# Patient Record
Sex: MALE | Race: WHITE | Employment: OTHER | URBAN - METROPOLITAN AREA
[De-identification: names, ages, dates, MRNs, and addresses within clinical notes are randomized per-mention and may not be internally consistent; named-entity substitution may affect disease eponyms.]

---

## 2017-01-16 ENCOUNTER — OFFICE VISIT (OUTPATIENT)
Dept: CARDIOLOGY CLINIC | Age: 77
End: 2017-01-16

## 2017-01-16 DIAGNOSIS — I48.91 ATRIAL FIBRILLATION, UNSPECIFIED TYPE (HCC): Primary | Chronic | ICD-10-CM

## 2017-01-27 DIAGNOSIS — I48.0 PAROXYSMAL ATRIAL FIBRILLATION (HCC): ICD-10-CM

## 2017-01-27 RX ORDER — APIXABAN 5 MG/1
TABLET, FILM COATED ORAL
Qty: 60 TAB | Refills: 6 | Status: SHIPPED | OUTPATIENT
Start: 2017-01-27 | End: 2017-09-23 | Stop reason: SDUPTHER

## 2017-02-16 ENCOUNTER — CLINICAL SUPPORT (OUTPATIENT)
Dept: CARDIOLOGY CLINIC | Age: 77
End: 2017-02-16

## 2017-02-16 DIAGNOSIS — Z45.09 ENCOUNTER FOR LOOP RECORDER CHECK: ICD-10-CM

## 2017-02-16 DIAGNOSIS — Z98.890 S/P ABLATION OF ATRIAL FIBRILLATION: Primary | ICD-10-CM

## 2017-02-16 DIAGNOSIS — I48.0 PAROXYSMAL ATRIAL FIBRILLATION (HCC): Chronic | ICD-10-CM

## 2017-02-16 DIAGNOSIS — Z86.79 S/P ABLATION OF ATRIAL FIBRILLATION: Primary | ICD-10-CM

## 2017-02-20 ENCOUNTER — OFFICE VISIT (OUTPATIENT)
Dept: CARDIOLOGY CLINIC | Age: 77
End: 2017-02-20

## 2017-02-20 DIAGNOSIS — I48.0 PAROXYSMAL ATRIAL FIBRILLATION (HCC): Primary | Chronic | ICD-10-CM

## 2017-03-15 RX ORDER — ROSUVASTATIN CALCIUM 40 MG/1
TABLET, COATED ORAL
Qty: 30 TAB | Refills: 3 | Status: SHIPPED | OUTPATIENT
Start: 2017-03-15 | End: 2017-07-17 | Stop reason: SDUPTHER

## 2017-03-23 ENCOUNTER — OFFICE VISIT (OUTPATIENT)
Dept: CARDIOLOGY CLINIC | Age: 77
End: 2017-03-23

## 2017-03-23 DIAGNOSIS — I48.0 PAROXYSMAL ATRIAL FIBRILLATION (HCC): Primary | Chronic | ICD-10-CM

## 2017-04-05 RX ORDER — METOPROLOL TARTRATE 25 MG/1
TABLET, FILM COATED ORAL
Qty: 30 TAB | Refills: 10 | Status: SHIPPED | OUTPATIENT
Start: 2017-04-05 | End: 2017-06-02 | Stop reason: SDUPTHER

## 2017-04-24 ENCOUNTER — OFFICE VISIT (OUTPATIENT)
Dept: CARDIOLOGY CLINIC | Age: 77
End: 2017-04-24

## 2017-04-24 DIAGNOSIS — I48.0 PAROXYSMAL ATRIAL FIBRILLATION (HCC): Primary | Chronic | ICD-10-CM

## 2017-05-09 ENCOUNTER — CLINICAL SUPPORT (OUTPATIENT)
Dept: CARDIOLOGY CLINIC | Age: 77
End: 2017-05-09

## 2017-05-09 ENCOUNTER — OFFICE VISIT (OUTPATIENT)
Dept: CARDIOLOGY CLINIC | Age: 77
End: 2017-05-09

## 2017-05-09 VITALS
SYSTOLIC BLOOD PRESSURE: 138 MMHG | OXYGEN SATURATION: 97 % | RESPIRATION RATE: 20 BRPM | HEART RATE: 55 BPM | WEIGHT: 283.6 LBS | BODY MASS INDEX: 38.41 KG/M2 | DIASTOLIC BLOOD PRESSURE: 68 MMHG | HEIGHT: 72 IN

## 2017-05-09 DIAGNOSIS — Z01.810 PRE-OPERATIVE CARDIOVASCULAR EXAMINATION: ICD-10-CM

## 2017-05-09 DIAGNOSIS — I48.91 ATRIAL FIBRILLATION, UNSPECIFIED TYPE (HCC): Primary | Chronic | ICD-10-CM

## 2017-05-09 DIAGNOSIS — E78.2 MIXED HYPERLIPIDEMIA: Chronic | ICD-10-CM

## 2017-05-09 RX ORDER — BLOOD SUGAR DIAGNOSTIC
STRIP MISCELLANEOUS
Qty: 100 STRIP | Refills: 2 | Status: SHIPPED | OUTPATIENT
Start: 2017-05-09 | End: 2018-07-05 | Stop reason: SDUPTHER

## 2017-05-09 RX ORDER — METFORMIN HYDROCHLORIDE 500 MG/1
TABLET, FILM COATED, EXTENDED RELEASE ORAL
Refills: 10 | COMMUNITY
Start: 2017-04-09 | End: 2017-05-09 | Stop reason: SDUPTHER

## 2017-05-09 NOTE — MR AVS SNAPSHOT
Visit Information Date & Time Provider Department Dept. Phone Encounter #  
 5/9/2017  1:45 PM Joe Lockwood, [address] [facility] [phone] [id number] Your Appointments 7/20/2017 11:30 AM  
ROUTINE CARE with MD Dwayne [facility]hena [facility]) Appt Note: 9 month follow-  up  
 [address] [facility], [facility] [address]  
[facility] [address] [place] [id number]  
  
    
  
 5/25/2017  8:00 AM  
REMOTE OFFICE VISIT with [facility]) Appt Note: NOT A OFFICE VISIT  REMOTE MDT ILR  
 0043 [address] [facility]  
[phone] [address] [facility] Upcoming Health Maintenance Date Due DTaP/Tdap/Td series (1 - Tdap) 1/25/1963 ZOSTER VACCINE AGE 60> 1/25/2002 Pneumococcal 65+ Low/Medium Risk (1 of 2 - PCV13) 1/25/2007 MEDICARE YEARLY EXAM 1/25/2007 GLAUCOMA SCREENING Q2Y 3/13/2015 EYE EXAM RETINAL OR DILATED Q1 9/24/2015 HEMOGLOBIN A1C Q6M 4/21/2017 MICROALBUMIN Q1 6/22/2017 LIPID PANEL Q1 6/22/2017 INFLUENZA AGE 9 TO ADULT 8/1/2017 FOOT EXAM Q1 10/20/2017 Allergies as of 5/9/2017  Review Complete On: 5/9/2017 By: Xochitl Silverio Severity Noted Reaction Type Reactions Ciprocinonide Medium 04/25/2012   Side Effect Unable to Obtain Causes anxiety Current Immunizations  Never Reviewed No immunizations on file. Not reviewed this visit You Were Diagnosed With   
  
 Codes Comments Atrial fibrillation, unspecified type ([facility]    -  Primary ICD-10-CM: I48.91 
ICD-9-CM: 427.31 Mixed hyperlipidemia     ICD-10-CM: E78.2 ICD-9-CM: 272.2 Vitals  BP Pulse Resp Height(growth percentile) Weight(growth percentile) SpO2  
 138/68 (BP 1 Location: Left arm, BP Patient Position: Sitting) (!) 55 20 6' (1.829 m) 283 lb 9.6 oz (128.6 kg) 97% BMI Smoking Status 38.46 kg/m2 Former Smoker Vitals History BMI and BSA Data Body Mass Index Body Surface Area  
 38.46 kg/m 2 2.56 m 2 Preferred Pharmacy Pharmacy Name Phone [id number] [address] [place] [phone] Your Updated Medication List  
  
   
This list is accurate as of: 5/9/17  2:24 PM.  Always use your most recent med list.  
  
  
  
  
 ACCU-CHEK YVES PLUS TEST STRP strip Generic drug:  glucose blood VI test strips USE TO TEST BLOOD SUGAR DAILY (DX E11.9)  
  
 cyanocobalamin 1,000 mcg tablet Take 1,000 mcg by mouth daily. ECOTRIN LOW STRENGTH 81 mg tablet Generic drug:  aspirin delayed-release Take  by mouth daily. ELIQUIS 5 mg tablet Generic drug:  apixaban TAKE 1 TABLET BY MOUTH TWO (2) TIMES A DAY. latanoprost 0.005 % ophthalmic solution Commonly known as:  Javi Samaniego Administer 1 Drop to both eyes nightly. metFORMIN  mg tablet Commonly known as:  GLUCOPHAGE XR  
TAKE 2 TABLETS BY MOUTH 2 TIMES DAILY WITH MEALS  
  
 * metoprolol tartrate 25 mg tablet Commonly known as:  LOPRESSOR  
TAKE 1/2 TABLET BY MOUTH TWICE A DAY  
  
 * metoprolol tartrate 25 mg tablet Commonly known as:  LOPRESSOR  
TAKE 1/2 TABLET BY MOUTH 2 TIMES DAILY  
  
 rosuvastatin 40 mg tablet Commonly known as:  CRESTOR  
TAKE 1 TABLET BY MOUTH EVERY DAY  
  
 * Notice: This list has 2 medication(s) that are the same as other medications prescribed for you. Read the directions carefully, and ask your doctor or other care provider to review them with you. We Performed the Following AMB POC EKG ROUTINE W/ 12 LEADS, INTER & REP [36606 CPT(R)] Please provide this summary of care documentation to your next provider. Your primary care clinician is listed as NONE.  If you have any questions after today's visit, please call [phone].

## 2017-05-09 NOTE — PROGRESS NOTES
Subjective:      Morgan Jimenez is a 78 y.o. male is here for follow up s/p AF ablation 2016. He reports dizziness upon standing occasionally. The patient denies chest pain/ shortness of breath, orthopnea, PND, LE edema, palpitations, syncope, presyncope or fatigue.        Patient Active Problem List    Diagnosis Date Noted    S/P ablation of atrial fibrillation 2016    [facility] 2016    Irregular heartbeat 2016    Postsurgical aortocoronary bypass status 2012    Mixed hyperlipidemia 2012    Coronary atherosclerosis of native coronary artery 2012    Atrial fibrillation ([facility] 2012      None  Past Medical History:   Diagnosis Date    Coronary atherosclerosis of native coronary artery     Diabetes mellitus, type II [facility]     Mixed hyperlipidemia     Other dyspnea and respiratory abnormality     S/P ablation of atrial fibrillation 2016      Past Surgical History:   Procedure Laterality Date    HX CORONARY ARTERY BYPASS GRAFT       Allergies   Allergen Reactions    Ciprocinonide Unable to Obtain     Causes anxiety      Family History   Problem Relation Age of Onset    Diabetes Mother     Emphysema Father       age 46 - smoker    negative for cardiac disease  Social History     Social History    Marital status:      Spouse name: N/A    Number of children: N/A    Years of education: N/A     Social History Main Topics    Smoking status: Former Smoker     Packs/day: 4.00     Years: 20.00     Types: Cigarettes     Quit date: 1980    Smokeless tobacco: None      Comment: QUIT AT AGE 42    Alcohol use No    Drug use: None    Sexual activity: Not Asked     Other Topics Concern    None     Social History Narrative     Current Outpatient Prescriptions   Medication Sig    metoprolol tartrate (LOPRESSOR) 25 mg tablet TAKE 1/2 TABLET BY MOUTH 2 TIMES DAILY    rosuvastatin (CRESTOR) 40 mg tablet TAKE 1 TABLET BY MOUTH EVERY DAY    ELIQUIS 5 mg tablet TAKE 1 TABLET BY MOUTH TWO (2) TIMES A DAY.  metFORMIN ER (GLUCOPHAGE XR) 500 mg tablet TAKE 2 TABLETS BY MOUTH 2 TIMES DAILY WITH MEALS    cyanocobalamin 1,000 mcg tablet Take 1,000 mcg by mouth daily.  ACCU-CHEK YVES PLUS TEST STRP strip USE TO TEST BLOOD SUGAR DAILY (DX E11.9)    metoprolol (LOPRESSOR) 25 mg tablet TAKE 1/2 TABLET BY MOUTH TWICE A DAY    aspirin delayed-release (ECOTRIN LOW STRENGTH) 81 mg tablet Take  by mouth daily.  latanoprost (XALATAN) 0.005 % ophthalmic solution Administer 1 Drop to both eyes nightly. No current facility-administered medications for this visit. Vitals:    05/09/17 1346   BP: 138/68   Pulse: (!) 55   Resp: 20   SpO2: 97%   Weight: 283 lb 9.6 oz (128.6 kg)   Height: 6' (1.829 m)       I have reviewed the nurses notes, vitals, problem list, allergy list, medical history, family, social history and medications. Review of Symptoms:    General: Pt denies excessive weight gain or loss. Pt is able to conduct ADL's  HEENT: +dizziness,Denies blurred vision, headaches, epistaxis and difficulty swallowing. Respiratory: Denies shortness of breath, MCCOLLUM, wheezing or stridor. Cardiovascular: Denies precordial pain, palpitations, edema or PND  Gastrointestinal: Denies poor appetite, indigestion, abdominal pain or blood in stool  Urinary: Denies dysuria, pyuria  Musculoskeletal: Denies pain or swelling from muscles or joints  Neurologic: Denies tremor, paresthesias, or sensory motor disturbance  Skin: Denies rash, itching or texture change. Psych: Denies depression      Physical Exam:      General: Well developed, in no acute distress. HEENT: Eyes - PERRL, no jvd  Heart:  Normal S1/S2 negative S3 or S4.  Regular, no murmur, gallop or rub.   Respiratory: Clear bilaterally x 4, no wheezing or rales  Abdomen:   Soft, non-tender, bowel sounds are active.   Extremities:  No edema, normal cap refill, no cyanosis. Musculoskeletal: No clubbing  Neuro: A&Ox3, speech clear, gait stable. Skin: Skin color is normal. No rashes or lesions. Non diaphoretic  Vascular: 2+ pulses symmetric in all extremities    Cardiographics    Ekg: sinus bradycardia  ILR negative    No results found for this or any previous visit. Lab Results   Component Value Date/Time    WBC 9.2 08/04/2016 05:11 AM    HGB 10.3 08/04/2016 05:11 AM    HCT 31.0 08/04/2016 05:11 AM    PLATELET 756 34/20/0269 05:11 AM    MCV 92.5 08/04/2016 05:11 AM      Lab Results   Component Value Date/Time    Sodium 140 08/04/2016 05:11 AM    Potassium 4.3 08/04/2016 05:11 AM    Chloride 109 08/04/2016 05:11 AM    CO2 21 08/04/2016 05:11 AM    Anion gap 10 08/04/2016 05:11 AM    Glucose 164 08/04/2016 05:11 AM    BUN 25 08/04/2016 05:11 AM    Creatinine 1.67 08/04/2016 05:11 AM    BUN/Creatinine ratio 15 08/04/2016 05:11 AM    GFR est AA 49 08/04/2016 05:11 AM    GFR est non-AA 40 08/04/2016 05:11 AM    Calcium 8.4 08/04/2016 05:11 AM    Bilirubin, total 0.5 07/26/2016 07:31 AM    AST (SGOT) 11 07/26/2016 07:31 AM    Alk. phosphatase 57 07/26/2016 07:31 AM    Protein, total 6.5 07/26/2016 07:31 AM    Albumin 4.2 07/26/2016 07:31 AM    A-G Ratio 1.8 07/26/2016 07:31 AM    ALT (SGPT) 15 07/26/2016 07:31 AM         Assessment:     Assessment:        ICD-10-CM ICD-9-CM    1. Atrial fibrillation, unspecified type ([facility] I48.91 427.31 AMB POC EKG ROUTINE W/ 12 LEADS, INTER & REP     Orders Placed This Encounter    AMB POC EKG ROUTINE W/ 12 LEADS, INTER & REP     Order Specific Question:   Reason for Exam:     Answer:   Routine    DISCONTD: metFORMIN (GLUMETZA ER) 500 mg TG24 24 hour tablet     Sig: TAKE 2 TABLETS BY MOUTH TWICE A DAY WITH MEALS     Refill:  10        Plan:   Mr. Aye Parker is here for f/u of AF s/p AF ablation 8/2016. He reports some dizziness with positional change- will increase hydration status. EKG is sinus bradycardia and ILR negative for events. Will continue medical therapy and follow up with Dr. Marga Brewster in one year. Echocardiogram last year demonstrated an EF of 50-55% and he denies sob, chest pain or other cardiac complaints. He is low risk of cardiac complications for upcoming cataract surgery. Continue medical management for hyperlipidemia. Dia Bansal NP     Thank you for allowing me to participate in Alesiha Antonia 's care.     Yassine Horton MD, Socrates Paredes

## 2017-05-09 NOTE — PROGRESS NOTES
Chief Complaint   Patient presents with    Irregular Heart Beat     6 mo f/u    Cholesterol Problem     \"

## 2017-05-25 ENCOUNTER — OFFICE VISIT (OUTPATIENT)
Dept: CARDIOLOGY CLINIC | Age: 77
End: 2017-05-25

## 2017-05-25 DIAGNOSIS — I48.91 ATRIAL FIBRILLATION, UNSPECIFIED TYPE (HCC): Primary | Chronic | ICD-10-CM

## 2017-06-02 ENCOUNTER — OFFICE VISIT (OUTPATIENT)
Dept: FAMILY MEDICINE CLINIC | Age: 77
End: 2017-06-02

## 2017-06-02 VITALS
DIASTOLIC BLOOD PRESSURE: 54 MMHG | WEIGHT: 288 LBS | SYSTOLIC BLOOD PRESSURE: 130 MMHG | RESPIRATION RATE: 18 BRPM | BODY MASS INDEX: 39.01 KG/M2 | HEART RATE: 55 BPM | HEIGHT: 72 IN | TEMPERATURE: 96.5 F | OXYGEN SATURATION: 97 %

## 2017-06-02 DIAGNOSIS — E11.9 CONTROLLED TYPE 2 DIABETES MELLITUS WITHOUT COMPLICATION, WITHOUT LONG-TERM CURRENT USE OF INSULIN (HCC): ICD-10-CM

## 2017-06-02 DIAGNOSIS — E78.5 HYPERLIPIDEMIA, UNSPECIFIED HYPERLIPIDEMIA TYPE: ICD-10-CM

## 2017-06-02 DIAGNOSIS — I48.91 ATRIAL FIBRILLATION, UNSPECIFIED TYPE (HCC): ICD-10-CM

## 2017-06-02 DIAGNOSIS — I25.810 CORONARY ARTERY DISEASE INVOLVING CORONARY BYPASS GRAFT OF NATIVE HEART WITHOUT ANGINA PECTORIS: ICD-10-CM

## 2017-06-02 DIAGNOSIS — H25.9 AGE-RELATED CATARACT OF BOTH EYES, UNSPECIFIED AGE-RELATED CATARACT TYPE: Primary | ICD-10-CM

## 2017-06-02 RX ORDER — OFLOXACIN 3 MG/ML
SOLUTION/ DROPS OPHTHALMIC
COMMUNITY
Start: 2017-05-26 | End: 2017-07-27

## 2017-06-02 NOTE — PATIENT INSTRUCTIONS
Continue same medication  Keep planned follow-up with cardiology and endocrinology and ophthalmology  Lab work today  As long as lab work within acceptable limits may proceed with planned cataract surgery    Recommend routine checkup and Medicare wellness visit at your convenience

## 2017-06-02 NOTE — PROGRESS NOTES
Iván Post is a 78 y.o. male who presents to the office today with the following:  Chief Complaint   Patient presents with    Pre-op Exam     cataract surgery,  and        Allergies   Allergen Reactions    Ciprocinonide Unable to Obtain     Causes anxiety       Current Outpatient Prescriptions   Medication Sig    ACCU-CHEK YVES PLUS TEST STRP strip USE TO TEST BLOOD SUGAR DAILY (DX E11.9)    rosuvastatin (CRESTOR) 40 mg tablet TAKE 1 TABLET BY MOUTH EVERY DAY    ELIQUIS 5 mg tablet TAKE 1 TABLET BY MOUTH TWO (2) TIMES A DAY.  metFORMIN ER (GLUCOPHAGE XR) 500 mg tablet TAKE 2 TABLETS BY MOUTH 2 TIMES DAILY WITH MEALS    cyanocobalamin 1,000 mcg tablet Take 1,000 mcg by mouth daily.  metoprolol (LOPRESSOR) 25 mg tablet TAKE 1/2 TABLET BY MOUTH TWICE A DAY    aspirin delayed-release (ECOTRIN LOW STRENGTH) 81 mg tablet Take  by mouth daily.  latanoprost (XALATAN) 0.005 % ophthalmic solution Administer 1 Drop to both eyes nightly.  ofloxacin (FLOXIN) 0.3 % ophthalmic solution      No current facility-administered medications for this visit. Past Medical History:   Diagnosis Date    Coronary atherosclerosis of native coronary artery     Diabetes mellitus, type II ([facility]     Mixed hyperlipidemia     Other dyspnea and respiratory abnormality     S/P ablation of atrial fibrillation 2016       Past Surgical History:   Procedure Laterality Date    HX CORONARY ARTERY BYPASS GRAFT         History   Smoking Status    Former Smoker    Packs/day: 4.00    Years: 20.00    Types: Cigarettes    Quit date: 1980   Smokeless Tobacco    Not on file     Comment: QUIT AT AGE 42       Family History   Problem Relation Age of Onset    Diabetes Mother    [address] Emphysema Father       age 46 - smoker         History of Present Illness:  Patient here for preop visit cataract surgery. Patient follows with ophthalmology. Found to have cataracts.   They are planning to do bilateral cataract surgery on 2 separate dates in June. Ophthalmology requested preop clearance. Physical form from ophthalmology presented by patient. Ophthalmology did not recommend/require discontinuation of anticoagulants prior to cataract surgery    Patient currently gets his care to the specialist in [place]. Is thinking of establishing in this area but probably not at this office because of location    Past medical problems. Patient has a history of CAD. Post bypass. No history of MI. Compliant with his medications. Follows with cardiology. He denies any symptoms. Specifically no chest pain shortness of breath palpitations. Patient does have sick sinus syndrome and atrial fibrillation. Status post ablation. On Eliquis and aspirin. He has an implantable cardiac monitor. Recently saw his cardiologist.  No cardiac complaints. His cardiologist noted in his most recent visit that this patient was low risk for cataract surgery. Patient does have diabetes. Compliant with metformin. He does follow with endocrinology. He denies any diabetic complaints. He does have obesity    Past medical history is otherwise negative    Past surgical history remarkable for ablation and coronary bypass. No surgical complications    EKG done by cardiology in May  Lab work ordered today      Review of Systems:    Review of systems negative except as noted above      Physical Exam:  Visit Vitals    /54 (BP 1 Location: Left arm, BP Patient Position: Sitting)    Pulse (!) 55    Temp 96.5 °F (35.8 °C) (Temporal)    Resp 18    Ht 6' (1.829 m)    Wt 288 lb (130.6 kg)    SpO2 97%    BMI 39.06 kg/m2     Vitals:    06/02/17 0945   BP: 130/54   BP 1 Location: Left arm   BP Patient Position: Sitting   Pulse: (!) 55   Resp: 18   Temp: 96.5 °F (35.8 °C)   TempSrc: Temporal   SpO2: 97%   Weight: 288 lb (130.6 kg)   Height: 6' (1.829 m)     Patient no acute distress vital signs stable as above.   Blood pressure controlled  Head is normocephalic  External ears normal.  Ear canals normal TMs were clear  Eyes PERRLA. EOMs full. Sclera clear. Full exam from ophthalmology  Nose within normal limits. Nares  normal.  No significant congestion  OP mucosa normal, no obvious lesions. Pharynx normal.  No erythema or exudate. Structures midline. No loose teeth or dentures  Neck no nodes no masses no bruits  Chest was clear no wheezes rhonchi or rales. Good air exchange  Cor regular rate and rhythm no S3-S4 no murmurs no ectopy  Abdomen obese no HSM no masses soft and was nontender  Extremities trace pretibial edema. Nontender. Good range of motion  Gait and gross neurologic exam were normal    Assessment/Plan:  1. Age-related cataract of both eyes, unspecified age-related cataract type  Patient here for preop evaluation cataract surgery. Exam was done. Labs ordered. Overall he appears medically stable. As long as his labs are acceptable I see no contraindications to him proceeding with the planned surgical procedure. I will complete his paperwork on receipt of his labs. Otherwise patient will continue with his routine medications. He will follow-up with the above providers. I recommended routine medical checkup at his convenience    2. Coronary artery disease involving coronary bypass graft of native heart without angina pectoris    - CBC WITH AUTOMATED DIFF    3. Atrial fibrillation, unspecified type (HCC)  Sinus rhythm today    4. Controlled type 2 diabetes mellitus without complication, without long-term current use of insulin ([facility]  Followed by endocrinology  - METABOLIC PANEL, BASIC    5. Hyperlipidemia, unspecified hyperlipidemia type      6.  BMI 39.0-39.9,adult      Patient Instructions   Continue same medication  Keep planned follow-up with cardiology and endocrinology and ophthalmology  Lab work today  As long as lab work within acceptable limits may proceed with planned cataract surgery    Recommend routine checkup and Medicare wellness visit at your convenience              Continue current therapy plan except for indicated above. Verbal and written instructions (see AVS) provided.  Patient expresses understanding of diagnosis and treatment plan. Follow-up Disposition:  Return if symptoms worsen or fail to improve. Luna Dalton.  Chad Garcia MD

## 2017-06-02 NOTE — MR AVS SNAPSHOT
Visit Information Date & Time Provider Department Dept. Phone Encounter #  
 6/2/2017 10:00 AM Allan Paredes MD [address] [id number] Follow-up Instructions Return if symptoms worsen or fail to improve. Your Appointments 7/20/2017 11:30 AM  
ROUTINE CARE with Nery Blair MD  
[facility]) Appt Note: 9 month follow-  up  
 [address] [facility] [address]  
[address] [address] [place] [id number] Upcoming Health Maintenance Date Due DTaP/Tdap/Td series (1 - Tdap) 1/25/1961 ZOSTER VACCINE AGE 60> 1/25/2002 Pneumococcal 65+ Low/Medium Risk (1 of 2 - PCV13) 1/25/2007 MEDICARE YEARLY EXAM 1/25/2007 GLAUCOMA SCREENING Q2Y 3/13/2015 EYE EXAM RETINAL OR DILATED Q1 9/24/2015 HEMOGLOBIN A1C Q6M 4/21/2017 MICROALBUMIN Q1 6/22/2017 LIPID PANEL Q1 6/22/2017 INFLUENZA AGE 9 TO ADULT 8/1/2017 FOOT EXAM Q1 10/20/2017 Allergies as of 6/2/2017  Review Complete On: 6/2/2017 By: Loretto Rinne, LPN Severity Noted Reaction Type Reactions Ciprocinonide Medium 04/25/2012   Side Effect Unable to Obtain Causes anxiety Current Immunizations  Never Reviewed No immunizations on file. Not reviewed this visit You Were Diagnosed With   
  
 Codes Comments Age-related cataract of both eyes, unspecified age-related cataract type    -  Primary ICD-10-CM: H25.9 ICD-9-CM: 366.10 Coronary artery disease involving coronary bypass graft of native heart without angina pectoris     ICD-10-CM: I25.810 ICD-9-CM: 414.05 Atrial fibrillation, unspecified type ([facility]     ICD-10-CM: I48.91 
ICD-9-CM: 427.31 Controlled type 2 diabetes mellitus without complication, without long-term current use of insulin ([facility]     ICD-10-CM: E11.9 ICD-9-CM: 250.00 Hyperlipidemia, unspecified hyperlipidemia type     ICD-10-CM: E78.5 ICD-9-CM: 272.4 BMI 39.0-39.9,adult     ICD-10-CM: Q17.00 ICD-9-CM: V85.39 Vitals BP Pulse Temp Resp Height(growth percentile) 130/54 (BP 1 Location: Left arm, BP Patient Position: Sitting) (!) 55 96.5 °F (35.8 °C) (Temporal) 18 6' (1.829 m) Weight(growth percentile) SpO2 BMI Smoking Status 288 lb (130.6 kg) 97% 39.06 kg/m2 Former Smoker BMI and BSA Data Body Mass Index Body Surface Area 39.06 kg/m 2 2.58 m 2 Preferred Pharmacy Pharmacy Name Phone [id number] [phone] Your Updated Medication List  
  
   
This list is accurate as of: 6/2/17 10:29 AM.  Always use your most recent med list.  
  
  
  
  
 ACCU-CHEK YVES PLUS TEST STRP strip Generic drug:  glucose blood VI test strips USE TO TEST BLOOD SUGAR DAILY (DX E11.9)  
  
 cyanocobalamin 1,000 mcg tablet Take 1,000 mcg by mouth daily. ECOTRIN LOW STRENGTH 81 mg tablet Generic drug:  aspirin delayed-release Take  by mouth daily. ELIQUIS 5 mg tablet Generic drug:  apixaban TAKE 1 TABLET BY MOUTH TWO (2) TIMES A DAY. latanoprost 0.005 % ophthalmic solution Commonly known as:  Yanet Sicilian Administer 1 Drop to both eyes nightly. metFORMIN  mg tablet Commonly known as:  GLUCOPHAGE XR  
TAKE 2 TABLETS BY MOUTH 2 TIMES DAILY WITH MEALS  
  
 metoprolol tartrate 25 mg tablet Commonly known as:  LOPRESSOR  
TAKE 1/2 TABLET BY MOUTH TWICE A DAY  
  
 ofloxacin 0.3 % ophthalmic solution Commonly known as:  FLOXIN  
  
 rosuvastatin 40 mg tablet Commonly known as:  CRESTOR  
TAKE 1 TABLET BY MOUTH EVERY DAY We Performed the Following CBC WITH AUTOMATED DIFF [78909 CPT(R)] METABOLIC PANEL, BASIC [82424 CPT(R)] Follow-up Instructions Return if symptoms worsen or fail to improve. Patient Instructions Continue same medication Keep planned follow-up with cardiology and endocrinology and ophthalmology Lab work today As long as lab work within acceptable limits may proceed with planned cataract surgery Recommend routine checkup and Medicare wellness visit at your convenience Please provide this summary of care documentation to your next provider. Your primary care clinician is listed as NONE. If you have any questions after today's visit, please call [phone].

## 2017-06-05 ENCOUNTER — TELEPHONE (OUTPATIENT)
Dept: FAMILY MEDICINE CLINIC | Age: 77
End: 2017-06-05

## 2017-06-05 PROBLEM — D64.9 ANEMIA: Status: ACTIVE | Noted: 2017-06-05

## 2017-06-05 NOTE — PROGRESS NOTES
Labs reviewed  Chemistry panel stable with glucose of 187 and mild renal insufficiency  Patient does have a mild normocytic anemia. Possibly due to his renal insufficiency.   Improved a bit since 2016  He may proceed with planned cataract surgery  Recommend he establish for primary care and follow-up to discuss his anemia and possible additional lab testing  In the interim I recommend he start over-the-counter vitamin with iron

## 2017-06-05 NOTE — PROGRESS NOTES
Patient notified about labs will make follow up appointment to establish care with Dr Chad Garcia to discuss anemia patient has a endocrinologist and will address sugars with them

## 2017-06-19 RX ORDER — METFORMIN HYDROCHLORIDE 500 MG/1
TABLET, EXTENDED RELEASE ORAL
Qty: 120 TAB | Refills: 10 | Status: SHIPPED | OUTPATIENT
Start: 2017-06-19 | End: 2018-06-26 | Stop reason: SDUPTHER

## 2017-06-26 ENCOUNTER — OFFICE VISIT (OUTPATIENT)
Dept: CARDIOLOGY CLINIC | Age: 77
End: 2017-06-26

## 2017-06-26 DIAGNOSIS — I48.91 ATRIAL FIBRILLATION, UNSPECIFIED TYPE (HCC): Primary | Chronic | ICD-10-CM

## 2017-07-13 LAB
BASOPHILS # BLD AUTO: 0 X10E3/UL (ref 0–0.2)
BASOPHILS NFR BLD AUTO: 0 %
BUN SERPL-MCNC: 28 MG/DL (ref 8–27)
BUN/CREAT SERPL: 18 (ref 10–24)
CALCIUM SERPL-MCNC: 9.3 MG/DL (ref 8.6–10.2)
CHLORIDE SERPL-SCNC: 101 MMOL/L (ref 96–106)
CO2 SERPL-SCNC: 21 MMOL/L (ref 18–29)
CREAT SERPL-MCNC: 1.57 MG/DL (ref 0.76–1.27)
EOSINOPHIL # BLD AUTO: 0.1 X10E3/UL (ref 0–0.4)
EOSINOPHIL NFR BLD AUTO: 1 %
ERYTHROCYTE [DISTWIDTH] IN BLOOD BY AUTOMATED COUNT: 14.6 % (ref 12.3–15.4)
GLUCOSE SERPL-MCNC: 187 MG/DL (ref 65–99)
HCT VFR BLD AUTO: 32.9 % (ref 37.5–51)
HGB BLD-MCNC: 10.6 G/DL (ref 12.6–17.7)
IMM GRANULOCYTES # BLD: 0 X10E3/UL (ref 0–0.1)
IMM GRANULOCYTES NFR BLD: 0 %
INTERPRETATION: NORMAL
LYMPHOCYTES # BLD AUTO: 1.1 X10E3/UL (ref 0.7–3.1)
LYMPHOCYTES NFR BLD AUTO: 17 %
MCH RBC QN AUTO: 29.9 PG (ref 26.6–33)
MCHC RBC AUTO-ENTMCNC: 32.2 G/DL (ref 31.5–35.7)
MCV RBC AUTO: 93 FL (ref 79–97)
MONOCYTES # BLD AUTO: 0.6 X10E3/UL (ref 0.1–0.9)
MONOCYTES NFR BLD AUTO: 9 %
NEUTROPHILS # BLD AUTO: 4.7 X10E3/UL (ref 1.4–7)
NEUTROPHILS NFR BLD AUTO: 73 %
PLATELET # BLD AUTO: 171 X10E3/UL (ref 150–379)
POTASSIUM SERPL-SCNC: 4.9 MMOL/L (ref 3.5–5.2)
RBC # BLD AUTO: 3.55 X10E6/UL (ref 4.14–5.8)
SODIUM SERPL-SCNC: 141 MMOL/L (ref 134–144)
WBC # BLD AUTO: 6.5 X10E3/UL (ref 3.4–10.8)

## 2017-07-17 RX ORDER — ROSUVASTATIN CALCIUM 40 MG/1
TABLET, COATED ORAL
Qty: 30 TAB | Refills: 3 | Status: SHIPPED | OUTPATIENT
Start: 2017-07-17 | End: 2017-12-04 | Stop reason: SDUPTHER

## 2017-07-27 ENCOUNTER — OFFICE VISIT (OUTPATIENT)
Dept: CARDIOLOGY CLINIC | Age: 77
End: 2017-07-27

## 2017-07-27 ENCOUNTER — OFFICE VISIT (OUTPATIENT)
Dept: ENDOCRINOLOGY | Age: 77
End: 2017-07-27

## 2017-07-27 VITALS
HEART RATE: 60 BPM | DIASTOLIC BLOOD PRESSURE: 48 MMHG | BODY MASS INDEX: 37.79 KG/M2 | HEIGHT: 72 IN | WEIGHT: 279 LBS | SYSTOLIC BLOOD PRESSURE: 109 MMHG

## 2017-07-27 DIAGNOSIS — N18.30 CKD (CHRONIC KIDNEY DISEASE) STAGE 3, GFR 30-59 ML/MIN (HCC): ICD-10-CM

## 2017-07-27 DIAGNOSIS — E11.9 CONTROLLED TYPE 2 DIABETES MELLITUS WITHOUT COMPLICATION, WITHOUT LONG-TERM CURRENT USE OF INSULIN (HCC): Primary | ICD-10-CM

## 2017-07-27 DIAGNOSIS — D64.9 ANEMIA, UNSPECIFIED TYPE: ICD-10-CM

## 2017-07-27 DIAGNOSIS — Z86.79 S/P ABLATION OF ATRIAL FIBRILLATION: ICD-10-CM

## 2017-07-27 DIAGNOSIS — I48.91 ATRIAL FIBRILLATION, UNSPECIFIED TYPE (HCC): Primary | Chronic | ICD-10-CM

## 2017-07-27 DIAGNOSIS — E78.2 MIXED HYPERLIPIDEMIA: Chronic | ICD-10-CM

## 2017-07-27 DIAGNOSIS — Z98.890 S/P ABLATION OF ATRIAL FIBRILLATION: ICD-10-CM

## 2017-07-27 NOTE — MR AVS SNAPSHOT
Visit Information Date & Time Provider Department Dept. Phone Encounter #  
 7/27/2017  2:30 PM Brittaney Scott, [address] [facility] [phone] Follow-up Instructions Return in about 1 year (around 7/27/2018). Upcoming Health Maintenance Date Due DTaP/Tdap/Td series (1 - Tdap) 1/25/1963 ZOSTER VACCINE AGE 60> 11/25/2001 Pneumococcal 65+ Low/Medium Risk (1 of 2 - PCV13) 1/25/2007 MEDICARE YEARLY EXAM 1/25/2007 GLAUCOMA SCREENING Q2Y 3/13/2015 EYE EXAM RETINAL OR DILATED Q1 9/24/2015 HEMOGLOBIN A1C Q6M 4/21/2017 MICROALBUMIN Q1 6/22/2017 LIPID PANEL Q1 6/22/2017 INFLUENZA AGE 9 TO ADULT 8/1/2017 FOOT EXAM Q1 10/20/2017 Allergies as of 7/27/2017  Review Complete On: 7/27/2017 By: Brittaney Scott MD  
  
 Severity Noted Reaction Type Reactions Ciprocinonide Medium 04/25/2012   Side Effect Unable to Obtain Causes anxiety Current Immunizations  Never Reviewed No immunizations on file. Not reviewed this visit You Were Diagnosed With   
  
 Codes Comments Controlled type 2 diabetes mellitus without complication, without long-term current use of insulin ([facility]    -  Primary ICD-10-CM: E11.9 ICD-9-CM: 250.00 Mixed hyperlipidemia     ICD-10-CM: E78.2 ICD-9-CM: 272.2 Hyperlipidemia, unspecified hyperlipidemia type     ICD-10-CM: E78.5 ICD-9-CM: 272.4 S/P ablation of atrial fibrillation     ICD-10-CM: Z98.890, Z86.79 
ICD-9-CM: V45.89 BMI 37.0-37.9, adult     ICD-10-CM: Z68.37 ICD-9-CM: V85.37 Anemia, unspecified type     ICD-10-CM: D64.9 ICD-9-CM: 285.9 CKD (chronic kidney disease) stage 3, GFR 30-59 ml/min     ICD-10-CM: N18.3 ICD-9-CM: 116. 3 Vitals BP Pulse Height(growth percentile) Weight(growth percentile) BMI Smoking Status 109/48 60 6' (1.829 m) 279 lb (126.6 kg) 37.84 kg/m2 Former Smoker Vitals History BMI and BSA Data Body Mass Index Body Surface Area  
 37.84 kg/m 2 2.54 m 2 Preferred Pharmacy Pharmacy Name Phone [id number]"" [address] [place] [address] [place] [phone] Your Updated Medication List  
  
   
This list is accurate as of: 7/27/17  3:34 PM.  Always use your most recent med list.  
  
  
  
  
 ACCU-CHEK YVES PLUS TEST STRP strip Generic drug:  glucose blood VI test strips USE TO TEST BLOOD SUGAR DAILY (DX E11.9)  
  
 cyanocobalamin 1,000 mcg tablet Take 1,000 mcg by mouth daily. ECOTRIN LOW STRENGTH 81 mg tablet Generic drug:  aspirin delayed-release Take  by mouth daily. ELIQUIS 5 mg tablet Generic drug:  apixaban TAKE 1 TABLET BY MOUTH TWO (2) TIMES A DAY. metFORMIN  mg tablet Commonly known as:  GLUCOPHAGE XR  
TAKE 2 TABLETS BY MOUTH 2 TIMES DAILY WITH MEALS  
  
 metoprolol tartrate 25 mg tablet Commonly known as:  LOPRESSOR  
TAKE 1/2 TABLET BY MOUTH TWICE A DAY  
  
 rosuvastatin 40 mg tablet Commonly known as:  CRESTOR  
TAKE 1 TABLET BY MOUTH EVERY DAY We Performed the Following FERRITIN [22604 CPT(R)] HEMOGLOBIN A1C WITH EAG [73436 CPT(R)] LIPID PANEL [58122 CPT(R)] METABOLIC PANEL, COMPREHENSIVE [52571 CPT(R)] PHOSPHORUS [77127 CPT(R)] RETICULOCYTE COUNT [id number] CPT(R)] Follow-up Instructions Return in about 1 year (around 7/27/2018). Patient Instructions Diabetes type II Watch portions Aim for 30 + minutes of dedicated exercise most days (walking, kayaking, etc) Check sugars before meals and at bedtime 2 days on the 1st and 15th of the month Goals for blood sugars: 
Fasting  (less than 150) Other times -  (less than 180). Cholesterol: 
Continue Crestor 40 mg 
 
Kidneys: 
Blood pressure and diabetes control. Anemia-  May be due to kidney disease. Will check iron levels Please provide this summary of care documentation to your next provider. Your primary care clinician is listed as NONE. If you have any questions after today's visit, please call [phone].

## 2017-07-27 NOTE — PATIENT INSTRUCTIONS
Diabetes type II  Watch portions  Aim for 30 + minutes of dedicated exercise most days (walking, kayaking, etc)    Check sugars before meals and at bedtime 2 days on the 1st and 15th of the month    Goals for blood sugars:  Fasting  (less than 150)  Other times -  (less than 180). Cholesterol:  Continue Crestor 40 mg    Kidneys:  Blood pressure and diabetes control. Anemia-  May be due to kidney disease.  Will check iron levels

## 2017-07-27 NOTE — PROGRESS NOTES
History of Present Illness: Alexys Lawrence is a 78 y.o. male presents for follow-up of diabetes. He has CAD and is s/p CABG. Following with Dr Anahi Starkey office for this. Since last visit had atrial fibrillation and then atrial ablation. Taking Eliquis. Diabetes related complications:  + microalbuminuria   Sees eye doctor every 6 months for glaucoma - no retinopathy problems    Current diabetes regimen:  Metformin ER 2 g daily     Glucoses:   monitors every other day at various times. Most are in 130 range     Weight is overall stable over the last year. Diet:  Watching portions more. Lipids - generic rosuvastatin    Blood pressure - remains controlled. Taking only a small dose of metoprolol twice daily    Exercise: no dedicated exercise. Can resume walking, which he did in the past.  Also has a kayak. He is also anemic, which appears stable. This may be due to his chronic kidney disease. Past Medical History:   Diagnosis Date    Anemia 6/5/2017    Coronary atherosclerosis of native coronary artery     Diabetes mellitus, type II (HCC)     Mixed hyperlipidemia     Other dyspnea and respiratory abnormality     S/P ablation of atrial fibrillation 8/4/2016     Current Outpatient Prescriptions   Medication Sig    rosuvastatin (CRESTOR) 40 mg tablet TAKE 1 TABLET BY MOUTH EVERY DAY    metFORMIN ER (GLUCOPHAGE XR) 500 mg tablet TAKE 2 TABLETS BY MOUTH 2 TIMES DAILY WITH MEALS    ACCU-CHEK YVES PLUS TEST STRP strip USE TO TEST BLOOD SUGAR DAILY (DX E11.9)    ELIQUIS 5 mg tablet TAKE 1 TABLET BY MOUTH TWO (2) TIMES A DAY.  cyanocobalamin 1,000 mcg tablet Take 1,000 mcg by mouth daily.  metoprolol (LOPRESSOR) 25 mg tablet TAKE 1/2 TABLET BY MOUTH TWICE A DAY    aspirin delayed-release (ECOTRIN LOW STRENGTH) 81 mg tablet Take  by mouth daily.       ofloxacin (FLOXIN) 0.3 % ophthalmic solution     latanoprost (XALATAN) 0.005 % ophthalmic solution Administer 1 Drop to both eyes nightly. No current facility-administered medications for this visit. Allergies   Allergen Reactions    Ciprocinonide Unable to Obtain     Causes anxiety       Review of Systems:  - Eyes: no blurry vision or double vision  - Cardiovascular: no chest pain  - Respiratory: no shortness of breath  - Musculoskeletal: no myalgias  - Neurological: no numbness/tingling in extremities    Physical Examination:  Visit Vitals    /48    Pulse 60    Ht 6' (1.829 m)    Wt 279 lb (126.6 kg)    BMI 37.84 kg/m2   -   - General: pleasant, no distress, normal gait   HEENT: hearing intact, EOMI, clear sclera without icterus  - Cardiovascular: regular, normal rate   - Respiratory: normal effort  - Integumentary: no edema    Diabetic foot exam:     Right: Filament test normal sensation with micro filament   Pulse DP: 2+ (normal)   Deformities: None    - Psychiatric: normal mood and affect    Data Reviewed:   Component      Latest Ref Rng & Units 6/2/2017 6/2/2017 10/21/2016 6/22/2016          10:28 AM 10:28 AM  8:44 AM  9:12 AM   WBC      3.4 - 10.8 x10E3/uL  6.5     RBC      4.14 - 5.80 x10E6/uL  3.55 (L)     HGB      12.6 - 17.7 g/dL  10.6 (L)     HCT      37.5 - 51.0 %  32.9 (L)     MCV      79 - 97 fL  93     MCH      26.6 - 33.0 pg  29.9     MCHC      31.5 - 35.7 g/dL  32.2     RDW      12.3 - 15.4 %  14.6     PLATELET      916 - 929 x10E3/uL  171     NEUTROPHILS      %  73     Lymphocytes      %  17     MONOCYTES      %  9     EOSINOPHILS      %  1     BASOPHILS      %  0     ABS. NEUTROPHILS      1.4 - 7.0 x10E3/uL  4.7     Abs Lymphocytes      0.7 - 3.1 x10E3/uL  1.1     ABS. MONOCYTES      0.1 - 0.9 x10E3/uL  0.6     ABS. EOSINOPHILS      0.0 - 0.4 x10E3/uL  0.1     ABS. BASOPHILS      0.0 - 0.2 x10E3/uL  0.0     IMMATURE GRANULOCYTES      %  0     ABS. IMM.  GRANS.      0.0 - 0.1 x10E3/uL  0.0     Glucose      65 - 99 mg/dL 187 (H)      BUN      8 - 27 mg/dL 28 (H)      Creatinine      0.76 - 1.27 mg/dL 1.57 (H)      GFR est non-AA      >59 mL/min/1.73 42 (L)      GFR est AA      >59 mL/min/1.73 49 (L)      BUN/Creatinine ratio      10 - 24 18      Sodium      134 - 144 mmol/L 141      Potassium      3.5 - 5.2 mmol/L 4.9      Chloride      96 - 106 mmol/L 101      CO2      18 - 29 mmol/L 21      Calcium      8.6 - 10.2 mg/dL 9.3      Cholesterol, total      100 - 199 mg/dL    104   Triglyceride      0 - 149 mg/dL    122   HDL Cholesterol      >39 mg/dL    36 (L)   VLDL, calculated      5 - 40 mg/dL    24   LDL, calculated      0 - 99 mg/dL    44   Creatinine, urine      Not Estab. mg/dL       Microalbumin, urine      Not Estab. ug/mL       Microalbumin/Creat. Ratio      0.0 - 30.0 mg/g creat       Hemoglobin A1c, (calculated)      4.8 - 5.6 %   6.8 (H)    Estimated average glucose      mg/dL   148      Component      Latest Ref Rng & Units 6/22/2016           9:12 AM   WBC      3.4 - 10.8 x10E3/uL    RBC      4.14 - 5.80 x10E6/uL    HGB      12.6 - 17.7 g/dL    HCT      37.5 - 51.0 %    MCV      79 - 97 fL    MCH      26.6 - 33.0 pg    MCHC      31.5 - 35.7 g/dL    RDW      12.3 - 15.4 %    PLATELET      947 - 988 x10E3/uL    NEUTROPHILS      %    Lymphocytes      %    MONOCYTES      %    EOSINOPHILS      %    BASOPHILS      %    ABS. NEUTROPHILS      1.4 - 7.0 x10E3/uL    Abs Lymphocytes      0.7 - 3.1 x10E3/uL    ABS. MONOCYTES      0.1 - 0.9 x10E3/uL    ABS. EOSINOPHILS      0.0 - 0.4 x10E3/uL    ABS. BASOPHILS      0.0 - 0.2 x10E3/uL    IMMATURE GRANULOCYTES      %    ABS. IMM.  GRANS.      0.0 - 0.1 x10E3/uL    Glucose      65 - 99 mg/dL    BUN      8 - 27 mg/dL    Creatinine      0.76 - 1.27 mg/dL    GFR est non-AA      >59 mL/min/1.73    GFR est AA      >59 mL/min/1.73    BUN/Creatinine ratio      10 - 24    Sodium      134 - 144 mmol/L    Potassium      3.5 - 5.2 mmol/L    Chloride      96 - 106 mmol/L    CO2      18 - 29 mmol/L    Calcium      8.6 - 10.2 mg/dL    Cholesterol, total      100 - 199 mg/dL Triglyceride      0 - 149 mg/dL    HDL Cholesterol      >39 mg/dL    VLDL, calculated      5 - 40 mg/dL    LDL, calculated      0 - 99 mg/dL    Creatinine, urine      Not Estab. mg/dL 121.8   Microalbumin, urine      Not Estab. ug/mL 62.8   Microalbumin/Creat. Ratio      0.0 - 30.0 mg/g creat 51.6 (H)   Hemoglobin A1c, (calculated)      4.8 - 5.6 %    Estimated average glucose      mg/dL        Assessment/Plan:   1. Controlled type 2 diabetes mellitus without complication, without long-term current use of insulin (HCC)   Check hemoglobin A1c. Continue metformin  Encouraged dietary improvements to help with weight loss, and increasing exercise. If hemoglobin A1c is above goal, discussed adding Jardiance, and SGL T2 inhibitor, in order to lower glucoses further and also for the cardiovascular risk reduction indication   2. Mixed hyperlipidemia   Reassess. Continue rosuvastatin       5 S/P ablation of atrial fibrillation    6. BMI 37.0-37.9, adult   -Encouraged weight loss and dietary efforts   7. Anemia, unspecified type   Suspect this is due to kidney dysfunction. Will check ferritin level. Overall, this appears stable over the last year   8 CKD (chronic kidney disease) stage 3, GFR 30-59 ml/min   Reassess. This appears stable. With anemia, may be worth considering nephrology evaluation     Patient Instructions   Diabetes type II  Watch portions  Aim for 30 + minutes of dedicated exercise most days (walking, kayaking, etc)    Check sugars before meals and at bedtime 2 days on the 1st and 15th of the month    Goals for blood sugars:  Fasting  (less than 150)  Other times -  (less than 180). Cholesterol:  Continue Crestor 40 mg    Kidneys:  Blood pressure and diabetes control. Anemia-  May be due to kidney disease. Will check iron levels      Follow-up Disposition:  Return in about 1 year (around 7/27/2018).     Copy sent to:

## 2017-08-01 LAB
ALBUMIN SERPL-MCNC: 4.4 G/DL (ref 3.5–4.8)
ALBUMIN/CREAT UR: 123.9 MG/G CREAT (ref 0–30)
ALBUMIN/GLOB SERPL: 2.2 {RATIO} (ref 1.2–2.2)
ALP SERPL-CCNC: 56 IU/L (ref 39–117)
ALT SERPL-CCNC: 13 IU/L (ref 0–44)
AST SERPL-CCNC: 14 IU/L (ref 0–40)
BILIRUB SERPL-MCNC: 0.3 MG/DL (ref 0–1.2)
BUN SERPL-MCNC: 27 MG/DL (ref 8–27)
BUN/CREAT SERPL: 16 (ref 10–24)
CALCIUM SERPL-MCNC: 8.9 MG/DL (ref 8.6–10.2)
CHLORIDE SERPL-SCNC: 104 MMOL/L (ref 96–106)
CHOLEST SERPL-MCNC: 87 MG/DL (ref 100–199)
CO2 SERPL-SCNC: 21 MMOL/L (ref 18–29)
CREAT SERPL-MCNC: 1.73 MG/DL (ref 0.76–1.27)
CREAT UR-MCNC: 110.4 MG/DL
EST. AVERAGE GLUCOSE BLD GHB EST-MCNC: 157 MG/DL
FERRITIN SERPL-MCNC: 12 NG/ML (ref 30–400)
GLOBULIN SER CALC-MCNC: 2 G/DL (ref 1.5–4.5)
GLUCOSE SERPL-MCNC: 136 MG/DL (ref 65–99)
HBA1C MFR BLD: 7.1 % (ref 4.8–5.6)
HDLC SERPL-MCNC: 31 MG/DL
LDLC SERPL CALC-MCNC: 22 MG/DL (ref 0–99)
MICROALBUMIN UR-MCNC: 136.8 UG/ML
PHOSPHATE SERPL-MCNC: 3 MG/DL (ref 2.5–4.5)
POTASSIUM SERPL-SCNC: 4.6 MMOL/L (ref 3.5–5.2)
PROT SERPL-MCNC: 6.4 G/DL (ref 6–8.5)
RETICS/RBC NFR AUTO: 1.3 % (ref 0.6–2.6)
SODIUM SERPL-SCNC: 141 MMOL/L (ref 134–144)
TRIGL SERPL-MCNC: 172 MG/DL (ref 0–149)
VLDLC SERPL CALC-MCNC: 34 MG/DL (ref 5–40)

## 2017-08-28 ENCOUNTER — OFFICE VISIT (OUTPATIENT)
Dept: CARDIOLOGY CLINIC | Age: 77
End: 2017-08-28

## 2017-08-28 DIAGNOSIS — I48.91 ATRIAL FIBRILLATION, UNSPECIFIED TYPE (HCC): Primary | Chronic | ICD-10-CM

## 2017-10-02 ENCOUNTER — OFFICE VISIT (OUTPATIENT)
Dept: CARDIOLOGY CLINIC | Age: 77
End: 2017-10-02

## 2017-10-02 DIAGNOSIS — I48.0 PAROXYSMAL ATRIAL FIBRILLATION (HCC): ICD-10-CM

## 2017-10-02 DIAGNOSIS — I48.91 ATRIAL FIBRILLATION, UNSPECIFIED TYPE (HCC): Primary | Chronic | ICD-10-CM

## 2017-10-03 RX ORDER — APIXABAN 5 MG/1
TABLET, FILM COATED ORAL
Qty: 60 TAB | Refills: 6 | Status: SHIPPED | OUTPATIENT
Start: 2017-10-03 | End: 2018-04-10 | Stop reason: SDUPTHER

## 2017-11-02 ENCOUNTER — OFFICE VISIT (OUTPATIENT)
Dept: CARDIOLOGY CLINIC | Age: 77
End: 2017-11-02

## 2017-11-02 DIAGNOSIS — I48.91 ATRIAL FIBRILLATION, UNSPECIFIED TYPE (HCC): Primary | Chronic | ICD-10-CM

## 2017-12-04 ENCOUNTER — OFFICE VISIT (OUTPATIENT)
Dept: CARDIOLOGY CLINIC | Age: 77
End: 2017-12-04

## 2017-12-04 DIAGNOSIS — I48.91 ATRIAL FIBRILLATION, UNSPECIFIED TYPE (HCC): Primary | Chronic | ICD-10-CM

## 2017-12-04 RX ORDER — ROSUVASTATIN CALCIUM 40 MG/1
TABLET, COATED ORAL
Qty: 30 TAB | Refills: 3 | Status: SHIPPED | OUTPATIENT
Start: 2017-12-04 | End: 2018-04-19 | Stop reason: SDUPTHER

## 2018-01-04 ENCOUNTER — OFFICE VISIT (OUTPATIENT)
Dept: CARDIOLOGY CLINIC | Age: 78
End: 2018-01-04

## 2018-01-04 DIAGNOSIS — I48.91 ATRIAL FIBRILLATION, UNSPECIFIED TYPE (HCC): Primary | Chronic | ICD-10-CM

## 2018-02-05 ENCOUNTER — OFFICE VISIT (OUTPATIENT)
Dept: CARDIOLOGY CLINIC | Age: 78
End: 2018-02-05

## 2018-02-05 DIAGNOSIS — I48.91 ATRIAL FIBRILLATION, UNSPECIFIED TYPE (HCC): Primary | Chronic | ICD-10-CM

## 2018-03-08 ENCOUNTER — CLINICAL SUPPORT (OUTPATIENT)
Dept: CARDIOLOGY CLINIC | Age: 78
End: 2018-03-08

## 2018-03-08 DIAGNOSIS — I48.0 PAROXYSMAL ATRIAL FIBRILLATION (HCC): Primary | Chronic | ICD-10-CM

## 2018-04-09 ENCOUNTER — CLINICAL SUPPORT (OUTPATIENT)
Dept: CARDIOLOGY CLINIC | Age: 78
End: 2018-04-09

## 2018-04-09 DIAGNOSIS — I48.91 ATRIAL FIBRILLATION, UNSPECIFIED TYPE (HCC): Primary | Chronic | ICD-10-CM

## 2018-04-10 ENCOUNTER — CLINICAL SUPPORT (OUTPATIENT)
Dept: CARDIOLOGY CLINIC | Age: 78
End: 2018-04-10

## 2018-04-10 ENCOUNTER — OFFICE VISIT (OUTPATIENT)
Dept: CARDIOLOGY CLINIC | Age: 78
End: 2018-04-10

## 2018-04-10 VITALS
HEART RATE: 60 BPM | HEIGHT: 72 IN | SYSTOLIC BLOOD PRESSURE: 128 MMHG | RESPIRATION RATE: 16 BRPM | OXYGEN SATURATION: 96 % | BODY MASS INDEX: 39.06 KG/M2 | WEIGHT: 288.4 LBS | DIASTOLIC BLOOD PRESSURE: 58 MMHG

## 2018-04-10 DIAGNOSIS — Z45.09 ENCOUNTER FOR LOOP RECORDER CHECK: ICD-10-CM

## 2018-04-10 DIAGNOSIS — I25.10 ATHEROSCLEROSIS OF NATIVE CORONARY ARTERY OF NATIVE HEART WITHOUT ANGINA PECTORIS: ICD-10-CM

## 2018-04-10 DIAGNOSIS — I25.111 CORONARY ARTERY DISEASE INVOLVING NATIVE CORONARY ARTERY OF NATIVE HEART WITH ANGINA PECTORIS WITH DOCUMENTED SPASM (HCC): Primary | ICD-10-CM

## 2018-04-10 DIAGNOSIS — Z86.79 S/P ABLATION OF ATRIAL FIBRILLATION: Primary | ICD-10-CM

## 2018-04-10 DIAGNOSIS — Z98.890 S/P ABLATION OF ATRIAL FIBRILLATION: Primary | ICD-10-CM

## 2018-04-10 DIAGNOSIS — Z86.79 S/P ABLATION OF ATRIAL FIBRILLATION: ICD-10-CM

## 2018-04-10 DIAGNOSIS — I48.91 ATRIAL FIBRILLATION, UNSPECIFIED TYPE (HCC): Chronic | ICD-10-CM

## 2018-04-10 DIAGNOSIS — I49.9 IRREGULAR HEARTBEAT: ICD-10-CM

## 2018-04-10 DIAGNOSIS — R55 SYNCOPE AND COLLAPSE: ICD-10-CM

## 2018-04-10 DIAGNOSIS — I48.0 PAROXYSMAL ATRIAL FIBRILLATION (HCC): Chronic | ICD-10-CM

## 2018-04-10 DIAGNOSIS — E78.5 HYPERLIPIDEMIA, UNSPECIFIED HYPERLIPIDEMIA TYPE: ICD-10-CM

## 2018-04-10 DIAGNOSIS — Z98.890 S/P ABLATION OF ATRIAL FIBRILLATION: ICD-10-CM

## 2018-04-10 DIAGNOSIS — I49.5 SSS (SICK SINUS SYNDROME) (HCC): ICD-10-CM

## 2018-04-10 DIAGNOSIS — E78.2 MIXED HYPERLIPIDEMIA: Chronic | ICD-10-CM

## 2018-04-10 PROBLEM — E66.01 SEVERE OBESITY (BMI 35.0-39.9) WITH COMORBIDITY (HCC): Status: ACTIVE | Noted: 2018-04-10

## 2018-04-10 PROBLEM — E11.21 TYPE 2 DIABETES WITH NEPHROPATHY (HCC): Status: ACTIVE | Noted: 2018-04-10

## 2018-04-10 RX ORDER — GLUCOSAMINE SULFATE 1500 MG
1000 POWDER IN PACKET (EA) ORAL DAILY
COMMUNITY

## 2018-04-10 RX ORDER — FERROUS SULFATE 324(65)MG
324 TABLET, DELAYED RELEASE (ENTERIC COATED) ORAL
COMMUNITY
End: 2019-10-25

## 2018-04-10 NOTE — PROGRESS NOTES
Chief Complaint   Patient presents with    Irregular Heart Beat     annual follow up, C/O off/on dizziness     1. Have you been to the ER, urgent care clinic since your last visit? Hospitalized since your last visit? No    2. Have you seen or consulted any other health care providers outside of the [address] [place] [address] since your last visit? Include any pap smears or colon screening.  No

## 2018-04-10 NOTE — PROGRESS NOTES
Subjective:      Alexys Lawrence is a 70 y.o. male is here for follow up s/p AF ablation 8/2016. PMHX of CABG x 2 in 2002, AF ablation, HTN, hyperlipidemia, DM and The patient denies shortness of breath, orthopnea, PND, LE edema, palpitations, or fatigue. He admits to 3 syncopal episodes in the last 4 mo. The last episode [place]. He is aware what is happening, his legs give out and he is down 30-40 seconds. In addition, he acquired a dog 3- 4 mo ago and is aware of a burning/winded feeling in his sternum when he walks the dog. He admits this is more exertion than he did prior to getting the dog. Patient Active Problem List    Diagnosis Date Noted    Type 2 diabetes with nephropathy ([address] 04/10/2018    Severe obesity (BMI 35.0-39. 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 angina pectoris 06/02/2017    Age-related cataract of both eyes 06/02/2017    S/P ablation of atrial fibrillation 08/04/2016    SSS (sick sinus syndrome) ([address] 07/19/2016    Irregular heartbeat 05/31/2016    Postsurgical aortocoronary bypass status 04/25/2012    Mixed hyperlipidemia 04/25/2012    Coronary atherosclerosis of native coronary artery 04/25/2012    Atrial fibrillation ([address] 04/25/2012      None  Past Medical History:   Diagnosis Date    Anemia 6/5/2017    Coronary atherosclerosis of native coronary artery     Diabetes mellitus, type II ([address]     Mixed hyperlipidemia     Other dyspnea and respiratory abnormality     S/P ablation of atrial fibrillation 8/4/2016      Past Surgical History:   Procedure Laterality Date    HX CORONARY ARTERY BYPASS GRAFT  2001     Allergies   Allergen Reactions  Ciprocinonide Unable to Obtain     Causes anxiety      Family History   Problem Relation Age of Onset    Diabetes Mother     Emphysema Father       age 46 - smoker    negative for cardiac disease  Social History     Social History    Marital status:      Spouse name: N/A    Number of children: N/A    Years of education: N/A     Social History Main Topics    Smoking status: Former Smoker     Packs/day: 4.00     Years: 20.00     Types: Cigarettes     Quit date: 1980    Smokeless tobacco: Former User      Comment: [employer] AT AGE 42    Alcohol use No    Drug use: None    Sexual activity: Not Asked     Other Topics Concern    None     Social History Narrative     Current Outpatient Prescriptions   Medication Sig    cholecalciferol (VITAMIN D3) 1,000 unit cap Take  by mouth daily.  ferrous sulfate 324 mg (65 mg iron) tablet Take  by mouth Daily (before breakfast).  rosuvastatin (CRESTOR) 40 mg tablet TAKE 1 TABLET BY MOUTH EVERY DAY    ELIQUIS 5 mg tablet TAKE 1 TABLET BY MOUTH TWO (2) TIMES A DAY.  metFORMIN ER (GLUCOPHAGE XR) 500 mg tablet TAKE 2 TABLETS BY MOUTH 2 TIMES DAILY WITH MEALS    ACCU-CHEK YVES PLUS TEST STRP strip USE TO TEST BLOOD SUGAR DAILY (DX E11.9)    cyanocobalamin 1,000 mcg tablet Take 1,000 mcg by mouth daily.  metoprolol (LOPRESSOR) 25 mg tablet TAKE 1/2 TABLET BY MOUTH TWICE A DAY    aspirin delayed-release (ECOTRIN LOW STRENGTH) 81 mg tablet Take  by mouth daily. No current facility-administered medications for this visit. Vitals:    04/10/18 1500 04/10/18 1514 04/10/18 1515   BP: 122/60 124/60 128/58   Pulse: 60     Resp: 16     SpO2: 96%     Weight: 288 lb 6.4 oz (130.8 kg)     Height: 6' (1.829 m)         I have reviewed the nurses notes, vitals, problem list, allergy list, medical history, family, social history and medications. Review of Symptoms:    General: Pt denies excessive weight gain or loss.  Pt is able to conduct ADL's  HEENT: Denies blurred vision, headaches, epistaxis and difficulty swallowing. Respiratory: Denies shortness of breath, MCCOLLUM, wheezing or stridor. Cardiovascular: Denies precordial pain, palpitations, edema or PND  Gastrointestinal: Denies poor appetite, indigestion, abdominal pain or blood in stool  Urinary: Denies dysuria, pyuria  Musculoskeletal: Denies pain or swelling from muscles or joints  Neurologic: Denies tremor, paresthesias, or sensory motor disturbance  Skin: Denies rash, itching or texture change. Psych: Denies depression      Physical Exam:      General: Well developed, in no acute distress. HEENT: Eyes - PERRL, no jvd  Heart:  Normal S1/S2 negative S3 or S4. Regular, no murmur, gallop or rub.   Respiratory: Clear bilaterally x 4, no wheezing or rales  Extremities:  No edema, normal cap refill, no cyanosis. Musculoskeletal: No clubbing  Neuro: A&Ox3, speech clear, gait stable. Skin: Skin color is normal. No rashes or lesions. Non diaphoretic  Vascular: 2+ pulses symmetric in all extremities    Cardiographics    Ekg: Sinus  Bradycardia   -Nonspecific QRS widening. Ventricular rate 53. No results found for this or any previous visit.       Lab Results   Component Value Date/Time    WBC 6.5 06/02/2017 10:28 AM    HGB 10.6 (L) 06/02/2017 10:28 AM    HCT 32.9 (L) 06/02/2017 10:28 AM    PLATELET 652 51/86/6290 10:28 AM    MCV 93 06/02/2017 10:28 AM      Lab Results   Component Value Date/Time    Sodium 141 07/31/2017 08:01 AM    Potassium 4.6 07/31/2017 08:01 AM    Chloride 104 07/31/2017 08:01 AM    CO2 21 07/31/2017 08:01 AM    Anion gap 10 08/04/2016 05:11 AM    Glucose 136 (H) 07/31/2017 08:01 AM    BUN 27 07/31/2017 08:01 AM    Creatinine 1.73 (H) 07/31/2017 08:01 AM    BUN/Creatinine ratio 16 07/31/2017 08:01 AM    GFR est AA 44 (L) 07/31/2017 08:01 AM    GFR est non-AA 38 (L) 07/31/2017 08:01 AM    Calcium 8.9 07/31/2017 08:01 AM    Bilirubin, total 0.3 07/31/2017 08:01 AM    AST (SGOT) 14 07/31/2017 08:01 AM    Alk. phosphatase 56 07/31/2017 08:01 AM    Protein, total 6.4 07/31/2017 08:01 AM    Albumin 4.4 07/31/2017 08:01 AM    A-G Ratio 2.2 07/31/2017 08:01 AM    ALT (SGPT) 13 07/31/2017 08:01 AM      No results found for: TSH, TSH2, TSH3, TSHP, TSHEXT, TSHEXT     Assessment:          ICD-10-CM ICD-9-CM    1. Coronary artery disease involving native coronary artery of native heart with angina pectoris with documented spasm ([facility] I25.111 414.01 2D ECHO COMPLETE ADULT (TTE) W OR WO CONTR     413.9 STRESS TEST LEXISCAN/CARDIOLITE   2. Atrial fibrillation, unspecified type (HCC) I48.91 427.31 AMB POC EKG ROUTINE W/ 12 LEADS, INTER & REP      2D ECHO COMPLETE ADULT (TTE) W OR WO CONTR      STRESS TEST LEXISCAN/CARDIOLITE   3. Mixed hyperlipidemia E78.2 272.2 2D ECHO COMPLETE ADULT (TTE) W OR WO CONTR      STRESS TEST LEXISCAN/CARDIOLITE   4. Atherosclerosis of native coronary artery of native heart without angina pectoris I25.10 414.01 2D ECHO COMPLETE ADULT (TTE) W OR WO CONTR      STRESS TEST LEXISCAN/CARDIOLITE   5. Hyperlipidemia, unspecified hyperlipidemia type E78.5 272.4 2D ECHO COMPLETE ADULT (TTE) W OR WO CONTR      STRESS TEST LEXISCAN/CARDIOLITE   6. Irregular heartbeat I49.9 427.9 2D ECHO COMPLETE ADULT (TTE) W OR WO CONTR      STRESS TEST LEXISCAN/CARDIOLITE   7.  S/P ablation of atrial fibrillation Z98.890 V45.89 2D ECHO COMPLETE ADULT (TTE) W OR WO CONTR    Z86.79  STRESS TEST LEXISCAN/CARDIOLITE     Orders Placed This Encounter    AMB POC EKG ROUTINE W/ 12 LEADS, INTER & REP     Order Specific Question:   Reason for Exam:     Answer:   routine    LEXISCAN/CARDIOLITE, Clinic Performed     Standing Status:   Future     Standing Expiration Date:   10/10/2018     Order Specific Question:   Reason for Exam:     Answer:   angina, syncope, CAD, S/P CABG in 2002    2D ECHO COMPLETE ADULT (TTE) W OR WO CONTR     Standing Status:   Future     Standing Expiration Date:   10/10/2018 Order Specific Question:   Reason for Exam:     Answer:   angina, s/p CABG     Order Specific Question:   Contrast Enhancement (Bubble Study, Definity, Optison) may be used if criteria listed in established evidence-based protocol has been identified. Answer: Yes    cholecalciferol (VITAMIN D3) 1,000 unit cap     Sig: Take  by mouth daily.  ferrous sulfate 324 mg (65 mg iron) tablet     Sig: Take  by mouth Daily (before breakfast). Plan:     Mr. Gaby Giordano is here for f/u of A, s/p AF ablation 8/2016. He reports 3 episodes of syncope. EKG is sinus bradycardia and ILR negative for events except bradycardia in the 40s. In addition, he reports exertional angina. Will get nuclear stress test and echo. If that is negative, he will benefit from a pacemaker for his sick sinus syndrome. He will f/u in 2 weeks for results. Cont med rx for cad, htn and dm. Continue medical management of ASHD, HTN, DM and hyperlipidemia. Thank you for allowing me to participate in Mario Alcaraz 's care.       CHAD Buchanan MD, [facility] [place], [facility]

## 2018-04-10 NOTE — MR AVS SNAPSHOT
[address] [facility] 
[phone] Patient: Kristal Petty MRN: [id number] TSC:3/68/9179 Visit Information Date & Time Provider Department Dept. Phone Encounter #  
 4/10/2018  3:15 PM Cyrus Michaud, [address] [place] [address] [facility] [phone] [id number] Your Appointments 4/24/2018  9:00 AM  
ECHO CARDIOGRAMS 2D with ECHO, [facility]) Appt Note: . [address]  
[phone] [address]  
  
    
 4/24/2018 10:00 AM  
NUCLEAR MEDICINE with NUCLEAR, [facility]) Appt Note: . [address]  
[phone] [address]  
  
    
 7/26/2018  9:50 AM  
ROUTINE CARE with MD Dwayne Hahn [facility]) Appt Note: 1yr f/u thyroid cp0.00  
 [address] [place] [address]  
[address] [facility] [address] [address] Upcoming Health Maintenance Date Due DTaP/Tdap/Td series (1 - Tdap) 1/25/1963 ZOSTER VACCINE AGE 60> 11/25/2001 Pneumococcal 65+ Low/Medium Risk (1 of 2 - PCV13) 1/25/2007 GLAUCOMA SCREENING Q2Y 3/13/2015 EYE EXAM RETINAL OR DILATED Q1 9/24/2015 Influenza Age 5 to Adult 8/1/2017 FOOT EXAM Q1 10/20/2017 HEMOGLOBIN A1C Q6M 1/31/2018 MEDICARE YEARLY EXAM 3/14/2018 MICROALBUMIN Q1 7/31/2018 LIPID PANEL Q1 7/31/2018 Allergies as of 4/10/2018  Review Complete On: 4/10/2018 By: Carmine Carson LPN Severity Noted Reaction Type Reactions Ciprocinonide Medium 04/25/2012   Side Effect Unable to Obtain Causes anxiety Current Immunizations  Never Reviewed No immunizations on file. Not reviewed this visit You Were Diagnosed With   
  
 Codes Comments Coronary artery disease involving native coronary artery of native heart with angina pectoris with documented spasm ([facility]    -  Primary ICD-10-CM: I25.111 ICD-9-CM: 414.01, 413.9 Atrial fibrillation, unspecified type ([facility].)     ICD-10-CM: I48.91 
ICD-9-CM: 427.31 Mixed hyperlipidemia     ICD-10-CM: E78.2 ICD-9-CM: 272.2 Atherosclerosis of native coronary artery of native heart without angina pectoris     ICD-10-CM: I25.10 ICD-9-CM: 414.01 Hyperlipidemia, unspecified hyperlipidemia type     ICD-10-CM: E78.5 ICD-9-CM: 272.4 Irregular heartbeat     ICD-10-CM: I49.9 ICD-9-CM: 427.9 S/P ablation of atrial fibrillation     ICD-10-CM: Z98.890, Z86.79 
ICD-9-CM: V45.89 Vitals BP Pulse Resp Height(growth percentile) Weight(growth percentile) SpO2  
 128/58 (BP 1 Location: Left arm, BP Patient Position: Standing) 60 16 6' (1.829 m) 288 lb 6.4 oz (130.8 kg) 96% BMI Smoking Status 39.11 kg/m2 Former Smoker Vitals History BMI and BSA Data Body Mass Index Body Surface Area  
 39.11 kg/m 2 2.58 m 2 Preferred Pharmacy Pharmacy Name Phone [id number] Flores [address] [phone] Your Updated Medication List  
  
   
This list is accurate as of 4/10/18  3:52 PM.  Always use your most recent med list.  
  
  
  
  
 ACCU-CHEK YVES PLUS TEST STRP strip Generic drug:  glucose blood VI test strips USE TO TEST BLOOD SUGAR DAILY (DX E11.9)  
  
 cyanocobalamin 1,000 mcg tablet Take 1,000 mcg by mouth daily. ECOTRIN LOW STRENGTH 81 mg tablet Generic drug:  aspirin delayed-release Take  by mouth daily. ELIQUIS 5 mg tablet Generic drug:  apixaban TAKE 1 TABLET BY MOUTH TWO (2) TIMES A DAY. ferrous sulfate 324 mg (65 mg iron) tablet Take  by mouth Daily (before breakfast). metFORMIN  mg tablet Commonly known as:  GLUCOPHAGE XR  
TAKE 2 TABLETS BY MOUTH 2 TIMES DAILY WITH MEALS  
  
 metoprolol tartrate 25 mg tablet Commonly known as:  LOPRESSOR  
TAKE 1/2 TABLET BY MOUTH TWICE A DAY  
  
 rosuvastatin 40 mg tablet Commonly known as:  CRESTOR  
TAKE 1 TABLET BY MOUTH EVERY DAY  
  
 VITAMIN D3 1,000 unit Cap Generic drug:  cholecalciferol Take  by mouth daily. We Performed the Following AMB POC EKG ROUTINE W/ 12 LEADS, INTER & REP [82839 CPT(R)] To-Do List   
 Around 04/10/2018 ECHO:  2D ECHO COMPLETE ADULT (TTE) W OR WO CONTR Around 04/10/2018 ECG:  STRESS TEST LEXISCAN/CARDIOLITE Introducing [facility] & HEALTH SERVICES! Dear Kelton Hendricks: 
Thank you for requesting a [employer] account. Our records indicate that you have previously registered for a [employer] account but its currently inactive. Please call our [employer] support line at [phone]. Additional Information If you have questions, please visit the Frequently Asked Questions section of the [employer] website at [email or web address]. Remember, [employer] is NOT to be used for urgent needs. For medical emergencies, dial 911. Now available from your iPhone and Android! Please provide this summary of care documentation to your next provider. Your primary care clinician is listed as NONE. If you have any questions after today's visit, please call [phone].

## 2018-04-19 RX ORDER — ROSUVASTATIN CALCIUM 40 MG/1
TABLET, COATED ORAL
Qty: 30 TAB | Refills: 3 | Status: SHIPPED | OUTPATIENT
Start: 2018-04-19 | End: 2018-09-05 | Stop reason: SDUPTHER

## 2018-04-19 RX ORDER — METOPROLOL TARTRATE 25 MG/1
TABLET, FILM COATED ORAL
Qty: 30 TAB | Refills: 10 | Status: SHIPPED | OUTPATIENT
Start: 2018-04-19 | End: 2018-07-26 | Stop reason: SDUPTHER

## 2018-04-24 ENCOUNTER — CLINICAL SUPPORT (OUTPATIENT)
Dept: CARDIOLOGY CLINIC | Age: 78
End: 2018-04-24

## 2018-04-24 DIAGNOSIS — I25.111 CORONARY ARTERY DISEASE INVOLVING NATIVE CORONARY ARTERY OF NATIVE HEART WITH ANGINA PECTORIS WITH DOCUMENTED SPASM (HCC): ICD-10-CM

## 2018-04-24 DIAGNOSIS — Z98.890 S/P ABLATION OF ATRIAL FIBRILLATION: ICD-10-CM

## 2018-04-24 DIAGNOSIS — I25.111 CORONARY ARTERY DISEASE INVOLVING NATIVE CORONARY ARTERY OF NATIVE HEART WITH ANGINA PECTORIS WITH DOCUMENTED SPASM (HCC): Primary | ICD-10-CM

## 2018-04-24 DIAGNOSIS — I25.10 ATHEROSCLEROSIS OF NATIVE CORONARY ARTERY OF NATIVE HEART WITHOUT ANGINA PECTORIS: ICD-10-CM

## 2018-04-24 DIAGNOSIS — I49.9 IRREGULAR HEARTBEAT: ICD-10-CM

## 2018-04-24 DIAGNOSIS — I48.91 ATRIAL FIBRILLATION, UNSPECIFIED TYPE (HCC): Chronic | ICD-10-CM

## 2018-04-24 DIAGNOSIS — Z86.79 S/P ABLATION OF ATRIAL FIBRILLATION: ICD-10-CM

## 2018-04-24 DIAGNOSIS — E78.5 HYPERLIPIDEMIA, UNSPECIFIED HYPERLIPIDEMIA TYPE: ICD-10-CM

## 2018-04-24 DIAGNOSIS — E78.2 MIXED HYPERLIPIDEMIA: Chronic | ICD-10-CM

## 2018-04-27 ENCOUNTER — TELEPHONE (OUTPATIENT)
Dept: CARDIOLOGY CLINIC | Age: 78
End: 2018-04-27

## 2018-04-30 ENCOUNTER — CLINICAL SUPPORT (OUTPATIENT)
Dept: CARDIOLOGY CLINIC | Age: 78
End: 2018-04-30

## 2018-04-30 DIAGNOSIS — I25.10 ATHEROSCLEROSIS OF NATIVE CORONARY ARTERY OF NATIVE HEART WITHOUT ANGINA PECTORIS: ICD-10-CM

## 2018-04-30 DIAGNOSIS — E78.2 MIXED HYPERLIPIDEMIA: Chronic | ICD-10-CM

## 2018-04-30 DIAGNOSIS — Z98.890 S/P ABLATION OF ATRIAL FIBRILLATION: ICD-10-CM

## 2018-04-30 DIAGNOSIS — I25.111 CORONARY ARTERY DISEASE INVOLVING NATIVE CORONARY ARTERY OF NATIVE HEART WITH ANGINA PECTORIS WITH DOCUMENTED SPASM (HCC): ICD-10-CM

## 2018-04-30 DIAGNOSIS — Z86.79 S/P ABLATION OF ATRIAL FIBRILLATION: ICD-10-CM

## 2018-04-30 DIAGNOSIS — E78.5 HYPERLIPIDEMIA, UNSPECIFIED HYPERLIPIDEMIA TYPE: ICD-10-CM

## 2018-04-30 DIAGNOSIS — I48.91 ATRIAL FIBRILLATION, UNSPECIFIED TYPE (HCC): Chronic | ICD-10-CM

## 2018-04-30 DIAGNOSIS — I49.9 IRREGULAR HEARTBEAT: ICD-10-CM

## 2018-05-01 ENCOUNTER — TELEPHONE (OUTPATIENT)
Dept: CARDIOLOGY CLINIC | Age: 78
End: 2018-05-01

## 2018-05-01 NOTE — PROGRESS NOTES
Spoke with patient  Verified patient with 2 patient identifiers  Informed per Bianca NP stress test shows no evidence of blockages. Patient verbalized understanding.

## 2018-05-01 NOTE — TELEPHONE ENCOUNTER
----- Message from Sonya Gasca MD sent at 5/1/2018  9:52 AM EDT -----  pls let him know that his stress test was normal    ----- Message -----     From: Hernan Bro MD     Sent: 5/1/2018   9:38 AM       To: Sonya Gasca MD

## 2018-05-11 ENCOUNTER — OFFICE VISIT (OUTPATIENT)
Dept: CARDIOLOGY CLINIC | Age: 78
End: 2018-05-11

## 2018-05-11 DIAGNOSIS — I49.9 IRREGULAR HEARTBEAT: ICD-10-CM

## 2018-05-11 DIAGNOSIS — Z86.79 S/P ABLATION OF ATRIAL FIBRILLATION: Primary | ICD-10-CM

## 2018-05-11 DIAGNOSIS — Z98.890 S/P ABLATION OF ATRIAL FIBRILLATION: Primary | ICD-10-CM

## 2018-05-11 DIAGNOSIS — I49.5 SSS (SICK SINUS SYNDROME) (HCC): Chronic | ICD-10-CM

## 2018-05-24 DIAGNOSIS — I48.0 PAROXYSMAL ATRIAL FIBRILLATION (HCC): ICD-10-CM

## 2018-05-24 RX ORDER — APIXABAN 5 MG/1
TABLET, FILM COATED ORAL
Qty: 60 TAB | Refills: 6 | Status: SHIPPED | OUTPATIENT
Start: 2018-05-24 | End: 2019-01-17 | Stop reason: SDUPTHER

## 2018-06-11 ENCOUNTER — CLINICAL SUPPORT (OUTPATIENT)
Dept: CARDIOLOGY CLINIC | Age: 78
End: 2018-06-11

## 2018-06-11 DIAGNOSIS — I49.9 IRREGULAR HEARTBEAT: ICD-10-CM

## 2018-06-11 DIAGNOSIS — Z45.09 ENCOUNTER FOR LOOP RECORDER CHECK: ICD-10-CM

## 2018-06-11 DIAGNOSIS — I49.5 SSS (SICK SINUS SYNDROME) (HCC): Chronic | ICD-10-CM

## 2018-06-11 DIAGNOSIS — Z86.79 S/P ABLATION OF ATRIAL FIBRILLATION: Primary | ICD-10-CM

## 2018-06-11 DIAGNOSIS — Z98.890 S/P ABLATION OF ATRIAL FIBRILLATION: Primary | ICD-10-CM

## 2018-06-26 RX ORDER — METFORMIN HYDROCHLORIDE 500 MG/1
TABLET, EXTENDED RELEASE ORAL
Qty: 120 TAB | Refills: 10 | Status: ON HOLD | OUTPATIENT
Start: 2018-06-26 | End: 2019-05-08 | Stop reason: SDUPTHER

## 2018-07-05 RX ORDER — BLOOD SUGAR DIAGNOSTIC
STRIP MISCELLANEOUS
Qty: 100 STRIP | Refills: 2 | Status: SHIPPED | OUTPATIENT
Start: 2018-07-05 | End: 2019-07-22 | Stop reason: SDUPTHER

## 2018-07-13 ENCOUNTER — CLINICAL SUPPORT (OUTPATIENT)
Dept: CARDIOLOGY CLINIC | Age: 78
End: 2018-07-13

## 2018-07-13 DIAGNOSIS — I49.5 SSS (SICK SINUS SYNDROME) (HCC): Chronic | ICD-10-CM

## 2018-07-13 DIAGNOSIS — I48.91 ATRIAL FIBRILLATION, UNSPECIFIED TYPE (HCC): Primary | Chronic | ICD-10-CM

## 2018-07-13 DIAGNOSIS — Z45.09 ENCOUNTER FOR LOOP RECORDER CHECK: ICD-10-CM

## 2018-07-26 ENCOUNTER — OFFICE VISIT (OUTPATIENT)
Dept: ENDOCRINOLOGY | Age: 78
End: 2018-07-26

## 2018-07-26 VITALS
DIASTOLIC BLOOD PRESSURE: 48 MMHG | WEIGHT: 274.6 LBS | RESPIRATION RATE: 16 BRPM | HEIGHT: 72 IN | BODY MASS INDEX: 37.19 KG/M2 | SYSTOLIC BLOOD PRESSURE: 125 MMHG | OXYGEN SATURATION: 94 % | HEART RATE: 56 BPM

## 2018-07-26 DIAGNOSIS — E78.5 DYSLIPIDEMIA: ICD-10-CM

## 2018-07-26 DIAGNOSIS — D50.9 IRON DEFICIENCY ANEMIA, UNSPECIFIED IRON DEFICIENCY ANEMIA TYPE: ICD-10-CM

## 2018-07-26 DIAGNOSIS — I95.1 ORTHOSTATIC HYPOTENSION: ICD-10-CM

## 2018-07-26 DIAGNOSIS — E11.21 TYPE II DIABETES MELLITUS WITH NEPHROPATHY (HCC): Primary | ICD-10-CM

## 2018-07-26 DIAGNOSIS — I25.10 ATHEROSCLEROSIS OF NATIVE CORONARY ARTERY OF NATIVE HEART WITHOUT ANGINA PECTORIS: ICD-10-CM

## 2018-07-26 NOTE — PROGRESS NOTES
History of Present Illness: Tolu Townsend is a 70 y.o. male presents for follow-up of diabetes. He has CAD and is s/p CABG. Following with Dr Sande Sandifer office for this. He has a history of atrial fibrillation and is status post atrial ablation. Taking Eliquis. He has some problems with bradycardia and is seeing Dr Nanda Thomas.  He remains on metoprolol 12.5 mg twice daily. Diabetes related complications:  + microalbuminuria   Sees eye doctor every 6 months for glaucoma - no retinopathy problems    Current diabetes regimen:  Metformin ER 2 g daily     Glucoses: Brings log. These were reviewed. Values in May were higher - values were 131-260. Higher values in evening. Avg was 189  June and July values are much better -  with all but 1 value < 150 - Avg was 129 in July and 118-190 with 154 avg in June. Overall, glucoses seem to be progressively improving. Weight is down from last summer- about 10-14 lbs. Diet:  Snacking less and improved diet over last 3 months. Lipids - generic rosuvastatin    Blood pressure - Taking only a small dose of metoprolol twice daily. BP is lower. Does have sx of orthostatic hypotension    Exercise: walking dog regularly. Has a 3-6 yo dog - Beagle. Walking more of late    He is also anemia. He reports being advised to take iron by his primary care doctor.   He is currently taking I am  He reports having never had a colonoscopy  He does not desire to have a colonoscopy either  He denies bright red blood in his stool or in the rectum, but does report occasional black stools      Past Medical History:   Diagnosis Date    Anemia 6/5/2017    Coronary atherosclerosis of native coronary artery     Diabetes mellitus, type II ([facility]     Mixed hyperlipidemia     Other dyspnea and respiratory abnormality     S/P ablation of atrial fibrillation 8/4/2016     Current Outpatient Prescriptions   Medication Sig    ACCU-CHEK YVES PLUS TEST STRP strip USE TO TEST BLOOD SUGAR DAILY DX:E11.9    metFORMIN ER (GLUCOPHAGE XR) 500 mg tablet TAKE 2 TABLETS BY MOUTH TWICE A DAY WITH A MEAL    ELIQUIS 5 mg tablet TAKE 1 TABLET BY MOUTH TWICE A DAY    rosuvastatin (CRESTOR) 40 mg tablet TAKE 1 TABLET BY MOUTH EVERY DAY    cholecalciferol (VITAMIN D3) 1,000 unit cap Take  by mouth daily.  ferrous sulfate 324 mg (65 mg iron) tablet Take  by mouth Daily (before breakfast).  cyanocobalamin 1,000 mcg tablet Take 1,000 mcg by mouth daily.  metoprolol (LOPRESSOR) 25 mg tablet TAKE 1/2 TABLET BY MOUTH TWICE A DAY    aspirin delayed-release (ECOTRIN LOW STRENGTH) 81 mg tablet Take  by mouth daily. No current facility-administered medications for this visit.       Allergies   Allergen Reactions    Ciprocinonide Unable to Obtain     Causes anxiety       Review of Systems:  - Eyes: no blurry vision or double vision  - Cardiovascular: no chest pain  - Respiratory: no shortness of breath  - Musculoskeletal: no myalgias  - Neurological: no numbness/tingling in extremities    Physical Examination:  Visit Vitals    /48 (BP 1 Location: Left arm, BP Patient Position: Sitting)    Pulse (!) 56    Resp 16    Ht 6' (1.829 m)    Wt 274 lb 9.6 oz (124.6 kg)    SpO2 94%    BMI 37.24 kg/m2   -   - General: pleasant, no distress, normal gait   HEENT: hearing intact, EOMI, clear sclera without icterus  - Cardiovascular: regular, normal rate   - Respiratory: normal effort  - Integumentary: no edema   Diabetic foot exam:       Right Foot:   Visual Exam: normal    Pulse DP: 2+ (normal)   Filament test: normal sensation        - Psychiatric: normal mood and affect    Data Reviewed:   Component      Latest Ref Rng & Units 7/31/2017 7/31/2017 7/31/2017 7/31/2017           8:01 AM  8:01 AM  8:01 AM  8:01 AM   Glucose      65 - 99 mg/dL       BUN      8 - 27 mg/dL       Creatinine      0.76 - 1.27 mg/dL       GFR est non-AA      >59 mL/min/1.73       GFR est AA      >59 mL/min/1.73 BUN/Creatinine ratio      10 - 24       Sodium      134 - 144 mmol/L       Potassium      3.5 - 5.2 mmol/L       Chloride      96 - 106 mmol/L       CO2      18 - 29 mmol/L       Calcium      8.6 - 10.2 mg/dL       Protein, total      6.0 - 8.5 g/dL       Albumin      3.5 - 4.8 g/dL       GLOBULIN, TOTAL      1.5 - 4.5 g/dL       A-G Ratio      1.2 - 2.2       Bilirubin, total      0.0 - 1.2 mg/dL       Alk. phosphatase      39 - 117 IU/L       AST      0 - 40 IU/L       ALT (SGPT)      0 - 44 IU/L       Cholesterol, total      100 - 199 mg/dL    87 (L)   Triglyceride      0 - 149 mg/dL    172 (H)   HDL Cholesterol      >39 mg/dL    31 (L)   VLDL, calculated      5 - 40 mg/dL    34   LDL, calculated      0 - 99 mg/dL    22   Creatinine, urine      Not Estab. mg/dL       Microalbumin, urine      Not Estab. ug/mL       Microalbumin/Creat. Ratio      0.0 - 30.0 mg/g creat       Hemoglobin A1c, (calculated)      4.8 - 5.6 %   7.1 (H)    Estimated average glucose      mg/dL   157    Phosphorus      2.5 - 4.5 mg/dL  3.0     Ferritin      30 - 400 ng/mL 12 (L)        Component      Latest Ref Rng & Units 7/31/2017 7/31/2017           8:01 AM  8:01 AM   Glucose      65 - 99 mg/dL 136 (H)    BUN      8 - 27 mg/dL 27    Creatinine      0.76 - 1.27 mg/dL 1.73 (H)    GFR est non-AA      >59 mL/min/1.73 38 (L)    GFR est AA      >59 mL/min/1.73 44 (L)    BUN/Creatinine ratio      10 - 24 16    Sodium      134 - 144 mmol/L 141    Potassium      3.5 - 5.2 mmol/L 4.6    Chloride      96 - 106 mmol/L 104    CO2      18 - 29 mmol/L 21    Calcium      8.6 - 10.2 mg/dL 8.9    Protein, total      6.0 - 8.5 g/dL 6.4    Albumin      3.5 - 4.8 g/dL 4.4    GLOBULIN, TOTAL      1.5 - 4.5 g/dL 2.0    A-G Ratio      1.2 - 2.2 2.2    Bilirubin, total      0.0 - 1.2 mg/dL 0.3    Alk.  phosphatase      39 - 117 IU/L 56    AST      0 - 40 IU/L 14    ALT (SGPT)      0 - 44 IU/L 13    Cholesterol, total      100 - 199 mg/dL     Triglyceride      0 - 149 mg/dL     HDL Cholesterol      >39 mg/dL     VLDL, calculated      5 - 40 mg/dL     LDL, calculated      0 - 99 mg/dL     Creatinine, urine      Not Estab. mg/dL  110.4   Microalbumin, urine      Not Estab. ug/mL  136.8   Microalbumin/Creat. Ratio      0.0 - 30.0 mg/g creat  123.9 (H)   Hemoglobin A1c, (calculated)      4.8 - 5.6 %     Estimated average glucose      mg/dL     Phosphorus      2.5 - 4.5 mg/dL     Ferritin      30 - 400 ng/mL         Assessment/Plan:   1. Type II diabetes mellitus with nephropathy ([facility]   Diabetes was not well controlled in May, but in June and July he has had progressive improvement  Hemoglobin A1c will likely be affected by higher values and may. If he continues current efforts with diet and exercise hemoglobin A1c should be near normal in several months     2. Atherosclerosis of native coronary artery of native heart without angina pectoris   Blood pressure is controlled. Continue atorvastatin   3. Dyslipidemia   Atorvastatin   4. BMI 37.0-37.9, adult    5. Orthostatic hypotension   We will see if Dr. Milka Franklin is in agreement with stopping metoprolol. His heart rate is slow and he reports symptoms of orthostatic hypotension   6. Iron deficiency anemia, unspecified iron deficiency anemia type   CBC, check ferritin  Iron deficiency anemia is confirmed, will recommend referral to gastroenterologist for further evaluation  Reviewed the Eliquis would increase the magnitude of any small intestinal bleeds     Patient Instructions   Diabetes type II  - continue efforts with dietary improvements and exercise    Continue metformin     Goals for blood sugars:  Fasting  (less than 150)  Other times -  (less than 180). Cholesterol:  Continue Crestor 40 mg    Dizziness with standing  Will see if Dr Jerrica Lennon is ok with stopping the metoprolol and whether he wants to see you for a pacemaker    Low iron and low blood counts  Reassess.   May recommend seeing stomach and intestine doctor (gastroenterologist) to evaluate you and see in you need a colonoscopy to determine if there is any bleeding in the intestines. Follow-up Disposition:  Return in about 1 year (around 7/26/2019).     Copy sent to:

## 2018-07-26 NOTE — PROGRESS NOTES
1. Have you been to the ER, urgent care clinic since your last visit? Hospitalized since your last visit? No    2. Have you seen or consulted any other health care providers outside of the [address] [place] [address] since your last visit? Include any pap smears or colon screening.  No     Chief Complaint   Patient presents with    Diabetes     Patient had cataract surgery and exam, patient has not had recent foot exam, patient has BG log       Visit Vitals    /48 (BP 1 Location: Left arm, BP Patient Position: Sitting)    Pulse (!) 56    Resp 16    Ht 6' (1.829 m)    Wt 274 lb 9.6 oz (124.6 kg)    SpO2 94%    BMI 37.24 kg/m2     Learning Assessment 7/26/2018   PRIMARY LEARNER Patient   HIGHEST LEVEL OF EDUCATION - PRIMARY LEARNER  -   BARRIERS PRIMARY LEARNER -   CO-LEARNER CAREGIVER -   PRIMARY LANGUAGE ENGLISH    NEED -   LEARNER PREFERENCE PRIMARY DEMONSTRATION     -   LEARNING SPECIAL TOPICS -   ANSWERED BY patient   RELATIONSHIP SELF

## 2018-07-26 NOTE — PATIENT INSTRUCTIONS
Diabetes type II  - continue efforts with dietary improvements and exercise    Continue metformin     Goals for blood sugars:  Fasting  (less than 150)  Other times -  (less than 180). Cholesterol:  Continue Crestor 40 mg    Dizziness with standing  Will see if Dr Earl Akbar is ok with stopping the metoprolol and whether he wants to see you for a pacemaker    Low iron and low blood counts  Reassess. May recommend seeing stomach and intestine doctor (gastroenterologist) to evaluate you and see in you need a colonoscopy to determine if there is any bleeding in the intestines.

## 2018-07-26 NOTE — MR AVS SNAPSHOT
[facility] 
[phone] Patient: Mayco Roberson MRN: [id number] YTO:[birth year] Visit Information Date & Time Provider Department Dept. Phone Encounter #  
 7/26/2018  9:50 AM Azalea Harden, [address] [facility] [phone] [id number] Follow-up Instructions Return in about 1 year (around 7/26/2019). Your Appointments 8/13/2018  8:00 AM  
PROCEDURE with [facility] [facility]) Appt Note: not an office visit, mdt ilr [address]  
[phone] [address] Upcoming Health Maintenance Date Due DTaP/Tdap/Td series (1 - Tdap) 1/25/1963 ZOSTER VACCINE AGE 60> 11/25/2001 Pneumococcal 65+ Low/Medium Risk (1 of 2 - PCV13) 1/25/2007 GLAUCOMA SCREENING Q2Y 3/13/2015 EYE EXAM RETINAL OR DILATED Q1 9/24/2015 FOOT EXAM Q1 10/20/2017 HEMOGLOBIN A1C Q6M 1/31/2018 MEDICARE YEARLY EXAM 3/14/2018 MICROALBUMIN Q1 7/31/2018 LIPID PANEL Q1 7/31/2018 Influenza Age 5 to Adult 8/1/2018 Allergies as of 7/26/2018  Review Complete On: 7/26/2018 By: Azalea Harden MD  
  
 Severity Noted Reaction Type Reactions Ciprocinonide Medium 04/25/2012   Side Effect Unable to Obtain Causes anxiety Current Immunizations  Never Reviewed No immunizations on file. Not reviewed this visit You Were Diagnosed With   
  
 Codes Comments Type II diabetes mellitus with nephropathy ([facility]    -  Primary ICD-10-CM: E11.21 
ICD-9-CM: 250.40, 583.81 Atherosclerosis of native coronary artery of native heart without angina pectoris     ICD-10-CM: I25.10 ICD-9-CM: 414.01 Dyslipidemia     ICD-10-CM: E78.5 ICD-9-CM: 272.4 BMI 37.0-37.9, adult     ICD-10-CM: Z68.37 ICD-9-CM: V85.37   
 Orthostatic hypotension     ICD-10-CM: I95.1 ICD-9-CM: 458.0 Iron deficiency anemia, unspecified iron deficiency anemia type     ICD-10-CM: D50.9 ICD-9-CM: 280.9 Vitals BP Pulse Resp Height(growth percentile) Weight(growth percentile) SpO2  
 125/48 (BP 1 Location: Left arm, BP Patient Position: Sitting) (!) 56 16 6' (1.829 m) 274 lb 9.6 oz (124.6 kg) 94% BMI Smoking Status 37.24 kg/m2 Former Smoker Vitals History BMI and BSA Data Body Mass Index Body Surface Area  
 37.24 kg/m 2 2.52 m 2 Preferred Pharmacy [facility] Name Phone [id number] [address] [place] [phone] Your Updated Medication List  
  
   
This list is accurate as of 7/26/18 10:37 AM.  Always use your most recent med list.  
  
  
  
  
 ACCU-CHEK YVES PLUS TEST STRP strip Generic drug:  glucose blood VI test strips USE TO TEST BLOOD SUGAR DAILY DX:E11.9  
  
 cyanocobalamin 1,000 mcg tablet Take 1,000 mcg by mouth daily. ECOTRIN LOW STRENGTH 81 mg tablet Generic drug:  aspirin delayed-release Take  by mouth daily. ELIQUIS 5 mg tablet Generic drug:  apixaban TAKE 1 TABLET BY MOUTH TWICE A DAY  
  
 ferrous sulfate 324 mg (65 mg iron) tablet Take  by mouth Daily (before breakfast). metFORMIN  mg tablet Commonly known as:  GLUCOPHAGE XR  
TAKE 2 TABLETS BY MOUTH TWICE A DAY WITH A MEAL  
  
 metoprolol tartrate 25 mg tablet Commonly known as:  LOPRESSOR  
TAKE 1/2 TABLET BY MOUTH TWICE A DAY  
  
 rosuvastatin 40 mg tablet Commonly known as:  CRESTOR  
TAKE 1 TABLET BY MOUTH EVERY DAY  
  
 VITAMIN D3 1,000 unit Cap Generic drug:  cholecalciferol Take  by mouth daily. We Performed the Following CBC WITH AUTOMATED DIFF [50241 CPT(R)] FERRITIN [70352 CPT(R)] HEMOGLOBIN A1C WITH EAG [81953 CPT(R)] LIPID PANEL [07242 CPT(R)] METABOLIC PANEL, COMPREHENSIVE [06464 CPT(R)] MICROALBUMIN, UR, RAND W/ MICROALB/CREAT RATIO [id number] CPT(R)] Follow-up Instructions Return in about 1 year (around 7/26/2019). Patient Instructions Diabetes type II 
- continue efforts with dietary improvements and exercise Continue metformin Goals for blood sugars: 
Fasting  (less than 150) Other times -  (less than 180). Cholesterol: 
Continue Crestor 40 mg 
 
Dizziness with standing Will see if Dr Irina Deng is ok with stopping the metoprolol and whether he wants to see you for a pacemaker Low iron and low blood counts Reassess. May recommend seeing stomach and intestine doctor (gastroenterologist) to evaluate you and see in you need a colonoscopy to determine if there is any bleeding in the intestines. Introducing [facility] & HEALTH SERVICES! Madeleine Sheriff introduces [email or web address] patient portal. Now you can access parts of your medical record, email your doctor's office, and request medication refills online. 1. In your internet browser, go to [email or web address] 2. Click on the First Time User? Click Here link in the Sign In box. You will see the New Member Sign Up page. 3. Enter your [email or web address] Access Code exactly as it appears below. You will not need to use this code after youve completed the sign-up process. If you do not sign up before the expiration date, you must request a new code. · [email or web address] Access Code: [email or web address] Expires: 10/14/2018 11:42 AM 
 
4. Enter the last four digits of your Social Security Number (xxxx) and Date of Birth (mm/dd/yyyy) as indicated and click Submit. You will be taken to the next sign-up page. 5. Create a [email or web address] ID. This will be your [email or web address] login ID and cannot be changed, so think of one that is secure and easy to remember. 6. Create a [employer] password. You can change your password at any time. 7. Enter your Password Reset Question and Answer. This can be used at a later time if you forget your password. 8. Enter your e-mail address. You will receive e-mail notification when new information is available in [address] 9. Click Sign Up. You can now view and download portions of your medical record. 10. Click the Download Summary menu link to download a portable copy of your medical information. If you have questions, please visit the Frequently Asked Questions section of the [employer] website. Remember, [employer] is NOT to be used for urgent needs. For medical emergencies, dial 911. Now available from your iPhone and Android! Please provide this summary of care documentation to your next provider. Your primary care clinician is listed as NONE. If you have any questions after today's visit, please call [phone].

## 2018-08-01 LAB
ALBUMIN SERPL-MCNC: 4.4 G/DL (ref 3.5–4.8)
ALBUMIN/CREAT UR: 120 MG/G CREAT (ref 0–30)
ALBUMIN/GLOB SERPL: 1.9 {RATIO} (ref 1.2–2.2)
ALP SERPL-CCNC: 55 IU/L (ref 39–117)
ALT SERPL-CCNC: 14 IU/L (ref 0–44)
AST SERPL-CCNC: 15 IU/L (ref 0–40)
BASOPHILS # BLD AUTO: 0 X10E3/UL (ref 0–0.2)
BASOPHILS NFR BLD AUTO: 0 %
BILIRUB SERPL-MCNC: 0.5 MG/DL (ref 0–1.2)
BUN SERPL-MCNC: 36 MG/DL (ref 8–27)
BUN/CREAT SERPL: 18 (ref 10–24)
CALCIUM SERPL-MCNC: 9.6 MG/DL (ref 8.6–10.2)
CHLORIDE SERPL-SCNC: 103 MMOL/L (ref 96–106)
CHOLEST SERPL-MCNC: 83 MG/DL (ref 100–199)
CO2 SERPL-SCNC: 23 MMOL/L (ref 20–29)
CREAT SERPL-MCNC: 1.98 MG/DL (ref 0.76–1.27)
CREAT UR-MCNC: 114.6 MG/DL
EOSINOPHIL # BLD AUTO: 0.1 X10E3/UL (ref 0–0.4)
EOSINOPHIL NFR BLD AUTO: 2 %
ERYTHROCYTE [DISTWIDTH] IN BLOOD BY AUTOMATED COUNT: 15 % (ref 12.3–15.4)
EST. AVERAGE GLUCOSE BLD GHB EST-MCNC: 157 MG/DL
FERRITIN SERPL-MCNC: 20 NG/ML (ref 30–400)
GLOBULIN SER CALC-MCNC: 2.3 G/DL (ref 1.5–4.5)
GLUCOSE SERPL-MCNC: 148 MG/DL (ref 65–99)
HBA1C MFR BLD: 7.1 % (ref 4.8–5.6)
HCT VFR BLD AUTO: 33.4 % (ref 37.5–51)
HDLC SERPL-MCNC: 31 MG/DL
HGB BLD-MCNC: 10.5 G/DL (ref 13–17.7)
IMM GRANULOCYTES # BLD: 0 X10E3/UL (ref 0–0.1)
IMM GRANULOCYTES NFR BLD: 0 %
LDLC SERPL CALC-MCNC: 20 MG/DL (ref 0–99)
LYMPHOCYTES # BLD AUTO: 1.5 X10E3/UL (ref 0.7–3.1)
LYMPHOCYTES NFR BLD AUTO: 24 %
MCH RBC QN AUTO: 29.9 PG (ref 26.6–33)
MCHC RBC AUTO-ENTMCNC: 31.4 G/DL (ref 31.5–35.7)
MCV RBC AUTO: 95 FL (ref 79–97)
MICROALBUMIN UR-MCNC: 137.5 UG/ML
MONOCYTES # BLD AUTO: 0.6 X10E3/UL (ref 0.1–0.9)
MONOCYTES NFR BLD AUTO: 8 %
NEUTROPHILS # BLD AUTO: 4.3 X10E3/UL (ref 1.4–7)
NEUTROPHILS NFR BLD AUTO: 66 %
PLATELET # BLD AUTO: 175 X10E3/UL (ref 150–379)
POTASSIUM SERPL-SCNC: 4.9 MMOL/L (ref 3.5–5.2)
PROT SERPL-MCNC: 6.7 G/DL (ref 6–8.5)
RBC # BLD AUTO: 3.51 X10E6/UL (ref 4.14–5.8)
SODIUM SERPL-SCNC: 141 MMOL/L (ref 134–144)
TRIGL SERPL-MCNC: 160 MG/DL (ref 0–149)
VLDLC SERPL CALC-MCNC: 32 MG/DL (ref 5–40)
WBC # BLD AUTO: 6.5 X10E3/UL (ref 3.4–10.8)

## 2018-08-13 ENCOUNTER — CLINICAL SUPPORT (OUTPATIENT)
Dept: CARDIOLOGY CLINIC | Age: 78
End: 2018-08-13

## 2018-08-13 DIAGNOSIS — Z45.09 ENCOUNTER FOR LOOP RECORDER CHECK: ICD-10-CM

## 2018-08-13 DIAGNOSIS — Z98.890 S/P ABLATION OF ATRIAL FIBRILLATION: ICD-10-CM

## 2018-08-13 DIAGNOSIS — Z86.79 S/P ABLATION OF ATRIAL FIBRILLATION: ICD-10-CM

## 2018-08-13 DIAGNOSIS — I48.91 ATRIAL FIBRILLATION, UNSPECIFIED TYPE (HCC): Primary | Chronic | ICD-10-CM

## 2018-09-05 RX ORDER — ROSUVASTATIN CALCIUM 40 MG/1
TABLET, COATED ORAL
Qty: 30 TAB | Refills: 3 | Status: SHIPPED | OUTPATIENT
Start: 2018-09-05 | End: 2019-01-17 | Stop reason: SDUPTHER

## 2018-09-13 ENCOUNTER — CLINICAL SUPPORT (OUTPATIENT)
Dept: CARDIOLOGY CLINIC | Age: 78
End: 2018-09-13

## 2018-09-13 DIAGNOSIS — Z45.09 ENCOUNTER FOR LOOP RECORDER CHECK: ICD-10-CM

## 2018-09-13 DIAGNOSIS — I48.91 ATRIAL FIBRILLATION, UNSPECIFIED TYPE (HCC): Primary | Chronic | ICD-10-CM

## 2018-12-14 ENCOUNTER — CLINICAL SUPPORT (OUTPATIENT)
Dept: CARDIOLOGY CLINIC | Age: 78
End: 2018-12-14

## 2018-12-14 DIAGNOSIS — I48.91 ATRIAL FIBRILLATION, UNSPECIFIED TYPE (HCC): Primary | Chronic | ICD-10-CM

## 2018-12-14 DIAGNOSIS — Z45.09 ENCOUNTER FOR LOOP RECORDER CHECK: ICD-10-CM

## 2019-01-17 DIAGNOSIS — I48.0 PAROXYSMAL ATRIAL FIBRILLATION (HCC): ICD-10-CM

## 2019-01-17 RX ORDER — ROSUVASTATIN CALCIUM 40 MG/1
TABLET, COATED ORAL
Qty: 30 TAB | Refills: 3 | Status: SHIPPED | OUTPATIENT
Start: 2019-01-17 | End: 2019-07-22 | Stop reason: SDUPTHER

## 2019-01-17 RX ORDER — APIXABAN 5 MG/1
TABLET, FILM COATED ORAL
Qty: 60 TAB | Refills: 6 | Status: SHIPPED | OUTPATIENT
Start: 2019-01-17 | End: 2019-08-20 | Stop reason: SINTOL

## 2019-03-18 ENCOUNTER — CLINICAL SUPPORT (OUTPATIENT)
Dept: CARDIOLOGY CLINIC | Age: 79
End: 2019-03-18

## 2019-03-18 DIAGNOSIS — I48.91 ATRIAL FIBRILLATION, UNSPECIFIED TYPE (HCC): Primary | Chronic | ICD-10-CM

## 2019-03-18 DIAGNOSIS — Z45.09 ENCOUNTER FOR LOOP RECORDER CHECK: ICD-10-CM

## 2019-03-21 ENCOUNTER — CLINICAL SUPPORT (OUTPATIENT)
Dept: CARDIOLOGY CLINIC | Age: 79
End: 2019-03-21

## 2019-03-21 ENCOUNTER — OFFICE VISIT (OUTPATIENT)
Dept: CARDIOLOGY CLINIC | Age: 79
End: 2019-03-21

## 2019-03-21 VITALS
SYSTOLIC BLOOD PRESSURE: 132 MMHG | HEIGHT: 72 IN | BODY MASS INDEX: 38.01 KG/M2 | RESPIRATION RATE: 18 BRPM | WEIGHT: 280.6 LBS | DIASTOLIC BLOOD PRESSURE: 70 MMHG | HEART RATE: 56 BPM | OXYGEN SATURATION: 97 %

## 2019-03-21 DIAGNOSIS — I49.9 IRREGULAR HEARTBEAT: ICD-10-CM

## 2019-03-21 DIAGNOSIS — I25.111 CORONARY ARTERY DISEASE INVOLVING NATIVE CORONARY ARTERY OF NATIVE HEART WITH ANGINA PECTORIS WITH DOCUMENTED SPASM (HCC): ICD-10-CM

## 2019-03-21 DIAGNOSIS — Z86.79 S/P ABLATION OF ATRIAL FIBRILLATION: ICD-10-CM

## 2019-03-21 DIAGNOSIS — R06.09 DOE (DYSPNEA ON EXERTION): ICD-10-CM

## 2019-03-21 DIAGNOSIS — Z98.890 S/P ABLATION OF ATRIAL FIBRILLATION: ICD-10-CM

## 2019-03-21 DIAGNOSIS — I49.5 SSS (SICK SINUS SYNDROME) (HCC): ICD-10-CM

## 2019-03-21 DIAGNOSIS — R53.83 FATIGUE, UNSPECIFIED TYPE: ICD-10-CM

## 2019-03-21 DIAGNOSIS — E78.2 MIXED HYPERLIPIDEMIA: ICD-10-CM

## 2019-03-21 DIAGNOSIS — I48.0 PAROXYSMAL ATRIAL FIBRILLATION (HCC): ICD-10-CM

## 2019-03-21 DIAGNOSIS — I49.9 IRREGULAR HEARTBEAT: Primary | ICD-10-CM

## 2019-03-21 NOTE — H&P (VIEW-ONLY)
Subjective:  
  
Mary Lares is a 70 y.o. male is here for EP follow up. The patient reports fatigue and dyspnea on exertion. Takes him 3-4 rests to complete mowing his lawn. In addition, he feels frequent fluttering across his chest daily. Sees endocrine annually. Patient Active Problem List  
 Diagnosis Date Noted  Type 2 diabetes with nephropathy ([address] 04/10/2018  Severe obesity (BMI 35.0-39. 9) with comorbidity ([address] 04/10/2018  Anemia 2017  BMI 39.0-39.9,adult 2017  Hyperlipidemia 2017  Controlled type 2 diabetes mellitus without complication, without long-term current use of insulin ([address] 2017  Coronary artery disease involving coronary bypass graft of native heart without angina pectoris 2017  Age-related cataract of both eyes 2017  S/P ablation of atrial fibrillation 2016  SSS (sick sinus syndrome) ([address] 2016  Irregular heartbeat 2016  Postsurgical aortocoronary bypass status 2012  Mixed hyperlipidemia 2012  Coronary atherosclerosis of native coronary artery 2012  Atrial fibrillation ([address] 2012 None Past Medical History:  
Diagnosis Date  Anemia 2017  Coronary atherosclerosis of native coronary artery  Diabetes mellitus, type II ([address]  Mixed hyperlipidemia  Other dyspnea and respiratory abnormality  S/P ablation of atrial fibrillation 2016 Past Surgical History:  
Procedure Laterality Date  HX CATARACT REMOVAL Bilateral 2018  HX CORONARY ARTERY BYPASS GRAFT   Allergies Allergen Reactions  Ciprocinonide Unable to Consolidated [place] Causes anxiety Family History Problem Relation Age of Onset  Diabetes Mother  Emphysema Father   
      age 46 - smoker  
 negative for cardiac disease Social History Socioeconomic History  Marital status:  Spouse name: Not on file  Number of children: Not on file  Years of education: Not on file  Highest education level: Not on file Tobacco Use  Smoking status: Former Smoker Packs/day: 4.00 Years: 20.00 Pack years: 80.00 Types: Cigarettes Last attempt to quit: 1980 Years since quittin.6  Smokeless tobacco: Former User  Tobacco comment: QUIT AT AGE 42 Substance and Sexual Activity  Alcohol use: No  
  Alcohol/week: 0.0 oz  
 
Current Outpatient Medications Medication Sig  
 rosuvastatin (CRESTOR) 40 mg tablet TAKE 1 TABLET BY MOUTH EVERY DAY  ELIQUIS 5 mg tablet TAKE 1 TABLET BY MOUTH TWICE A DAY  ACCU-CHEK YVES PLUS TEST STRP strip USE TO TEST BLOOD SUGAR DAILY DX:E11.9  
 metFORMIN ER (GLUCOPHAGE XR) 500 mg tablet TAKE 2 TABLETS BY MOUTH TWICE A DAY WITH A MEAL  cholecalciferol (VITAMIN D3) 1,000 unit cap Take  by mouth daily.  ferrous sulfate 324 mg (65 mg iron) tablet Take  by mouth Daily (before breakfast).  cyanocobalamin 1,000 mcg tablet Take 1,000 mcg by mouth daily.  metoprolol (LOPRESSOR) 25 mg tablet TAKE 1/2 TABLET BY MOUTH TWICE A DAY  aspirin delayed-release (ECOTRIN LOW STRENGTH) 81 mg tablet Take  by mouth daily. No current facility-administered medications for this visit. Vitals:  
 19 1341 BP: 132/70 Pulse: (!) 56 Resp: 18 SpO2: 97% Weight: 280 lb 9.6 oz (127.3 kg) Height: 6' (1.829 m) I have reviewed the nurses notes, vitals, problem list, allergy list, medical history, family, social history and medications. Review of Symptoms: 
 
General: Pt denies excessive weight gain or loss. Pt is able to conduct ADL's. Pale. HEENT: Denies blurred vision, headaches, epistaxis and difficulty swallowing. Respiratory: Reports shortness of breath, MCCOLLUM Cardiovascular: Denies precordial pain, Reports palpitations Gastrointestinal: Denies poor appetite, indigestion, abdominal pain or blood in stool Urinary: Denies dysuria, pyuria Musculoskeletal: Denies pain or swelling from muscles or joints Neurologic: Denies tremor, paresthesias, or sensory motor disturbance Skin: Denies rash, itching or texture change. Psych: Denies depression Physical Exam:   
 
General: Well developed, in no acute distress. HEENT: Eyes - PERRL, no jvd Heart:  Normal S1/S2 negative S3 or S4. Regular, no murmur, gallop or rub.  
Respiratory: Clear bilaterally x 4, no wheezing or rales Extremities:  No edema, normal cap refill, no cyanosis. Musculoskeletal: No clubbing Neuro: A&Ox3, speech clear, gait stable. Skin: Skin color is normal. No rashes or lesions. Non diaphoretic Vascular: 2+ pulses symmetric in all extremities Cardiographics Ekg: sinus bradycardia No results found for this or any previous visit. Lab Results Component Value Date/Time WBC 6.5 07/31/2018 08:52 AM  
 HGB 10.5 (L) 07/31/2018 08:52 AM  
 HCT 33.4 (L) 07/31/2018 08:52 AM  
 PLATELET 134 75/45/3518 08:52 AM  
 MCV 95 07/31/2018 08:52 AM  
  
Lab Results Component Value Date/Time Sodium 141 07/31/2018 08:52 AM  
 Potassium 4.9 07/31/2018 08:52 AM  
 Chloride 103 07/31/2018 08:52 AM  
 CO2 23 07/31/2018 08:52 AM  
 Anion gap 10 08/04/2016 05:11 AM  
 Glucose 148 (H) 07/31/2018 08:52 AM  
 BUN 36 (H) 07/31/2018 08:52 AM  
 Creatinine 1.98 (H) 07/31/2018 08:52 AM  
 BUN/Creatinine ratio 18 07/31/2018 08:52 AM  
 GFR est AA 37 (L) 07/31/2018 08:52 AM  
 GFR est non-AA 32 (L) 07/31/2018 08:52 AM  
 Calcium 9.6 07/31/2018 08:52 AM  
 Bilirubin, total 0.5 07/31/2018 08:52 AM  
 AST (SGOT) 15 07/31/2018 08:52 AM  
 Alk. phosphatase 55 07/31/2018 08:52 AM  
 Protein, total 6.7 07/31/2018 08:52 AM  
 Albumin 4.4 07/31/2018 08:52 AM  
 A-G Ratio 1.9 07/31/2018 08:52 AM  
 ALT (SGPT) 14 07/31/2018 08:52 AM  
  
No results found for: TSH, TSH2, TSH3, TSHP, TSHEXT, TSHEXT Assessment: ICD-10-CM ICD-9-CM 1. Irregular heartbeat I49.9 427.9 AMB POC EKG ROUTINE W/ 12 LEADS, INTER & REP 2. SSS (sick sinus syndrome) ([facility]) I49.5 427.81   
3. Mixed hyperlipidemia E78.2 272.2 4. Coronary artery disease involving native coronary artery of native heart with angina pectoris with documented spasm ([facility]) I25.111 414.01   
  413.9 5. Paroxysmal atrial fibrillation ([facility]) I48.0 427.31   
6. S/P ablation of atrial fibrillation Z98.890 V45.89   
 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 He reports recurrent palpitations. ILR with ? Undersensing but unable to detect arrhythmia. Will get holter. With fatigue, dyspnea in obese, DM, pt with CAD hx on [facility], I will get lexiscan and labs. Follow up pending test results. Continue medical management for DM, CAD and BMI 38. Thank you for allowing me to participate in Master Mae 's care.  
 
 
Karissa Luong NP

## 2019-03-21 NOTE — PROGRESS NOTES
1. Have you been to the ER, urgent care clinic since your last visit? Hospitalized since your last visit? No    2. Have you seen or consulted any other health care providers outside of the [facility] since your last visit? Include any pap smears or colon screening. No    Chief Complaint   Patient presents with    Irregular Heart Beat     C/O of palps.

## 2019-03-21 NOTE — PROGRESS NOTES
Subjective:      Mendel Hemp is a 70 y.o. male is here for EP follow up. The patient reports fatigue and dyspnea on exertion. Takes him 3-4 rests to complete mowing his lawn. In addition, he feels frequent fluttering across his chest daily. Sees endocrine annually. Patient Active Problem List    Diagnosis Date Noted    Type 2 diabetes with nephropathy ([address] 04/10/2018    Severe obesity (BMI 35.0-39. 9) with comorbidity ([address] 04/10/2018    Anemia 2017    BMI 39.0-39.9,adult 2017    Hyperlipidemia 2017    Controlled type 2 diabetes mellitus without complication, without long-term current use of insulin ([address] 2017    Coronary artery disease involving coronary bypass graft of native heart without angina pectoris 2017    Age-related cataract of both eyes 2017    S/P ablation of atrial fibrillation 2016    [facility] 2016    Irregular heartbeat 2016    Postsurgical aortocoronary bypass status 2012    Mixed hyperlipidemia 2012    Coronary atherosclerosis of native coronary artery 2012    Atrial fibrillation ([address] 2012      None  Past Medical History:   Diagnosis Date    Anemia 2017    Coronary atherosclerosis of native coronary artery     Diabetes mellitus, type II ([address]     Mixed hyperlipidemia     Other dyspnea and respiratory abnormality     S/P ablation of atrial fibrillation 2016      Past Surgical History:   Procedure Laterality Date    HX CATARACT REMOVAL Bilateral 2018    HX CORONARY ARTERY BYPASS GRAFT       Allergies   Allergen Reactions    Ciprocinonide Unable to Obtain     Causes anxiety      Family History   Problem Relation Age of Onset    Diabetes Mother     Emphysema Father          age 46 - smoker    negative for cardiac disease  Social History     Socioeconomic History    Marital status:      Spouse name: Not on file    Number of children: Not on file    Years of education: Not on file    Highest education level: Not on file   Tobacco Use    Smoking status: Former Smoker     Packs/day: 4.00     Years: 20.00     Pack years: 80.00     Types: Cigarettes     Last attempt to quit: 1980     Years since quittin.6    Smokeless tobacco: Former User    Tobacco comment: QUIT AT AGE 39   Substance and Sexual Activity    Alcohol use: No     Alcohol/week: 0.0 oz     Current Outpatient Medications   Medication Sig    rosuvastatin (CRESTOR) 40 mg tablet TAKE 1 TABLET BY MOUTH EVERY DAY    ELIQUIS 5 mg tablet TAKE 1 TABLET BY MOUTH TWICE A DAY    ACCU-CHEK YVES PLUS TEST STRP strip USE TO TEST BLOOD SUGAR DAILY DX:E11.9    metFORMIN ER (GLUCOPHAGE XR) 500 mg tablet TAKE 2 TABLETS BY MOUTH TWICE A DAY WITH A MEAL    cholecalciferol (VITAMIN D3) 1,000 unit cap Take  by mouth daily.  ferrous sulfate 324 mg (65 mg iron) tablet Take  by mouth Daily (before breakfast).  cyanocobalamin 1,000 mcg tablet Take 1,000 mcg by mouth daily.  metoprolol (LOPRESSOR) 25 mg tablet TAKE 1/2 TABLET BY MOUTH TWICE A DAY    aspirin delayed-release (ECOTRIN LOW STRENGTH) 81 mg tablet Take  by mouth daily. No current facility-administered medications for this visit. Vitals:    19 1341   BP: 132/70   Pulse: (!) 56   Resp: 18   SpO2: 97%   Weight: 280 lb 9.6 oz (127.3 kg)   Height: 6' (1.829 m)       I have reviewed the nurses notes, vitals, problem list, allergy list, medical history, family, social history and medications. Review of Symptoms:    General: Pt denies excessive weight gain or loss. Pt is able to conduct ADL's. Pale. HEENT: Denies blurred vision, headaches, epistaxis and difficulty swallowing.   Respiratory: Reports shortness of breath, MCCOLLUM  Cardiovascular: Denies precordial pain, Reports palpitations  Gastrointestinal: Denies poor appetite, indigestion, abdominal pain or blood in stool  Urinary: Denies dysuria, pyuria  Musculoskeletal: Denies pain or swelling from muscles or joints  Neurologic: Denies tremor, paresthesias, or sensory motor disturbance  Skin: Denies rash, itching or texture change. Psych: Denies depression      Physical Exam:      General: Well developed, in no acute distress. HEENT: Eyes - PERRL, no jvd  Heart:  Normal S1/S2 negative S3 or S4. Regular, no murmur, gallop or rub.   Respiratory: Clear bilaterally x 4, no wheezing or rales  Extremities:  No edema, normal cap refill, no cyanosis. Musculoskeletal: No clubbing  Neuro: A&Ox3, speech clear, gait stable. Skin: Skin color is normal. No rashes or lesions. Non diaphoretic  Vascular: 2+ pulses symmetric in all extremities    Cardiographics    Ekg: sinus bradycardia    No results found for this or any previous visit. Lab Results   Component Value Date/Time    WBC 6.5 07/31/2018 08:52 AM    HGB 10.5 (L) 07/31/2018 08:52 AM    HCT 33.4 (L) 07/31/2018 08:52 AM    PLATELET 034 40/68/3844 08:52 AM    MCV 95 07/31/2018 08:52 AM      Lab Results   Component Value Date/Time    Sodium 141 07/31/2018 08:52 AM    Potassium 4.9 07/31/2018 08:52 AM    Chloride 103 07/31/2018 08:52 AM    CO2 23 07/31/2018 08:52 AM    Anion gap 10 08/04/2016 05:11 AM    Glucose 148 (H) 07/31/2018 08:52 AM    BUN 36 (H) 07/31/2018 08:52 AM    Creatinine 1.98 (H) 07/31/2018 08:52 AM    BUN/Creatinine ratio 18 07/31/2018 08:52 AM    GFR est AA 37 (L) 07/31/2018 08:52 AM    GFR est non-AA 32 (L) 07/31/2018 08:52 AM    Calcium 9.6 07/31/2018 08:52 AM    Bilirubin, total 0.5 07/31/2018 08:52 AM    AST (SGOT) 15 07/31/2018 08:52 AM    Alk. phosphatase 55 07/31/2018 08:52 AM    Protein, total 6.7 07/31/2018 08:52 AM    Albumin 4.4 07/31/2018 08:52 AM    A-G Ratio 1.9 07/31/2018 08:52 AM    ALT (SGPT) 14 07/31/2018 08:52 AM      No results found for: TSH, TSH2, TSH3, TSHP, TSHEXT, TSHEXT     Assessment:           ICD-10-CM ICD-9-CM    1.  Irregular heartbeat I49.9 427.9 AMB POC EKG ROUTINE W/ 12 LEADS, INTER & REP   2. SSS (sick sinus syndrome) ([facility]) I49.5 427.81    3. Mixed hyperlipidemia E78.2 272.2    4. Coronary artery disease involving native coronary artery of native heart with angina pectoris with documented spasm ([facility] I25.111 414.01      413.9    5. Paroxysmal atrial fibrillation ([facility]) I48.0 427.31    6. 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 He reports recurrent palpitations. ILR with ? Undersensing but unable to detect arrhythmia. Will get holter. With fatigue, dyspnea in obese, DM, pt with CAD hx on [address], I will get lexiscan and labs. Follow up pending test results. Continue medical management for DM, CAD and BMI 38. Thank you for allowing me to participate in Jyothi Jimenez 's care.       Luis Rasmussen NP

## 2019-03-26 ENCOUNTER — TELEPHONE (OUTPATIENT)
Dept: CARDIOLOGY CLINIC | Age: 79
End: 2019-03-26

## 2019-03-26 NOTE — TELEPHONE ENCOUNTER
Verified patient with two identifiers. Pt has been informed of holter monitor results. Results and options will be discussed  further with Alcides Max NP. Pt verbalized understanding.

## 2019-03-27 ENCOUNTER — HOSPITAL ENCOUNTER (OUTPATIENT)
Dept: LAB | Age: 79
Discharge: HOME | End: 2019-03-27
Payer: MEDICARE

## 2019-03-27 DIAGNOSIS — I25.111 CORONARY ARTERY DISEASE INVOLVING NATIVE CORONARY ARTERY OF NATIVE HEART WITH ANGINA PECTORIS WITH DOCUMENTED SPASM (HCC): ICD-10-CM

## 2019-03-27 DIAGNOSIS — R06.09 DOE (DYSPNEA ON EXERTION): ICD-10-CM

## 2019-03-27 DIAGNOSIS — Z45.09 ENCOUNTER FOR LOOP RECORDER CHECK: ICD-10-CM

## 2019-03-27 DIAGNOSIS — R53.83 FATIGUE, UNSPECIFIED TYPE: ICD-10-CM

## 2019-03-27 DIAGNOSIS — I49.5 SSS (SICK SINUS SYNDROME) (HCC): Primary | ICD-10-CM

## 2019-03-27 PROCEDURE — 85027 COMPLETE CBC AUTOMATED: CPT

## 2019-03-28 LAB
ERYTHROCYTE [DISTWIDTH] IN BLOOD BY AUTOMATED COUNT: 14.5 % (ref 12.3–15.4)
HCT VFR BLD AUTO: 31.7 % (ref 37.5–51)
HGB BLD-MCNC: 10.1 G/DL (ref 13–17.7)
MCH RBC QN AUTO: 29.8 PG (ref 26.6–33)
MCHC RBC AUTO-ENTMCNC: 31.9 G/DL (ref 31.5–35.7)
MCV RBC AUTO: 94 FL (ref 79–97)
PLATELET # BLD AUTO: 178 X10E3/UL (ref 150–379)
RBC # BLD AUTO: 3.39 X10E6/UL (ref 4.14–5.8)
WBC # BLD AUTO: 6 X10E3/UL (ref 3.4–10.8)

## 2019-03-29 ENCOUNTER — APPOINTMENT (OUTPATIENT)
Dept: GENERAL RADIOLOGY | Age: 79
End: 2019-03-29
Attending: INTERNAL MEDICINE
Payer: MEDICARE

## 2019-03-29 ENCOUNTER — HOSPITAL ENCOUNTER (OUTPATIENT)
Age: 79
Discharge: HOME | End: 2019-03-29
Attending: INTERNAL MEDICINE | Admitting: INTERNAL MEDICINE
Payer: MEDICARE

## 2019-03-29 VITALS
BODY MASS INDEX: 37.79 KG/M2 | DIASTOLIC BLOOD PRESSURE: 65 MMHG | HEIGHT: 72 IN | SYSTOLIC BLOOD PRESSURE: 144 MMHG | TEMPERATURE: 97.7 F | WEIGHT: 279 LBS | RESPIRATION RATE: 11 BRPM | OXYGEN SATURATION: 96 % | HEART RATE: 60 BPM

## 2019-03-29 DIAGNOSIS — R00.1 BRADYCARDIA: ICD-10-CM

## 2019-03-29 PROBLEM — I49.3 VENTRICULAR ESCAPE RHYTHM: Status: ACTIVE | Noted: 2019-03-29

## 2019-03-29 LAB
GLUCOSE BLD STRIP.AUTO-MCNC: 175 MG/DL (ref 65–100)
SERVICE CMNT-IMP: ABNORMAL

## 2019-03-29 PROCEDURE — 77030018729 HC ELECTRD DEFIB PAD CARD -B: Performed by: INTERNAL MEDICINE

## 2019-03-29 PROCEDURE — C1898 LEAD, PMKR, OTHER THAN TRANS: HCPCS | Performed by: INTERNAL MEDICINE

## 2019-03-29 PROCEDURE — 33286 RMVL SUBQ CAR RHYTHM MNTR: CPT | Performed by: INTERNAL MEDICINE

## 2019-03-29 PROCEDURE — 71045 X-RAY EXAM CHEST 1 VIEW: CPT

## 2019-03-29 PROCEDURE — A4565 SLINGS: HCPCS

## 2019-03-29 PROCEDURE — 74011000250 HC RX REV CODE- 250: Performed by: INTERNAL MEDICINE

## 2019-03-29 PROCEDURE — 77030037713 HC CLOSR DEV INCIS ZIP STRY -B: Performed by: INTERNAL MEDICINE

## 2019-03-29 PROCEDURE — 82962 GLUCOSE BLOOD TEST: CPT

## 2019-03-29 PROCEDURE — 77030031139 HC SUT VCRL2 J&J -A: Performed by: INTERNAL MEDICINE

## 2019-03-29 PROCEDURE — 99152 MOD SED SAME PHYS/QHP 5/>YRS: CPT | Performed by: INTERNAL MEDICINE

## 2019-03-29 PROCEDURE — 74011250636 HC RX REV CODE- 250/636

## 2019-03-29 PROCEDURE — 77030002996 HC SUT SLK J&J -A: Performed by: INTERNAL MEDICINE

## 2019-03-29 PROCEDURE — C1894 INTRO/SHEATH, NON-LASER: HCPCS | Performed by: INTERNAL MEDICINE

## 2019-03-29 PROCEDURE — 74011250636 HC RX REV CODE- 250/636: Performed by: INTERNAL MEDICINE

## 2019-03-29 PROCEDURE — C1785 PMKR, DUAL, RATE-RESP: HCPCS | Performed by: INTERNAL MEDICINE

## 2019-03-29 PROCEDURE — 99153 MOD SED SAME PHYS/QHP EA: CPT | Performed by: INTERNAL MEDICINE

## 2019-03-29 PROCEDURE — C1893 INTRO/SHEATH, FIXED,NON-PEEL: HCPCS | Performed by: INTERNAL MEDICINE

## 2019-03-29 PROCEDURE — 77030018673: Performed by: INTERNAL MEDICINE

## 2019-03-29 PROCEDURE — 33208 INSRT HEART PM ATRIAL & VENT: CPT | Performed by: INTERNAL MEDICINE

## 2019-03-29 DEVICE — PCMKR AZURE XT DR MRI --: Type: IMPLANTABLE DEVICE | Status: FUNCTIONAL

## 2019-03-29 DEVICE — LEAD PCMKR CAPSUR FIX NOVUS 52 --: Type: IMPLANTABLE DEVICE | Status: FUNCTIONAL

## 2019-03-29 DEVICE — LEAD PCMKR CAPSUR FIX NOVUS 58 --: Type: IMPLANTABLE DEVICE | Status: FUNCTIONAL

## 2019-03-29 RX ORDER — LORAZEPAM 2 MG/ML
INJECTION INTRAMUSCULAR AS NEEDED
Status: DISCONTINUED | OUTPATIENT
Start: 2019-03-29 | End: 2019-03-29

## 2019-03-29 RX ORDER — NALOXONE HYDROCHLORIDE 0.4 MG/ML
0.4 INJECTION, SOLUTION INTRAMUSCULAR; INTRAVENOUS; SUBCUTANEOUS AS NEEDED
Status: DISCONTINUED | OUTPATIENT
Start: 2019-03-29 | End: 2019-03-29 | Stop reason: HOSPADM

## 2019-03-29 RX ORDER — BACITRACIN 50000 [IU]/1
INJECTION, POWDER, FOR SOLUTION INTRAMUSCULAR AS NEEDED
Status: DISCONTINUED | OUTPATIENT
Start: 2019-03-29 | End: 2019-03-29

## 2019-03-29 RX ORDER — HYDROCODONE BITARTRATE AND ACETAMINOPHEN 5; 325 MG/1; MG/1
1 TABLET ORAL
Status: DISCONTINUED | OUTPATIENT
Start: 2019-03-29 | End: 2019-03-29 | Stop reason: HOSPADM

## 2019-03-29 RX ORDER — SODIUM CHLORIDE 0.9 % (FLUSH) 0.9 %
5-40 SYRINGE (ML) INJECTION AS NEEDED
Status: DISCONTINUED | OUTPATIENT
Start: 2019-03-29 | End: 2019-03-29 | Stop reason: HOSPADM

## 2019-03-29 RX ORDER — MIDAZOLAM HYDROCHLORIDE 1 MG/ML
INJECTION, SOLUTION INTRAMUSCULAR; INTRAVENOUS AS NEEDED
Status: DISCONTINUED | OUTPATIENT
Start: 2019-03-29 | End: 2019-03-29

## 2019-03-29 RX ORDER — SODIUM CHLORIDE 0.9 % (FLUSH) 0.9 %
5-40 SYRINGE (ML) INJECTION EVERY 8 HOURS
Status: DISCONTINUED | OUTPATIENT
Start: 2019-03-29 | End: 2019-03-29 | Stop reason: HOSPADM

## 2019-03-29 RX ORDER — CEFAZOLIN SODIUM/WATER 2 G/20 ML
2 SYRINGE (ML) INTRAVENOUS ONCE
Status: DISCONTINUED | OUTPATIENT
Start: 2019-03-29 | End: 2019-03-29

## 2019-03-29 RX ORDER — HEPARIN SODIUM 200 [USP'U]/100ML
INJECTION, SOLUTION INTRAVENOUS
Status: DISCONTINUED | OUTPATIENT
Start: 2019-03-29 | End: 2019-03-29

## 2019-03-29 RX ORDER — LIDOCAINE HYDROCHLORIDE 20 MG/ML
INJECTION, SOLUTION INFILTRATION; PERINEURAL AS NEEDED
Status: DISCONTINUED | OUTPATIENT
Start: 2019-03-29 | End: 2019-03-29

## 2019-03-29 RX ORDER — FENTANYL CITRATE 50 UG/ML
INJECTION, SOLUTION INTRAMUSCULAR; INTRAVENOUS AS NEEDED
Status: DISCONTINUED | OUTPATIENT
Start: 2019-03-29 | End: 2019-03-29

## 2019-03-29 RX ORDER — ACETAMINOPHEN 325 MG/1
650 TABLET ORAL
Status: DISCONTINUED | OUTPATIENT
Start: 2019-03-29 | End: 2019-03-29 | Stop reason: HOSPADM

## 2019-03-29 RX ADMIN — CEFAZOLIN 3 G: 1 INJECTION, POWDER, FOR SOLUTION INTRAMUSCULAR; INTRAVENOUS; PARENTERAL at 09:10

## 2019-03-29 NOTE — Clinical Note
TRANSFER - OUT REPORT:  
 
Verbal report given to: Woodrow Abraham RN/TIN Espinoza. Report consisted of patient's Situation, Background, Assessment and  
Recommendations(SBAR). Opportunity for questions and clarification was provided. Patient transported to: Recovery.

## 2019-03-29 NOTE — PROGRESS NOTES
TRANSFER - IN REPORT: 
 
Verbal report received from [facility] [address] on Yaakov Lara  being received from cath lab for routine post - op. Report consisted of patients Situation, Background, Assessment and Recommendations(SBAR). Information from the following report(s) SBAR, Procedure Summary, Med Rec Status and Cardiac Rhythm paced was reviewed with the receiving clinician. Opportunity for questions and clarification was provided. Assessment completed upon patients arrival to [address] [place] [address] and care assumed. Cardiac Cath Lab Recovery Arrival Note: 
 
Yaakov Lara arrived to [facility] recovery area. Patient procedure= removal of loop recorder and insertion of permanent pacemaker. Patient on cardiac monitor, non-invasive blood pressure, SPO2 monitor. On room air. Patient status doing well without problems. Patient is A&Ox 3. Patient reports no problems. PROCEDURE SITE CHECK: 
 
Procedure site:without any bleeding and denies pain/discomfort reported at procedure site. No change in patient status. Continue to monitor patient and status.

## 2019-03-29 NOTE — DISCHARGE INSTRUCTIONS
[address]  [phone]        NEW PACEMAKER IMPLANT DISCHARGE INSTRUCTIONS    Patient ID:  Tanya Grover  [id number]  7 y.o.  1940    Admit Date: 3/29/2019    Discharge Date: 3/29/2019     Admitting Physician: Ar Cochran MD     Discharge Physician: Ar Cochran MD    Admission Diagnoses:   Bradycardia [R00.1]  Ventricular escape rhythm [I49.3]    Discharge Diagnoses: Active Problems:    Ventricular escape rhythm (3/29/2019)        Discharge Condition: Good    Cardiology Procedures this Admission:  Pacemaker insertion. Disposition: home    Reference discharge instructions provided by nursing for diet and activity. Follow-up with device clinic in two weeks. Call [phone] to make an appointment. Signed:  Ar Cochran MD  3/29/2019  10:01 AM      DISCHARGE INSTRUCTIONS FOR PATIENTS WITH PACEMAKERS    1. Remember to call for an appointment for 2 weeks [phone] to check healing and implant programming. 2. Medic Alert Bracelets are available from your pharmacist to wear at all times if you choose to wear one. 3. Carry your ID card for pacemaker with you at all times. This card will be given to you in the hospital or mailed to you. 4. The pacemaker will bulge slightly under your skin. The bulge will decrease in size over the next few weeks. Please notify the doctor's office if you notice any of the following around your site:   A.  A bruise that does not go away. B.  Soreness or yellow, green, or brown drainage from the site. C. Any swelling from the site. D. If you have a fever of 100 degrees or higher that lasts for a few days. INCISION CARE       1.  Leave the dressing over your site until your follow up visit. 2.  You may not shower until after follow up visit. 3.  For comfort, wear loose fitting clothing. 4.  Ice pack to affected shoulder for first 24 hours, wear your sling for 2 days.   5.  Report any signs of infection, fever, pain, swelling, redness, oozing, or heat at site especially if these symptoms increase after the first 3 to 4 days. ACTIVITY PRECAUTIONS     1. Avoid rough contact with the implant site. 2. No driving for 14 days. 3. Avoid lifting your arm over your head, carrying anything on the affected side, or lifting over 10 pounds for 30 days. For the first 2 days only bend your arm at the elbow. 4. Any extreme activity such as golf, weight lifting or exercise biking should be restricted for 60 days. 5. Do not carry objects by holding them against your implant site. 6.  No shooting rifles or any type of gun with the affected shoulder permanently. SPECIAL PRECAUTIONS     1. You should avoid all strong magnetic fields, such as arc welding, large transformers, large motors. 2.  You may or may not (depending on your device) have an MRI which uses a strong magnet to take pictures. 3.  Treatments or surgery that requires diathermy or electrocautery should be discussed with your doctor before scheduled. 4. Avoid radio frequency transmitters, including radar. 5. Advise dentist or other medical personnel you see that you have a pacemaker. 6.  Cell phones and microwave oven use is okay. 7.  If you plan to move or take a trip to a new area, the doctor's office will give you a name of a doctor to contact for any problems. ANTIBIOTIC THERAPY    During the first 8 weeks after your pacemaker insertion, you may need antibiotics before any dental work or certain tests or operations. Let the dentist or doctor who is caring for you know that you have had an implanted device.

## 2019-03-29 NOTE — INTERVAL H&P NOTE
H&P Update: 
Surinder Him was seen and examined. History and physical has been reviewed. Significant clinical changes have occurred as noted:  holter demonstrated ventricular escape rhythm in the 30s. In light of fatigue and sob, will proceed with removal of ILR and implant of pacemaker. I discussed the risks/benefits/alternatives of the procedure with the patient. Risks include (but are not limited to) bleeding, heart block, infection, cva/mi/tamponade/death. The patient understands and agrees to proceed. Thank you for this interesting consultation.  
 
 
Signed By: Malick Rodas MD   
 March 29, 2019 8:09 AM

## 2019-03-29 NOTE — PROGRESS NOTES
Reviewed discharge paperwork with patient and wife. Both verbalize understanding. Patient discharge d to home

## 2019-03-29 NOTE — ROUTINE PROCESS
Cardiac Cath Lab Recovery Arrival Note: 
 
 
Mir Kirkland arrived to Cardiac Cath Lab, Recovery Area. Staff introduced to patient. Patient identifiers verified with NAME and DATE OF BIRTH. Procedure verified with patient. Consent forms reviewed and signed by patient or authorized representative and verified. Allergies verified. Patient and family oriented to department. Patient and family informed of procedure and plan of care. Questions answered with review. Patient prepped for procedure, per orders from physician, prior to arrival. 
 
Patient on cardiac monitor, non-invasive blood pressure, SPO2 monitor. On room air. Patient is A&Ox 3. Patient reports no complaints. Patient in stretcher, in low position, with side rails up, call bell within reach, patient instructed to call if assistance as needed. Patient prep in: [address], [place] [address]. Family in: room with patient.   
Prep by: Pk Ambrose rn

## 2019-04-15 ENCOUNTER — CLINICAL SUPPORT (OUTPATIENT)
Dept: CARDIOLOGY CLINIC | Age: 79
End: 2019-04-15

## 2019-04-15 DIAGNOSIS — Z95.0 PACEMAKER: ICD-10-CM

## 2019-04-15 DIAGNOSIS — Z86.79 S/P ABLATION OF ATRIAL FIBRILLATION: ICD-10-CM

## 2019-04-15 DIAGNOSIS — I49.5 SSS (SICK SINUS SYNDROME) (HCC): ICD-10-CM

## 2019-04-15 DIAGNOSIS — I49.5 SSS (SICK SINUS SYNDROME) (HCC): Chronic | ICD-10-CM

## 2019-04-15 DIAGNOSIS — Z98.890 S/P ABLATION OF ATRIAL FIBRILLATION: ICD-10-CM

## 2019-04-15 DIAGNOSIS — I49.9 IRREGULAR HEARTBEAT: ICD-10-CM

## 2019-04-15 DIAGNOSIS — I48.91 ATRIAL FIBRILLATION, UNSPECIFIED TYPE (HCC): Primary | ICD-10-CM

## 2019-04-15 DIAGNOSIS — Z95.0 CARDIAC PACEMAKER IN SITU: Primary | ICD-10-CM

## 2019-04-15 RX ORDER — METOPROLOL TARTRATE 25 MG/1
25 TABLET, FILM COATED ORAL 2 TIMES DAILY
Qty: 180 TAB | Refills: 3 | Status: SHIPPED | OUTPATIENT
Start: 2019-04-15 | End: 2020-06-15

## 2019-04-15 NOTE — PROGRESS NOTES
Shelley Gibbs  1940  None          Subjective:    Patient is here for 2 week site check and device interrogation after pacemaker implantation. The patient denies chest pain/shortness of breath or fevers. Reports elevated bps. Did not have stress test completed. Patient Active Problem List    Diagnosis Date Noted    Ventricular escape rhythm 03/29/2019    Type 2 diabetes with nephropathy ([address] 04/10/2018    Severe obesity (BMI 35.0-39. 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 angina pectoris 06/02/2017    Age-related cataract of both eyes 06/02/2017    S/P ablation of atrial fibrillation 08/04/2016    SSS (sick sinus syndrome) ([address] 07/19/2016    Irregular heartbeat 05/31/2016    Postsurgical aortocoronary bypass status 04/25/2012    Mixed hyperlipidemia 04/25/2012    Coronary atherosclerosis of native coronary artery 04/25/2012    Atrial fibrillation ([address] 04/25/2012      Past Medical History:   Diagnosis Date    Anemia 6/5/2017    Coronary atherosclerosis of native coronary artery     Diabetes mellitus, type II ([address]     Mixed hyperlipidemia     Other dyspnea and respiratory abnormality     S/P ablation of atrial fibrillation 8/4/2016      Past Surgical History:   Procedure Laterality Date    HX CATARACT REMOVAL Bilateral 2018    HX CORONARY ARTERY BYPASS GRAFT  2001    NM INS NEW/RPLCMT PRM PM W/TRANSV ELTRD ATRIAL&VENT N/A 3/29/2019    INSERT PPM DUAL performed by Jovita Salcedo MD at [facility] CARD[facility]AC [facility]    NM REMOVAL SUBCUTANEOUS CARDIAC RHYTHM MONITOR N/A 3/29/2019    LOOP RECORDER REMOVAL performed by Jovita Salcedo MD at [facility] CARDIAC CATH LAB     Allergies   Allergen Reactions  Ciprofloxacin Other (comments)     Makes him out of his head      Family History   Problem Relation Age of Onset    Diabetes Mother     Emphysema Father          age 46 - smoker      Current Outpatient Medications   Medication Sig    metoprolol tartrate (LOPRESSOR) 25 mg tablet Take 1 Tab by mouth two (2) times a day.  rosuvastatin (CRESTOR) 40 mg tablet TAKE 1 TABLET BY MOUTH EVERY DAY    ELIQUIS 5 mg tablet TAKE 1 TABLET BY MOUTH TWICE A DAY    ACCU-CHEK YVES PLUS TEST STRP strip USE TO TEST BLOOD SUGAR DAILY DX:E11.9    metFORMIN ER (GLUCOPHAGE XR) 500 mg tablet TAKE 2 TABLETS BY MOUTH TWICE A DAY WITH A MEAL    cholecalciferol (VITAMIN D3) 1,000 unit cap Take  by mouth daily.  ferrous sulfate 324 mg (65 mg iron) tablet Take  by mouth Daily (before breakfast).  cyanocobalamin 1,000 mcg tablet Take 1,000 mcg by mouth daily.  aspirin delayed-release (ECOTRIN LOW STRENGTH) 81 mg tablet Take  by mouth daily. No current facility-administered medications for this visit. Bps  150/66 left arm sitting    Review of Systems:    General: Pt denies excessive weight gain or loss. Pt is able to conduct ADL's  Respiratory: Denies shortness of breath, MCCOLLUM, wheezing or stridor. Cardiovascular: Denies precordial pain, palpitations, edema or PND        Physical ExamPhysical Exam:      General: Well developed, in no acute distress. .  Chest: left subclavian pacer site approximated well  Neuro: A&Ox3, speech clear, gait stable. Assessment:        ICD-10-CM ICD-9-CM    1. Atrial fibrillation, unspecified type ([facility] I48.91 427.31    2. Irregular heartbeat I49.9 427.9    3. SSS (sick sinus syndrome) ([facility]) I49.5 427.81    4. S/P ablation of atrial fibrillation Z98.890 V45.89     Z86.79     5. Pacemaker Z95.0 V45.01         Plan:     Patient feels well following pacemaker implantation. Left subclavian pacemaker site approximated well, no discharge. No erythema or heat. Follow up as planned. Increased bb for HTN. He will continue to monitor at home.   Advised to complete nuclear stress test with complaints of fatigue, dyspnea in DM with HTN, hyperlipidemia and obesity     Bianca Godfrey, ANP

## 2019-05-03 ENCOUNTER — OFFICE VISIT (OUTPATIENT)
Dept: CARDIOLOGY CLINIC | Age: 79
End: 2019-05-03

## 2019-05-03 VITALS
RESPIRATION RATE: 16 BRPM | OXYGEN SATURATION: 96 % | BODY MASS INDEX: 37.3 KG/M2 | WEIGHT: 275.4 LBS | SYSTOLIC BLOOD PRESSURE: 122 MMHG | DIASTOLIC BLOOD PRESSURE: 66 MMHG | HEIGHT: 72 IN | HEART RATE: 64 BPM

## 2019-05-03 DIAGNOSIS — Z98.890 S/P ABLATION OF ATRIAL FIBRILLATION: ICD-10-CM

## 2019-05-03 DIAGNOSIS — Z95.0 PACEMAKER: ICD-10-CM

## 2019-05-03 DIAGNOSIS — I48.0 PAROXYSMAL ATRIAL FIBRILLATION (HCC): ICD-10-CM

## 2019-05-03 DIAGNOSIS — E78.5 HYPERLIPIDEMIA, UNSPECIFIED HYPERLIPIDEMIA TYPE: ICD-10-CM

## 2019-05-03 DIAGNOSIS — Z86.79 S/P ABLATION OF ATRIAL FIBRILLATION: ICD-10-CM

## 2019-05-03 DIAGNOSIS — I25.118 CORONARY ARTERY DISEASE OF NATIVE ARTERY OF NATIVE HEART WITH STABLE ANGINA PECTORIS (HCC): Primary | ICD-10-CM

## 2019-05-03 DIAGNOSIS — E11.21 TYPE 2 DIABETES WITH NEPHROPATHY (HCC): ICD-10-CM

## 2019-05-03 NOTE — H&P (VIEW-ONLY)
[address], [address] [facility]  [phone] Subjective:  
  
Rowena Lai is a 70 y.o. male is hereto discuss result of recent stress test. . Last seen by us in 2016. Saw EP 3/19 and c/o fay, ordered nuclear stress test which came back abnormal.  However, he reports resolution of sob since pacemaker insertion. He walks his dog daily, at least 1 mile, with no exertional cp or sob. The patient denies chest pain/ shortness of breath, orthopnea, PND, LE edema, palpitations, syncope, or presyncope. 4/19 NST Positive myocardial perfusion imaging. Myocardial perfusion imaging supports a high risk stress test.  
There is a prior study available for comparison. As compared to the previous study, there are significant changes. Perfusion defects appear to be new. Myocardial perfusion imaging defect 1: There is a defect that is large in size present in the apical to mid anterior, lateral and apex location(s) that is reversible. There is normal wall motion in the defect area. The defect appears to be ischemia. Patient Active Problem List  
 Diagnosis Date Noted  Ventricular escape rhythm 03/29/2019  Type 2 diabetes with nephropathy ([address] 04/10/2018  Severe obesity (BMI 35.0-39. 9) with comorbidity ([address] 04/10/2018  Anemia 06/05/2017  BMI 39.0-39.9,adult 06/02/2017  Hyperlipidemia 06/02/2017  Controlled type 2 diabetes mellitus without complication, without long-term current use of insulin ([address] 06/02/2017  Coronary artery disease involving coronary bypass graft of native heart without angina pectoris 06/02/2017  Age-related cataract of both eyes 06/02/2017  S/P ablation of atrial fibrillation 08/04/2016  SSS (sick sinus syndrome) ([address] 07/19/2016  Irregular heartbeat 05/31/2016  Postsurgical aortocoronary bypass status 04/25/2012  Mixed hyperlipidemia 04/25/2012  Coronary atherosclerosis of native coronary artery 04/25/2012  Atrial fibrillation ([facility] 2012 None Past Medical History:  
Diagnosis Date  Anemia 2017  Coronary atherosclerosis of native coronary artery  Diabetes mellitus, type II ([facility]  Mixed hyperlipidemia  Other dyspnea and respiratory abnormality  S/P ablation of atrial fibrillation 2016 Past Surgical History:  
Procedure Laterality Date  HX CATARACT REMOVAL Bilateral 2018  HX CORONARY ARTERY BYPASS GRAFT  2001  AR INS NEW/RPLCMT PRM PM W/TRANSV ELTRD ATRIAL&VENT N/A 3/29/2019 INSERT PPM DUAL performed by Lois Cuadra MD at [facility] CARDIAC CATH LAB  AR REMOVAL SUBCUTANEOUS CARDIAC RHYTHM MONITOR N/A 3/29/2019 LOOP RECORDER REMOVAL performed by Lois Cuadra MD at [facility] CARDIAC CATH LAB Allergies Allergen Reactions  Ciprofloxacin Other (comments) Difficulty breathing; 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 Family History Problem Relation Age of Onset  Diabetes Mother  Emphysema Father   
      age 46 - smoker Social History Socioeconomic History  Marital status:  Spouse name: Not on file  Number of children: Not on file  Years of education: Not on file  Highest education level: Not on file Occupational History  Not on file Social Needs  Financial resource strain: Not on file  Food insecurity:  
  Worry: Not on file Inability: Not on file  Transportation needs:  
  Medical: Not on file Non-medical: Not on file Tobacco Use  Smoking status: Former Smoker Packs/day: 4.00 Years: 20.00 Pack years: 80.00 Types: Cigarettes Last attempt to quit: 1980 Years since quittin.8  Smokeless tobacco: Former User  Tobacco comment: QUIT AT AGE 42 Substance and Sexual Activity  Alcohol use: No  
  Alcohol/week: 0.0 oz  Drug use: Never  Sexual activity: Not on file Lifestyle  Physical activity:  
  Days per week: Not on file Minutes per session: Not on file  Stress: Not on file Relationships  Social connections:  
  Talks on phone: Not on file Gets together: Not on file Attends [employer] service: Not on file Active member of club or organization: Not on file Attends meetings of clubs or organizations: Not on file Relationship status: Not on file  Intimate partner violence:  
  Fear of current or ex partner: Not on file Emotionally abused: Not on file Physically abused: Not on file Forced sexual activity: Not on file Other Topics Concern  Not on file Social History Narrative  Not on file Current Outpatient Medications Medication Sig  
 metoprolol tartrate (LOPRESSOR) 25 mg tablet Take 1 Tab by mouth two (2) times a day.  rosuvastatin (CRESTOR) 40 mg tablet TAKE 1 TABLET BY MOUTH EVERY DAY  ELIQUIS 5 mg tablet TAKE 1 TABLET BY MOUTH TWICE A DAY  ACCU-CHEK YVES PLUS TEST STRP strip USE TO TEST BLOOD SUGAR DAILY DX:E11.9  
 metFORMIN ER (GLUCOPHAGE XR) 500 mg tablet TAKE 2 TABLETS BY MOUTH TWICE A DAY WITH A MEAL  cholecalciferol (VITAMIN D3) 1,000 unit cap Take  by mouth daily.  ferrous sulfate 324 mg (65 mg iron) tablet Take  by mouth Daily (before breakfast).  cyanocobalamin 1,000 mcg tablet Take 1,000 mcg by mouth daily.  aspirin delayed-release (ECOTRIN LOW STRENGTH) 81 mg tablet Take  by mouth daily. No current facility-administered medications for this visit. Review of Symptoms: 
11 systems reviewed, negative other than as stated in the HPI Physical ExamPhysical Exam:   
Vitals:  
 05/03/19 1303 05/03/19 1314 BP: 130/68 122/66 Pulse: 64 Resp: 16 SpO2: 96% Weight: 275 lb 6.4 oz (124.9 kg) Height: 6' (1.829 m) Body mass index is 37.35 kg/m². General PE Gen:  NAD Mental Status - Alert. General Appearance - Not in acute distress.   
Chest and Lung Exam  
 Inspection: Accessory muscles - No use of accessory muscles in breathing. Auscultation:  
Breath sounds: - Normal.  
Cardiovascular Inspection: Jugular vein - Bilateral - Inspection Normal.  
Palpation/Percussion:  
Apical Impulse: - Normal.  
Auscultation: Rhythm - Regular. Heart Sounds - S1 WNL and S2 WNL. No S3 or S4. Murmurs & Other Heart Sounds: Auscultation of the heart reveals - No Murmurs. Peripheral Vascular Upper Extremity: Inspection - Bilateral - No Cyanotic nailbeds or Digital clubbing. Lower Extremity:  
Palpation: Edema - Bilateral - No edema. Abdomen:   Soft, non-tender, bowel sounds are active. Neuro: A&O times 3, CN and motor grossly WNL Labs:  
Lab Results Component Value Date/Time Cholesterol, total 83 (L) 07/31/2018 08:52 AM  
 Cholesterol, total 87 (L) 07/31/2017 08:01 AM  
 Cholesterol, total 104 06/22/2016 09:12 AM  
 Cholesterol, total 110 10/05/2015 07:40 AM  
 Cholesterol, total 100 11/11/2014 08:02 AM  
 HDL Cholesterol 31 (L) 07/31/2018 08:52 AM  
 HDL Cholesterol 31 (L) 07/31/2017 08:01 AM  
 HDL Cholesterol 36 (L) 06/22/2016 09:12 AM  
 HDL Cholesterol 43 10/05/2015 07:40 AM  
 HDL Cholesterol 39 (L) 11/11/2014 08:02 AM  
 LDL, calculated 20 07/31/2018 08:52 AM  
 LDL, calculated 22 07/31/2017 08:01 AM  
 LDL, calculated 44 06/22/2016 09:12 AM  
 LDL, calculated 42 10/05/2015 07:40 AM  
 LDL, calculated 43 11/11/2014 08:02 AM  
 Triglyceride 160 (H) 07/31/2018 08:52 AM  
 Triglyceride 172 (H) 07/31/2017 08:01 AM  
 Triglyceride 122 06/22/2016 09:12 AM  
 Triglyceride 123 10/05/2015 07:40 AM  
 Triglyceride 89 11/11/2014 08:02 AM  
 
No results found for: CPK, CPKX, CPX Lab Results Component Value Date/Time  Sodium 141 07/31/2018 08:52 AM  
 Potassium 4.9 07/31/2018 08:52 AM  
 Chloride 103 07/31/2018 08:52 AM  
 CO2 23 07/31/2018 08:52 AM  
 Anion gap 10 08/04/2016 05:11 AM  
 Glucose 148 (H) 07/31/2018 08:52 AM  
 BUN 36 (H) 07/31/2018 08:52 AM  
 Creatinine 1.98 (H) 07/31/2018 08:52 AM  
 BUN/Creatinine ratio 18 07/31/2018 08:52 AM  
 GFR est AA 37 (L) 07/31/2018 08:52 AM  
 GFR est non-AA 32 (L) 07/31/2018 08:52 AM  
 Calcium 9.6 07/31/2018 08:52 AM  
 Bilirubin, total 0.5 07/31/2018 08:52 AM  
 AST (SGOT) 15 07/31/2018 08:52 AM  
 Alk. phosphatase 55 07/31/2018 08:52 AM  
 Protein, total 6.7 07/31/2018 08:52 AM  
 Albumin 4.4 07/31/2018 08:52 AM  
 A-G Ratio 1.9 07/31/2018 08:52 AM  
 ALT (SGPT) 14 07/31/2018 08:52 AM  
 
 
EKG: 
 
 
 Assessment: 
 
 Assessment: 1. Coronary artery disease involving native coronary artery of native heart with angina pectoris with documented spasm ([address] 2. Paroxysmal atrial fibrillation ([address] 3. S/P ablation of atrial fibrillation 4. Pacemaker 5. Hyperlipidemia, unspecified hyperlipidemia type 6. Type 2 diabetes with nephropathy (HCC) No orders of the defined types were placed in this encounter. Plan:  
 
Patient presents for long f/u. Last seen by us in 2016. Saw EP 3/19 and c/o fay, ordered nuclear stress test which came back abnormal.  Sx better post pacer, but the dog walking is done at a leisurely pace. [facility] He is 20 yr post CABG X 2-3 ASHD, Hx CABG in 2001 Abnormal NST 4/19:  defect that is large in size present in the apical to mid anterior, lateral and apex location(s) that is reversible. There is normal wall motion in the defect area. The defect appears to be ischemia. On ASA, BB, statin. High risk stress test; plan cath via L snuffbox PAF s/p Successful PVI of all 4 PVs  (8/3/16). , post PM 3/19 Holter 3/19: Sinus bradycardia with PAF with rvr and bouts of ventricular escape rhythm in the 40s Continue BB, Eliquis for [address] 
 
HTN Controlled HLD 
7/18 LDL at 20. On  Statin DM On Metformin Followed by Dr Yovany Hartman Continue current care and f/u in 6 months. Gauri Bhatt NP Patient seen and examined by me with nurse practitioner. Janette Umaña personally performed all components of the history, physical, and medical decision making and agree with the assessment and plan with minor modifications as noted. Plan cath via L snuffbox

## 2019-05-03 NOTE — PROGRESS NOTES
1. Have you been to the ER, urgent care clinic since your last visit? Hospitalized since your last visit? No    2. Have you seen or consulted any other health care providers outside of the [facility] since your last visit? Include any pap smears or colon screening.  No     Chief Complaint   Patient presents with    Results     for nuclear stress test

## 2019-05-03 NOTE — PROGRESS NOTES
[address] [place] [address], [address] [facility]  [phone]     Subjective:      Renita Wade is a 70 y.o. male is hereto discuss result of recent stress test. . Last seen by us in 2016. Saw EP 3/19 and c/o fay, ordered nuclear stress test which came back abnormal.  However, he reports resolution of sob since pacemaker insertion. He walks his dog daily, at least 1 mile, with no exertional cp or sob. The patient denies chest pain/ shortness of breath, orthopnea, PND, LE edema, palpitations, syncope, or presyncope. 4/19 NST  Positive myocardial perfusion imaging. Myocardial perfusion imaging supports a high risk stress test.   There is a prior study available for comparison. As compared to the previous study, there are significant changes. Perfusion defects appear to be new. Myocardial perfusion imaging defect 1: There is a defect that is large in size present in the apical to mid anterior, lateral and apex location(s) that is reversible. There is normal wall motion in the defect area. The defect appears to be ischemia. Patient Active Problem List    Diagnosis Date Noted    Ventricular escape rhythm 03/29/2019    Type 2 diabetes with nephropathy ([address] 04/10/2018    Severe obesity (BMI 35.0-39. 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 angina pectoris 06/02/2017    Age-related cataract of both eyes 06/02/2017    S/P ablation of atrial fibrillation 08/04/2016    SSS (sick sinus syndrome) ([address] 07/19/2016    Irregular heartbeat 05/31/2016    Postsurgical aortocoronary bypass status 04/25/2012    Mixed hyperlipidemia 04/25/2012    Coronary atherosclerosis of native coronary artery 04/25/2012    Atrial fibrillation ([facility] 2012      None  Past Medical History:   Diagnosis Date    Anemia 2017    Coronary atherosclerosis of native coronary artery     Diabetes mellitus, type II ([facility]     Mixed hyperlipidemia     Other dyspnea and respiratory abnormality     S/P ablation of atrial fibrillation 2016      Past Surgical History:   Procedure Laterality Date    HX CATARACT REMOVAL Bilateral 2018    HX CORONARY ARTERY BYPASS GRAFT  2001    MN INS NEW/RPLCMT PRM PM W/TRANSV ELTRD ATRIAL&VENT N/A 3/29/2019    INSERT PPM DUAL performed by Brice Dooely MD at [facility] CARDIAC CATH LAB    MN REMOVAL SUBCUTANEOUS CARDIAC RHYTHM MONITOR N/A 3/29/2019    LOOP RECORDER REMOVAL performed by Brice Dooley MD at [facility] CARDIAC CATH LAB     Allergies   Allergen Reactions    Ciprofloxacin Other (comments)     Difficulty breathing; 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      Family History   Problem Relation Age of Onset    Diabetes Mother     Emphysema Father          age 46 - smoker      Social History     Socioeconomic History    Marital status:      Spouse name: Not on file    Number of children: Not on file    Years of education: Not on file    Highest education level: Not on file   Occupational History    Not on file   Social Needs    Financial resource strain: Not on file    Food insecurity:     Worry: Not on file     Inability: Not on file    Transportation needs:     Medical: Not on file     Non-medical: Not on file   Tobacco Use    Smoking status: Former Smoker     Packs/day: 4.00     Years: 20.00     Pack years: 80.00     Types: Cigarettes     Last attempt to quit: 1980     Years since quittin.8    Smokeless tobacco: Former User    Tobacco comment: QUIT AT AGE 39   Substance and Sexual Activity    Alcohol use: No     Alcohol/week: 0.0 oz    Drug use: Never    Sexual activity: Not on file   Lifestyle    Physical activity:     Days per week: Not on file     Minutes per session: Not on file    Stress: Not on file   Relationships    Social connections:     Talks on phone: Not on file     Gets together: Not on file     Attends Taoism service: Not on file     Active member of club or organization: Not on file     Attends meetings of clubs or organizations: Not on file     Relationship status: Not on file    Intimate partner violence:     Fear of current or ex partner: Not on file     Emotionally abused: Not on file     Physically abused: Not on file     Forced sexual activity: Not on file   Other Topics Concern    Not on file   Social History Narrative    Not on file      Current Outpatient Medications   Medication Sig    metoprolol tartrate (LOPRESSOR) 25 mg tablet Take 1 Tab by mouth two (2) times a day.  rosuvastatin (CRESTOR) 40 mg tablet TAKE 1 TABLET BY MOUTH EVERY DAY    ELIQUIS 5 mg tablet TAKE 1 TABLET BY MOUTH TWICE A DAY    ACCU-CHEK YVES PLUS TEST STRP strip USE TO TEST BLOOD SUGAR DAILY DX:E11.9    metFORMIN ER (GLUCOPHAGE XR) 500 mg tablet TAKE 2 TABLETS BY MOUTH TWICE A DAY WITH A MEAL    cholecalciferol (VITAMIN D3) 1,000 unit cap Take  by mouth daily.  ferrous sulfate 324 mg (65 mg iron) tablet Take  by mouth Daily (before breakfast).  cyanocobalamin 1,000 mcg tablet Take 1,000 mcg by mouth daily.  aspirin delayed-release (ECOTRIN LOW STRENGTH) 81 mg tablet Take  by mouth daily. No current facility-administered medications for this visit. Review of Symptoms:  11 systems reviewed, negative other than as stated in the HPI    Physical ExamPhysical Exam:    Vitals:    05/03/19 1303 05/03/19 1314   BP: 130/68 122/66   Pulse: 64    Resp: 16    SpO2: 96%    Weight: 275 lb 6.4 oz (124.9 kg)    Height: 6' (1.829 m)      Body mass index is 37.35 kg/m². General PE   Gen:  NAD  Mental Status - Alert. General Appearance - Not in acute distress.    Chest and Lung Exam   Inspection: Accessory muscles - No use of accessory muscles in breathing. Auscultation:   Breath sounds: - Normal.   Cardiovascular   Inspection: Jugular vein - Bilateral - Inspection Normal.   Palpation/Percussion:   Apical Impulse: - Normal.   Auscultation: Rhythm - Regular. Heart Sounds - S1 WNL and S2 WNL. No S3 or S4. Murmurs & Other Heart Sounds: Auscultation of the heart reveals - No Murmurs. Peripheral Vascular   Upper Extremity: Inspection - Bilateral - No Cyanotic nailbeds or Digital clubbing. Lower Extremity:   Palpation: Edema - Bilateral - No edema. Abdomen:   Soft, non-tender, bowel sounds are active.   Neuro: A&O times 3, CN and motor grossly WNL    Labs:   Lab Results   Component Value Date/Time    Cholesterol, total 83 (L) 07/31/2018 08:52 AM    Cholesterol, total 87 (L) 07/31/2017 08:01 AM    Cholesterol, total 104 06/22/2016 09:12 AM    Cholesterol, total 110 10/05/2015 07:40 AM    Cholesterol, total 100 11/11/2014 08:02 AM    HDL Cholesterol 31 (L) 07/31/2018 08:52 AM    HDL Cholesterol 31 (L) 07/31/2017 08:01 AM    HDL Cholesterol 36 (L) 06/22/2016 09:12 AM    HDL Cholesterol 43 10/05/2015 07:40 AM    HDL Cholesterol 39 (L) 11/11/2014 08:02 AM    LDL, calculated 20 07/31/2018 08:52 AM    LDL, calculated 22 07/31/2017 08:01 AM    LDL, calculated 44 06/22/2016 09:12 AM    LDL, calculated 42 10/05/2015 07:40 AM    LDL, calculated 43 11/11/2014 08:02 AM    Triglyceride 160 (H) 07/31/2018 08:52 AM    Triglyceride 172 (H) 07/31/2017 08:01 AM    Triglyceride 122 06/22/2016 09:12 AM    Triglyceride 123 10/05/2015 07:40 AM    Triglyceride 89 11/11/2014 08:02 AM     No results found for: CPK, CPKX, CPX  Lab Results   Component Value Date/Time    Sodium 141 07/31/2018 08:52 AM    Potassium 4.9 07/31/2018 08:52 AM    Chloride 103 07/31/2018 08:52 AM    CO2 23 07/31/2018 08:52 AM    Anion gap 10 08/04/2016 05:11 AM    Glucose 148 (H) 07/31/2018 08:52 AM    BUN 36 (H) 07/31/2018 08:52 AM    Creatinine 1.98 (H) 07/31/2018 08:52 AM    BUN/Creatinine ratio 18 07/31/2018 08:52 AM    GFR est AA 37 (L) 07/31/2018 08:52 AM    GFR est non-AA 32 (L) 07/31/2018 08:52 AM    Calcium 9.6 07/31/2018 08:52 AM    Bilirubin, total 0.5 07/31/2018 08:52 AM    AST (SGOT) 15 07/31/2018 08:52 AM    Alk. phosphatase 55 07/31/2018 08:52 AM    Protein, total 6.7 07/31/2018 08:52 AM    Albumin 4.4 07/31/2018 08:52 AM    A-G Ratio 1.9 07/31/2018 08:52 AM    ALT (SGPT) 14 07/31/2018 08:52 AM       EKG:       Assessment:     Assessment:      1. Coronary artery disease involving native coronary artery of native heart with angina pectoris with documented spasm (HCC)    2. Paroxysmal atrial fibrillation ([address]    3. S/P ablation of atrial fibrillation    4. Pacemaker    5. Hyperlipidemia, unspecified hyperlipidemia type    6. Type 2 diabetes with nephropathy (HCC)        No orders of the defined types were placed in this encounter. Plan:     Patient presents for long f/u. Last seen by us in 2016. Saw EP 3/19 and c/o fay, ordered nuclear stress test which came back abnormal.  Sx better post pacer, but the dog walking is done at a leisurely pace. Devi Holland He is 27 yr post CABG X 2-3  ASHD, Hx CABG in 2001  Abnormal NST 4/19:  defect that is large in size present in the apical to mid anterior, lateral and apex location(s) that is reversible. There is normal wall motion in the defect area. The defect appears to be ischemia. On ASA, BB, statin. High risk stress test; plan cath via L snuffbox    PAF s/p Successful PVI of all 4 PVs  (8/3/16). , post PM 3/19  Holter 3/19: Sinus bradycardia with PAF with rvr and bouts of ventricular escape rhythm in the 40s   Continue BB, Eliquis for [address]    HTN  Controlled    HLD  7/18 LDL at 20. On  Statin    DM  On Metformin  Followed by Dr Elton Ruiz current care and f/u in 6 months.     Paco Monet NP     Patient seen and examined by me with nurse practitioner. Kelley Herrera personally performed all components of the history, physical, and medical decision making and agree with the assessment and plan with minor modifications as noted.     Plan cath via L snuffbox

## 2019-05-06 ENCOUNTER — HOSPITAL ENCOUNTER (OUTPATIENT)
Dept: LAB | Age: 79
Discharge: HOME | End: 2019-05-06
Payer: MEDICARE

## 2019-05-06 PROCEDURE — 85610 PROTHROMBIN TIME: CPT

## 2019-05-06 PROCEDURE — 85025 COMPLETE CBC W/AUTO DIFF WBC: CPT

## 2019-05-06 PROCEDURE — 80053 COMPREHEN METABOLIC PANEL: CPT

## 2019-05-06 PROCEDURE — 36415 COLL VENOUS BLD VENIPUNCTURE: CPT

## 2019-05-07 ENCOUNTER — HOSPITAL ENCOUNTER (OUTPATIENT)
Age: 79
Discharge: HOME | End: 2019-05-08
Attending: INTERNAL MEDICINE | Admitting: INTERNAL MEDICINE
Payer: MEDICARE

## 2019-05-07 DIAGNOSIS — I24.9 ACS (ACUTE CORONARY SYNDROME) (HCC): ICD-10-CM

## 2019-05-07 PROBLEM — Z98.890 S/P CARDIAC CATH: Status: ACTIVE | Noted: 2019-05-07

## 2019-05-07 LAB
ALBUMIN SERPL-MCNC: 4.4 G/DL (ref 3.5–4.8)
ALBUMIN/GLOB SERPL: 1.8 {RATIO} (ref 1.2–2.2)
ALP SERPL-CCNC: 60 IU/L (ref 39–117)
ALT SERPL-CCNC: 15 IU/L (ref 0–44)
AST SERPL-CCNC: 17 IU/L (ref 0–40)
BASOPHILS # BLD AUTO: 0 X10E3/UL (ref 0–0.2)
BASOPHILS NFR BLD AUTO: 0 %
BILIRUB SERPL-MCNC: 0.3 MG/DL (ref 0–1.2)
BUN SERPL-MCNC: 34 MG/DL (ref 8–27)
BUN/CREAT SERPL: 16 (ref 10–24)
CALCIUM SERPL-MCNC: 9.5 MG/DL (ref 8.6–10.2)
CHLORIDE SERPL-SCNC: 105 MMOL/L (ref 96–106)
CO2 SERPL-SCNC: 21 MMOL/L (ref 20–29)
CREAT SERPL-MCNC: 2.08 MG/DL (ref 0.76–1.27)
EOSINOPHIL # BLD AUTO: 0.2 X10E3/UL (ref 0–0.4)
EOSINOPHIL NFR BLD AUTO: 3 %
ERYTHROCYTE [DISTWIDTH] IN BLOOD BY AUTOMATED COUNT: 14.6 % (ref 12.3–15.4)
GLOBULIN SER CALC-MCNC: 2.5 G/DL (ref 1.5–4.5)
GLUCOSE BLD STRIP.AUTO-MCNC: 139 MG/DL (ref 65–100)
GLUCOSE BLD STRIP.AUTO-MCNC: 142 MG/DL (ref 65–100)
GLUCOSE BLD STRIP.AUTO-MCNC: 169 MG/DL (ref 65–100)
GLUCOSE SERPL-MCNC: 208 MG/DL (ref 65–99)
HCT VFR BLD AUTO: 33.2 % (ref 37.5–51)
HGB BLD-MCNC: 10.8 G/DL (ref 13–17.7)
IMM GRANULOCYTES # BLD AUTO: 0 X10E3/UL (ref 0–0.1)
IMM GRANULOCYTES NFR BLD AUTO: 0 %
INR PPP: 1.1 (ref 0.8–1.2)
INTERPRETATION: NORMAL
LYMPHOCYTES # BLD AUTO: 1 X10E3/UL (ref 0.7–3.1)
LYMPHOCYTES NFR BLD AUTO: 20 %
Lab: NORMAL
MCH RBC QN AUTO: 29.3 PG (ref 26.6–33)
MCHC RBC AUTO-ENTMCNC: 32.5 G/DL (ref 31.5–35.7)
MCV RBC AUTO: 90 FL (ref 79–97)
MONOCYTES # BLD AUTO: 0.6 X10E3/UL (ref 0.1–0.9)
MONOCYTES NFR BLD AUTO: 12 %
NEUTROPHILS # BLD AUTO: 3.2 X10E3/UL (ref 1.4–7)
NEUTROPHILS NFR BLD AUTO: 65 %
PLATELET # BLD AUTO: 164 X10E3/UL (ref 150–379)
POTASSIUM SERPL-SCNC: 4.5 MMOL/L (ref 3.5–5.2)
PROT SERPL-MCNC: 6.9 G/DL (ref 6–8.5)
PROTHROMBIN TIME: 11 SEC (ref 9.1–12)
RBC # BLD AUTO: 3.69 X10E6/UL (ref 4.14–5.8)
SERVICE CMNT-IMP: ABNORMAL
SODIUM SERPL-SCNC: 139 MMOL/L (ref 134–144)
WBC # BLD AUTO: 5 X10E3/UL (ref 3.4–10.8)

## 2019-05-07 PROCEDURE — 74011000258 HC RX REV CODE- 258: Performed by: INTERNAL MEDICINE

## 2019-05-07 PROCEDURE — C1769 GUIDE WIRE: HCPCS | Performed by: INTERNAL MEDICINE

## 2019-05-07 PROCEDURE — 77030015766: Performed by: INTERNAL MEDICINE

## 2019-05-07 PROCEDURE — 74011000250 HC RX REV CODE- 250: Performed by: INTERNAL MEDICINE

## 2019-05-07 PROCEDURE — 93461 R&L HRT ART/VENTRICLE ANGIO: CPT | Performed by: INTERNAL MEDICINE

## 2019-05-07 PROCEDURE — 77030004549 HC CATH ANGI DX PRF MRTM -A: Performed by: INTERNAL MEDICINE

## 2019-05-07 PROCEDURE — 74011250636 HC RX REV CODE- 250/636: Performed by: INTERNAL MEDICINE

## 2019-05-07 PROCEDURE — C1874 STENT, COATED/COV W/DEL SYS: HCPCS | Performed by: INTERNAL MEDICINE

## 2019-05-07 PROCEDURE — C1725 CATH, TRANSLUMIN NON-LASER: HCPCS | Performed by: INTERNAL MEDICINE

## 2019-05-07 PROCEDURE — 74011250636 HC RX REV CODE- 250/636: Performed by: NURSE PRACTITIONER

## 2019-05-07 PROCEDURE — 76937 US GUIDE VASCULAR ACCESS: CPT | Performed by: INTERNAL MEDICINE

## 2019-05-07 PROCEDURE — 77030016704 HC CATH ANGI DX PRF1 MRTM -B: Performed by: INTERNAL MEDICINE

## 2019-05-07 PROCEDURE — 77030028837 HC SYR ANGI PWR INJ COEU -A: Performed by: INTERNAL MEDICINE

## 2019-05-07 PROCEDURE — 77030012468 HC VLV BLEEDBK CNTRL ABBT -B: Performed by: INTERNAL MEDICINE

## 2019-05-07 PROCEDURE — C1887 CATHETER, GUIDING: HCPCS | Performed by: INTERNAL MEDICINE

## 2019-05-07 PROCEDURE — 92928 PRQ TCAT PLMT NTRAC ST 1 LES: CPT | Performed by: INTERNAL MEDICINE

## 2019-05-07 PROCEDURE — 82962 GLUCOSE BLOOD TEST: CPT

## 2019-05-07 PROCEDURE — C1894 INTRO/SHEATH, NON-LASER: HCPCS | Performed by: INTERNAL MEDICINE

## 2019-05-07 PROCEDURE — 77030019697 HC SYR ANGI INFL MRTM -B: Performed by: INTERNAL MEDICINE

## 2019-05-07 PROCEDURE — 77030010221 HC SPLNT WR POS TELE -B: Performed by: INTERNAL MEDICINE

## 2019-05-07 PROCEDURE — 99152 MOD SED SAME PHYS/QHP 5/>YRS: CPT | Performed by: INTERNAL MEDICINE

## 2019-05-07 PROCEDURE — 93005 ELECTROCARDIOGRAM TRACING: CPT

## 2019-05-07 PROCEDURE — 77030030195 HC CATH ANGI DX PRF4 MRTM -A: Performed by: INTERNAL MEDICINE

## 2019-05-07 PROCEDURE — 74011250637 HC RX REV CODE- 250/637: Performed by: NURSE PRACTITIONER

## 2019-05-07 PROCEDURE — 99153 MOD SED SAME PHYS/QHP EA: CPT | Performed by: INTERNAL MEDICINE

## 2019-05-07 PROCEDURE — 74011250636 HC RX REV CODE- 250/636

## 2019-05-07 PROCEDURE — 77030019698 HC SYR ANGI MDLON MRTM -A: Performed by: INTERNAL MEDICINE

## 2019-05-07 PROCEDURE — 77030019569 HC BND COMPR RAD TERU -B: Performed by: INTERNAL MEDICINE

## 2019-05-07 PROCEDURE — 74011636320 HC RX REV CODE- 636/320: Performed by: INTERNAL MEDICINE

## 2019-05-07 PROCEDURE — 77030008543 HC TBNG MON PRSS MRTM -A: Performed by: INTERNAL MEDICINE

## 2019-05-07 PROCEDURE — 74011250637 HC RX REV CODE- 250/637: Performed by: INTERNAL MEDICINE

## 2019-05-07 DEVICE — XIENCE SIERRA™ EVEROLIMUS ELUTING CORONARY STENT SYSTEM 2.25 MM X 18 MM / RAPID-EXCHANGE
Type: IMPLANTABLE DEVICE | Status: FUNCTIONAL
Brand: XIENCE SIERRA™

## 2019-05-07 RX ORDER — CLOPIDOGREL BISULFATE 75 MG/1
75 TABLET ORAL DAILY
Status: DISCONTINUED | OUTPATIENT
Start: 2019-05-08 | End: 2019-05-08 | Stop reason: HOSPADM

## 2019-05-07 RX ORDER — CLOPIDOGREL BISULFATE 75 MG/1
TABLET ORAL AS NEEDED
Status: DISCONTINUED | OUTPATIENT
Start: 2019-05-07 | End: 2019-05-07 | Stop reason: HOSPADM

## 2019-05-07 RX ORDER — HEPARIN SODIUM 200 [USP'U]/100ML
INJECTION, SOLUTION INTRAVENOUS
Status: DISCONTINUED | OUTPATIENT
Start: 2019-05-07 | End: 2019-05-07

## 2019-05-07 RX ORDER — LIDOCAINE HYDROCHLORIDE 10 MG/ML
INJECTION, SOLUTION EPIDURAL; INFILTRATION; INTRACAUDAL; PERINEURAL AS NEEDED
Status: DISCONTINUED | OUTPATIENT
Start: 2019-05-07 | End: 2019-05-07 | Stop reason: HOSPADM

## 2019-05-07 RX ORDER — LANOLIN ALCOHOL/MO/W.PET/CERES
1000 CREAM (GRAM) TOPICAL DAILY
Status: DISCONTINUED | OUTPATIENT
Start: 2019-05-08 | End: 2019-05-08 | Stop reason: HOSPADM

## 2019-05-07 RX ORDER — SODIUM CHLORIDE 0.9 % (FLUSH) 0.9 %
5-40 SYRINGE (ML) INJECTION AS NEEDED
Status: DISCONTINUED | OUTPATIENT
Start: 2019-05-07 | End: 2019-05-08 | Stop reason: HOSPADM

## 2019-05-07 RX ORDER — ACETAMINOPHEN 325 MG/1
650 TABLET ORAL
Status: DISCONTINUED | OUTPATIENT
Start: 2019-05-07 | End: 2019-05-08 | Stop reason: HOSPADM

## 2019-05-07 RX ORDER — MAGNESIUM SULFATE 100 %
4 CRYSTALS MISCELLANEOUS AS NEEDED
Status: DISCONTINUED | OUTPATIENT
Start: 2019-05-07 | End: 2019-05-08 | Stop reason: HOSPADM

## 2019-05-07 RX ORDER — ATORVASTATIN CALCIUM 40 MG/1
80 TABLET, FILM COATED ORAL
Status: DISCONTINUED | OUTPATIENT
Start: 2019-05-07 | End: 2019-05-08 | Stop reason: HOSPADM

## 2019-05-07 RX ORDER — ASPIRIN 81 MG/1
81 TABLET ORAL DAILY
Status: DISCONTINUED | OUTPATIENT
Start: 2019-05-08 | End: 2019-05-08 | Stop reason: HOSPADM

## 2019-05-07 RX ORDER — DEXTROSE 50 % IN WATER (D50W) INTRAVENOUS SYRINGE
12.5-25 AS NEEDED
Status: DISCONTINUED | OUTPATIENT
Start: 2019-05-07 | End: 2019-05-08 | Stop reason: HOSPADM

## 2019-05-07 RX ORDER — INSULIN LISPRO 100 [IU]/ML
INJECTION, SOLUTION INTRAVENOUS; SUBCUTANEOUS
Status: DISCONTINUED | OUTPATIENT
Start: 2019-05-07 | End: 2019-05-08 | Stop reason: HOSPADM

## 2019-05-07 RX ORDER — METOPROLOL TARTRATE 25 MG/1
25 TABLET, FILM COATED ORAL 2 TIMES DAILY
Status: DISCONTINUED | OUTPATIENT
Start: 2019-05-07 | End: 2019-05-08 | Stop reason: HOSPADM

## 2019-05-07 RX ORDER — FENTANYL CITRATE 50 UG/ML
INJECTION, SOLUTION INTRAMUSCULAR; INTRAVENOUS AS NEEDED
Status: DISCONTINUED | OUTPATIENT
Start: 2019-05-07 | End: 2019-05-07 | Stop reason: HOSPADM

## 2019-05-07 RX ORDER — VERAPAMIL HYDROCHLORIDE 2.5 MG/ML
INJECTION, SOLUTION INTRAVENOUS AS NEEDED
Status: DISCONTINUED | OUTPATIENT
Start: 2019-05-07 | End: 2019-05-07 | Stop reason: HOSPADM

## 2019-05-07 RX ORDER — SODIUM CHLORIDE 9 MG/ML
50 INJECTION, SOLUTION INTRAVENOUS CONTINUOUS
Status: DISCONTINUED | OUTPATIENT
Start: 2019-05-07 | End: 2019-05-08 | Stop reason: HOSPADM

## 2019-05-07 RX ORDER — MIDAZOLAM HYDROCHLORIDE 1 MG/ML
INJECTION, SOLUTION INTRAMUSCULAR; INTRAVENOUS AS NEEDED
Status: DISCONTINUED | OUTPATIENT
Start: 2019-05-07 | End: 2019-05-07 | Stop reason: HOSPADM

## 2019-05-07 RX ORDER — HEPARIN SODIUM 1000 [USP'U]/ML
INJECTION, SOLUTION INTRAVENOUS; SUBCUTANEOUS AS NEEDED
Status: DISCONTINUED | OUTPATIENT
Start: 2019-05-07 | End: 2019-05-07 | Stop reason: HOSPADM

## 2019-05-07 RX ORDER — SODIUM CHLORIDE 0.9 % (FLUSH) 0.9 %
5-40 SYRINGE (ML) INJECTION EVERY 8 HOURS
Status: DISCONTINUED | OUTPATIENT
Start: 2019-05-07 | End: 2019-05-08 | Stop reason: HOSPADM

## 2019-05-07 RX ADMIN — Medication 10 ML: at 21:53

## 2019-05-07 RX ADMIN — BIVALIRUDIN 1.75 MG/KG/HR: 250 INJECTION, POWDER, LYOPHILIZED, FOR SOLUTION INTRAVENOUS at 14:54

## 2019-05-07 RX ADMIN — ATORVASTATIN CALCIUM 80 MG: 40 TABLET, FILM COATED ORAL at 21:53

## 2019-05-07 RX ADMIN — SODIUM CHLORIDE 50 ML/HR: 900 INJECTION, SOLUTION INTRAVENOUS at 17:18

## 2019-05-07 RX ADMIN — METOPROLOL TARTRATE 25 MG: 25 TABLET, FILM COATED ORAL at 17:20

## 2019-05-07 NOTE — Clinical Note
Lesion: Located in the Mid RCA. Stent deployed. Single technique used. First inflation pressure = 14 lorraine; inflation time: 18 sec.

## 2019-05-07 NOTE — Clinical Note
Lesion located in the Mid RCA. Balloon inflated using single inflation technique. Pressure = 16 lorraine; Duration = 20 sec.

## 2019-05-07 NOTE — Clinical Note
Left radial artery. Accessed successfully. using ultrasound guidance. Number of attempts =  1.  Snuffbox

## 2019-05-07 NOTE — Clinical Note
Lesion located in the Mid RCA. Balloon inflated using single inflation technique. Pressure = 8 lorraine; Duration = 10 sec.

## 2019-05-07 NOTE — Clinical Note
Lesion located in the Mid RCA. Balloon inflated using multiple inflations inflation technique. Pressure = 10 lorraine; Duration = 13 sec. Inflation 2: Pressure: 10 lorraine; Duration: 8 sec.

## 2019-05-07 NOTE — INTERVAL H&P NOTE
H&P Update: 
Esther Connors was seen and examined. History and physical has been reviewed. The patient has been examined.  There have been no significant clinical changes since the completion of the originally dated History and Physical.

## 2019-05-08 VITALS
TEMPERATURE: 97.9 F | OXYGEN SATURATION: 97 % | SYSTOLIC BLOOD PRESSURE: 131 MMHG | HEIGHT: 72 IN | RESPIRATION RATE: 14 BRPM | BODY MASS INDEX: 37.25 KG/M2 | DIASTOLIC BLOOD PRESSURE: 67 MMHG | WEIGHT: 275 LBS | HEART RATE: 60 BPM

## 2019-05-08 PROBLEM — R93.1 ABNORMAL NUCLEAR CARDIAC IMAGING TEST: Status: ACTIVE | Noted: 2019-05-08

## 2019-05-08 PROBLEM — N18.9 CKD (CHRONIC KIDNEY DISEASE): Status: ACTIVE | Noted: 2019-05-08

## 2019-05-08 PROBLEM — R06.09 DOE (DYSPNEA ON EXERTION): Status: ACTIVE | Noted: 2019-05-08

## 2019-05-08 LAB
ANION GAP SERPL CALC-SCNC: 7 MMOL/L (ref 5–15)
ATRIAL RATE: 60 BPM
ATRIAL RATE: 61 BPM
BUN SERPL-MCNC: 29 MG/DL (ref 6–20)
BUN/CREAT SERPL: 16 (ref 12–20)
CALCIUM SERPL-MCNC: 8.4 MG/DL (ref 8.5–10.1)
CALCULATED P AXIS, ECG09: 11 DEGREES
CALCULATED R AXIS, ECG10: 31 DEGREES
CALCULATED R AXIS, ECG10: 37 DEGREES
CALCULATED T AXIS, ECG11: 40 DEGREES
CALCULATED T AXIS, ECG11: 49 DEGREES
CHLORIDE SERPL-SCNC: 109 MMOL/L (ref 97–108)
CO2 SERPL-SCNC: 23 MMOL/L (ref 21–32)
CREAT SERPL-MCNC: 1.81 MG/DL (ref 0.7–1.3)
DIAGNOSIS, 93000: NORMAL
DIAGNOSIS, 93000: NORMAL
GLUCOSE BLD STRIP.AUTO-MCNC: 171 MG/DL (ref 65–100)
GLUCOSE SERPL-MCNC: 156 MG/DL (ref 65–100)
P-R INTERVAL, ECG05: 150 MS
POTASSIUM SERPL-SCNC: 4.1 MMOL/L (ref 3.5–5.1)
Q-T INTERVAL, ECG07: 408 MS
Q-T INTERVAL, ECG07: 412 MS
QRS DURATION, ECG06: 108 MS
QRS DURATION, ECG06: 92 MS
QTC CALCULATION (BEZET), ECG08: 410 MS
QTC CALCULATION (BEZET), ECG08: 412 MS
SERVICE CMNT-IMP: ABNORMAL
SODIUM SERPL-SCNC: 139 MMOL/L (ref 136–145)
VENTRICULAR RATE, ECG03: 60 BPM
VENTRICULAR RATE, ECG03: 61 BPM

## 2019-05-08 PROCEDURE — 80048 BASIC METABOLIC PNL TOTAL CA: CPT

## 2019-05-08 PROCEDURE — 93005 ELECTROCARDIOGRAM TRACING: CPT

## 2019-05-08 PROCEDURE — 77030011746 HC SEAL HEMSTAT TELE -B

## 2019-05-08 PROCEDURE — 74011250637 HC RX REV CODE- 250/637: Performed by: NURSE PRACTITIONER

## 2019-05-08 PROCEDURE — 82962 GLUCOSE BLOOD TEST: CPT

## 2019-05-08 PROCEDURE — 36415 COLL VENOUS BLD VENIPUNCTURE: CPT

## 2019-05-08 RX ORDER — CLOPIDOGREL BISULFATE 75 MG/1
75 TABLET ORAL DAILY
Qty: 30 TAB | Refills: 11 | Status: SHIPPED | OUTPATIENT
Start: 2019-05-08 | End: 2019-08-20 | Stop reason: ALTCHOICE

## 2019-05-08 RX ORDER — METFORMIN HYDROCHLORIDE 500 MG/1
500 TABLET, EXTENDED RELEASE ORAL 2 TIMES DAILY
Qty: 120 TAB | Refills: 10 | Status: SHIPPED | OUTPATIENT
Start: 2019-05-08 | End: 2019-07-05 | Stop reason: SDUPTHER

## 2019-05-08 RX ADMIN — METOPROLOL TARTRATE 25 MG: 25 TABLET, FILM COATED ORAL at 08:22

## 2019-05-08 RX ADMIN — APIXABAN 5 MG: 5 TABLET, FILM COATED ORAL at 08:22

## 2019-05-08 RX ADMIN — ASPIRIN 81 MG: 81 TABLET, DELAYED RELEASE ORAL at 09:00

## 2019-05-08 RX ADMIN — Medication 10 ML: at 05:05

## 2019-05-08 RX ADMIN — CYANOCOBALAMIN TAB 500 MCG 1000 MCG: 500 TAB at 08:21

## 2019-05-08 RX ADMIN — CLOPIDOGREL BISULFATE 75 MG: 75 TABLET ORAL at 08:21

## 2019-05-08 NOTE — PROGRESS NOTES
The patient is resting in bed in NAD. Four multiple attempts of removing the TR band per protocol, all of those attempts failed due to ooze at 7ml. Last attempt at 0048 am. TR band not oozing at 8ml of air.

## 2019-05-08 NOTE — DISCHARGE INSTRUCTIONS
[address] [place] [address]  [phone]        Patient ID:  Sandra Brice  [id number]  89 y.o.  1940    Admit Date: 5/7/2019    Discharge Date: 5/8/2019     Admitting Physician: Carolann Martínez MD     Discharge Physician: Boy Tran NP    Admission Diagnoses:   ACS (acute coronary syndrome) [facility]) [I24.9]    Discharge Diagnoses:   Principal Problem:    Abnormal nuclear cardiac imaging test (5/8/2019)    Active Problems:    Mixed hyperlipidemia (4/25/2012)      Atrial fibrillation ([facility] (4/25/2012)      Hyperlipidemia (6/2/2017)      Controlled type 2 diabetes mellitus without complication, without long-term current use of insulin ([facility] (6/2/2017)      S/P cardiac cath (5/7/2019)      Overview: 5/7/19: DEVONTE to RCA      MCCOLLUM (dyspnea on exertion) (5/8/2019)        Discharge Condition: Good    Cardiology Procedures this Admission:  Left heart catheterization with PCI    Disposition: home    Reference discharge instructions provided by nursing for diet and activity. Signed:  Boy Tran NP  5/8/2019  8:40 AM      Snuff Box Cardiac Catheterization/Angiography Discharge Instructions    It is normal to feel tired the first couple days. Take it easy and follow the physicians instructions. CHECK THE CATHETER INSERTION SITE DAILY:    Remove the wrist dressing 24 hours after the procedure. You may shower 24 hours after the procedure. Wash with soap and water and pat dry. Gentle cleaning of the site with soap and water is sufficient, cover with a dry clean dressing or bandage. Do not apply creams or powders to the area. No soaking the wrist for 3 days. Leave the puncture site open to air after 24 hours post-procedure. CALL THE PHYSICIANS:     If the site becomes red, swollen or feels warm to the touch  If there is bleeding or drainage or if there is unusual pain at the radial site.      If there is any minor oozing, you may apply a band-aid and remove after 12 hours. If the bleeding continues, hold pressure with the middle finger against the puncture site and the thumb against the back of the wrist,call 911 to be transported to the hospital.  DO NOT DRIVE YOURSELF, Zee Segovia. ACTIVITY:   For the first 24 hours do not manipulate the wrist.  No lifting, pushing or pulling over 3-5 pounds with the affected wrist for 7 daysand no straining the insertion site. Do not life grocery bags or the garbage can, do not run the vacuum  or  for 7 days. Start with short walks as in the hospital and gradually increase as tolerated each day. It is recommended to walk 30 minutes 5-7 days per week. Follow your physicians instructions on activity. Avoid walking outside in extremes of heat or cold. Walk inside when it is cold and windy or hot and humid. Things to keep in mind:  No driving for at least 24 hours, or as designated by your physician. Limit the number of times you go up and down the stairs  Take rests and pace yourself with activity. Be careful and do not strain with bowel movements. MEDICATIONS:    Take all medications as prescribed  Call your physician if you have any questions  Keep an updated list of your medications with you at all times and give a list to your physician and pharmacist    SIGNS AND SYMPTOMS:   Be cautious of symptoms of angina or recurrent symptoms such as chest discomfort, unusual shortness of breath or fatigue. These could be symptoms of restenosis, a new blockage or a heart attack. If your symptoms are relieved with rest it is still recommended that you notify your physician of recurrent chest pain or discomfort. For CHEST PAIN or symptoms of angina not relieved with rest:  If the discomfort is not relieved with rest, and you have been prescribed Nitroglycerin, take as directed (taken under the tongue, one at a time 5 minutes apart for a total of 3 doses).  If the discomfort is not relieved after the 3rd nitroglycerin, call 911. If you have not been prescribed Nitroglycerin  and your chest discomfort is not relieved with rest, call 911. AFTER CARE:   Follow up with your physician as instructed. Follow a heart healthy diet with proper portion control, daily stress management, daily exercise, blood pressure and cholesterol control , and smoking cessation.

## 2019-05-08 NOTE — PROGRESS NOTES
I have reviewed discharge instructions with the patient and spouse. The patient and spouse verbalized understanding. Opportunity for questions and clarification was provided. Patient IV and telemetry removed. Patient to be wheeled down wife wife for discharge home.

## 2019-05-08 NOTE — PROGRESS NOTES
[address] [place] [address]  [phone] Cardiology Progress Note 5/8/2019 8:41 AM 
 
Admit Date: 5/7/2019 Admit Diagnosis:  
ACS (acute coronary syndrome) ([facility] [I24.9] Subjective:  
 
Drea Chow has no complaints s/p PCI/DEVONTE to RCA. Visit Vitals /67 Pulse 60 Temp 97.9 °F (36.6 °C) Resp 14 Ht 6' (1.829 m) Wt 124.7 kg (275 lb) SpO2 97% BMI 37.30 kg/m² Current Facility-Administered Medications Medication Dose Route Frequency  aspirin delayed-release tablet 81 mg  81 mg Oral DAILY  cyanocobalamin (VITAMIN B12) tablet 1,000 mcg  1,000 mcg Oral DAILY  apixaban (ELIQUIS) tablet 5 mg  5 mg Oral BID  metoprolol tartrate (LOPRESSOR) tablet 25 mg  25 mg Oral BID  atorvastatin (LIPITOR) tablet 80 mg  80 mg Oral QHS  clopidogrel (PLAVIX) tablet 75 mg  75 mg Oral DAILY  0.9% sodium chloride infusion  50 mL/hr IntraVENous CONTINUOUS  
 sodium chloride (NS) flush 5-40 mL  5-40 mL IntraVENous Q8H  
 sodium chloride (NS) flush 5-40 mL  5-40 mL IntraVENous PRN  
 acetaminophen (TYLENOL) tablet 650 mg  650 mg Oral Q4H PRN  
 insulin lispro (HUMALOG) injection   SubCUTAneous AC&HS  
 glucose chewable tablet 16 g  4 Tab Oral PRN  
 dextrose (D50W) injection syrg 12.5-25 g  12.5-25 g IntraVENous PRN  
 glucagon (GLUCAGEN) injection 1 mg  1 mg IntraMUSCular PRN Objective:  
  
Physical Exam: 
General Appearance:  obese  male in no acute distress Chest:   Clear Cardiovascular:  Regular rate and rhythm, no murmur. Abdomen:   Soft, non-tender, bowel sounds are active. Extremities: left snuff box dressing D/I, no hematoma Skin:  Warm and dry. Data Review:  
Recent Labs 05/06/19 
4577 WBC 5.0 HGB 10.8* HCT 33.2*  
 Recent Labs 05/08/19 
0502 05/06/19 
8146  139  
K 4.1 4.5  
* 105 CO2 23 21 * 208* BUN 29* 34* CREA 1.81* 2.08* CA 8.4* 9.5 ALB  --  4.4 TBILI  --  0.3 SGOT  --  17 ALT  --  15 INR  --  1.1 No results for input(s): TROIQ, CPK, CKMB in the last 72 hours. Intake/Output Summary (Last 24 hours) at 5/8/2019 0845 Last data filed at 5/8/2019 0400 Gross per 24 hour Intake 1450 ml Output 1150 ml Net 300 ml Telemetry: SR 
 
Assessment:  
 
Principal Problem: 
  Abnormal nuclear cardiac imaging test (5/8/2019) Active Problems: 
  Mixed hyperlipidemia (4/25/2012) Atrial fibrillation ([facility] (4/25/2012) Hyperlipidemia (6/2/2017) Controlled type 2 diabetes mellitus without complication, without long-term current use of insulin ([facility] (6/2/2017) S/P cardiac cath (5/7/2019) Overview: 5/7/19: DEVONTE to RCA 
 
  MCCOLLUM (dyspnea on exertion) (5/8/2019) CKD (chronic kidney disease) (5/8/2019) Plan: MCCOLLUM/angina III with abnormal nuclear stress test: S/p PCI/DEVONTE to RCA Start on Plavix 75mg daily x 1 year, continue on Lipitor, metoprolol Stop ASA today as patient is on chronic Eliquis and now starting Plavix. Decreasing risk of bleeding F/u with Dr. Garce Sers 2 weeks. Tammy Baker [facility]

## 2019-05-14 ENCOUNTER — TELEPHONE (OUTPATIENT)
Dept: CARDIAC REHAB | Age: 79
End: 2019-05-14

## 2019-05-24 ENCOUNTER — TELEPHONE (OUTPATIENT)
Dept: CARDIAC REHAB | Age: 79
End: 2019-05-24

## 2019-05-30 ENCOUNTER — TELEPHONE (OUTPATIENT)
Dept: CARDIAC REHAB | Age: 79
End: 2019-05-30

## 2019-05-30 ENCOUNTER — OFFICE VISIT (OUTPATIENT)
Dept: CARDIOLOGY CLINIC | Age: 79
End: 2019-05-30

## 2019-05-30 VITALS
RESPIRATION RATE: 16 BRPM | WEIGHT: 282 LBS | BODY MASS INDEX: 38.19 KG/M2 | HEIGHT: 72 IN | OXYGEN SATURATION: 94 % | HEART RATE: 70 BPM | SYSTOLIC BLOOD PRESSURE: 138 MMHG | DIASTOLIC BLOOD PRESSURE: 70 MMHG

## 2019-05-30 DIAGNOSIS — Z95.1 POSTSURGICAL AORTOCORONARY BYPASS STATUS: ICD-10-CM

## 2019-05-30 DIAGNOSIS — E78.5 HYPERLIPIDEMIA, UNSPECIFIED HYPERLIPIDEMIA TYPE: ICD-10-CM

## 2019-05-30 DIAGNOSIS — I25.810 CORONARY ARTERY DISEASE INVOLVING CORONARY BYPASS GRAFT OF NATIVE HEART WITHOUT ANGINA PECTORIS: Primary | ICD-10-CM

## 2019-05-30 DIAGNOSIS — Z86.79 S/P ABLATION OF ATRIAL FIBRILLATION: ICD-10-CM

## 2019-05-30 DIAGNOSIS — E78.2 MIXED HYPERLIPIDEMIA: Chronic | ICD-10-CM

## 2019-05-30 DIAGNOSIS — Z98.890 S/P ABLATION OF ATRIAL FIBRILLATION: ICD-10-CM

## 2019-05-30 DIAGNOSIS — I25.10 ATHEROSCLEROSIS OF NATIVE CORONARY ARTERY OF NATIVE HEART WITHOUT ANGINA PECTORIS: ICD-10-CM

## 2019-05-30 NOTE — PROGRESS NOTES
Dafne Olvera DNP, ANP-BC  Subjective/HPI:     Esther Connors is a 70 y.o. male is here for hospital follow-up from cardiac catheterization LIMA to the LAD patent, required DEVONTE to the mid RCA. He has been doing well with Eliquis and Plavix with minimal bruising/bleeding. Aspirin is on hold. Patient reports resolution of his dyspnea on exertion and fatigue. Left Main   The vessel exhibits minimal luminal irregularities. Left Anterior Descending   Prox LAD lesion 50% stenosed. . The pre-interventional distal flow is normal (YOVANA 3). Left Circumflex   The vessel exhibits minimal luminal irregularities. Right Coronary Artery   Mid RCA lesion 75% stenosed. . The pre-interventional distal flow is normal (YOVANA 3). Lesion is the culprit lesion. The lesion is type C. The stenosis was measured by a visual reading. AYALA Graft to Mid LAD   The graft was visualized by angiography and is moderate in size. The graft is angiographically normal.   Intervention     Mid RCA lesion   Angioplasty   Angioplasty using a standard balloon was performed prior to stent deployment. Balloon inflated using single inflation technique. Supplies used: CATH BLLN RX MINI TREK [id number] --    Stent   A single stent was placed. Drug-eluting stent was successfully placed. Supplies used: STENT SYS COR [id number] -- XIENCE VIANEY; CATH BLLN COR DIL 2.5X15MM -- NC TREK RAPID-EXCHANGE   Post-Intervention Lesion Assessment   The intervention was successful. The guidewire crossed the lesion. Device was deployed. Post-intervention YOVANA flow is 3. There were no complications. Supplies used: STENT SYS COR [id number] -- XIENCE VIANEY; CATH BLLN COR DIL 2.5X15MM -- NC TREK RAPID-EXCHANGE; CATH BLLN RX TREK [id number] -- ; CATH BLLN RX MINI TREK 2X15MM --    There is a 15% residual stenosis post intervention.        PCP Provider  None  Past Medical History:   Diagnosis Date    Abnormal nuclear cardiac imaging test 5/8/2019    Anemia 6/5/2017  CKD (chronic kidney disease) 2019    Coronary atherosclerosis of native coronary artery     Diabetes mellitus, type II (HCC)     MCCOLLUM (dyspnea on exertion) 2019    Mixed hyperlipidemia     Other dyspnea and respiratory abnormality     S/P ablation of atrial fibrillation 2016      Past Surgical History:   Procedure Laterality Date    HX CATARACT REMOVAL Bilateral 2018    HX CORONARY ARTERY BYPASS GRAFT  2001    NM INS NEW/RPLCMT PRM PM W/TRANSV ELTRD ATRIAL&VENT N/A 3/29/2019    INSERT PPM DUAL performed by Olivier V[facility]la MD at [facility] CARDIAC CATH LAB    NM REMOVAL SUBCUTANEOUS CARDIAC RHYTHM MONITOR N/A 3/29/2019    LOOP RECORDER REMOVAL performed by Oliiver Varela MD at [facility] CARDIAC CATH LAB     Allergies   Allergen Reactions    Ciprofloxacin Other (comments)     Difficulty breathing; 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      Family History   Problem Relation Age of Onset    Diabetes Mother     Emphysema Father          age 46 - smoker      Current Outpatient Medications   Medication Sig    metFORMIN ER (GLUCOPHAGE XR) 500 mg tablet Take 1 Tab by mouth two (2) times a day. Please restart Metformin on 19    clopidogrel (PLAVIX) 75 mg tab Take 1 Tab by mouth daily.  metoprolol tartrate (LOPRESSOR) 25 mg tablet Take 1 Tab by mouth two (2) times a day.  rosuvastatin (CRESTOR) 40 mg tablet TAKE 1 TABLET BY MOUTH EVERY DAY    ELIQUIS 5 mg tablet TAKE 1 TABLET BY MOUTH TWICE A DAY    ACCU-CHEK YVES PLUS TEST STRP strip USE TO TEST BLOOD SUGAR DAILY DX:E11.9    cholecalciferol (VITAMIN D3) 1,000 unit cap Take  by mouth daily.  ferrous sulfate 324 mg (65 mg iron) tablet Take  by mouth Daily (before breakfast).  cyanocobalamin 1,000 mcg tablet Take 1,000 mcg by mouth daily.  aspirin delayed-release (ECOTRIN LOW STRENGTH) 81 mg tablet Take  by mouth daily. No current facility-administered medications for this visit.        Vitals:    19 0918 19 0925   BP: 142/72 138/70   Pulse: 70    Resp: 16    SpO2: 94%    Weight: 282 lb (127.9 kg)    Height: 6' (1.829 m)      Social History     Socioeconomic History    Marital status:      Spouse name: Not on file    Number of children: Not on file    Years of education: Not on file    Highest education level: Not on file   Occupational History    Not on file   Social Needs    Financial resource strain: Not on file    Food insecurity:     Worry: Not on file     Inability: Not on file    Transportation needs:     Medical: Not on file     Non-medical: Not on file   Tobacco Use    Smoking status: Former Smoker     Packs/day: 4.00     Years: 20.00     Pack years: 80.00     Types: Cigarettes     Last attempt to quit: 1980     Years since quittin.8    Smokeless tobacco: Former User    Tobacco comment: QUIT AT AGE 39   Substance and Sexual Activity    Alcohol use: No     Alcohol/week: 0.0 oz    Drug use: Never    Sexual activity: Not on file   Lifestyle    Physical activity:     Days per week: Not on file     Minutes per session: Not on file    Stress: Not on file   Relationships    Social connections:     Talks on phone: Not on file     Gets together: Not on file     Attends Worship service: Not on file     Active member of club or organization: Not on file     Attends meetings of clubs or organizations: Not on file     Relationship status: Not on file    Intimate partner violence:     Fear of current or ex partner: Not on file     Emotionally abused: Not on file     Physically abused: Not on file     Forced sexual activity: Not on file   Other Topics Concern    Not on file   Social History Narrative    Not on file       I have reviewed the nurses notes, vitals, problem list, allergy list, medical history, family, social history and medications. Review of Symptoms:    General: Pt denies excessive weight gain or loss.  Pt is able to conduct ADL's  HEENT: Denies blurred vision, headaches, epistaxis and difficulty swallowing. Respiratory: Denies shortness of breath, MCCOLLUM, wheezing or stridor. Cardiovascular: Denies precordial pain, palpitations, edema or PND  Gastrointestinal: Denies poor appetite, indigestion, abdominal pain or blood in stool  Musculoskeletal: Denies pain or swelling from muscles or joints  Neurologic: Denies tremor, paresthesias, or sensory motor disturbance  Skin: Denies rash, itching or texture change. Physical Exam:      General: Well developed, in no acute distress, cooperative and alert  HEENT: No carotid bruits, no JVD, trach is midline. Neck Supple, PEERL, EOM intact. Heart:  Normal S1/S2 negative S3 or S4. Regular, no murmur, gallop or rub.   Respiratory: Clear bilaterally x 4, no wheezing or rales  Abdomen:   Soft, non-tender, no masses, bowel sounds are active.   Extremities:  No edema, normal cap refill, no cyanosis, atraumatic. Neuro: A&Ox3, speech clear, gait stable. Skin: Skin color is normal. No rashes or lesions. Non diaphoretic  Vascular: 2+ pulses symmetric in all extremities.   Left hand snuffbox access site well-healed neurovascularly intact    Cardiographics    ECG:   Results for orders placed or performed during the hospital encounter of 05/07/19   EKG, 12 LEAD, INITIAL   Result Value Ref Range    Ventricular Rate 60 BPM    Atrial Rate 60 BPM    P-R Interval 150 ms    QRS Duration 92 ms    Q-T Interval 412 ms    QTC Calculation (Bezet) 412 ms    Calculated P Axis 11 degrees    Calculated R Axis 37 degrees    Calculated T Axis 49 degrees    Diagnosis       Electronic atrial pacemaker      Confirmed by Irina Payan ([id number]) on 5/8/2019 10:00:11 AM           Cardiology Labs:  Lab Results   Component Value Date/Time    Cholesterol, total 83 (L) 07/31/2018 08:52 AM    HDL Cholesterol 31 (L) 07/31/2018 08:52 AM    LDL, calculated 20 07/31/2018 08:52 AM    Triglyceride 160 (H) 07/31/2018 08:52 AM       Lab Results   Component Value Date/Time    Sodium 139 05/08/2019 05:02 AM    Potassium 4.1 05/08/2019 05:02 AM    Chloride 109 (H) 05/08/2019 05:02 AM    CO2 23 05/08/2019 05:02 AM    Anion gap 7 05/08/2019 05:02 AM    Glucose 156 (H) 05/08/2019 05:02 AM    BUN 29 (H) 05/08/2019 05:02 AM    Creatinine 1.81 (H) 05/08/2019 05:02 AM    BUN/Creatinine ratio 16 05/08/2019 05:02 AM    GFR est AA 44 (L) 05/08/2019 05:02 AM    GFR est non-AA 37 (L) 05/08/2019 05:02 AM    Calcium 8.4 (L) 05/08/2019 05:02 AM    Bilirubin, total 0.3 05/06/2019 08:21 AM    AST (SGOT) 17 05/06/2019 08:21 AM    Alk. phosphatase 60 05/06/2019 08:21 AM    Protein, total 6.9 05/06/2019 08:21 AM    Albumin 4.4 05/06/2019 08:21 AM    A-G Ratio 1.8 05/06/2019 08:21 AM    ALT (SGPT) 15 05/06/2019 08:21 AM           Assessment:     Assessment:     Diagnoses and all orders for this visit:    1. Coronary artery disease involving coronary bypass graft of native heart without angina pectoris    2. Atherosclerosis of native coronary artery of native heart without angina pectoris    3. Hyperlipidemia, unspecified hyperlipidemia type    4. Mixed hyperlipidemia    5. Postsurgical aortocoronary bypass status    6. S/P ablation of atrial fibrillation        ICD-10-CM ICD-9-CM    1. Coronary artery disease involving coronary bypass graft of native heart without angina pectoris I25.810 414.05    2. Atherosclerosis of native coronary artery of native heart without angina pectoris I25.10 414.01    3. Hyperlipidemia, unspecified hyperlipidemia type E78.5 272.4    4. Mixed hyperlipidemia E78.2 272.2    5. Postsurgical aortocoronary bypass status Z95.1 V45.81    6. S/P ablation of atrial fibrillation Z98.890 V45.89     Z86.79       No orders of the defined types were placed in this encounter. Plan:     1. Atherosclerotic heart disease: History of CABG, recent PTCA stenting of the RCA. Will remain on Plavix and Eliquis for 1 year, Plavix will then be transitioned to 81 mg aspirin.   2.  Atrial fibrillation: History of ablation, has pacemaker, remain on Eliquis 5 mg. 3.  Hyperlipidemia: Recent LDL in [facility] care 20, continue statin therapy  Encourage patient to walk 3 times daily 15 minutes each session. Reduce carbohydrate diet and weight loss. Follow-up with Dr. Rola Tucker in 6 months. Aurelia Cope NP    This note was created using voice recognition software. Despite editing, there may be syntax errors.

## 2019-05-30 NOTE — PROGRESS NOTES
Chief Complaint   Patient presents with   [facility] Follow Up     1. Have you been to the ER, urgent care clinic since your last visit? Hospitalized since your last visit? No    2. Have you seen or consulted any other health care providers outside of the [address] [place] [address] since your last visit? Include any pap smears or colon screening.  No

## 2019-06-14 ENCOUNTER — TELEPHONE (OUTPATIENT)
Dept: CARDIAC REHAB | Age: 79
End: 2019-06-14

## 2019-06-14 NOTE — TELEPHONE ENCOUNTER
Spoke with pt regarding Cardiac Rehab OP- states he has appt with Dr. Natarajan Click on July 7 regarding a possible pacemaker and wants to decide after that appt depending he states \"what the outcome is. \"

## 2019-07-05 ENCOUNTER — TELEPHONE (OUTPATIENT)
Dept: ENDOCRINOLOGY | Age: 79
End: 2019-07-05

## 2019-07-05 RX ORDER — METFORMIN HYDROCHLORIDE 500 MG/1
500 TABLET, EXTENDED RELEASE ORAL 2 TIMES DAILY
Qty: 120 TAB | Refills: 10 | Status: ON HOLD | OUTPATIENT
Start: 2019-07-05 | End: 2019-11-22 | Stop reason: SDUPTHER

## 2019-07-05 NOTE — TELEPHONE ENCOUNTER
----- Message from Stevie Dutton sent at 7/5/2019  3:05 PM EDT -----  Regarding: /Refill  [facility] . Pt best contact number is [phone].

## 2019-07-16 ENCOUNTER — TELEPHONE (OUTPATIENT)
Dept: CARDIAC REHAB | Age: 79
End: 2019-07-16

## 2019-07-16 ENCOUNTER — OFFICE VISIT (OUTPATIENT)
Dept: CARDIOLOGY CLINIC | Age: 79
End: 2019-07-16

## 2019-07-16 ENCOUNTER — CLINICAL SUPPORT (OUTPATIENT)
Dept: CARDIOLOGY CLINIC | Age: 79
End: 2019-07-16

## 2019-07-16 VITALS
OXYGEN SATURATION: 98 % | RESPIRATION RATE: 20 BRPM | HEART RATE: 59 BPM | SYSTOLIC BLOOD PRESSURE: 134 MMHG | DIASTOLIC BLOOD PRESSURE: 70 MMHG | HEIGHT: 72 IN | BODY MASS INDEX: 37.79 KG/M2 | WEIGHT: 279 LBS

## 2019-07-16 DIAGNOSIS — I49.5 SSS (SICK SINUS SYNDROME) (HCC): Primary | ICD-10-CM

## 2019-07-16 DIAGNOSIS — I48.0 PAROXYSMAL ATRIAL FIBRILLATION (HCC): ICD-10-CM

## 2019-07-16 DIAGNOSIS — I25.10 ATHEROSCLEROSIS OF NATIVE CORONARY ARTERY OF NATIVE HEART WITHOUT ANGINA PECTORIS: ICD-10-CM

## 2019-07-16 DIAGNOSIS — E78.2 MIXED HYPERLIPIDEMIA: ICD-10-CM

## 2019-07-16 DIAGNOSIS — E11.21 TYPE 2 DIABETES WITH NEPHROPATHY (HCC): ICD-10-CM

## 2019-07-16 DIAGNOSIS — Z95.0 CARDIAC PACEMAKER IN SITU: Primary | ICD-10-CM

## 2019-07-16 DIAGNOSIS — I49.5 SSS (SICK SINUS SYNDROME) (HCC): ICD-10-CM

## 2019-07-16 RX ORDER — NITROGLYCERIN 0.4 MG/1
0.4 TABLET SUBLINGUAL
Qty: 25 TAB | Refills: 1 | Status: SHIPPED | OUTPATIENT
Start: 2019-07-16 | End: 2019-08-09 | Stop reason: SDUPTHER

## 2019-07-16 NOTE — TELEPHONE ENCOUNTER
Contacted pt to attempt to sign up for OP Cardiac Rehab. Pt states he could not talk at this time and had our number on his phone to contact us.

## 2019-07-16 NOTE — PROGRESS NOTES
Subjective:      Lala Angel is a 70 y.o. male is here for EP follow up. The patient denies shortness of breath, orthopnea, PND, LE edema, palpitations, syncope, presyncope or fatigue. Is aware of brief tightness in his chest when he starts to walk the dog in the morning. The discomfort resolves with walking. Does not occur regularly. He is tolerating Eliquis and Plavix. Patient Active Problem List    Diagnosis Date Noted    Abnormal nuclear cardiac imaging test 05/08/2019    MCCOLLUM (dyspnea on exertion) 05/08/2019    CKD (chronic kidney disease) 05/08/2019    S/P cardiac cath 05/07/2019    Ventricular escape rhythm 03/29/2019    Type 2 diabetes with nephropathy ([address] 04/10/2018    Severe obesity (BMI 35.0-39. 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 angina pectoris 06/02/2017    Age-related cataract of both eyes 06/02/2017    S/P ablation of atrial fibrillation 08/04/2016    SSS (sick sinus syndrome) ([address] 07/19/2016    Irregular heartbeat 05/31/2016    Postsurgical aortocoronary bypass status 04/25/2012    Mixed hyperlipidemia 04/25/2012    Coronary atherosclerosis of native coronary artery 04/25/2012    Atrial fibrillation ([address] 04/25/2012      None  Past Medical History:   Diagnosis Date    Abnormal nuclear cardiac imaging test 5/8/2019    Anemia 6/5/2017    CKD (chronic kidney disease) 5/8/2019    Coronary atherosclerosis of native coronary artery     Diabetes mellitus, type II ([address]     MCCOLLUM (dyspnea on exertion) 5/8/2019    Mixed hyperlipidemia     Other dyspnea and respiratory abnormality     S/P ablation of atrial fibrillation 8/4/2016      Past Surgical History:   Procedure Laterality Date    HX CATARACT REMOVAL Bilateral 2018    HX CORONARY ARTERY BYPASS GRAFT  2001    [place] INS NEW/RPLCMT PRM PM W/TRANSV ELTRD ATRIAL&VENT N/A 3/29/2019    INSERT PPM DUAL performed by Cely Osborn MD at [facility] CARDIAC CATH LAB    [place] REMOVAL SUBCUTANEOUS CARDIAC RHYTHM MONITOR N/A 3/29/2019    LOOP RECORDER REMOVAL performed by Cely Osborn MD at [facility] CARDIAC CATH LAB     Allergies   Allergen Reactions    Ciprofloxacin Other (comments)     Difficulty breathing; 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      Family History   Problem Relation Age of Onset    Diabetes Mother     Emphysema Father          age 46 - smoker    negative for cardiac disease  Social History     Socioeconomic History    Marital status:      Spouse name: Not on file    Number of children: Not on file    Years of education: Not on file    Highest education level: Not on file   Tobacco Use    Smoking status: Former Smoker     Packs/day: 4.00     Years: 20.00     Pack years: 80.00     Types: Cigarettes     Last attempt to quit: 1980     Years since quittin.0    Smokeless tobacco: Former User    Tobacco comment: QUIT AT AGE 42   Substance and Sexual Activity    Alcohol use: No     Alcohol/week: 0.0 oz    Drug use: Never     Current Outpatient Medications   Medication Sig    metFORMIN ER (GLUCOPHAGE XR) 500 mg tablet Take 1 Tab by mouth two (2) times a day.  clopidogrel (PLAVIX) 75 mg tab Take 1 Tab by mouth daily.  metoprolol tartrate (LOPRESSOR) 25 mg tablet Take 1 Tab by mouth two (2) times a day.  rosuvastatin (CRESTOR) 40 mg tablet TAKE 1 TABLET BY MOUTH EVERY DAY    ELIQUIS 5 mg tablet TAKE 1 TABLET BY MOUTH TWICE A DAY    ACCU-CHEK YVES PLUS TEST STRP strip USE TO TEST BLOOD SUGAR DAILY DX:E11.9    cholecalciferol (VITAMIN D3) 1,000 unit cap Take  by mouth daily.  ferrous sulfate 324 mg (65 mg iron) tablet Take  by mouth Daily (before breakfast).     cyanocobalamin 1,000 mcg tablet Take 1,000 mcg by mouth daily.  aspirin delayed-release (ECOTRIN LOW STRENGTH) 81 mg tablet Take  by mouth daily. No current facility-administered medications for this visit. Vitals:    07/16/19 1333   BP: 134/70   Pulse: (!) 59   Resp: 20   SpO2: 98%   Weight: 279 lb (126.6 kg)   Height: 6' (1.829 m)       I have reviewed the nurses notes, vitals, problem list, allergy list, medical history, family, social history and medications. Review of Symptoms:    General: Pt denies excessive weight gain or loss. Pt is able to conduct ADL's  HEENT: Denies blurred vision, headaches, epistaxis and difficulty swallowing. Respiratory: Denies shortness of breath, MCCOLLUM, wheezing or stridor. Cardiovascular: Denies palpitations, edema or PND. Reports chest tightness. Gastrointestinal: Denies poor appetite, indigestion, abdominal pain or blood in stool  Urinary: Denies dysuria, pyuria  Musculoskeletal: Denies pain or swelling from muscles or joints  Neurologic: Denies tremor, paresthesias, or sensory motor disturbance  Skin: Denies rash, itching or texture change. Psych: Denies depression    Physical Exam:      General: Well developed, in no acute distress. HEENT: Eyes - PERRL, no jvd  Heart:  Normal S1/S2 negative S3 or S4. Regular, no murmur, gallop or rub. Respiratory: Clear bilaterally x 4, no wheezing or rales  Extremities:  No edema, normal cap refill, no cyanosis. Musculoskeletal: No clubbing  Neuro: A&Ox3, speech clear, gait stable. Skin: Skin color is normal. No rashes or lesions.  Non diaphoretic  Vascular: 2+ pulses symmetric in all extremities    Cardiographics    Ekg:  Sinus PVCs    Pacer - a paced 96%    Results for orders placed or performed during the hospital encounter of 05/07/19   EKG, 12 LEAD, INITIAL   Result Value Ref Range    Ventricular Rate 60 BPM    Atrial Rate 60 BPM    P-R Interval 150 ms    QRS Duration 92 ms    Q-T Interval 412 ms    QTC Calculation (Bezet) 412 ms    Calculated P Axis 11 degrees    Calculated R Axis 37 degrees    Calculated T Axis 49 degrees    Diagnosis       Electronic atrial pacemaker      Confirmed by Dian Wilson ([id number]) on 5/8/2019 10:00:11 AM           Lab Results   Component Value Date/Time    WBC 5.0 05/06/2019 08:21 AM    HGB 10.8 (L) 05/06/2019 08:21 AM    HCT 33.2 (L) 05/06/2019 08:21 AM    PLATELET 406 25/54/5460 08:21 AM    MCV 90 05/06/2019 08:21 AM      Lab Results   Component Value Date/Time    Sodium 139 05/08/2019 05:02 AM    Potassium 4.1 05/08/2019 05:02 AM    Chloride 109 (H) 05/08/2019 05:02 AM    CO2 23 05/08/2019 05:02 AM    Anion gap 7 05/08/2019 05:02 AM    Glucose 156 (H) 05/08/2019 05:02 AM    BUN 29 (H) 05/08/2019 05:02 AM    Creatinine 1.81 (H) 05/08/2019 05:02 AM    BUN/Creatinine ratio 16 05/08/2019 05:02 AM    GFR est AA 44 (L) 05/08/2019 05:02 AM    GFR est non-AA 37 (L) 05/08/2019 05:02 AM    Calcium 8.4 (L) 05/08/2019 05:02 AM    Bilirubin, total 0.3 05/06/2019 08:21 AM    AST (SGOT) 17 05/06/2019 08:21 AM    Alk. phosphatase 60 05/06/2019 08:21 AM    Protein, total 6.9 05/06/2019 08:21 AM    Albumin 4.4 05/06/2019 08:21 AM    A-G Ratio 1.8 05/06/2019 08:21 AM    ALT (SGPT) 15 05/06/2019 08:21 AM      No results found for: TSH, TSH2, TSH3, TSHP, TSHEXT, TSHEXT     Assessment:             ICD-10-CM ICD-9-CM    1. SSS (sick sinus syndrome) (HCC) I49.5 427.81    2. Mixed hyperlipidemia E78.2 272.2    3. Paroxysmal atrial fibrillation (HCC) I48.0 427.31 AMB POC EKG ROUTINE W/ 12 LEADS, INTER & REP   4. Atherosclerosis of native coronary artery of native heart without angina pectoris I25.10 414.01    5. Type 2 diabetes with nephropathy (HCC) E11.21 250.40      583.81      Orders Placed This Encounter    AMB POC EKG ROUTINE W/ 12 LEADS, INTER & REP     Order Specific Question:   Reason for Exam:     Answer:   routine        Plan:     Patient feels well following pacemaker implantation 3/29/19, s/p PCI/DEVONTE to RCA , 6/7/19.   His device interrogation with 96% AP, <0.1% RVP with no events. A few episodes of chest tightness resolving with ambulation. Overall feels better since PM and PCI. Will prescribe PRN NTG. He is enrolled on remote monitoring of his dual chamber pm. We will follow up with him in 1 year. Continue medical management of HTN, DM and BMI 38. Thank you for allowing me to participate in Lilia Hansen 's care. Lidia Wilks NP    Patient seen and examined. All pertinent data reviewed. I have reviewed detailed note as outlined by Lidia Wilks NP. Case discussed with Nursing/medical assistant staff and Lidia Wilks NP. Plans as outlined. A paced 96% for sick sinus. Cont med rx for htn and dm.  F/u in one year    Charles Gonzalez MD, Jesus Alberto Jacobo

## 2019-07-16 NOTE — PROGRESS NOTES
Verified patient with 2 identifiers   Reviewed record in preparation for visit and obtained necessary documentation. Chief Complaint   Patient presents with    Irregular Heart Beat     PM placed on 3/29/19 - here for follow up     Chest Pain     for five days -when he walks dog - goes away after 4- 5 blocks    Shortness of Breath     in the heat     Foot Swelling     arthur'     1. Have you been to the ER, urgent care clinic since your last visit? Hospitalized since your last visit? No     2. Have you seen or consulted any other health care providers outside of the [address] [place] [address] since your last visit? Include any pap smears or colon screening.   No

## 2019-07-18 ENCOUNTER — TELEPHONE (OUTPATIENT)
Dept: CARDIAC REHAB | Age: 79
End: 2019-07-18

## 2019-07-18 NOTE — TELEPHONE ENCOUNTER
Cardiopulmonary Rehab Nursing Entry:    Pt returned phone call to check on status of enrollment in out-patient Cardiac Rehab Program.  Pt reported that he does not currently live in [place], that he is out on the [employer] 100 miles away from [facility]". He declined enrollment and driving in for CR. \"I walk several miles every day with my dog. \"

## 2019-07-22 RX ORDER — ROSUVASTATIN CALCIUM 40 MG/1
TABLET, COATED ORAL
Qty: 30 TAB | Refills: 3 | Status: SHIPPED | OUTPATIENT
Start: 2019-07-22 | End: 2019-12-23

## 2019-07-25 ENCOUNTER — HOSPITAL ENCOUNTER (INPATIENT)
Age: 79
LOS: 2 days | Discharge: HOME | DRG: 378 | End: 2019-07-27
Attending: EMERGENCY MEDICINE | Admitting: INTERNAL MEDICINE
Payer: MEDICARE

## 2019-07-25 ENCOUNTER — TELEPHONE (OUTPATIENT)
Dept: CARDIOLOGY CLINIC | Age: 79
End: 2019-07-25

## 2019-07-25 DIAGNOSIS — D62 ACUTE BLOOD LOSS ANEMIA: ICD-10-CM

## 2019-07-25 DIAGNOSIS — N18.3 ACUTE RENAL FAILURE SUPERIMPOSED ON STAGE 3 CHRONIC KIDNEY DISEASE, UNSPECIFIED ACUTE RENAL FAILURE TYPE: ICD-10-CM

## 2019-07-25 DIAGNOSIS — N17.9 ACUTE RENAL FAILURE SUPERIMPOSED ON STAGE 3 CHRONIC KIDNEY DISEASE, UNSPECIFIED ACUTE RENAL FAILURE TYPE: ICD-10-CM

## 2019-07-25 DIAGNOSIS — E11.65 UNCONTROLLED TYPE 2 DIABETES MELLITUS WITH HYPERGLYCEMIA (HCC): ICD-10-CM

## 2019-07-25 DIAGNOSIS — R07.9 EXERTIONAL CHEST PAIN: ICD-10-CM

## 2019-07-25 DIAGNOSIS — I24.8 DEMAND ISCHEMIA (HCC): ICD-10-CM

## 2019-07-25 DIAGNOSIS — K92.2 ACUTE GI BLEEDING: Primary | ICD-10-CM

## 2019-07-25 PROBLEM — E11.21 TYPE 2 DIABETES WITH NEPHROPATHY (HCC): Chronic | Status: ACTIVE | Noted: 2018-04-10

## 2019-07-25 LAB
CK SERPL-CCNC: 131 U/L (ref 39–308)
TROPONIN I SERPL-MCNC: <0.05 NG/ML

## 2019-07-25 PROCEDURE — 99285 EMERGENCY DEPT VISIT HI MDM: CPT

## 2019-07-25 PROCEDURE — 82550 ASSAY OF CK (CPK): CPT

## 2019-07-25 PROCEDURE — 65660000000 HC RM CCU STEPDOWN

## 2019-07-25 PROCEDURE — 84484 ASSAY OF TROPONIN QUANT: CPT

## 2019-07-25 PROCEDURE — 36430 TRANSFUSION BLD/BLD COMPNT: CPT

## 2019-07-25 PROCEDURE — 93005 ELECTROCARDIOGRAM TRACING: CPT

## 2019-07-25 PROCEDURE — 77030013169 SET IV BLD ICUM -A

## 2019-07-25 PROCEDURE — 86920 COMPATIBILITY TEST SPIN: CPT

## 2019-07-25 PROCEDURE — 36415 COLL VENOUS BLD VENIPUNCTURE: CPT

## 2019-07-25 PROCEDURE — 86900 BLOOD TYPING SEROLOGIC ABO: CPT

## 2019-07-25 PROCEDURE — P9016 RBC LEUKOCYTES REDUCED: HCPCS

## 2019-07-25 RX ORDER — INSULIN LISPRO 100 [IU]/ML
INJECTION, SOLUTION INTRAVENOUS; SUBCUTANEOUS
Status: DISCONTINUED | OUTPATIENT
Start: 2019-07-26 | End: 2019-07-27 | Stop reason: HOSPADM

## 2019-07-25 RX ORDER — NITROGLYCERIN 0.4 MG/1
0.4 TABLET SUBLINGUAL
Status: DISCONTINUED | OUTPATIENT
Start: 2019-07-25 | End: 2019-07-27 | Stop reason: HOSPADM

## 2019-07-25 RX ORDER — SODIUM CHLORIDE 9 MG/ML
75 INJECTION, SOLUTION INTRAVENOUS CONTINUOUS
Status: DISPENSED | OUTPATIENT
Start: 2019-07-25 | End: 2019-07-26

## 2019-07-25 RX ORDER — SODIUM CHLORIDE 0.9 % (FLUSH) 0.9 %
5-40 SYRINGE (ML) INJECTION AS NEEDED
Status: DISCONTINUED | OUTPATIENT
Start: 2019-07-25 | End: 2019-07-27 | Stop reason: HOSPADM

## 2019-07-25 RX ORDER — ATORVASTATIN CALCIUM 20 MG/1
20 TABLET, FILM COATED ORAL
Status: DISCONTINUED | OUTPATIENT
Start: 2019-07-25 | End: 2019-07-27 | Stop reason: HOSPADM

## 2019-07-25 RX ORDER — ASPIRIN 81 MG/1
81 TABLET ORAL DAILY
Status: DISCONTINUED | OUTPATIENT
Start: 2019-07-26 | End: 2019-07-26

## 2019-07-25 RX ORDER — SODIUM CHLORIDE 9 MG/ML
250 INJECTION, SOLUTION INTRAVENOUS AS NEEDED
Status: DISCONTINUED | OUTPATIENT
Start: 2019-07-25 | End: 2019-07-27 | Stop reason: HOSPADM

## 2019-07-25 RX ORDER — METOPROLOL TARTRATE 25 MG/1
25 TABLET, FILM COATED ORAL 2 TIMES DAILY
Status: DISCONTINUED | OUTPATIENT
Start: 2019-07-25 | End: 2019-07-27 | Stop reason: HOSPADM

## 2019-07-25 RX ORDER — SODIUM CHLORIDE 0.9 % (FLUSH) 0.9 %
5-40 SYRINGE (ML) INJECTION EVERY 8 HOURS
Status: DISCONTINUED | OUTPATIENT
Start: 2019-07-25 | End: 2019-07-27 | Stop reason: HOSPADM

## 2019-07-25 RX ORDER — MAGNESIUM SULFATE 100 %
4 CRYSTALS MISCELLANEOUS AS NEEDED
Status: DISCONTINUED | OUTPATIENT
Start: 2019-07-25 | End: 2019-07-27 | Stop reason: HOSPADM

## 2019-07-25 RX ORDER — DEXTROSE MONOHYDRATE 100 MG/ML
125-250 INJECTION, SOLUTION INTRAVENOUS AS NEEDED
Status: DISCONTINUED | OUTPATIENT
Start: 2019-07-25 | End: 2019-07-27 | Stop reason: HOSPADM

## 2019-07-25 NOTE — TELEPHONE ENCOUNTER
Spoke with patient  Verified patient with 2 patient identifiers    Pt called with c/o off chest relieved with 1 nitro however, with activity becomes symptomatic. Informed per [facility] NP need report to ED. Patient verbalized understanding.

## 2019-07-25 NOTE — ED PROVIDER NOTES
EMERGENCY DEPARTMENT HISTORY AND PHYSICAL EXAM      Date: 7/25/2019  Patient Name: Valery Naranjo  Patient Age and Sex: 70 y.o. male    History of Presenting Illness     Chief Complaint   Patient presents with    Chest Pain     [facility] transfer. chest pain for 1 week. worsening this AM so Took 1 nitro tablet this morning        History Provided By: Patient and EMS    HPI: Valery Naranjo, 70 y.o. male with past medical history as documented below presents to the ED with c/o of one week of exertional chest pain that has been intermittent this week. Pt states that yesterday, he was going to take his dog for a walk but had to stop due to chest pain and SOB. Pt reports taking 1 nitro tablet with improvement of sx's. Pt reports taking aspirin, Plavix and eliquis daily with recent history of CAD s/p DEVONTE stent in June 2019. Pt currently rates the pain at 2/10 intensity but as high as 9/10 pain at initial onset. Pt reports pain is across the chest and non radiating with associated shortness of breath. Pt was seen at OSH where he was found to be guaiac positive with a Hgb of 7.0 which was down from 10.8 in 5/2019. Pt denies any obvious blood in stools, no black stools, or melena. He denies any recent NSAID use. He does endorse taking iron daily for history of anemia. Pt denies any other alleviating or exacerbating factors. Additionally, pt specifically denies any recent fever, chills, headache, nausea, vomiting, abdominal pain, lightheadedness, dizziness, numbness, weakness, BLE swelling, heart palpitations, urinary sxs, diarrhea, constipation, melena, hematochezia, cough, or congestion. PCP: None    There are no other complaints, changes or physical findings at this time.      Past History   Past Medical History:  Past Medical History:   Diagnosis Date    Abnormal nuclear cardiac imaging test 5/8/2019    Anemia 6/5/2017    CKD (chronic kidney disease) 5/8/2019    Coronary atherosclerosis of native coronary artery  Diabetes mellitus, type II ([facility] [address]     MCCOLLUM (dyspnea on exertion) 2019    Mixed hyperlipidemia     Other dyspnea and respiratory abnormality     S/P ablation of atrial fibrillation 2016       Past Surgical History:  Past Surgical History:   Procedure Laterality Date    HX CATARACT REMOVAL Bilateral 2018    HX CORONARY ARTERY BYPASS GRAFT  2001    NV INS NEW/RPLCMT PRM PM W/TRANSV ELTRD ATRIAL&VENT N/A 3/29/2019    INSERT PPM DUAL performed by Rocael Emery MD at [facility] CARDIAC CATH LAB    NV REMOVAL SUBCUTANEOUS CARDIAC RHYTHM MONITOR N/A 3/29/2019    LOOP RECORDER REMOVAL performed by Rocael Emery MD at [facility] CARDIAC CATH LAB       Family History:  Family History   Problem Relation Age of Onset    Diabetes Mother     Emphysema Father          age 46 - smoker       Social History:  Social History     Tobacco Use    Smoking status: Former Smoker     Packs/day: 4.00     Years: 20.00     Pack years: 80.00     Types: Cigarettes     Last attempt to quit: 1980     Years since quittin.0    Smokeless tobacco: Former User    Tobacco comment: QUIT AT AGE 42   Substance Use Topics    Alcohol use: No     Alcohol/week: 0.0 standard drinks    Drug use: Never       Allergies:   Allergies   Allergen Reactions    Ciprofloxacin Other (comments)     Difficulty breathing; 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       Current Medications:  Current Facility-Administered Medications on File Prior to Encounter   Medication Dose Route Frequency Provider Last Rate Last Dose    [COMPLETED] aspirin tablet 325 mg  325 mg Oral Cassie Ball MD   325 mg at 19 0957    [COMPLETED] pantoprazole (PROTONIX) 40 mg in sodium chloride 0.9% 10 mL injection  40 mg IntraVENous Cassie Ball MD   40 mg at 19 1123    [COMPLETED] hydrOXYzine HCl (ATARAX) tablet 50 mg  50 mg Oral Cassie Ball MD   50 mg at 19 1421    [DISCONTINUED] saline peripheral flush soln 5 mL  5 mL InterCATHeter PRN Joshua Sheehan MD        [DISCONTINUED] 0.9% sodium chloride infusion 250 mL  250 mL IntraVENous PRN Aiyana, La Lopez MD         Current Outpatient Medications on File Prior to Encounter   Medication Sig Dispense Refill    rosuvastatin (CRESTOR) 40 mg tablet TAKE 1 TABLET BY MOUTH EVERY DAY 30 Tab 3    glucose blood VI test strips (ACCU-CHEK YVES PLUS TEST STRP) strip Use to check blood glucose daily. 100 Strip 2    nitroglycerin (NITROSTAT) 0.4 mg SL tablet 1 Tab by SubLINGual route every five (5) minutes as needed for Chest Pain. 25 Tab 1    metFORMIN ER (GLUCOPHAGE XR) 500 mg tablet Take 1 Tab by mouth two (2) times a day. 120 Tab 10    clopidogrel (PLAVIX) 75 mg tab Take 1 Tab by mouth daily. 30 Tab 11    metoprolol tartrate (LOPRESSOR) 25 mg tablet Take 1 Tab by mouth two (2) times a day. 180 Tab 3    ELIQUIS 5 mg tablet TAKE 1 TABLET BY MOUTH TWICE A DAY 60 Tab 6    cholecalciferol (VITAMIN D3) 1,000 unit cap Take  by mouth daily.  ferrous sulfate 324 mg (65 mg iron) tablet Take  by mouth Daily (before breakfast).  cyanocobalamin 1,000 mcg tablet Take 1,000 mcg by mouth daily.  aspirin delayed-release (ECOTRIN LOW STRENGTH) 81 mg tablet Take  by mouth daily. Review of Systems   Review of Systems   Constitutional: Negative. Negative for chills and fever. HENT: Negative. Negative for congestion, facial swelling, rhinorrhea, sore throat, trouble swallowing and voice change. Eyes: Negative. Respiratory: Positive for shortness of breath. Negative for apnea, cough, chest tightness and wheezing. Cardiovascular: Positive for chest pain. Negative for palpitations and leg swelling. Gastrointestinal: Negative. Negative for abdominal distention, abdominal pain, blood in stool, constipation, diarrhea, nausea and vomiting. Endocrine: Negative. Negative for cold intolerance, heat intolerance and polyuria. Genitourinary: Negative.   Negative for difficulty urinating, dysuria, flank pain, frequency, hematuria and urgency. Musculoskeletal: Negative. Negative for arthralgias, back pain, myalgias, neck pain and neck stiffness. Skin: Negative. Negative for color change and rash. Neurological: Negative. Negative for dizziness, syncope, facial asymmetry, speech difficulty, weakness, light-headedness, numbness and headaches. Hematological: Negative. Does not bruise/bleed easily. Psychiatric/Behavioral: Negative. Negative for confusion and self-injury. The patient is not nervous/anxious. Physical Exam   Physical Exam   Constitutional: He is oriented to person, place, and time. He is cooperative. He appears toxic. He has a sickly appearance. He appears ill. He appears distressed. HENT:   Head: Normocephalic and atraumatic. Mouth/Throat: Mucous membranes are normal. No posterior oropharyngeal erythema. Eyes: Pupils are equal, round, and reactive to light. Conjunctivae and EOM are normal.   Neck: Normal range of motion. Cardiovascular: Normal rate, regular rhythm, normal heart sounds and intact distal pulses. Exam reveals no gallop and no friction rub. No murmur heard. Pulmonary/Chest: Effort normal and breath sounds normal. No respiratory distress. He has no wheezes. He has no rales. He exhibits no tenderness. Pacemaker over chest wall   Abdominal: Soft. Bowel sounds are normal. He exhibits no distension and no mass. There is no tenderness. There is no rebound and no guarding. Musculoskeletal: Normal range of motion. He exhibits no edema, tenderness or deformity. Neurological: He is alert and oriented to person, place, and time. He displays normal reflexes. No cranial nerve deficit. He exhibits normal muscle tone. Coordination normal.   Skin: Skin is warm. No rash noted. Psychiatric: He has a normal mood and affect. Nursing note and vitals reviewed.       Diagnostic Study Results     Labs -  Recent Results (from the past 24 hour(s))   EKG, 12 LEAD, INITIAL    Collection Time: 07/25/19  9:46 AM   Result Value Ref Range    Ventricular Rate 62 BPM    Atrial Rate 62 BPM    P-R Interval 230 ms    QRS Duration 106 ms    Q-T Interval 412 ms    QTC Calculation (Bezet) 418 ms    Calculated P Axis 71 degrees    Calculated R Axis 44 degrees    Calculated T Axis 57 degrees    Diagnosis       Atrial-paced rhythm with prolonged AV conduction  Nonspecific ST abnormality  Abnormal ECG  No previous ECGs available     CBC WITH AUTOMATED DIFF    Collection Time: 07/25/19  9:50 AM   Result Value Ref Range    WBC 6.3 4.1 - 11.1 K/uL    RBC 2.37 (L) 4.10 - 5.70 M/uL    HGB 7.0 (L) 12.1 - 17.0 g/dL    HCT 22.3 (L) 36.6 - 50.3 %    MCV 94.1 80.0 - 99.0 FL    MCH 29.5 26.0 - 34.0 PG    MCHC 31.4 30.0 - 36.5 g/dL    RDW 15.8 (H) 11.5 - 14.5 %    PLATELET 146 909 - 765 K/uL    MPV 10.9 8.9 - 12.9 FL    NRBC 0.0 0  WBC    ABSOLUTE NRBC 0.00 0.00 - 0.01 K/uL    NEUTROPHILS 74 32 - 75 %    LYMPHOCYTES 16 12 - 49 %    MONOCYTES 8 5 - 13 %    EOSINOPHILS 1 0 - 7 %    BASOPHILS 0 0 - 1 %    IMMATURE GRANULOCYTES 1 (H) 0.0 - 0.5 %    ABS. NEUTROPHILS 4.7 1.8 - 8.0 K/UL    ABS. LYMPHOCYTES 1.0 0.8 - 3.5 K/UL    ABS. MONOCYTES 0.5 0.0 - 1.0 K/UL    ABS. EOSINOPHILS 0.1 0.0 - 0.4 K/UL    ABS. BASOPHILS 0.0 0.0 - 0.1 K/UL    ABS. IMM. GRANS. 0.0 0.00 - 0.04 K/UL    DF AUTOMATED     METABOLIC PANEL, COMPREHENSIVE    Collection Time: 07/25/19  9:50 AM   Result Value Ref Range    Sodium 140 136 - 145 mmol/L    Potassium 4.8 3.5 - 5.1 mmol/L    Chloride 107 97 - 108 mmol/L    CO2 23 21 - 32 mmol/L    Anion gap 10 5 - 15 mmol/L    Glucose 183 (H) 65 - 100 mg/dL    BUN 49 (H) 6 - 20 MG/DL    Creatinine 2.27 (H) 0.70 - 1.30 MG/DL    BUN/Creatinine ratio 22 (H) 12 - 20      GFR est AA 34 (L) >60 ml/min/1.73m2    GFR est non-AA 28 (L) >60 ml/min/1.73m2    Calcium 8.6 8.5 - 10.1 MG/DL    Bilirubin, total 0.5 0.2 - 1.0 MG/DL    ALT (SGPT) 22 12 - 78 U/L    AST (SGOT) 14 (L) 15 - 37 U/L    Alk. phosphatase 51 45 - 117 U/L    Protein, total 6.4 6.4 - 8.2 g/dL    Albumin 3.3 (L) 3.5 - 5.0 g/dL    Globulin 3.1 2.0 - 4.0 g/dL    A-G Ratio 1.1 1.1 - 2.2     TROPONIN I    Collection Time: 07/25/19  9:50 AM   Result Value Ref Range    Troponin-I, Qt. <0.05 <0.05 ng/mL   NT-PRO BNP    Collection Time: 07/25/19  9:50 AM   Result Value Ref Range    NT pro- (H) 0 - 450 PG/ML   SAMPLES BEING HELD    Collection Time: 07/25/19  9:50 AM   Result Value Ref Range    SAMPLES BEING HELD 1SST, 1BLUE     COMMENT        Add-on orders for these samples will be processed based on acceptable specimen integrity and analyte stability, which may vary by analyte. PROTHROMBIN TIME + INR    Collection Time: 07/25/19  9:50 AM   Result Value Ref Range    INR 1.1 0.9 - 1.1      Prothrombin time 11.0 9.0 - 11.1 sec   OCCULT BLOOD, STOOL    Collection Time: 07/25/19 11:03 AM   Result Value Ref Range    Occult blood, stool POSITIVE (A) NEG     IRON PROFILE    Collection Time: 07/25/19 11:25 AM   Result Value Ref Range    Iron 29 (L) 65 - 175 ug/dL    TIBC 411 250 - 450 ug/dL    Iron % saturation 7 (L) 20 - 50 %   VITAMIN B12    Collection Time: 07/25/19 11:25 AM   Result Value Ref Range    Vitamin B12 355 193 - 986 pg/mL   TYPE + CROSSMATCH    Collection Time: 07/25/19 11:25 AM   Result Value Ref Range    Crossmatch Expiration 07/28/2019     ABO/Rh(D) A POSITIVE     Antibody screen NEG     Unit number [id number]     Blood component type RC LR     Unit division 00     Status of unit ALLOCATED     Crossmatch result Compatible    SAMPLES BEING HELD    Collection Time: 07/25/19 11:25 AM   Result Value Ref Range    SAMPLES BEING HELD  2 PST, 1 BLUE, 1 LAV     COMMENT        Add-on orders for these samples will be processed based on acceptable specimen integrity and analyte stability, which may vary by analyte.    TROPONIN I    Collection Time: 07/25/19  5:14 PM   Result Value Ref Range    Troponin-I, Qt. <0.05 <0.05 ng/mL   CK W/ REFLX CKMB    Collection Time: 07/25/19  5:14 PM   Result Value Ref Range     39 - 308 U/L   TYPE + CROSSMATCH    Collection Time: 07/25/19  8:16 PM   Result Value Ref Range    Crossmatch Expiration 07/28/2019     ABO/Rh(D) A POSITIVE     Antibody screen NEG     Unit number [id number]     Blood component type RC LR     Unit division 00     Status of unit ISSUED     Crossmatch result Compatible        Radiologic Studies -   No orders to display     CT Results  (Last 48 hours)               07/25/19 1232  CT ABD PELV WO CONT Final result    Impression:  IMPRESSION:   1. Presence of a fat-containing umbilical hernia as described above. 2. Presence of a moderate amount of gas and fecal material within the colon. No   evidence of intestinal obstruction or free intraperitoneal air. 3. Normal appearance of the urinary bladder. 4. Normal sized kidneys with the presence of cysts bilaterally. No evidence of   urolithiasis or hydronephrotic change. Narrative:  EXAM: CT ABD PELV WO CONT       INDICATION: eval source of bleeding clinical information regarding the   nature/location of hemorrhage would be helpful for evaluation. COMPARISON: The CT examination of the chest of 7/27/2016 is available for   correlation. CONTRAST:  None. TECHNIQUE:    Thin axial images were obtained through the abdomen and pelvis. Coronal and   sagittal reconstructions were generated. Oral contrast was not administered. CT   dose reduction was achieved through use of a standardized protocol tailored for   this examination and automatic exposure control for dose modulation. The absence of intravenous contrast material reduces the sensitivity for   evaluation of the solid parenchymal organs of the abdomen. FINDINGS:    The images of the lung bases revealed no evidence of nodularity. The liver   measures 18.5 cm longitudinally. No focal hepatic lesions are noted on this   noncontrasted examination.  The pancreas is normal in appearance. The spleen   measures 11.4 cm longitudinally. No focal splenic lesions are seen. The kidneys   are normal in size. Focal areas of decreased attenuation are noted within both   kidneys. These appear to represent cysts. There is evidence of apparent chronic   perinephric stranding bilaterally. There is no evidence of urolithiasis or   hydronephrotic change. The urinary bladder is normal in appearance. There is   relative enlargement of the prostate gland. A moderate amount of gas and fecal   material is noted within the colon. There is no evidence of intestinal   obstruction or free intraperitoneal air. A 2 cm fat-containing umbilical hernia   is present (see axial images 67 and sagittal image 43). No loops of bowel are   noted within this hernia. The abdominal aorta is of normal caliber. There is no   evidence of ascites. There is no evidence of intra-abdominal or intrapelvic   inflammatory change. There is no evidence of intra-abdominal, intrapelvic or   inguinal lymphadenopathy. CXR Results  (Last 48 hours)               07/25/19 1009  XR CHEST PORT Final result    Impression:  IMPRESSION:   1. Slight prominence of the basilar lung markings. 2. Interval placement of a cardiac pacer device and leads on the left. 3. Evidence of a previous thoracotomy. Narrative:  Portable chest single view dated 7/25/2019       Comparison chest dated 3/27/2019       History is evaluate for effusion/fluid overload       A single portable AP upright view of the chest was obtained. This film was   obtained during a less than optimal degree of inspiration. There is slight   prominence of the basilar lung markings. Some vascular crowding is present. There is no evidence of lobar consolidation or pulmonary vascular congestion. It   is difficult to accurately assess the size of the cardiac silhouette. There is   evidence of a previous thoracotomy.  There has been interval placement of a   cardiac pacer device on the left. Medical Decision Making   I am the first provider for this patient. I reviewed the vital signs, available nursing notes, past medical history, past surgical history, family history and social history. Vital Signs-Reviewed the patient's vital signs. Patient Vitals for the past 24 hrs:   Temp Pulse Resp BP SpO2   07/25/19 2350 98.1 °F (36.7 °C) 77  144/62    07/25/19 2335 98.1 °F (36.7 °C) 82  122/56    07/25/19 2320 98 °F (36.7 °C) 61  127/64    07/25/19 2305 98 °F (36.7 °C) 60 16 129/58 99 %   07/25/19 2250 97.9 °F (36.6 °C) 71 16 135/61 98 %   07/25/19 2045  74 17 (!) 144/117 98 %   07/25/19 1915  73 15 135/66 98 %   07/25/19 1900  72 23 118/54 94 %   07/25/19 1845  74 12 126/55 100 %   07/25/19 1830  74 12 128/57 99 %   07/25/19 1815  66 11 129/60 98 %   07/25/19 1800  63 12 122/53 98 %   07/25/19 1700  70 19 132/60 95 %   07/25/19 1630  66 15  100 %       Pulse Oximetry Analysis - 100% on RA    Cardiac Monitor:   Rate: 66 bpm  Rhythm: Paced      ED EKG interpretation:  Rhythm: paced; and regular . Rate (approx.): 62; Axis: normal; P wave: normal; QRS interval: normal ; ST/T wave: normal; Other findings: normal. This EKG was interpreted by Baylee Mark M.D. Records Reviewed: Nursing Notes, Old Medical Records, Previous electrocardiograms, Previous Radiology Studies and Previous Laboratory Studies    Provider Notes (Medical Decision Making):   DDx: ACS, acute blood loss anemia, acute GI bleed, chronic anticoagulation, ERAN, electrolyte disturbance    ED Course:   Initial assessment performed. The patients presenting problems have been discussed, and they are in agreement with the care plan formulated and outlined with them. I have encouraged them to ask questions as they arise throughout their visit.     I reviewed our electronic medical record system for any past medical records that were available that may contribute to the patient's current condition, the nursing notes and vital signs from today's visit. Gilmar Higgins MD    Orders Placed During ED Course:  Orders Placed This Encounter    ANTICOAGULANT THERAPY IS CONTRAINDICATED Patient is at risk of or experiencing active bleeding    TROPONIN I    CK W/REFLEX CKMB    TROPONIN I    METABOLIC PANEL, BASIC    CBC W/O DIFF    HGB & HCT    DIET NPO    TRANSFUSE PACKED RBC'S    Cardiac Monitor    BEDREST, COMPLETE    VITAL SIGNS PER UNIT ROUTINE    POC GLUCOSE    FULL CODE    IP CONSULT TO GASTROENTEROLOGY    EKG 12 LEAD INITIAL    BLOOD BANK REQUEST FOR RED BLOOD CELLS    0.9% sodium chloride infusion 250 mL    aspirin delayed-release tablet 81 mg    metoprolol tartrate (LOPRESSOR) tablet 25 mg    nitroglycerin (NITROSTAT) tablet 0.4 mg    atorvastatin (LIPITOR) tablet 20 mg    pantoprazole (PROTONIX) 40 mg in sodium chloride 0.9% 10 mL injection    0.9% sodium chloride infusion    sodium chloride (NS) flush 5-40 mL    sodium chloride (NS) flush 5-40 mL    insulin lispro (HUMALOG) injection    glucose chewable tablet 16 g    glucagon (GLUCAGEN) injection 1 mg    dextrose 10% infusion 125-250 mL    IP CONSULT TO CARDIOLOGY    INITIAL PHYSICIAN ORDER: INPATIENT Telemetry; 3.  Patient receiving treatment that can only be provided in an inpatient setting (further clarification in H&P documentation)     Medications Administered:  Medications   0.9% sodium chloride infusion 250 mL (has no administration in time range)   aspirin delayed-release tablet 81 mg (has no administration in time range)   metoprolol tartrate (LOPRESSOR) tablet 25 mg (25 mg Oral Given 7/26/19 0034)   nitroglycerin (NITROSTAT) tablet 0.4 mg (has no administration in time range)   atorvastatin (LIPITOR) tablet 20 mg (has no administration in time range)   pantoprazole (PROTONIX) 40 mg in sodium chloride 0.9% 10 mL injection (0 mg IntraVENous Held 7/25/19 2247)   0.9% sodium chloride infusion (has no administration in time range)   sodium chloride (NS) flush 5-40 mL (has no administration in time range)   sodium chloride (NS) flush 5-40 mL (has no administration in time range)   insulin lispro (HUMALOG) injection (has no administration in time range)   glucose chewable tablet 16 g (has no administration in time range)   glucagon (GLUCAGEN) injection 1 mg (has no administration in time range)   dextrose 10% infusion 125-250 mL (has no administration in time range)     Progress Note:  Hgb 7.0 noted, will transfuse 1 U PRBC here in ED, consent obtained    Progress Note:  Suspect upper GI bleed, now causing symptomatic anemia, exertional chest pain, h/o CAD s/p stent in June 2019, s/p CABG 2001, will check another set of cardiac enzymes    Progress Note:  Chart reviewed: CAD with PCI/DEVONTE RCA 5/7/19, CABG, SSS with [employer] dual chamber pacemaker implant 3/2019, PAF with hx of afib ablation 2016, HTN, DM; pt taking ASA, Plavix and Eliquis even though Dr. Adi Brian advised him to stop Aspirin three weeks ago    Progress Note  Meli Suarez MD spoke with Dr. Adi Brian,   Specialty: Cardiologist   Discussed pt's hx, disposition, and available diagnostic and imaging results. Reviewed care plans. Agree with management and plan thus far. Consultant will evaluate pt. OK with holding eliquis & plavix for now but will need to resume plavix soon    Consult Note:  Meli Suarez MD spoke with Dr. Sheela Daugherty,   Specialty: Hospitalist  Discussed pt's hx, disposition, and available diagnostic and imaging results. Reviewed care plans. Agree with management and plan thus far. Consultant will evaluate pt. Agree with transfusion, keep NPO for possible scope. Progress Note  Meli Suarez MD spoke with Dr. Tonja Patel,   Specialty: Hospitalist  Discussed pt's hx, disposition, and available diagnostic and imaging results. Reviewed care plans. Agree with management and plan thus far. Consultant will evaluate pt for admission.     CRITICAL CARE NOTE [employer] IMPENDING DETERIORATION -Airway, Respiratory, Cardiovascular, Metabolic and Renal  ASSOCIATED RISK FACTORS - Hypotension, Shock, Hypoxia, Bleeding, Metabolic changes, Vascular Compromise and CNS Decompensation  MANAGEMENT- Bedside Assessment and Supervision of Care  INTERPRETATION -  Xrays, CT Scan, ECG, Blood Pressure and Cardiac Output Measures   INTERVENTIONS - hemodynamic mngmt, Metobolic interventions and blood transfusion for acute blood loss anemia from acute GI bleed, frequent hemodynamic monitoring and reassessments  CASE REVIEW - Hospitalist, Medical Sub-Specialist, Nursing, Family and Cardiologist, GI  TREATMENT RESPONSE -Improved  PERFORMED BY - Self    NOTES   :    I have spent 60 minutes of critical care time involved in lab review, consultations with specialist, family decision- making, bedside attention and documentation. During this entire length of time I was immediately available to the patient . Critical Care: The reason for providing this level of medical care for this critically ill patient was due to a critical illness that impaired one or more vital organ systems, such that there was a high probability of imminent or life threatening deterioration in the patient's condition. This care involved high complexity decision making to assess, manipulate, and support vital system functions, to treat this degree of vital organ system failure, and to prevent further life threatening deterioration of the patients condition. Nayan Shea MD    Disposition: ADMIT  Patient is being admitted to the hospital.  The results of their tests and reasons for their admission have been discussed with them and/or available family. They convey agreement and understanding for the need to be admitted and for their admission diagnosis. Consultation has been made with the inpatient physician specialist for hospitalization. Diagnosis     Clinical Impression:   1. Acute GI bleeding    2.  Acute blood loss anemia    3. Exertional chest pain    4. Demand ischemia ([address]    5. Acute renal failure superimposed on stage 3 chronic kidney disease, unspecified acute renal failure type ([address]    6. Uncontrolled type 2 diabetes mellitus with hyperglycemia ([address]        Attestation:  I personally performed the services described in this documentation on this date 7/25/2019 for patient, Chencho Melendez. Michelle Carpenter MD    Please note that this dictation was completed with [employer], the computer voice recognition software. Quite often unanticipated grammatical, syntax, homophones, and other interpretive errors are inadvertently transcribed by the computer software. Please disregard these errors. Please excuse any errors that have escaped final proofreading. This note will not be viewable in [address]

## 2019-07-26 ENCOUNTER — APPOINTMENT (OUTPATIENT)
Dept: NON INVASIVE DIAGNOSTICS | Age: 79
DRG: 378 | End: 2019-07-26
Attending: INTERNAL MEDICINE
Payer: MEDICARE

## 2019-07-26 ENCOUNTER — ANESTHESIA EVENT (OUTPATIENT)
Dept: ENDOSCOPY | Age: 79
DRG: 378 | End: 2019-07-26
Payer: MEDICARE

## 2019-07-26 ENCOUNTER — ANESTHESIA (OUTPATIENT)
Dept: ENDOSCOPY | Age: 79
DRG: 378 | End: 2019-07-26
Payer: MEDICARE

## 2019-07-26 LAB
ABO + RH BLD: NORMAL
ANION GAP SERPL CALC-SCNC: 7 MMOL/L (ref 5–15)
ATRIAL RATE: 56 BPM
BLD PROD TYP BPU: NORMAL
BLOOD GROUP ANTIBODIES SERPL: NORMAL
BPU ID: NORMAL
BUN SERPL-MCNC: 45 MG/DL (ref 6–20)
BUN/CREAT SERPL: 24 (ref 12–20)
CALCIUM SERPL-MCNC: 8.4 MG/DL (ref 8.5–10.1)
CALCULATED R AXIS, ECG10: 24 DEGREES
CALCULATED T AXIS, ECG11: 50 DEGREES
CHLORIDE SERPL-SCNC: 114 MMOL/L (ref 97–108)
CO2 SERPL-SCNC: 22 MMOL/L (ref 21–32)
CREAT SERPL-MCNC: 1.85 MG/DL (ref 0.7–1.3)
CROSSMATCH RESULT,%XM: NORMAL
DIAGNOSIS, 93000: NORMAL
ECHO AO ROOT DIAM: 3.95 CM
ECHO AV AREA PEAK VELOCITY: 2.1 CM2
ECHO AV AREA/BSA PEAK VELOCITY: 0.8 CM2/M2
ECHO AV CUSP MM: 0 CM
ECHO AV PEAK GRADIENT: 9.3 MMHG
ECHO AV PEAK VELOCITY: 152.12 CM/S
ECHO EST RA PRESSURE: 10 MMHG
ECHO LA AREA 4C: 21.9 CM2
ECHO LA MAJOR AXIS: 5.45 CM
ECHO LA TO AORTIC ROOT RATIO: 1.38
ECHO LA VOL 4C: 54.9 ML (ref 18–58)
ECHO LA VOLUME INDEX A4C: 22.45 ML/M2 (ref 16–28)
ECHO LV EDV A4C: 103.5 ML
ECHO LV EDV INDEX A4C: 42.3 ML/M2
ECHO LV EJECTION FRACTION A4C: 45 %
ECHO LV ESV A4C: 56.8 ML
ECHO LV ESV INDEX A4C: 23.2 ML/M2
ECHO LV INTERNAL DIMENSION DIASTOLIC: 5.47 CM (ref 4.2–5.9)
ECHO LV INTERNAL DIMENSION SYSTOLIC: 3.67 CM
ECHO LV IVSD: 1.32 CM (ref 0.6–1)
ECHO LV MASS 2D: 341.8 G (ref 88–224)
ECHO LV MASS INDEX 2D: 139.8 G/M2 (ref 49–115)
ECHO LV POSTERIOR WALL DIASTOLIC: 1.18 CM (ref 0.6–1)
ECHO LV POSTERIOR WALL SYSTOLIC: 1.74 CM
ECHO LVOT DIAM: 1.8 CM
ECHO LVOT PEAK GRADIENT: 6.5 MMHG
ECHO LVOT PEAK VELOCITY: 127.84 CM/S
ECHO LVOT SV: 79.7 ML
ECHO LVOT VTI: 31.26 CM
ECHO MV A VELOCITY: 66.48 CM/S
ECHO MV AREA PHT: 3.3 CM2
ECHO MV E DECELERATION TIME (DT): 232 MS
ECHO MV E VELOCITY: 94.78 CM/S
ECHO MV E/A RATIO: 1.43
ECHO MV PRESSURE HALF TIME (PHT): 67.3 MS
ECHO PULMONARY ARTERY SYSTOLIC PRESSURE (PASP): 46.7 MMHG
ECHO RA VOLUME: 53.2 ML
ECHO RIGHT VENTRICULAR SYSTOLIC PRESSURE (RVSP): 46.7 MMHG
ECHO TV MAX VELOCITY: 295.19 CM/S
ECHO TV PEAK GRADIENT: 34.9 MMHG
ECHO TV REGURGITANT MAX VELOCITY: 302.96 CM/S
ECHO TV REGURGITANT PEAK GRADIENT: 36.7 MMHG
ERYTHROCYTE [DISTWIDTH] IN BLOOD BY AUTOMATED COUNT: 15.5 % (ref 11.5–14.5)
GLUCOSE BLD STRIP.AUTO-MCNC: 140 MG/DL (ref 65–100)
GLUCOSE BLD STRIP.AUTO-MCNC: 155 MG/DL (ref 65–100)
GLUCOSE BLD STRIP.AUTO-MCNC: 166 MG/DL (ref 65–100)
GLUCOSE BLD STRIP.AUTO-MCNC: 172 MG/DL (ref 65–100)
GLUCOSE BLD STRIP.AUTO-MCNC: 190 MG/DL (ref 65–100)
GLUCOSE SERPL-MCNC: 175 MG/DL (ref 65–100)
HCT VFR BLD AUTO: 24.8 % (ref 36.6–50.3)
HCT VFR BLD AUTO: 25.1 % (ref 36.6–50.3)
HGB BLD-MCNC: 7.7 G/DL (ref 12.1–17)
HGB BLD-MCNC: 8.1 G/DL (ref 12.1–17)
MCH RBC QN AUTO: 29.8 PG (ref 26–34)
MCHC RBC AUTO-ENTMCNC: 31 G/DL (ref 30–36.5)
MCV RBC AUTO: 96.1 FL (ref 80–99)
NRBC # BLD: 0 K/UL (ref 0–0.01)
NRBC BLD-RTO: 0 PER 100 WBC
P-R INTERVAL, ECG05: 232 MS
PLATELET # BLD AUTO: 162 K/UL (ref 150–400)
PMV BLD AUTO: 11.3 FL (ref 8.9–12.9)
POTASSIUM SERPL-SCNC: 4.6 MMOL/L (ref 3.5–5.1)
Q-T INTERVAL, ECG07: 384 MS
QRS DURATION, ECG06: 106 MS
QTC CALCULATION (BEZET), ECG08: 428 MS
RBC # BLD AUTO: 2.58 M/UL (ref 4.1–5.7)
SERVICE CMNT-IMP: ABNORMAL
SODIUM SERPL-SCNC: 143 MMOL/L (ref 136–145)
SPECIMEN EXP DATE BLD: NORMAL
STATUS OF UNIT,%ST: NORMAL
TROPONIN I SERPL-MCNC: <0.05 NG/ML
UNIT DIVISION, %UDIV: 0
VENTRICULAR RATE, ECG03: 75 BPM
WBC # BLD AUTO: 6.1 K/UL (ref 4.1–11.1)

## 2019-07-26 PROCEDURE — 0DB98ZX EXCISION OF DUODENUM, VIA NATURAL OR ARTIFICIAL OPENING ENDOSCOPIC, DIAGNOSTIC: ICD-10-PCS | Performed by: INTERNAL MEDICINE

## 2019-07-26 PROCEDURE — 80048 BASIC METABOLIC PNL TOTAL CA: CPT

## 2019-07-26 PROCEDURE — 74011250637 HC RX REV CODE- 250/637: Performed by: INTERNAL MEDICINE

## 2019-07-26 PROCEDURE — 76040000019: Performed by: INTERNAL MEDICINE

## 2019-07-26 PROCEDURE — 82962 GLUCOSE BLOOD TEST: CPT

## 2019-07-26 PROCEDURE — 76060000031 HC ANESTHESIA FIRST 0.5 HR: Performed by: INTERNAL MEDICINE

## 2019-07-26 PROCEDURE — 0DB48ZX EXCISION OF ESOPHAGOGASTRIC JUNCTION, VIA NATURAL OR ARTIFICIAL OPENING ENDOSCOPIC, DIAGNOSTIC: ICD-10-PCS | Performed by: INTERNAL MEDICINE

## 2019-07-26 PROCEDURE — 74011250637 HC RX REV CODE- 250/637: Performed by: NURSE PRACTITIONER

## 2019-07-26 PROCEDURE — 85027 COMPLETE CBC AUTOMATED: CPT

## 2019-07-26 PROCEDURE — 36415 COLL VENOUS BLD VENIPUNCTURE: CPT

## 2019-07-26 PROCEDURE — 74011250636 HC RX REV CODE- 250/636: Performed by: INTERNAL MEDICINE

## 2019-07-26 PROCEDURE — C9113 INJ PANTOPRAZOLE SODIUM, VIA: HCPCS | Performed by: INTERNAL MEDICINE

## 2019-07-26 PROCEDURE — 85018 HEMOGLOBIN: CPT

## 2019-07-26 PROCEDURE — 65660000000 HC RM CCU STEPDOWN

## 2019-07-26 PROCEDURE — 88342 IMHCHEM/IMCYTCHM 1ST ANTB: CPT

## 2019-07-26 PROCEDURE — 0DB78ZX EXCISION OF STOMACH, PYLORUS, VIA NATURAL OR ARTIFICIAL OPENING ENDOSCOPIC, DIAGNOSTIC: ICD-10-PCS | Performed by: INTERNAL MEDICINE

## 2019-07-26 PROCEDURE — 88305 TISSUE EXAM BY PATHOLOGIST: CPT

## 2019-07-26 PROCEDURE — 74011250636 HC RX REV CODE- 250/636

## 2019-07-26 PROCEDURE — 77030019988 HC FCPS ENDOSC DISP BSC -B: Performed by: INTERNAL MEDICINE

## 2019-07-26 PROCEDURE — 93306 TTE W/DOPPLER COMPLETE: CPT

## 2019-07-26 PROCEDURE — 84484 ASSAY OF TROPONIN QUANT: CPT

## 2019-07-26 RX ORDER — NALOXONE HYDROCHLORIDE 0.4 MG/ML
0.4 INJECTION, SOLUTION INTRAMUSCULAR; INTRAVENOUS; SUBCUTANEOUS
Status: DISCONTINUED | OUTPATIENT
Start: 2019-07-26 | End: 2019-07-26 | Stop reason: HOSPADM

## 2019-07-26 RX ORDER — DEXTROMETHORPHAN/PSEUDOEPHED 2.5-7.5/.8
1.2 DROPS ORAL
Status: DISCONTINUED | OUTPATIENT
Start: 2019-07-26 | End: 2019-07-26 | Stop reason: HOSPADM

## 2019-07-26 RX ORDER — PROPOFOL 10 MG/ML
INJECTION, EMULSION INTRAVENOUS AS NEEDED
Status: DISCONTINUED | OUTPATIENT
Start: 2019-07-26 | End: 2019-07-26 | Stop reason: HOSPADM

## 2019-07-26 RX ORDER — FLUMAZENIL 0.1 MG/ML
0.2 INJECTION INTRAVENOUS
Status: DISCONTINUED | OUTPATIENT
Start: 2019-07-26 | End: 2019-07-26 | Stop reason: HOSPADM

## 2019-07-26 RX ORDER — PANTOPRAZOLE SODIUM 40 MG/1
40 TABLET, DELAYED RELEASE ORAL
Status: DISCONTINUED | OUTPATIENT
Start: 2019-07-27 | End: 2019-07-27 | Stop reason: HOSPADM

## 2019-07-26 RX ORDER — LIDOCAINE HYDROCHLORIDE 20 MG/ML
INJECTION, SOLUTION EPIDURAL; INFILTRATION; INTRACAUDAL; PERINEURAL AS NEEDED
Status: DISCONTINUED | OUTPATIENT
Start: 2019-07-26 | End: 2019-07-26 | Stop reason: HOSPADM

## 2019-07-26 RX ORDER — SODIUM CHLORIDE 0.9 % (FLUSH) 0.9 %
5-40 SYRINGE (ML) INJECTION AS NEEDED
Status: DISCONTINUED | OUTPATIENT
Start: 2019-07-26 | End: 2019-07-27 | Stop reason: HOSPADM

## 2019-07-26 RX ORDER — SODIUM CHLORIDE 9 MG/ML
INJECTION, SOLUTION INTRAVENOUS
Status: DISCONTINUED | OUTPATIENT
Start: 2019-07-26 | End: 2019-07-26 | Stop reason: HOSPADM

## 2019-07-26 RX ORDER — ATROPINE SULFATE 0.1 MG/ML
0.5 INJECTION INTRAVENOUS
Status: DISCONTINUED | OUTPATIENT
Start: 2019-07-26 | End: 2019-07-26 | Stop reason: HOSPADM

## 2019-07-26 RX ORDER — EPINEPHRINE 0.1 MG/ML
1 INJECTION INTRACARDIAC; INTRAVENOUS
Status: DISCONTINUED | OUTPATIENT
Start: 2019-07-26 | End: 2019-07-26 | Stop reason: HOSPADM

## 2019-07-26 RX ORDER — CLOPIDOGREL BISULFATE 75 MG/1
75 TABLET ORAL DAILY
Status: DISCONTINUED | OUTPATIENT
Start: 2019-07-26 | End: 2019-07-27 | Stop reason: HOSPADM

## 2019-07-26 RX ORDER — SODIUM CHLORIDE 0.9 % (FLUSH) 0.9 %
5-40 SYRINGE (ML) INJECTION EVERY 8 HOURS
Status: DISCONTINUED | OUTPATIENT
Start: 2019-07-26 | End: 2019-07-27 | Stop reason: HOSPADM

## 2019-07-26 RX ADMIN — Medication 10 ML: at 16:27

## 2019-07-26 RX ADMIN — Medication 10 ML: at 16:26

## 2019-07-26 RX ADMIN — Medication 10 ML: at 21:22

## 2019-07-26 RX ADMIN — LIDOCAINE HYDROCHLORIDE 40 MG: 20 INJECTION, SOLUTION EPIDURAL; INFILTRATION; INTRACAUDAL; PERINEURAL at 15:22

## 2019-07-26 RX ADMIN — APIXABAN 5 MG: 5 TABLET, FILM COATED ORAL at 21:22

## 2019-07-26 RX ADMIN — ATORVASTATIN CALCIUM 20 MG: 20 TABLET, FILM COATED ORAL at 21:21

## 2019-07-26 RX ADMIN — METOPROLOL TARTRATE 25 MG: 25 TABLET ORAL at 00:34

## 2019-07-26 RX ADMIN — Medication 10 ML: at 21:23

## 2019-07-26 RX ADMIN — SODIUM CHLORIDE: 9 INJECTION, SOLUTION INTRAVENOUS at 15:14

## 2019-07-26 RX ADMIN — METOPROLOL TARTRATE 25 MG: 25 TABLET ORAL at 17:15

## 2019-07-26 RX ADMIN — PROPOFOL 30 MG: 10 INJECTION, EMULSION INTRAVENOUS at 15:27

## 2019-07-26 RX ADMIN — PROPOFOL 30 MG: 10 INJECTION, EMULSION INTRAVENOUS at 15:23

## 2019-07-26 RX ADMIN — PROPOFOL 70 MG: 10 INJECTION, EMULSION INTRAVENOUS at 15:22

## 2019-07-26 RX ADMIN — PANTOPRAZOLE SODIUM 40 MG: 40 INJECTION, POWDER, FOR SOLUTION INTRAVENOUS at 08:55

## 2019-07-26 RX ADMIN — PROPOFOL 30 MG: 10 INJECTION, EMULSION INTRAVENOUS at 15:25

## 2019-07-26 RX ADMIN — SODIUM CHLORIDE 75 ML/HR: 900 INJECTION, SOLUTION INTRAVENOUS at 02:45

## 2019-07-26 NOTE — PROGRESS NOTES
TRANSFER - OUT REPORT:    Verbal report given to Himanshu Palacios Rn(name) on Levi Sorenson  being transferred to room 2225(unit) for routine progression of care       Report consisted of patients Situation, Background, Assessment and   Recommendations(SBAR). Information from the following report(s) SBAR, Kardex and Recent Results was reviewed with the receiving nurse. Lines:   Peripheral IV 07/26/19 Left Hand (Active)   Site Assessment Clean, dry, & intact 7/26/2019  2:18 PM   Phlebitis Assessment 0 7/26/2019  2:18 PM   Infiltration Assessment 0 7/26/2019  2:18 PM   Dressing Status Clean, dry, & intact 7/26/2019  2:18 PM   Dressing Type Transparent;Tape 7/26/2019  2:18 PM   Hub Color/Line Status Green;Flushed 7/26/2019  2:18 PM       Peripheral IV 07/26/19 Right Hand (Active)   Site Assessment Clean, dry, & intact 7/26/2019  2:18 PM   Phlebitis Assessment 0 7/26/2019  2:18 PM   Infiltration Assessment 0 7/26/2019  2:18 PM   Dressing Status Clean, dry, & intact 7/26/2019  2:18 PM   Dressing Type Transparent;Tape 7/26/2019  2:18 PM   Hub Color/Line Status Pink;Flushed 7/26/2019  2:18 PM        Opportunity for questions and clarification was provided.

## 2019-07-26 NOTE — ED NOTES
Pt resting on stretcher     Pt updated on plan with pending ECHO and pending Endo procedure this afternoon.

## 2019-07-26 NOTE — ED NOTES
Pt states he is having pain across his chest after ambulating to the bathroom. Primary nurse notified.

## 2019-07-26 NOTE — PROGRESS NOTES
Caren Hari Ross  1940  [id number]    Situation:  Verbal report received from: CHRISTIANO Pierre RN  Procedure: Procedure(s):  ESOPHAGOGASTRODUODENOSCOPY (EGD)  ESOPHAGOGASTRODUODENAL (EGD) BIOPSY    Background:    Preoperative diagnosis: anemia  Postoperative diagnosis: EGD: esophagitis, gastritis    :  Dr. Brea Henry  Assistant(s): Endoscopy Technician-1: Tiera Mcwilliams  Endoscopy RN-1: Sande Essex, RN    Specimens:   ID Type Source Tests Collected by Time Destination   1 : Bx Preservative Duodenum  Saulo Redd MD 7/26/2019 1530 Pathology   2 : bx Preservative Gastric  Saulo Redd MD 7/26/2019 1531 Pathology   3 : Bx Preservative GE Junction  Saulo Deshpande MD 7/26/2019 1531 Pathology     H. Pylori  no    Assessment:  Intra-procedure medications     Anesthesia gave intra-procedure sedation and medications, see anesthesia flow sheet yes    Intravenous fluids: NS@ KVO     Vital signs stable     Abdominal assessment: round and soft     Recommendation:  Discharge patient per MD order.   Return to floor  Permission to share finding with family or friend yes

## 2019-07-26 NOTE — PROGRESS NOTES
TRANSFER - IN REPORT:    Verbal report received from [address] Gil Chaparro Rn(name) on [facility]  being received from room 2225 (unit) for ordered procedure      Report consisted of patients Situation, Background, Assessment and   Recommendations(SBAR). Information from the following report(s) SBAR and Kardex was reviewed with the receiving nurse. Opportunity for questions and clarification was provided.

## 2019-07-26 NOTE — PROGRESS NOTES
Hospitalist Progress Note    NAME: Cheyanne Murphy   :  1940   MRN:  [id number]       Interim Hospital Summary: 70 y.o. male whom presented on 2019 with      Assessment / Plan:    GI bleeding POA- upper suspected  Causing Symptomatic Anemia (due to Subacute Blood loss) POA  Causing Chest Pain (exertional) due to demand Ischemia POA  CAD s/p recent Stent in 2019, s/p CABG in   H/o Afib s/p ablation s/p PPM  Stool Guaiac +ve in ER  CT A/P neg acute  CXR= neg acute  Trop neg x 3 sets   IV PPI Q 12  · Echo Left Ventricle: Mild concentric hypertrophy. Low normal systolic dysfunction. Estimated left ventricular ejection fraction is 51 - 55%. · Pericardium: Trivial-to-small pericardial effusion. Cont  BB and statin  NTG prn  Cardiology/gi following for   Endoscopic Gastroduodenoscopy with biopsy thsi afternoon   Restart plavix/eliquis when ok w cardiology/GI later today        ERAN/CKD stage 3 POA- baseline Cr 1.8  Cr= 1.85 today  Holding metformin  IVF overnight     DM type 2 uncontrolled with hyperglycemia  FS Q AC & HS  Holding metformin  SSI lispro here      Morbid Obesity POA  Weight loss recommended     Code Status: Full  Surrogate Decision Maker: wife     DVT Prophylaxis: SCDs  GI Prophylaxis: IV PPI as above     Baseline: Pt independent with ADLs at home         Subjective:     Chief Complaint / Reason for Physician Visit  No more bleeding   Discussed with RN events overnight. Review of Systems:  Symptom Y/N Comments  Symptom Y/N Comments   Fever/Chills n   Chest Pain n    Poor Appetite n   Edema n    Cough n   Abdominal Pain n    Sputum n   Joint Pain     SOB/MCCOLLUM n   Pruritis/Rash     Nausea/vomit n   Tolerating PT/OT     Diarrhea    Tolerating Diet n npo   Constipation    Other       Could NOT obtain due to:      Objective:     VITALS:   Last 24hrs VS reviewed since prior progress note.  Most recent are:  Patient Vitals for the past 24 hrs:   Temp Pulse Resp BP SpO2   19 0530  70 12 141/90 100 %   07/26/19 0430  62 11 115/61 98 %   07/26/19 0330  66 10 111/53 97 %   07/26/19 0230  71 15 117/61 97 %   07/26/19 0150 98.1 °F (36.7 °C) 78 14 127/65 98 %   07/26/19 0130  75 13 128/48 97 %   07/26/19 0115  77 12 123/59 97 %   07/26/19 0100  76 16 (!) 144/111 97 %   07/26/19 0050 98.2 °F (36.8 °C) 71 16 130/72    07/26/19 0045  78 11 132/68 97 %   07/26/19 0030  74 14 115/57 97 %   07/26/19 0020 98.2 °F (36.8 °C) 75 16 125/78    07/25/19 2350 98.1 °F (36.7 °C) 77  144/62    07/25/19 2335 98.1 °F (36.7 °C) 82  122/56    07/25/19 2320 98 °F (36.7 °C) 61  127/64    07/25/19 2305 98 °F (36.7 °C) 60 16 129/58 99 %   07/25/19 2250 97.9 °F (36.6 °C) 71 16 135/61 98 %   07/25/19 2045  74 17 (!) 144/117 98 %   07/25/19 1915  73 15 135/66 98 %   07/25/19 1900  72 23 118/54 94 %   07/25/19 1845  74 12 126/55 100 %   07/25/19 1830  74 12 128/57 99 %   07/25/19 1815  66 11 129/60 98 %   07/25/19 1800  63 12 122/53 98 %   07/25/19 1700  70 19 132/60 95 %   07/25/19 1630  66 15  100 %     No intake or output data in the 24 hours ending 07/26/19 0713     PHYSICAL EXAM:General: older  Heart: RRR, no m/S3/JVD, no carotid bruits   Lungs: clear   Abdomen: Soft,obese  +BS, NTND   Extremities: LE arthur +DP/PT, no edema   : Grossly normal  exam   Neurologic:      EOMs intact. No facial asymmetry. No aphasia or slurred speech. Normal strength, A/O X 3.   Reviewed most current lab test results and cultures  YES  Reviewed most current radiology test results   YES  Review and summation of old records today    NO  Reviewed patient's current orders and MAR    YES  PMH/SH reviewed - no change compared to H&P  ________________________________________________________________________  Care Plan discussed with:    Comments   Patient x    Family      RN x    Care Manager     Consultant  x                      Multidiciplinary team rounds were held today with , nursing, pharmacist and clinical coordinator. Patient's plan of care was discussed; medications were reviewed and discharge planning was addressed. ________________________________________________________________________  Total NON critical care TIME:  30   Minutes    Total CRITICAL CARE TIME Spent:   Minutes non procedure based      Comments   >50% of visit spent in counseling and coordination of care x    ________________________________________________________________________  Mayda Orozco MD     Procedures: see electronic medical records for all procedures/Xrays and details which were not copied into this note but were reviewed prior to creation of Plan. LABS:  I reviewed today's most current labs and imaging studies.   Pertinent labs include:  Recent Labs     07/26/19  0523 07/25/19  0950   WBC 6.1 6.3   HGB 7.7* 7.0*   HCT 24.8* 22.3*    164     Recent Labs     07/26/19 0523 07/25/19  0950    140   K 4.6 4.8   * 107   CO2 22 23   * 183*   BUN 45* 49*   CREA 1.85* 2.27*   CA 8.4* 8.6   ALB  --  3.3*   TBILI  --  0.5   SGOT  --  14*   ALT  --  22   INR  --  1.1       Signed: Mayda Orozco MD

## 2019-07-26 NOTE — PROGRESS NOTES
Bedside shift change report GIVEN TO Sushma Spaulding RN. Report included the following information SBAR. SIGNIFICANT CHANGES DURING SHIFT:  H&H stable, endoscopy done this afternoon. No complaints of any chest pain. CONCERNS TO ADDRESS WITH MD:            [facility] NURSING NOTE   Admission Date 7/25/2019   Admission Diagnosis GI bleed [K92.2]  Chest pain [R07.9]  Anemia [D64.9]   Consults IP CONSULT TO CARDIOLOGY  IP CONSULT TO GASTROENTEROLOGY      Cardiac Monitoring [x] Yes [] No      Purposeful Hourly Rounding [x] Yes    Peng Score Total Score: 2   Peng score 3 or > [x] Bed Alarm [] Avasys [] 1:1 sitter [] Patient refused (Signed refusal form in chart)   Alexys Score Alexys Score: 20   Alexys score 14 or < [] PMT consult [] Wound Care consult    []  Specialty bed  [] Nutrition consult      Influenza Vaccine             Oxygen needs? [x] Room air Oxygen @  []1L    []2L    []3L   []4L    []5L   []6L via  NC   Chronic home O2 use?  [] Yes [] No  Perform O2 challenge test and document in progress note using smartphrase (.Homeoxygen)      Last bowel movement Last Bowel Movement Date: 07/23/19      Urinary Catheter             LDAs               Peripheral IV 07/26/19 Left Hand (Active)   Site Assessment Clean, dry, & intact 7/26/2019  2:18 PM   Phlebitis Assessment 0 7/26/2019  2:18 PM   Infiltration Assessment 0 7/26/2019  2:18 PM   Dressing Status Clean, dry, & intact 7/26/2019  2:18 PM   Dressing Type Transparent;Tape 7/26/2019  2:18 PM   Hub Color/Line Status Green;Flushed 7/26/2019  2:18 PM       Peripheral IV 07/26/19 Right Hand (Active)   Site Assessment Clean, dry, & intact 7/26/2019  2:18 PM   Phlebitis Assessment 0 7/26/2019  2:18 PM   Infiltration Assessment 0 7/26/2019  2:18 PM   Dressing Status Clean, dry, & intact 7/26/2019  2:18 PM   Dressing Type Transparent;Tape 7/26/2019  2:18 PM   Hub Color/Line Status Pink;Flushed 7/26/2019  2:18 PM                         Readmission Risk Assessment Tool Score High Risk            28       Total Score        5 Pt. Coverage (Medicare=5 , Medicaid, or Self-Pay=4)    23 Charlson Comorbidity Score (Age + Comorbid Conditions)        Criteria that do not apply:    Has Seen PCP in Last 6 Months (Yes=3, No=0)    . Living with Significant Other. Assisted Living. LTAC. SNF.  or   Rehab    Patient Length of Stay (>5 days = 3)    IP Visits Last 12 Months (1-3=4, 4=9, >4=11)       Expected Length of Stay - - -   Actual Length of Stay 1

## 2019-07-26 NOTE — H&P
Hospitalist Admission Note    NAME: Abbie Hannah   :  1940   MRN:  [id number]     Date/Time:  2019 10:11 PM    Patient PCP: None Cardio= [facility] cardiology  ______________________________________________________________________  Given the patient's current clinical presentation, I have a high level of concern for decompensation if discharged from the emergency department. Complex decision making was performed, which includes reviewing the patient's available past medical records, laboratory results, and x-ray films. My assessment of this patient's clinical condition and my plan of care is as follows.     Assessment / Plan:  GI bleeding POA- upper suspected  Causing Symptomatic Anemia (due to Subacute Blood loss) POA  Causing Chest Pain (exertional) due to demand Ischemia POA  CAD s/p recent Stent in 2019, s/p CABG in   H/o Afib s/p ablation s/p PPM  Hb= 7.0 (down from 10.8 in may 2019)  Stool Guaiac +ve in ER  CT A/P neg acute  CXR= neg acute  Trop neg x 2 sets  EKG= A paced at 62 bpm    Admit to telemetry bed on [facility] if possible   mg x 1 at [facility] ER before transfer to [facility]  Cont ASA 81 mg daily  Hold Plavix & Eliquis for GI bleed  Cont metoprolol  IV PPI Q 12  Agree to PRBC transfusion- 1unit for now, check serial H/H post transfusion  Bed rest for now  Trend troponin   Check Echo  Ip cardiology consulted in ER- ok with holding eliquis & plavix for now but will need to resume plavix soon  Ip GI consulted- NPO p MN for EGD in AM  Cont statin  NTG prn    ERAN/CKD stage 3 POA- baseline Cr 1.8  Cr= 2.2  Holding metformin  IVF overnight  BMP in AM    DM type 2 uncontrolled with hyperglycemia  FS Q AC & HS  Holding metformin  SSI lispro here    Morbid Obesity POA  Weight loss recommended    Code Status: Full  Surrogate Decision Maker: wife    DVT Prophylaxis: SCDs  GI Prophylaxis: IV PPI as above    Baseline: Pt independent with ADLs at home      Subjective:   CHIEF COMPLAINT: Chest pain on & off x 1 week    HISTORY OF PRESENT ILLNESS:     Ml Collins is a 70 y.o.  male who presents with above complains from home initially to [facility] ER then transferred to [facility] ER for GI & Cardiac evaluation. Pt presents with CC of Chest pain , intermittent, on exertion x 1 week  H/o improvement with NTG  Pt denies any evidence of blood in stools but had stool +ve for Guaiac in ER  H/o taking ASA/Plavix/Eliquis daily  H/o recent DEVONTE in 2019    We were asked to admit for work up and evaluation of the above problems.      Past Medical History:   Diagnosis Date    Abnormal nuclear cardiac imaging test 2019    Anemia 2017    CKD (chronic kidney disease) 2019    Coronary atherosclerosis of native coronary artery     Diabetes mellitus, type II (HCC)     MCCOLLUM (dyspnea on exertion) 2019    Mixed hyperlipidemia     Other dyspnea and respiratory abnormality     S/P ablation of atrial fibrillation 2016        Past Surgical History:   Procedure Laterality Date    HX CATARACT REMOVAL Bilateral 2018    HX CORONARY ARTERY BYPASS GRAFT  2001    CA INS NEW/RPLCMT PRM PM W/TRANSV ELTRD ATRIAL&VENT N/A 3/29/2019    INSERT PPM DUAL performed by Roshan Magana MD at [facility] CARDIAC CATH LAB    CA REMOVAL SUBCUTANEOUS CARDIAC RHYTHM MONITOR N/A 3/29/2019    LOOP RECORDER REMOVAL performed by Roshan Magana MD at [facility] CARDIAC CATH LAB       Social History     Tobacco Use    Smoking status: Former Smoker     Packs/day: 4.00     Years: 20.00     Pack years: 80.00     Types: Cigarettes     Last attempt to quit: 1980     Years since quittin.0    Smokeless tobacco: Former User    Tobacco comment: QUIT AT AGE 42   Substance Use Topics    Alcohol use: No     Alcohol/week: 0.0 standard drinks        Family History   Problem Relation Age of Onset    Diabetes Mother     Emphysema Father          age 46 - smoker     Allergies   Allergen Reactions    Ciprofloxacin Other (comments)     Difficulty breathing; 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        Prior to Admission medications    Medication Sig Start Date End Date Taking? Authorizing Provider   rosuvastatin (CRESTOR) 40 mg tablet TAKE 1 TABLET BY MOUTH EVERY DAY 7/22/19   María Briggs NP   glucose blood VI test strips (ACCU-CHEK YVES PLUS TEST STRP) strip Use to check blood glucose daily. 7/22/19   Germaine Garcia MD   nitroglycerin (NITROSTAT) 0.4 mg SL tablet 1 Tab by SubLINGual route every five (5) minutes as needed for Chest Pain. 7/16/19   Bianca Godfrey ANP   metFORMIN ER (GLUCOPHAGE XR) 500 mg tablet Take 1 Tab by mouth two (2) times a day. 7/5/19   Germaine Garcia MD   clopidogrel (PLAVIX) 75 mg tab Take 1 Tab by mouth daily. 5/8/19   Aaksah Casanova NP   metoprolol tartrate (LOPRESSOR) 25 mg tablet Take 1 Tab by mouth two (2) times a day. 4/15/19   Bianca Godfrey ANP   ELIQUIS 5 mg tablet TAKE 1 TABLET BY MOUTH TWICE A DAY 1/17/19   Bianca Godfrey ANP   cholecalciferol (VITAMIN D3) 1,000 unit cap Take  by mouth daily. Provider, Historical   ferrous sulfate 324 mg (65 mg iron) tablet Take  by mouth Daily (before breakfast). Provider, Historical   cyanocobalamin 1,000 mcg tablet Take 1,000 mcg by mouth daily. Provider, Historical   aspirin delayed-release (ECOTRIN LOW STRENGTH) 81 mg tablet Take  by mouth daily.       Provider, Historical       REVIEW OF SYSTEMS:           Total of 12 systems reviewed as follows:       POSITIVE= underlined text  Negative = text not underlined  General:  fever, chills, sweats, generalized weakness, weight loss/gain,      loss of appetite   Eyes:    blurred vision, eye pain, loss of vision, double vision  ENT:    rhinorrhea, pharyngitis   Respiratory:   cough, sputum production, SOB, MCCOLLUM, wheezing, pleuritic pain   Cardiology:   chest pain, palpitations, orthopnea, PND, edema, syncope   Gastrointestinal:  abdominal pain , N/V, diarrhea, dysphagia, constipation, bleeding Genitourinary:  frequency, urgency, dysuria, hematuria, incontinence   Muskuloskeletal :  arthralgia, myalgia, back pain  Hematology:  easy bruising, nose or gum bleeding, lymphadenopathy   Dermatological: rash, ulceration, pruritis, color change / jaundice  Endocrine:   hot flashes or polydipsia   Neurological:  headache, dizziness, confusion, focal weakness, paresthesia,     Speech difficulties, memory loss, gait difficulty  Psychological: Feelings of anxiety, depression, agitation    Objective:   VITALS:    Visit Vitals  BP (!) 144/117   Pulse 74   Resp 17   SpO2 98%       PHYSICAL EXAM:    General:    Alert, cooperative, no distress, appears stated age. Obese +    HEENT: Atraumatic, anicteric sclerae, pink conjunctivae     No oral ulcers, mucosa moist, throat clear, dentition fair  Neck:  Supple, symmetrical,  thyroid: non tender  Lungs:   Clear to auscultation bilaterally. No Wheezing or Rhonchi. No rales. Chest wall:  No tenderness  No Accessory muscle use. Heart:   Regular  rhythm,  No  murmur   No edema  Abdomen:   Soft, non-tender. Not distended. Bowel sounds normal  Extremities: No cyanosis. No clubbing,      Skin turgor normal, Capillary refill normal, Radial dial pulse 2+  Skin:     Not pale. Not Jaundiced  No rashes   Psych:  Good insight. Not depressed. Not anxious or agitated. Neurologic: EOMs intact. No facial asymmetry. No aphasia or slurred speech. Symmetrical strength, Sensation grossly intact.  Alert and oriented X 4.     _______________________________________________________________________  Care Plan discussed with:    Comments   Patient x    Family      RN x    Care Manager                    Consultant:  alexi Newberry   _______________________________________________________________________  Expected  Disposition:   Home with Family x   HH/PT/OT/RN ?   SNF/LTC    SHEA    ________________________________________________________________________  TOTAL TIME:  72 Minutes    Critical Care Provided     Minutes non procedure based      Comments    x Reviewed previous records   >50% of visit spent in counseling and coordination of care x Discussion with patient  and questions answered       ________________________________________________________________________  Signed: Batsheva Spangler MD    Procedures: see electronic medical records for all procedures/Xrays and details which were not copied into this note but were reviewed prior to creation of Plan. LAB DATA REVIEWED:    Recent Results (from the past 24 hour(s))   EKG, 12 LEAD, INITIAL    Collection Time: 07/25/19  9:46 AM   Result Value Ref Range    Ventricular Rate 62 BPM    Atrial Rate 62 BPM    P-R Interval 230 ms    QRS Duration 106 ms    Q-T Interval 412 ms    QTC Calculation (Bezet) 418 ms    Calculated P Axis 71 degrees    Calculated R Axis 44 degrees    Calculated T Axis 57 degrees    Diagnosis       Atrial-paced rhythm with prolonged AV conduction  Nonspecific ST abnormality  Abnormal ECG  No previous ECGs available     CBC WITH AUTOMATED DIFF    Collection Time: 07/25/19  9:50 AM   Result Value Ref Range    WBC 6.3 4.1 - 11.1 K/uL    RBC 2.37 (L) 4.10 - 5.70 M/uL    HGB 7.0 (L) 12.1 - 17.0 g/dL    HCT 22.3 (L) 36.6 - 50.3 %    MCV 94.1 80.0 - 99.0 FL    MCH 29.5 26.0 - 34.0 PG    MCHC 31.4 30.0 - 36.5 g/dL    RDW 15.8 (H) 11.5 - 14.5 %    PLATELET 710 553 - 679 K/uL    MPV 10.9 8.9 - 12.9 FL    NRBC 0.0 0  WBC    ABSOLUTE NRBC 0.00 0.00 - 0.01 K/uL    NEUTROPHILS 74 32 - 75 %    LYMPHOCYTES 16 12 - 49 %    MONOCYTES 8 5 - 13 %    EOSINOPHILS 1 0 - 7 %    BASOPHILS 0 0 - 1 %    IMMATURE GRANULOCYTES 1 (H) 0.0 - 0.5 %    ABS. NEUTROPHILS 4.7 1.8 - 8.0 K/UL    ABS. LYMPHOCYTES 1.0 0.8 - 3.5 K/UL    ABS. MONOCYTES 0.5 0.0 - 1.0 K/UL    ABS. EOSINOPHILS 0.1 0.0 - 0.4 K/UL    ABS. BASOPHILS 0.0 0.0 - 0.1 K/UL    ABS. IMM.  GRANS. 0.0 0.00 - 0.04 K/UL    DF AUTOMATED     METABOLIC PANEL, COMPREHENSIVE    Collection Time: 07/25/19 9:50 AM   Result Value Ref Range    Sodium 140 136 - 145 mmol/L    Potassium 4.8 3.5 - 5.1 mmol/L    Chloride 107 97 - 108 mmol/L    CO2 23 21 - 32 mmol/L    Anion gap 10 5 - 15 mmol/L    Glucose 183 (H) 65 - 100 mg/dL    BUN 49 (H) 6 - 20 MG/DL    Creatinine 2.27 (H) 0.70 - 1.30 MG/DL    BUN/Creatinine ratio 22 (H) 12 - 20      GFR est AA 34 (L) >60 ml/min/1.73m2    GFR est non-AA 28 (L) >60 ml/min/1.73m2    Calcium 8.6 8.5 - 10.1 MG/DL    Bilirubin, total 0.5 0.2 - 1.0 MG/DL    ALT (SGPT) 22 12 - 78 U/L    AST (SGOT) 14 (L) 15 - 37 U/L    Alk. phosphatase 51 45 - 117 U/L    Protein, total 6.4 6.4 - 8.2 g/dL    Albumin 3.3 (L) 3.5 - 5.0 g/dL    Globulin 3.1 2.0 - 4.0 g/dL    A-G Ratio 1.1 1.1 - 2.2     TROPONIN I    Collection Time: 07/25/19  9:50 AM   Result Value Ref Range    Troponin-I, Qt. <0.05 <0.05 ng/mL   NT-PRO BNP    Collection Time: 07/25/19  9:50 AM   Result Value Ref Range    NT pro- (H) 0 - 450 PG/ML   SAMPLES BEING HELD    Collection Time: 07/25/19  9:50 AM   Result Value Ref Range    SAMPLES BEING HELD 1SST, 1BLUE     COMMENT        Add-on orders for these samples will be processed based on acceptable specimen integrity and analyte stability, which may vary by analyte.    PROTHROMBIN TIME + INR    Collection Time: 07/25/19  9:50 AM   Result Value Ref Range    INR 1.1 0.9 - 1.1      Prothrombin time 11.0 9.0 - 11.1 sec   OCCULT BLOOD, STOOL    Collection Time: 07/25/19 11:03 AM   Result Value Ref Range    Occult blood, stool POSITIVE (A) NEG     IRON PROFILE    Collection Time: 07/25/19 11:25 AM   Result Value Ref Range    Iron 29 (L) 65 - 175 ug/dL    TIBC 411 250 - 450 ug/dL    Iron % saturation 7 (L) 20 - 50 %   VITAMIN B12    Collection Time: 07/25/19 11:25 AM   Result Value Ref Range    Vitamin B12 355 193 - 986 pg/mL   TYPE + CROSSMATCH    Collection Time: 07/25/19 11:25 AM   Result Value Ref Range    Crossmatch Expiration 07/28/2019     ABO/Rh(D) A POSITIVE     Antibody screen NEG Unit number [id number]     Blood component type [facility]     Unit division 00     Status of unit ALLOCATED     Crossmatch result Compatible    SAMPLES BEING HELD    Collection Time: 07/25/19 11:25 AM   Result Value Ref Range    SAMPLES BEING HELD  2 PST, 1 BLUE, 1 LAV     COMMENT        Add-on orders for these samples will be processed based on acceptable specimen integrity and analyte stability, which may vary by analyte.    TROPONIN I    Collection Time: 07/25/19  5:14 PM   Result Value Ref Range    Troponin-I, Qt. <0.05 <0.05 ng/mL   CK W/ REFLX CKMB    Collection Time: 07/25/19  5:14 PM   Result Value Ref Range     39 - 308 U/L   TYPE + CROSSMATCH    Collection Time: 07/25/19  8:16 PM   Result Value Ref Range    Crossmatch Expiration 07/28/2019     ABO/Rh(D) A POSITIVE     Antibody screen NEG     Unit number [id number]     Blood component type [facility]     Unit division 00     Status of unit ALLOCATED     Crossmatch result Compatible

## 2019-07-26 NOTE — ED NOTES
This RN spoke with [facility] who states they will do patient's study @ 12    This RN relayed information to Aniceto Hodgkins in Endo.

## 2019-07-26 NOTE — PROGRESS NOTES
Dr. Kailyn Morton discussed EGD with Dr. Larry Isaac to restart Eliquis this evening. Continue on Plavix  STOP ASA.

## 2019-07-26 NOTE — PROGRESS NOTES
Pharmacy Clarification of the Prior to Admission Medication Regimen Retrospective to the Admission Medication Reconciliation    The patient was interviewed regarding clarification of the prior to admission medication regimen and was questioned regarding use of any other inhalers, topical products, over the counter medications, herbal medications, vitamin products or ophthalmic/nasal/otic medication use. Patient was uncertain of his medication strengths. [facility] called the outpatient pharmacy, [address] [place] [address], [phone], and spoke with Ras Acuña, pharmacist, who was able to verify the patient's medications and strengths. Information Obtained From: Patient, Rx query, Outpatient pharmacy    Recommendations/Findings: The following amendments were made to the patient's active medication list on file at [facility]:     1) Additions: none    2) Removals: none    3) Changes:  aspirin delayed-release (ECOTRIN LOW STRENGTH) 81 mg tablet (Old regimen: no sig /New regimen: take 81 mg by mouth daily)  cholecalciferol (VITAMIN D3) 1,000 unit cap (Old regimen: no sig /New regimen: take 1,000 unit by mouth daily)  ferrous sulfate 324 mg (65 mg iron) tablet (Old regimen: no sig /New regimen: take 324 mg by mouth daily)    4) Pertinent Pharmacy Findings:  Updated patients preferred outpatient pharmacy to: [facility]/pharmacy #[id number]- [facility], [facility] [address]   Identified High Alert Medication Information  Current Anticoagulants:  Name: ELIQUIS 5 mg tablet  Patient stated he last took this agent on the evening of 07/24 but not sure at exactly what time. PTA medication list was corrected to the following:     Prior to Admission Medications   Prescriptions Last Dose Informant Patient Reported? Taking? ELIQUIS 5 mg tablet 7/24/2019 at 2000 Self No Yes   Sig: TAKE 1 TABLET BY MOUTH TWICE A DAY   aspirin delayed-release (ECOTRIN LOW STRENGTH) 81 mg tablet 7/24/2019 at Unknown time Self Yes Yes   Sig: Take 81 mg by mouth daily. cholecalciferol (VITAMIN D3) 1,000 unit cap 2019 Self Yes Yes   Sig: Take 1,000 Units by mouth daily. clopidogrel (PLAVIX) 75 mg tab 2019 Self No Yes   Sig: Take 1 Tab by mouth daily. cyanocobalamin 1,000 mcg tablet 2019 Self Yes Yes   Sig: Take 1,000 mcg by mouth daily. ferrous sulfate 324 mg (65 mg iron) tablet 2019 Self Yes Yes   Sig: Take 324 mg by mouth Daily (before breakfast). metFORMIN ER (GLUCOPHAGE XR) 500 mg tablet 2019 Self No Yes   Sig: Take 1 Tab by mouth two (2) times a day. metoprolol tartrate (LOPRESSOR) 25 mg tablet 2019 Self No Yes   Sig: Take 1 Tab by mouth two (2) times a day. nitroglycerin (NITROSTAT) 0.4 mg SL tablet 2019 at Unknown time Self No Yes   Si Tab by SubLINGual route every five (5) minutes as needed for Chest Pain.    rosuvastatin (CRESTOR) 40 mg tablet 2019 Self No Yes   Sig: TAKE 1 TABLET BY MOUTH EVERY DAY      Facility-Administered Medications: None          Thank you,  Brian Alcantar  Medication History Pharmacy Technician

## 2019-07-26 NOTE — PROGRESS NOTES
Reason for Admission:   GI bleed, chest pain               RRAT Score:     28 high risk             Resources/supports as identified by patient/family:   Family                 Top Challenges facing patient (as identified by patient/family and CM): Finances/Medication cost?      No challenges reported              Transportation? Pt's wife can transport at discharge              Support system or lack thereof?  family                     Living arrangements? Lives with wife, [place]\", 1 step to enter           Self-care/ADLs/Cognition? Independent with ADLs, currently alert and oriented          Current Advanced Directive/Advance Care Plan:  none                          Plan for utilizing home health:    TBD                 Transition of Care Plan:                  1.  Patient in ED bed waiting for inpatient admission  2. Patient will need 2nd IM letter at discharge  3. Patient has no PCP. When questioned by CM about following up with a PCP after discharge, patient states he prefers to just see Dr. Darrin Begum (cardiology). Patient does not want to select a PCP at this time because [place] and [place]'t want to \"come into town\" for another appointment. 4.  Patient prefers to discharge home with family assistance and follow up appointments. Patient is a 70year old male admitted 7/26 for GI bleed and chest pain. Patient alert and oriented, resting in bed, no visitors present in room. Demographic information verified and correct. Insurance information verified and correct. Patient lives with his wife, no home oxygen, no DME and has not used home health in the past.  Patient uses [facility] in [place]. Patient states he is independent with ADLs and can drive. Patient's wife can transport at discharge.     Care Management Interventions  PCP Verified by CM: Yes(pt has no PCP)  Mode of Transport at Discharge:  Other (see comment)(pt's wife can transport at [facility])  Transition of Care Consult (CM Consult): Discharge Planning  Discharge Durable Medical Equipment: No  Physical Therapy Consult: No  Occupational Therapy Consult: No  Speech Therapy Consult: No  Current Support Network: Lives with Spouse, Own Home([place]  Confirm Follow Up Transport: Family  Plan discussed with Pt/Family/Caregiver: Yes  Discharge Location  Discharge Placement: Unable to determine at this time     Abhijit Allred RN, [address] [place] [address]  [phone]

## 2019-07-26 NOTE — ED NOTES
TRANSFER - IN REPORT:    Verbal report received from Alexandr Mota (name) on [facility]. Report consisted of patients Situation, Background, Assessment and   Recommendations(SBAR). Information from the following report(s) SBAR and ED Summary was reviewed with the receiving nurse. Opportunity for questions and clarification was provided. Assumed care of patient currently resting on stretcher without complaints. Pt denies any needs/complaints/wants at time. Pt remains on telemetry monitoring showing SR with [facility]. Pt alert oriented x 4.

## 2019-07-26 NOTE — ANESTHESIA PREPROCEDURE EVALUATION
Anesthetic History   No history of anesthetic complications            Review of Systems / Medical History  Patient summary reviewed, nursing notes reviewed and pertinent labs reviewed    Pulmonary          Shortness of breath and smoker      Comments: Former smoker 80 pack years   Neuro/Psych   Within defined limits           Cardiovascular        Angina: with exertion    Dysrhythmias : atrial fibrillation and PVC  Pacemaker, CAD, cardiac stents, CABG and hyperlipidemia    Exercise tolerance: >4 METS  Comments: ECHO from 4/2018 showed a 55-60% EF with mild TR    S/P PVI    Had his beta blocker at 00:34 today, 9 am dose held   GI/Hepatic/Renal         Renal disease: CRI       Endo/Other    Diabetes: poorly controlled, type 2, using insulin    Obesity and anemia     Other Findings   Comments: GI bleed         Physical Exam    Airway  Mallampati: IV  TM Distance: > 6 cm  Neck ROM: normal range of motion   Mouth opening: Normal     Cardiovascular  Regular rate and rhythm,  S1 and S2 normal,  no murmur, click, rub, or gallop  Rhythm: regular  Rate: normal         Dental    Dentition: Poor dentition, Caps/crowns and Bridges  Comments: Some missing teeth   Pulmonary  Breath sounds clear to auscultation               Abdominal  GI exam deferred       Other Findings            Anesthetic Plan    ASA: 3  Anesthesia type: total IV anesthesia          Induction: Intravenous  Anesthetic plan and risks discussed with: Patient

## 2019-07-26 NOTE — CONSULTS
[address]  Sujatha Oneil 59              GI CONSULTATION NOTE  Carol Handley, NP  [phone] NP in-hospital cell phone M-F until 4:30  After 5pm or on weekends, please call  for physician on call    NAME: Anju Solano   :  1940   MRN:  [id number]     Primary GI: Dr Nila Quigley    Date/Time:  2019 9:33 AM  Assessment:   GI Bleed on anticoagulation - high risk -  Ddx erosive gastritis, PUD, AVM, and lesion. Symptomatic Anemia  SOB with exertion  - Taking Plavix, Eliquis, and ASA  - FOBT +  - Hgb 7.0 on arrival from 10.8 in 2019  - S/p 1 unit of PRBC and Hgb now 7.7  - CT abd/pel - negative   - No aubrey blood loss  - Last BM 2 days ago - typically has a BM every 3 days - this has not changed recently  - Dark stools but on iron daily  - No previous EGD    CAD with recent DEVONTE in 2019   CABG in   Afib s/p ablation   Pacemaker  - Neg troponins     CKD   DMII   Obesity      Plan:   1. EGD today with Dr Nila Quigley at 36  2. Maintain NPO - last food was at 0000  3. Hold anticoagulation and ASA if possible but will defer to cardiology as recent stent placed  4. BID PPI  5. No NSAIDs  6. Serial H/H - Transfuse for Hgb < 7.0  7. Supportive care    THank you for consultation. Plan discussed with Dr Thomes Cowden:     HISTORY OF PRESENT ILLNESS:     Anju Solano is an 70 y.o.  male who we are asked to see for complaint of GI Bleed. Medical history includes CKD, obesity, afib s/p ablation and pacemaker in 3/2019,  CAD with DEVONTE - most recent 2019, and hyperlipidemia. States that yesterday, he was going to take his dof for a walk and then had to stop due to chest pain going across chest and shortness of breath. Upon arrival to ER, he was noted to have heme + stool and Hgb of 7.0 which is down from 10.8 in 2019. Received 1 unit pRBC in hospital , hgb now 7.7.  States he does take Plavix 75mg daily, Eliquis BID, and ASA 81mg daily (but was just told by Dr Mick Angeles that he was suppose to stop ASA a few weeks ago). Pt denies any obvious blood in stools, no black stools, or melena. States he typically has a BM every 3 days with straining but this has not changed recently. Denies any recent NSAID use. No daily PPI or H2A. Denies chronic GERD. No previous EGD. He does endorse taking iron daily for some history of anemia.      Past Medical History:   Diagnosis Date    Abnormal nuclear cardiac imaging test 2019    Anemia 2017    CKD (chronic kidney disease) 2019    Coronary atherosclerosis of native coronary artery     Diabetes mellitus, type II ([address]     MCCOLLUM (dyspnea on exertion) 2019    Mixed hyperlipidemia     Other dyspnea and respiratory abnormality     S/P ablation of atrial fibrillation 2016      Past Surgical History:   Procedure Laterality Date    HX CATARACT REMOVAL Bilateral 2018    HX CORONARY ARTERY BYPASS GRAFT  2001    NH INS NEW/RPLCMT PRM PM W/TRANSV ELTRD ATRIAL&VENT N/A 3/29/2019    INSERT PPM DUAL performed by Ailyn Agrawal MD at [facility] CARDIAC CATH LAB    NH REMOVAL SUBCUTANEOUS CARDIAC RHYTHM MONITOR N/A 3/29/2019    LOOP RECORDER REMOVAL performed by Ailyn Agrawal MD at [facility] CARDIAC CATH LAB     Social History     Tobacco Use    Smoking status: Former Smoker     Packs/day: 4.00     Years: 20.00     Pack years: 80.00     Types: Cigarettes     Last attempt to quit: 1980     Years since quittin.0    Smokeless tobacco: Former User    Tobacco comment: QUIT AT AGE 42   Substance Use Topics    Alcohol use: No     Alcohol/week: 0.0 standard drinks      Family History   Problem Relation Age of Onset    Diabetes Mother     Emphysema Father          age 46 - smoker      Allergies   Allergen Reactions    Ciprofloxacin Other (comments)     Difficulty breathing; 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      Prior to Admission medications    Medication Sig Start Date End Date Taking? Authorizing Provider   rosuvastatin (CRESTOR) 40 mg tablet TAKE 1 TABLET BY MOUTH EVERY DAY 7/22/19   Geronimo Alex NP   glucose blood VI test strips (ACCU-CHEK YVES PLUS TEST STRP) strip Use to check blood glucose daily. 7/22/19   Torres Livingston MD   nitroglycerin (NITROSTAT) 0.4 mg SL tablet 1 Tab by SubLINGual route every five (5) minutes as needed for Chest Pain. 7/16/19   Bianca Godfrey ANP   metFORMIN ER (GLUCOPHAGE XR) 500 mg tablet Take 1 Tab by mouth two (2) times a day. 7/5/19   Torres Livingston MD   clopidogrel (PLAVIX) 75 mg tab Take 1 Tab by mouth daily. 5/8/19   Omaira Edmondson NP   metoprolol tartrate (LOPRESSOR) 25 mg tablet Take 1 Tab by mouth two (2) times a day. 4/15/19   Bianca Godfrey ANP   ELIQUIS 5 mg tablet TAKE 1 TABLET BY MOUTH TWICE A DAY 1/17/19   Bianca Godfrey ANP   cholecalciferol (VITAMIN D3) 1,000 unit cap Take  by mouth daily. Provider, Historical   ferrous sulfate 324 mg (65 mg iron) tablet Take  by mouth Daily (before breakfast). Provider, Historical   cyanocobalamin 1,000 mcg tablet Take 1,000 mcg by mouth daily. Provider, Historical   aspirin delayed-release (ECOTRIN LOW STRENGTH) 81 mg tablet Take  by mouth daily.       Provider, Historical       Patient Active Problem List   Diagnosis Code    Postsurgical aortocoronary bypass status Z95.1    Mixed hyperlipidemia E78.2    Coronary atherosclerosis of native coronary artery I25.10    Atrial fibrillation ([facility]) I48.91    Irregular heartbeat I49.9    SSS (sick sinus syndrome) ([facility]) I49.5    S/P ablation of atrial fibrillation Z98.890, Z86.79    BMI 39.0-39.9,adult Z68.39    Hyperlipidemia E78.5    Controlled type 2 diabetes mellitus without complication, without long-term current use of insulin ([facility]) E11.9    Coronary artery disease involving coronary bypass graft of native heart without angina pectoris I25.810    Age-related cataract of both eyes H25.9    Anemia D64.9    Type 2 diabetes with nephropathy [facility] E11.21    Severe obesity (BMI 35.0-39. 9) with comorbidity ([facility] E66.01    Ventricular escape rhythm I49.3    S/P cardiac cath Z98.890    Abnormal nuclear cardiac imaging test R93.1    MCCOLLUM (dyspnea on exertion) R06.09    CKD (chronic kidney disease) N18.9    Chest pain R07.9    GI bleed K92.2       REVIEW OF SYSTEMS:    Constitutional: negative fever, negative chills, negative weight loss  Eyes:   negative visual changes  ENT:   negative sore throat, tongue or lip swelling   Respiratory:  negative cough  Cards:  negative for palpitations, lower extremity edema  GI:   See HPI  :  negative for frequency, dysuria  Integument:  negative for rash and pruritus  Heme:  negative for easy bruising and gum/nose bleeding  Musculoskel: negative for myalgias,  back pain and muscle weakness  Neuro: negative for headaches, dizziness, vertigo  Psych:  negative for feelings of anxiety, depression     Pertinent Positives:  Dyspnea, chest pain      Objective:   VITALS:    Visit Vitals  /46 (BP 1 Location: Right arm, BP Patient Position: At rest)   Pulse 69   Temp 97.6 °F (36.4 °C)   Resp 12   SpO2 96%       PHYSICAL EXAM:   General:          Alert, WD, WN, cooperative, no distress, appears stated age. Head:               Normocephalic, without obvious abnormality, atraumatic. Eyes:               Conjunctivae clear and pale, anicteric sclerae. Pupils are equal  Nose:               Nares normal. No drainage or sinus tenderness. Throat:             Lips, mucosa, and tongue normal.  No Thrush  Neck:               Supple, symmetrical,  no adenopathy  Back:               Symmetric,  No CVA tenderness. Lungs:             CTA bilaterally. No wheezing/rhonchi/rales. Chest wall:      No tenderness or deformity. No Accessory muscle use. Heart:              Regular rate and rhythm,  no murmur, rub or gallop. Abdomen:        Soft, non-tender. Rounded,  Not distended.   Bowel sounds normal. Extremities:     Atraumatic, No cyanosis. No edema. No clubbing  Skin:                Texture, turgor normal. No rashes/lesions/jaundice  Lymph:            Cervical, supraclavicular normal.  Psych:             Good insight. Not depressed. Not anxious or agitated. Neurologic:      EOMs intact. No facial asymmetry. No aphasia or slurred speech. Normal strength, A/O X 3. LAB DATA REVIEWED:    Recent Results (from the past 24 hour(s))   EKG, 12 LEAD, INITIAL    Collection Time: 07/25/19  9:46 AM   Result Value Ref Range    Ventricular Rate 62 BPM    Atrial Rate 62 BPM    P-R Interval 230 ms    QRS Duration 106 ms    Q-T Interval 412 ms    QTC Calculation (Bezet) 418 ms    Calculated P Axis 71 degrees    Calculated R Axis 44 degrees    Calculated T Axis 57 degrees    Diagnosis       Atrial-paced rhythm with prolonged AV conduction  Nonspecific ST abnormality  Abnormal ECG  No previous ECGs available     CBC WITH AUTOMATED DIFF    Collection Time: 07/25/19  9:50 AM   Result Value Ref Range    WBC 6.3 4.1 - 11.1 K/uL    RBC 2.37 (L) 4.10 - 5.70 M/uL    HGB 7.0 (L) 12.1 - 17.0 g/dL    HCT 22.3 (L) 36.6 - 50.3 %    MCV 94.1 80.0 - 99.0 FL    MCH 29.5 26.0 - 34.0 PG    MCHC 31.4 30.0 - 36.5 g/dL    RDW 15.8 (H) 11.5 - 14.5 %    PLATELET 987 804 - 608 K/uL    MPV 10.9 8.9 - 12.9 FL    NRBC 0.0 0  WBC    ABSOLUTE NRBC 0.00 0.00 - 0.01 K/uL    NEUTROPHILS 74 32 - 75 %    LYMPHOCYTES 16 12 - 49 %    MONOCYTES 8 5 - 13 %    EOSINOPHILS 1 0 - 7 %    BASOPHILS 0 0 - 1 %    IMMATURE GRANULOCYTES 1 (H) 0.0 - 0.5 %    ABS. NEUTROPHILS 4.7 1.8 - 8.0 K/UL    ABS. LYMPHOCYTES 1.0 0.8 - 3.5 K/UL    ABS. MONOCYTES 0.5 0.0 - 1.0 K/UL    ABS. EOSINOPHILS 0.1 0.0 - 0.4 K/UL    ABS. BASOPHILS 0.0 0.0 - 0.1 K/UL    ABS. IMM.  GRANS. 0.0 0.00 - 0.04 K/UL    DF AUTOMATED     METABOLIC PANEL, COMPREHENSIVE    Collection Time: 07/25/19  9:50 AM   Result Value Ref Range    Sodium 140 136 - 145 mmol/L    Potassium 4.8 3.5 - 5.1 mmol/L Chloride 107 97 - 108 mmol/L    CO2 23 21 - 32 mmol/L    Anion gap 10 5 - 15 mmol/L    Glucose 183 (H) 65 - 100 mg/dL    BUN 49 (H) 6 - 20 MG/DL    Creatinine 2.27 (H) 0.70 - 1.30 MG/DL    BUN/Creatinine ratio 22 (H) 12 - 20      GFR est AA 34 (L) >60 ml/min/1.73m2    GFR est non-AA 28 (L) >60 ml/min/1.73m2    Calcium 8.6 8.5 - 10.1 MG/DL    Bilirubin, total 0.5 0.2 - 1.0 MG/DL    ALT (SGPT) 22 12 - 78 U/L    AST (SGOT) 14 (L) 15 - 37 U/L    Alk. phosphatase 51 45 - 117 U/L    Protein, total 6.4 6.4 - 8.2 g/dL    Albumin 3.3 (L) 3.5 - 5.0 g/dL    Globulin 3.1 2.0 - 4.0 g/dL    A-G Ratio 1.1 1.1 - 2.2     TROPONIN I    Collection Time: 07/25/19  9:50 AM   Result Value Ref Range    Troponin-I, Qt. <0.05 <0.05 ng/mL   NT-PRO BNP    Collection Time: 07/25/19  9:50 AM   Result Value Ref Range    NT pro- (H) 0 - 450 PG/ML   SAMPLES BEING HELD    Collection Time: 07/25/19  9:50 AM   Result Value Ref Range    SAMPLES BEING HELD 1SST, 1BLUE     COMMENT        Add-on orders for these samples will be processed based on acceptable specimen integrity and analyte stability, which may vary by analyte.    PROTHROMBIN TIME + INR    Collection Time: 07/25/19  9:50 AM   Result Value Ref Range    INR 1.1 0.9 - 1.1      Prothrombin time 11.0 9.0 - 11.1 sec   OCCULT BLOOD, STOOL    Collection Time: 07/25/19 11:03 AM   Result Value Ref Range    Occult blood, stool POSITIVE (A) NEG     IRON PROFILE    Collection Time: 07/25/19 11:25 AM   Result Value Ref Range    Iron 29 (L) 65 - 175 ug/dL    TIBC 411 250 - 450 ug/dL    Iron % saturation 7 (L) 20 - 50 %   VITAMIN B12    Collection Time: 07/25/19 11:25 AM   Result Value Ref Range    Vitamin B12 355 193 - 986 pg/mL   TYPE + CROSSMATCH    Collection Time: 07/25/19 11:25 AM   Result Value Ref Range    Crossmatch Expiration 07/28/2019     ABO/Rh(D) A POSITIVE     Antibody screen NEG     Unit number [id number]     Blood component type [facility]     Unit division 00     Status of unit ALLOCATED     Crossmatch result Compatible    SAMPLES BEING HELD    Collection Time: 07/25/19 11:25 AM   Result Value Ref Range    SAMPLES BEING HELD  2 PST, 1 BLUE, 1 LAV     COMMENT        Add-on orders for these samples will be processed based on acceptable specimen integrity and analyte stability, which may vary by analyte.    EKG, 12 LEAD, INITIAL    Collection Time: 07/25/19  5:00 PM   Result Value Ref Range    Ventricular Rate 75 BPM    Atrial Rate 56 BPM    P-R Interval 232 ms    QRS Duration 106 ms    Q-T Interval 384 ms    QTC Calculation (Bezet) 428 ms    Calculated R Axis 24 degrees    Calculated T Axis 50 degrees    Diagnosis       Atrial-paced rhythm with prolonged AV conduction with frequent   supraventricular complexes and with frequent premature ventricular complexes    Confirmed by Cartachita Cope, P.V. ([id number]) on 7/26/2019 8:44:18 AM     TROPONIN I    Collection Time: 07/25/19  5:14 PM   Result Value Ref Range    Troponin-I, Qt. <0.05 <0.05 ng/mL   CK W/ REFLX CKMB    Collection Time: 07/25/19  5:14 PM   Result Value Ref Range     39 - 308 U/L   TYPE + CROSSMATCH    Collection Time: 07/25/19  8:16 PM   Result Value Ref Range    Crossmatch Expiration 07/28/2019     ABO/Rh(D) A POSITIVE     Antibody screen NEG     Unit number [id number]     Blood component type RC LR     Unit division 00     Status of unit TRANSFUSED     Crossmatch result Compatible    GLUCOSE, POC    Collection Time: 07/26/19 12:39 AM   Result Value Ref Range    Glucose (POC) 190 (H) 65 - 100 mg/dL    Performed by Artis Rae    TROPONIN I    Collection Time: 07/26/19  5:23 AM   Result Value Ref Range    Troponin-I, Qt. <0.05 <1.91 ng/mL   METABOLIC PANEL, BASIC    Collection Time: 07/26/19  5:23 AM   Result Value Ref Range    Sodium 143 136 - 145 mmol/L    Potassium 4.6 3.5 - 5.1 mmol/L    Chloride 114 (H) 97 - 108 mmol/L    CO2 22 21 - 32 mmol/L    Anion gap 7 5 - 15 mmol/L    Glucose 175 (H) 65 - 100 mg/dL    BUN 45 (H) 6 - 20 MG/DL    Creatinine 1.85 (H) 0.70 - 1.30 MG/DL    BUN/Creatinine ratio 24 (H) 12 - 20      GFR est AA 43 (L) >60 ml/min/1.73m2    GFR est non-AA 36 (L) >60 ml/min/1.73m2    Calcium 8.4 (L) 8.5 - 10.1 MG/DL   CBC W/O DIFF    Collection Time: 07/26/19  5:23 AM   Result Value Ref Range    WBC 6.1 4.1 - 11.1 K/uL    RBC 2.58 (L) 4.10 - 5.70 M/uL    HGB 7.7 (L) 12.1 - 17.0 g/dL    HCT 24.8 (L) 36.6 - 50.3 %    MCV 96.1 80.0 - 99.0 FL    MCH 29.8 26.0 - 34.0 PG    MCHC 31.0 30.0 - 36.5 g/dL    RDW 15.5 (H) 11.5 - 14.5 %    PLATELET 804 625 - 215 K/uL    MPV 11.3 8.9 - 12.9 FL    NRBC 0.0 0  WBC    ABSOLUTE NRBC 0.00 0.00 - 0.01 K/uL   GLUCOSE, POC    Collection Time: 07/26/19  6:50 AM   Result Value Ref Range    Glucose (POC) 155 (H) 65 - 100 mg/dL    Performed by [facility]) [place]        IMAGING RESULTS:  I have personally reviewed the imaging reports    7/25/19  EXAM: CT ABD PELV WO CONT     INDICATION: eval source of bleeding clinical information regarding the  nature/location of hemorrhage would be helpful for evaluation.     COMPARISON: The CT examination of the chest of 7/27/2016 is available for  correlation.     CONTRAST:  None.     TECHNIQUE:   Thin axial images were obtained through the abdomen and pelvis. Coronal and  sagittal reconstructions were generated. Oral contrast was not administered. CT  dose reduction was achieved through use of a standardized protocol tailored for  this examination and automatic exposure control for dose modulation.      The absence of intravenous contrast material reduces the sensitivity for  evaluation of the solid parenchymal organs of the abdomen.      FINDINGS:   The images of the lung bases revealed no evidence of nodularity. The liver  measures 18.5 cm longitudinally. No focal hepatic lesions are noted on this  noncontrasted examination. The pancreas is normal in appearance. The spleen  measures 11.4 cm longitudinally. No focal splenic lesions are seen.  The kidneys  are normal in size. Focal areas of decreased attenuation are noted within both  kidneys. These appear to represent cysts. There is evidence of apparent chronic  perinephric stranding bilaterally. There is no evidence of urolithiasis or  hydronephrotic change. The urinary bladder is normal in appearance. There is  relative enlargement of the prostate gland. A moderate amount of gas and fecal  material is noted within the colon. There is no evidence of intestinal  obstruction or free intraperitoneal air. A 2 cm fat-containing umbilical hernia  is present (see axial images 67 and sagittal image 43). No loops of bowel are  noted within this hernia. The abdominal aorta is of normal caliber. There is no  evidence of ascites. There is no evidence of intra-abdominal or intrapelvic  inflammatory change. There is no evidence of intra-abdominal, intrapelvic or  inguinal lymphadenopathy.     IMPRESSION  IMPRESSION:  1. Presence of a fat-containing umbilical hernia as described above. 2. Presence of a moderate amount of gas and fecal material within the colon. No  evidence of intestinal obstruction or free intraperitoneal air. 3. Normal appearance of the urinary bladder. 4. Normal sized kidneys with the presence of cysts bilaterally. No evidence of  urolithiasis or hydronephrotic change. Total time spent with patient: 50 minutes ________________________________________________________________________  Care Plan discussed with:  Patient y   Family     RN y              Consultant:       CT  7/26/2019:  ________________________________________________________________________    ___________________________________________________  Consulting Provider:  Vanita Head NP      7/26/2019  9:33 AM

## 2019-07-26 NOTE — PROCEDURES
[facility]                   Endoscopic Gastroduodenoscopy Procedure Note      7/26/2019  Lisa Edmonds  1940  [id number]    Procedure: Endoscopic Gastroduodenoscopy with biopsy    Indication:  anemia, heme positive stool     Pre-operative Diagnosis: see indication above    Post-operative Diagnosis: see findings below    : STEFANY Boyd MD    Referring Provider:  None      Anesthesia/Sedation:  MAC anesthesia Propofol    Airway assessment: No airway problems anticipated    Pre-Procedural Exam:      Airway: clear, no airway problems anticipated  Heart: RRR, without gallops or rubs  Lungs: clear bilaterally without wheezes, crackles, or rhonchi  Abdomen: soft, nontender, nondistended, bowel sounds present  Mental Status: awake, alert and oriented to person, place and time       Procedure Details     After infomed consent was obtained for the procedure, with all risks and benefits of procedure explained the patient was taken to the endoscopy suite and placed in the left lateral decubitus position. Following sequential administration of sedation as per above, the endoscope was inserted into the mouth and advanced under direct vision to second portion of the duodenum. A careful inspection was made as the gastroscope was withdrawn, including a retroflexed view of the proximal stomach; findings and interventions are described below. Findings:   Esophagus: Moderate esophagitis at the GE junction with erosions. No bleeding present. Biopsies done. Stomach: chronic gastritis with few areas of friability. No  Significant bleeding. No ulcers seen. Duodenum: normal , biopsied    Therapies:  biopsy of esophagus  biopsy of stomach antrum  biopsy of duodenal second portion    Specimens: 1. Duodenal biopsies  2. Gastric antral biopsies  3. Biopsies of GE junction           Complications:   None; patient tolerated the procedure well. EBL:  None. Impression:      1. Moderate esophagitis  2. Mild gastritis  3. Normal duodenum    Recommendations:  -begin acid suppression with pantoprazole 40 mg daily. , -Follow clinical symptoms and laboratory studies for evidence of rebleeding. , -Will need colonoscopy. Timing TBD. I will talk to Dr. Donovan Avina.     Thomas Hughes MD7/26/2019

## 2019-07-26 NOTE — PROGRESS NOTES
TRANSFER - IN REPORT:    Verbal report received from Nakul Christensen (name) on Napoleon Williamson  being received from ED (unit) for routine progression of care      Report consisted of patients Situation, Background, Assessment and   Recommendations(SBAR). Information from the following report(s) SBAR was reviewed with the receiving nurse. Opportunity for questions and clarification was provided. Assessment completed upon patients arrival to unit and care assumed.      1440 - Taken to Endo with nurses

## 2019-07-26 NOTE — PROGRESS NOTES
Anesthesia reports 160mg Propofol, 40mg Lidocaine and 250mL NS given during procedure. Received report from anesthesia staff on vital signs and status of patient.     Endoscope was pre-cleaned at the bedside immediately following procedure by Marlene Huizar

## 2019-07-26 NOTE — PROGRESS NOTES
Problem: Falls - Risk of  Goal: *Absence of Falls  Description  Document Ritchie Minor Fall Risk and appropriate interventions in the flowsheet.   Outcome: Progressing Towards Goal  Note:   Fall Risk Interventions:            Medication Interventions: Assess postural VS orthostatic hypotension, Evaluate medications/consider consulting pharmacy, Teach patient to arise slowly         History of Falls Interventions: Consult care management for discharge planning, Bed/chair exit alarm, Door open when patient unattended, Evaluate medications/consider consulting pharmacy, Room close to nurse's station, Investigate reason for fall, Utilize gait belt for transfer/ambulation, Assess for delayed presentation/identification of injury for 48 hrs (comment for end date), Vital signs minimum Q4HRs X 24 hrs (comment for end date)

## 2019-07-26 NOTE — ANESTHESIA POSTPROCEDURE EVALUATION
Procedure(s):  ESOPHAGOGASTRODUODENOSCOPY (EGD)  ESOPHAGOGASTRODUODENAL (EGD) BIOPSY. total IV anesthesia, MAC    Anesthesia Post Evaluation        Patient location during evaluation: PACU  Note status: Adequate. Level of consciousness: responsive to verbal stimuli and sleepy but conscious  Pain management: satisfactory to patient  Airway patency: patent  Anesthetic complications: no  Cardiovascular status: acceptable  Respiratory status: acceptable  Hydration status: acceptable  Comments: +Post-Anesthesia Evaluation and Assessment    Patient: Homa Toledo MRN: [id number]  SSN: [id number]   YOB: 1940  Age: 70 y.o. Sex: male      Cardiovascular Function/Vital Signs    /61   Pulse 72   Temp 36.6 °C (97.8 °F)   Resp 19   SpO2 99%     Patient is status post Procedure(s):  ESOPHAGOGASTRODUODENOSCOPY (EGD)  ESOPHAGOGASTRODUODENAL (EGD) BIOPSY. Nausea/Vomiting: Controlled. Postoperative hydration reviewed and adequate. Pain:  Pain Scale 1: Numeric (0 - 10) (07/26/19 1542)  Pain Intensity 1: 0 (07/26/19 1542)   Managed. Neurological Status: At baseline. Mental Status and Level of Consciousness: Arousable. Pulmonary Status:   O2 Device: Room air (07/26/19 1548)   Adequate oxygenation and airway patent. Complications related to anesthesia: None    Post-anesthesia assessment completed. No concerns. Signed By: Juan Carlos Grey DO    7/26/2019  Post anesthesia nausea and vomiting:  controlled      Vitals Value Taken Time   /61 7/26/2019  3:48 PM   Temp 36.6 °C (97.8 °F) 7/26/2019  3:42 PM   Pulse 69 7/26/2019  3:50 PM   Resp 17 7/26/2019  3:50 PM   SpO2 99 % 7/26/2019  3:50 PM   Vitals shown include unvalidated device data.

## 2019-07-26 NOTE — PROGRESS NOTES
TRANSFER - IN REPORT:    Verbal report received from Bianca (name) on Wade Elizabeth  being received from Endoscopy (unit) for routine progression of care      Report consisted of patients Situation, Background, Assessment and   Recommendations(SBAR). Information from the following report(s) SBAR was reviewed with the receiving nurse. Opportunity for questions and clarification was provided. Assessment completed upon patients arrival to unit and care assumed. 2204 - Asked Dr. Emigdio Albright about resuming plavix, physician would like to speak with cardiology about this. Will place any needed orders. 3312 -Patient back in room. Per cardiology, to resume plavix and eliquis tomorrow. No aspirin.

## 2019-07-26 NOTE — CONSULTS
[address] [facility]     Date of  Admission: 7/25/2019  4:11 PM     Admission type:Emergency    Consult for: chest pain  Consult by:Hospitalist     Subjective:     Homa Toledo is a 70 y.o. male admitted for GI bleed [K92.2], Chest pain [R07.9], Anemia [D64.9]. Admitted with c/o worsening CP radiating across chest, x 4-5 days, with associated SOB. On admission HGB 7.0, received 1 unit PRBC. Chest pain has resolved. Denies palpitations, dizziness/lightheadedness. ECG A paced with PVCs, no acute change. Neg troponin    PMH:  CAD with PCI/DEVONTE RCA 5/7/19, CABG, SSS with [employer] dual chamber pacemaker implant 3/2019, PAF with hx of afib ablation 2016, HTN, DM    Previous treatment/evaluation includes Coronary Artery Bypass Graft, Percutaneous Coronary Intervention, echocardiogram and stress thallium . Cardiac risk factors: smoking/ tobacco exposure, family history, dyslipidemia, diabetes mellitus, obesity, sedentary life style, male gender, hypertension. Patient Active Problem List    Diagnosis Date Noted    Chest pain 07/25/2019    GI bleed 07/25/2019    Abnormal nuclear cardiac imaging test 05/08/2019    MCCOLLUM (dyspnea on exertion) 05/08/2019    CKD (chronic kidney disease) 05/08/2019    S/P cardiac cath 05/07/2019    Ventricular escape rhythm 03/29/2019    Type 2 diabetes with nephropathy ([address] 04/10/2018    Severe obesity (BMI 35.0-39. 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 angina pectoris 06/02/2017    Age-related cataract of both eyes 06/02/2017    S/P ablation of atrial fibrillation 08/04/2016    SSS [facility]sick sinus syndrome) ([facility] 2016    Irregular heartbeat 2016    Postsurgical aortocoronary bypass status 2012    Mixed hyperlipidemia 2012    Coronary atherosclerosis of native coronary artery 2012    Atrial fibrillation ([facility] 2012      None  Past Medical History:   Diagnosis Date    Abnormal nuclear cardiac imaging test 2019    Anemia 2017    CKD (chronic kidney disease) 2019    Coronary atherosclerosis of native coronary artery     Diabetes mellitus, type II (HCC)     MCCOLLUM (dyspnea on exertion) 2019    Mixed hyperlipidemia     Other dyspnea and respiratory abnormality     S/P ablation of atrial fibrillation 2016      Social History     Socioeconomic History    Marital status:      Spouse name: Not on file    Number of children: Not on file    Years of education: Not on file    Highest education level: Not on file   Tobacco Use    Smoking status: Former Smoker     Packs/day: 4.00     Years: 20.00     Pack years: 80.00     Types: Cigarettes     Last attempt to quit: 1980     Years since quittin.0    Smokeless tobacco: Former User    Tobacco comment: QUIT AT AGE 42   Substance and Sexual Activity    Alcohol use: No     Alcohol/week: 0.0 standard drinks    Drug use: Never     Allergies   Allergen Reactions    Ciprofloxacin Other (comments)     Difficulty breathing; 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      Family History   Problem Relation Age of Onset    Diabetes Mother     Emphysema Father          age 46 - smoker      Current Facility-Administered Medications   Medication Dose Route Frequency    0.9% sodium chloride infusion 250 mL  250 mL IntraVENous PRN    aspirin delayed-release tablet 81 mg  81 mg Oral DAILY    metoprolol tartrate (LOPRESSOR) tablet 25 mg  25 mg Oral BID    nitroglycerin (NITROSTAT) tablet 0.4 mg  0.4 mg SubLINGual Q5MIN PRN    atorvastatin (LIPITOR) tablet 20 mg  20 mg Oral QHS    pantoprazole (PROTONIX) 40 mg in sodium chloride 0.9% 10 mL injection  40 mg IntraVENous Q12H    0.9% sodium chloride infusion  75 mL/hr IntraVENous CONTINUOUS    sodium chloride (NS) flush 5-40 mL  5-40 mL IntraVENous Q8H    sodium chloride (NS) flush 5-40 mL  5-40 mL IntraVENous PRN    insulin lispro (HUMALOG) injection   SubCUTAneous AC&HS    glucose chewable tablet 16 g  4 Tab Oral PRN    glucagon (GLUCAGEN) injection 1 mg  1 mg IntraMUSCular PRN    dextrose 10% infusion 125-250 mL  125-250 mL IntraVENous PRN     Current Outpatient Medications   Medication Sig    rosuvastatin (CRESTOR) 40 mg tablet TAKE 1 TABLET BY MOUTH EVERY DAY    glucose blood VI test strips (ACCU-CHEK YVES PLUS TEST STRP) strip Use to check blood glucose daily.  nitroglycerin (NITROSTAT) 0.4 mg SL tablet 1 Tab by SubLINGual route every five (5) minutes as needed for Chest Pain.  metFORMIN ER (GLUCOPHAGE XR) 500 mg tablet Take 1 Tab by mouth two (2) times a day.  clopidogrel (PLAVIX) 75 mg tab Take 1 Tab by mouth daily.  metoprolol tartrate (LOPRESSOR) 25 mg tablet Take 1 Tab by mouth two (2) times a day.  ELIQUIS 5 mg tablet TAKE 1 TABLET BY MOUTH TWICE A DAY    cholecalciferol (VITAMIN D3) 1,000 unit cap Take  by mouth daily.  ferrous sulfate 324 mg (65 mg iron) tablet Take  by mouth Daily (before breakfast).  cyanocobalamin 1,000 mcg tablet Take 1,000 mcg by mouth daily.  aspirin delayed-release (ECOTRIN LOW STRENGTH) 81 mg tablet Take  by mouth daily.           Review of Symptoms:   11 systems reviewed, negative other than as stated in the HPI        Objective:      Visit Vitals  /46 (BP 1 Location: Right arm, BP Patient Position: At rest)   Pulse 69   Temp 97.6 °F (36.4 °C)   Resp 12   SpO2 96%       Physical:   General: older  Heart: RRR, no m/S3/JVD, no carotid bruits   Lungs: clear   Abdomen: Soft, +BS, NTND   Extremities: LE arthur +DP/PT, no edema   Neurologic: Grossly normal    Data Review:   Recent Labs 07/26/19  0523 07/25/19  0950   WBC 6.1 6.3   HGB 7.7* 7.0*   HCT 24.8* 22.3*    164     Recent Labs     07/26/19  0523 07/25/19  0950    140   K 4.6 4.8   * 107   CO2 22 23   * 183*   BUN 45* 49*   CREA 1.85* 2.27*   CA 8.4* 8.6   ALB  --  3.3*   TBILI  --  0.5   SGOT  --  14*   ALT  --  22   INR  --  1.1       Recent Labs     07/26/19  0523 07/25/19  1714 07/25/19  0950   TROIQ <0.05 <0.05 <0.05       No intake or output data in the 24 hours ending 07/26/19 0823     Cardiographics    Telemetry: SR with PVCs   ECG: A paced with PVCs    Echocardiogram:   4/2018 EF 55-60%, mild conc hypertrophy, LAE, mild TR     CXRAY:  1. Slight prominence of the basilar lung markings. 2. Interval placement of a cardiac pacer device and leads on the left. 3. Evidence of a previous thoracotomy. Assessment:       Active Problems:    Coronary atherosclerosis of native coronary artery (4/25/2012)      SSS (sick [facility] syndrome) ([facility] (7/19/2016)      S/P ablation of atrial fibrillation (8/4/2016)      Anemia (6/5/2017)      Type 2 diabetes with nephropathy ([facility] (4/10/2018)      Severe obesity (BMI 35.0-39. 9) with comorbidity ([facility] (4/10/2018)      Chest pain (7/25/2019)      GI bleed (7/25/2019)         Plan:     Chest pain with hx of CAD:  R/t GIB. Neg troponin and no ECG changes, CP symptoms resolved with PRBC transfusion  Echo pending   Recent PCI/DEVONTE 5/2019, will continue on Plavix and ASA  No further cardiac evaluation   Continue BB and statin    GIB:  Likely related to \"triple therapy\" of ASA, Plavix and Eliquis. Patient had been instructed 1 month post PCI to stop ASA, and continue only on Plavix x 1 year and Eliquis indefinitely  Eliquis discontinued for now, will continue on Plavix and ASA    Hx of PAF:  A paced, last interrogation 7/16/19 shows < 0.1% afib  Continue on BB, as above reference DOAC and antiplatelet therapy.     Thank you for consulting TRISTAN Briones NP

## 2019-07-27 VITALS
TEMPERATURE: 97.7 F | OXYGEN SATURATION: 100 % | RESPIRATION RATE: 19 BRPM | HEART RATE: 82 BPM | DIASTOLIC BLOOD PRESSURE: 61 MMHG | SYSTOLIC BLOOD PRESSURE: 127 MMHG

## 2019-07-27 LAB
GLUCOSE BLD STRIP.AUTO-MCNC: 172 MG/DL (ref 65–100)
SERVICE CMNT-IMP: ABNORMAL

## 2019-07-27 PROCEDURE — 74011250637 HC RX REV CODE- 250/637: Performed by: INTERNAL MEDICINE

## 2019-07-27 PROCEDURE — 82962 GLUCOSE BLOOD TEST: CPT

## 2019-07-27 PROCEDURE — 94760 N-INVAS EAR/PLS OXIMETRY 1: CPT

## 2019-07-27 PROCEDURE — 74011250637 HC RX REV CODE- 250/637: Performed by: NURSE PRACTITIONER

## 2019-07-27 RX ORDER — PANTOPRAZOLE SODIUM 40 MG/1
40 TABLET, DELAYED RELEASE ORAL
Qty: 30 TAB | Refills: 0 | Status: SHIPPED | OUTPATIENT
Start: 2019-07-28 | End: 2019-10-25

## 2019-07-27 RX ADMIN — Medication 10 ML: at 05:20

## 2019-07-27 RX ADMIN — APIXABAN 5 MG: 5 TABLET, FILM COATED ORAL at 08:57

## 2019-07-27 RX ADMIN — Medication 10 ML: at 05:21

## 2019-07-27 RX ADMIN — CLOPIDOGREL BISULFATE 75 MG: 75 TABLET ORAL at 08:58

## 2019-07-27 RX ADMIN — PANTOPRAZOLE SODIUM 40 MG: 40 TABLET, DELAYED RELEASE ORAL at 08:58

## 2019-07-27 RX ADMIN — METOPROLOL TARTRATE 25 MG: 25 TABLET ORAL at 08:59

## 2019-07-27 NOTE — PROGRESS NOTES
Attempted x4 and 2 RNs to get AM blood work. No blood return from either IV site. Unable to obtain and patient refused 5th stick/3rd nurse.

## 2019-07-27 NOTE — PROGRESS NOTES
CM acknowledged discharge order. Follow ups noted. No needs.   Patient refused PCP list.    Stacey Marino RN CM  Ext [phone]

## 2019-07-27 NOTE — PROGRESS NOTES
Bedside report given to Nahum Duke RN that included SBAR and cardiac rhythm. No acute events overnight.

## 2019-07-27 NOTE — DISCHARGE SUMMARY
Discharge Summary      Name: Chencho Melendez  [id number]  YOB: 1940 (Age: 70 y.o.)   Date of Admission: 7/25/2019  Date of Discharge: 7/27/2019  Attending Physician: Nara Burgess MD    Discharge Diagnosis:   Acute blood loss anemia due to upper GI bleed causing exertional chest Pain due to demand Ischemia POA  CAD s/p recent Stent in June 2019, s/p CABG in 2001  H/o Afib s/p ablation s/p PPM    Consultations:  IP CONSULT TO CARDIOLOGY  IP CONSULT TO GASTROENTEROLOGY      Brief Admission History/Reason for Admission Per Dina Bence, MD:   Tank Tapia is a 70 y.o.  male who presents with above complains from home initially to [facility] ER then transferred to [facility] for GI & Cardiac evaluation. Pt presents with CC of Chest pain , intermittent, on exertion x 1 week  H/o improvement with NTG  Pt denies any evidence of blood in stools but had stool +ve for Guaiac in ER  H/o taking ASA/Plavix/Eliquis daily  H/o recent DEVONTE in June 2019    [facility] Course by Main Problems:   Acute blood loss anemia due to upper GI bleed causing exertional chest Pain due to demand Ischemia POA  CAD s/p recent Stent in June 2019, s/p CABG in 2001  H/o Afib s/p ablation s/p PPM  FOBT in the setting of triple therapy of ASA, Plavix, and Eliqis. Pt started on IV PPI. He underwent EGD with findings of moderate esophagitis, mild gastritis and normal duodenum. Pt will need to con't PPI daily. F/u with GI for Cscope outpt. Pt was also evaluated by cardiology with recommendation to stop ASA and cont' BB, Eliquis and plavix. F/u outpt as scheduled. Discharge Exam:  Patient seen and examined by me on discharge day. Pertinent Findings:  Visit Vitals  /61 (BP 1 Location: Right arm, BP Patient Position: Sitting)   Pulse 82   Temp 97.7 °F (36.5 °C)   Resp 19   SpO2 100%     Gen:    Not in distress  Chest: Clear lungs  CVS:   Regular rhythm.   No edema  Abd:  Soft, not distended, not tender    Discharge/Recent Laboratory Results:  Recent Labs     07/26/19  0523      K 4.6   *   CO2 22   BUN 45*   *   CA 8.4*     Recent Labs     07/26/19  1448 07/26/19  0523   HGB 8.1* 7.7*   HCT 25.1* 24.8*   WBC  --  6.1   PLT  --  162       Discharge Medications:  Current Discharge Medication List      START taking these medications    Details   pantoprazole (PROTONIX) 40 mg tablet Take 1 Tab by mouth Daily (before breakfast). Qty: 30 Tab, Refills: 0         CONTINUE these medications which have NOT CHANGED    Details   rosuvastatin (CRESTOR) 40 mg tablet TAKE 1 TABLET BY MOUTH EVERY DAY  Qty: 30 Tab, Refills: 3      nitroglycerin (NITROSTAT) 0.4 mg SL tablet 1 Tab by SubLINGual route every five (5) minutes as needed for Chest Pain. Qty: 25 Tab, Refills: 1      metFORMIN ER (GLUCOPHAGE XR) 500 mg tablet Take 1 Tab by mouth two (2) times a day. Qty: 120 Tab, Refills: 10      clopidogrel (PLAVIX) 75 mg tab Take 1 Tab by mouth daily. Qty: 30 Tab, Refills: 11      metoprolol tartrate (LOPRESSOR) 25 mg tablet Take 1 Tab by mouth two (2) times a day. Qty: 180 Tab, Refills: 3      ELIQUIS 5 mg tablet TAKE 1 TABLET BY MOUTH TWICE A DAY  Qty: 60 Tab, Refills: 6    Associated Diagnoses: Paroxysmal atrial fibrillation (HCC)      cholecalciferol (VITAMIN D3) 1,000 unit cap Take 1,000 Units by mouth daily. ferrous sulfate 324 mg (65 mg iron) tablet Take 324 mg by mouth Daily (before breakfast). cyanocobalamin 1,000 mcg tablet Take 1,000 mcg by mouth daily.          STOP taking these medications       aspirin delayed-release (ECOTRIN LOW STRENGTH) 81 mg tablet Comments:   Reason for Stopping:                   DISPOSITION:    Home with Family: x   Home with HH/PT/OT/RN:    SNF/LTC:    SHEA:    OTHER:          Follow up with:   PCP : None  Follow-up Information     Follow up With Specialties Details Why Contact Info    None    None ([phone]) Patient stated that they have no PCP Scott Wright MD Cardiology, [address] [facility] Vascular Surgery In 2 weeks  [address]. Liliana Treadwell [address]  [address] [facility] Lexy Redd MD Gastroenterology In 2 weeks Call to schedule a colonoscopy  [address]  [phone]            Code: Full    Total time in minutes spent coordinating this discharge (includes going over instructions, follow-up, prescriptions, and preparing report for sign off to her PCP) :  35 minutes

## 2019-07-27 NOTE — PROGRESS NOTES
Problem: Falls - Risk of  Goal: *Absence of Falls  Description  Document Trey Lebron Fall Risk and appropriate interventions in the flowsheet.   Outcome: Progressing Towards Goal  Note:   Fall Risk Interventions:            Medication Interventions: Assess postural VS orthostatic hypotension, Evaluate medications/consider consulting pharmacy, Teach patient to arise slowly         History of Falls Interventions: Consult care management for discharge planning, Bed/chair exit alarm, Door open when patient unattended, Evaluate medications/consider consulting pharmacy, Room close to nurse's station, Investigate reason for fall, Utilize gait belt for transfer/ambulation, Assess for delayed presentation/identification of injury for 48 hrs (comment for end date), Vital signs minimum Q4HRs X 24 hrs (comment for end date)         Problem: Patient Education: Go to Patient Education Activity  Goal: Patient/Family Education  Outcome: Progressing Towards Goal     Problem: Patient Education: Go to Patient Education Activity  Goal: Patient/Family Education  Outcome: Progressing Towards Goal     Problem: Upper and Lower GI Bleed: Day 1  Goal: Off Pathway (Use only if patient is Off Pathway)  Outcome: Progressing Towards Goal  Goal: Activity/Safety  Outcome: Progressing Towards Goal  Goal: Consults, if ordered  Outcome: Progressing Towards Goal  Goal: Diagnostic Test/Procedures  Outcome: Progressing Towards Goal  Goal: Nutrition/Diet  Outcome: Progressing Towards Goal  Goal: Discharge Planning  Outcome: Progressing Towards Goal  Goal: Medications  Outcome: Progressing Towards Goal  Goal: Respiratory  Outcome: Progressing Towards Goal  Goal: Treatments/Interventions/Procedures  Outcome: Progressing Towards Goal  Goal: Psychosocial  Outcome: Progressing Towards Goal  Goal: *Optimal pain control at patient's stated goal  Outcome: Progressing Towards Goal  Goal: *Hemodynamically stable  Outcome: Progressing Towards Goal  Goal: *Demonstrates progressive activity  Outcome: Progressing Towards Goal     Problem: Upper and Lower GI Bleed: Day 2  Goal: Off Pathway (Use only if patient is Off Pathway)  Outcome: Progressing Towards Goal  Goal: Activity/Safety  Outcome: Progressing Towards Goal  Goal: Consults, if ordered  Outcome: Progressing Towards Goal  Goal: Diagnostic Test/Procedures  Outcome: Progressing Towards Goal  Goal: Nutrition/Diet  Outcome: Progressing Towards Goal  Goal: Discharge Planning  Outcome: Progressing Towards Goal  Goal: Medications  Outcome: Progressing Towards Goal  Goal: Respiratory  Outcome: Progressing Towards Goal  Goal: Treatments/Interventions/Procedures  Outcome: Progressing Towards Goal  Goal: Psychosocial  Outcome: Progressing Towards Goal  Goal: *Optimal pain control at patient's stated goal  Outcome: Progressing Towards Goal  Goal: *Hemodynamically stable  Outcome: Progressing Towards Goal  Goal: *Tolerating diet  Outcome: Progressing Towards Goal  Goal: *Demonstrates progressive activity  Outcome: Progressing Towards Goal     Problem: Upper and Lower GI Bleed: Day 3  Goal: Off Pathway (Use only if patient is Off Pathway)  Outcome: Progressing Towards Goal  Goal: Activity/Safety  Outcome: Progressing Towards Goal  Goal: Diagnostic Test/Procedures  Outcome: Progressing Towards Goal  Goal: Nutrition/Diet  Outcome: Progressing Towards Goal  Goal: Discharge Planning  Outcome: Progressing Towards Goal  Goal: Medications  Outcome: Progressing Towards Goal  Goal: Treatments/Interventions/Procedures  Outcome: Progressing Towards Goal  Goal: Psychosocial  Outcome: Progressing Towards Goal     Problem: Upper and Lower GI Bleed:  Discharge Outcomes  Goal: *Hemodynamically stable  Outcome: Progressing Towards Goal  Goal: *Lungs clear or at baseline  Outcome: Progressing Towards Goal  Goal: *Demonstrates independent activity or return to baseline  Outcome: Progressing Towards Goal  Goal: *Pain is controlled to three or less  Outcome: Progressing Towards Goal  Goal: *Verbalizes understanding and describes prescribed diet  Outcome: Progressing Towards Goal  Goal: *Tolerating diet  Outcome: Progressing Towards Goal  Goal: *Verbalizes name, dosage, time, side effects, and number of days to continue medications  Outcome: Progressing Towards Goal  Goal: *Anxiety reduced or absent  Outcome: Progressing Towards Goal  Goal: *Understands and describes signs and symptoms to report to providers(Stroke Metric)  Outcome: Progressing Towards Goal  Goal: *Describes follow-up/return visits to physicians  Outcome: Progressing Towards Goal  Goal: *Describes available resources and support systems  Outcome: Progressing Towards Goal

## 2019-07-27 NOTE — PROGRESS NOTES
Patient discharged. Reviewed all discharge instructions and medications. Patient stated understanding. Removed IV and telemetry box. Patient left with all belongings.

## 2019-07-27 NOTE — PROGRESS NOTES
Cardiac Electrophysiology Progress Note            [address], [place], [address] [facility]  [phone]    7/27/2019 9:02 AM    Admit Date: 7/25/2019    Admit Diagnosis: GI bleed [K92.2]  Chest pain [R07.9]  Anemia [D64.9]    Subjective:     Jeremy Patino  Is without cardiac complaints. Is hoping for d/c today.        Visit Vitals  /61 (BP 1 Location: Right arm, BP Patient Position: Sitting)   Pulse 82   Temp 97.7 °F (36.5 °C)   Resp 19   SpO2 100%     Current Facility-Administered Medications   Medication Dose Route Frequency    clopidogrel (PLAVIX) tablet 75 mg  75 mg Oral DAILY    sodium chloride (NS) flush 5-40 mL  5-40 mL IntraVENous Q8H    sodium chloride (NS) flush 5-40 mL  5-40 mL IntraVENous PRN    pantoprazole (PROTONIX) tablet 40 mg  40 mg Oral ACB    apixaban (ELIQUIS) tablet 5 mg  5 mg Oral Q12H    0.9% sodium chloride infusion 250 mL  250 mL IntraVENous PRN    metoprolol tartrate (LOPRESSOR) tablet 25 mg  25 mg Oral BID    nitroglycerin (NITROSTAT) tablet 0.4 mg  0.4 mg SubLINGual Q5MIN PRN    atorvastatin (LIPITOR) tablet 20 mg  20 mg Oral QHS    sodium chloride (NS) flush 5-40 mL  5-40 mL IntraVENous Q8H    sodium chloride (NS) flush 5-40 mL  5-40 mL IntraVENous PRN    insulin lispro (HUMALOG) injection   SubCUTAneous AC&HS    glucose chewable tablet 16 g  4 Tab Oral PRN    glucagon (GLUCAGEN) injection 1 mg  1 mg IntraMUSCular PRN    dextrose 10% infusion 125-250 mL  125-250 mL IntraVENous PRN         Objective:      Visit Vitals  /61 (BP 1 Location: Right arm, BP Patient Position: Sitting)   Pulse 82   Temp 97.7 °F (36.5 °C)   Resp 19   SpO2 100%       Physical Exam:  General:  male NAD  Abdomen: soft, non-tender  Extremities: extremities normal  Heart: regular rate and rhythm  Lungs: clear to auscultation bilaterally  Pulses: 2+ and symmetric    Data Review:   Labs:    Recent Labs     07/26/19  1448 07/26/19  0523 07/25/19  0950   WBC  --  6.1 6.3 HGB 8.1* 7.7* 7.0*   HCT 25.1* 24.8* 22.3*   PLT  --  162 164     Recent Labs     07/26/19  0523 07/25/19  0950    140   K 4.6 4.8   * 107   CO2 22 23   * 183*   BUN 45* 49*   CREA 1.85* 2.27*   CA 8.4* 8.6   ALB  --  3.3*   TBILI  --  0.5   SGOT  --  14*   ALT  --  22   INR  --  1.1       Recent Labs     07/26/19  0523 07/25/19  1714 07/25/19  0950   TROIQ <0.05 <0.05 <0.05         Intake/Output Summary (Last 24 hours) at 7/27/2019 0902  Last data filed at 7/27/2019 0426  Gross per 24 hour   Intake 1370 ml   Output 925 ml   Net 445 ml        Telemetry: AP 71    Echo:  · Left Ventricle: Mild concentric hypertrophy. Low normal systolic dysfunction. Estimated left ventricular ejection fraction is 51 - 55%. · Pericardium: Trivial-to-small pericardial effusion. Assessment:     Principal Problem:    GI bleed (7/25/2019)    Active Problems:    Coronary atherosclerosis of native coronary artery (4/25/2012)      SSS (sick sinus syndrome) ([address] [facility] (7/19/2016)      S/P ablation of atrial fibrillation (8/4/2016)      Anemia (6/5/2017)      Type 2 diabetes with nephropathy ([facility] (4/10/2018)      Severe obesity (BMI 35.0-39. 9) with comorbidity ([facility] [address] (4/10/2018)      Chest pain (7/25/2019)        Plan:     Chest pain with hx of CAD:  R/t GIB. Neg troponin and no ECG changes, CP symptoms resolved with PRBC transfusion  Echo with Ef 55%   Recent PCI/DEVONTE 5/2019, will continue on Plavix  No further cardiac evaluation   Continue BB and statin     GIB:  Likely related to \"triple therapy\" of ASA, Plavix and Eliquis. Patient had been instructed 1 month post PCI to stop ASA, and continue only on Plavix x 1 year and Eliquis indefinitely  On plavix and restarted eliquis. OFF ASA. Plass for colonoscopy as outpatient.      Hx of PAF:  A paced, last interrogation 7/16/19 shows < 0.1% afib  Continue on BB, and [address]    DILMA Sanchez  Patient seen and examined by me with nurse practitioner.   I personally performed all components of the history, physical, and medical decision making and agree with the assessment and plan with minor modifications as noted. Cont oac/plavix. Cont bb. [id number] Katy Julio for [employer] from cardiology standpoint.     Dinora Diego MD, [facility]        7/27/2019  9:02 AM

## 2019-08-12 RX ORDER — NITROGLYCERIN 0.4 MG/1
0.4 TABLET SUBLINGUAL
Qty: 25 TAB | Refills: 1 | Status: SHIPPED | OUTPATIENT
Start: 2019-08-12 | End: 2022-09-22

## 2019-08-15 ENCOUNTER — OFFICE VISIT (OUTPATIENT)
Dept: CARDIOLOGY CLINIC | Age: 79
End: 2019-08-15

## 2019-08-15 ENCOUNTER — HOSPITAL ENCOUNTER (OUTPATIENT)
Dept: LAB | Age: 79
Discharge: HOME | End: 2019-08-15
Payer: MEDICARE

## 2019-08-15 VITALS
WEIGHT: 281 LBS | SYSTOLIC BLOOD PRESSURE: 130 MMHG | HEIGHT: 72 IN | OXYGEN SATURATION: 98 % | BODY MASS INDEX: 38.06 KG/M2 | DIASTOLIC BLOOD PRESSURE: 68 MMHG | RESPIRATION RATE: 18 BRPM | HEART RATE: 52 BPM

## 2019-08-15 DIAGNOSIS — Z98.890 S/P ABLATION OF ATRIAL FIBRILLATION: ICD-10-CM

## 2019-08-15 DIAGNOSIS — I25.810 CORONARY ARTERY DISEASE INVOLVING CORONARY BYPASS GRAFT OF NATIVE HEART WITHOUT ANGINA PECTORIS: ICD-10-CM

## 2019-08-15 DIAGNOSIS — Z95.1 POSTSURGICAL AORTOCORONARY BYPASS STATUS: ICD-10-CM

## 2019-08-15 DIAGNOSIS — I48.0 PAROXYSMAL ATRIAL FIBRILLATION (HCC): Primary | Chronic | ICD-10-CM

## 2019-08-15 DIAGNOSIS — Z86.79 S/P ABLATION OF ATRIAL FIBRILLATION: ICD-10-CM

## 2019-08-15 DIAGNOSIS — K92.2 GASTROINTESTINAL HEMORRHAGE, UNSPECIFIED GASTROINTESTINAL HEMORRHAGE TYPE: ICD-10-CM

## 2019-08-15 DIAGNOSIS — E78.2 MIXED HYPERLIPIDEMIA: Chronic | ICD-10-CM

## 2019-08-15 PROCEDURE — 80061 LIPID PANEL: CPT

## 2019-08-15 PROCEDURE — 36415 COLL VENOUS BLD VENIPUNCTURE: CPT

## 2019-08-15 PROCEDURE — 85025 COMPLETE CBC W/AUTO DIFF WBC: CPT

## 2019-08-15 NOTE — PROGRESS NOTES
Akilah House DNP, ANP-BC  Subjective/HPI:     Edward Raymundo is a 70 y.o. male is here for transitional care hospital follow-up admitted for chest pain subsequently discovered to have upper GI bleeding, he was taking aspirin Plavix and Eliquis. It was reviewed with patient at last consult with me we were placing the aspirin on hold, unfortunately he continued to take it. Since hospital discharge has remained on Plavix and Eliquis denies bright red blood per rectum or darkening stools. Seen by Dr. Lida Bishop upper GI performed, is pending colonoscopy. Mr. Birgit Palmer reports since his hospital discharge he continues to have intermittent chest pain with exertional activities for example walking his dog. He also admits to having anterior chest discomfort when he is aggravated. To recall he has a history of elective PTCA stenting of the RCA back in this MAY 2019. Hospitalist discharge note:  Brief Admission History/Reason for Admission Per John Milner MD:   Elia Cam is a 70 y.o.   male who presents with above complains from home initially to [facility] ER then transferred to [address] [place] [address] for GI & Cardiac evaluation. Pt presents with CC of Chest pain , intermittent, on exertion x 1 week  H/o improvement with NTG  Pt denies any evidence of blood in stools but had stool +ve for Guaiac in ER  H/o taking ASA/Plavix/Eliquis daily  H/o recent DEVONTE in June 2019  [address] Course by Main Problems:   Acute blood loss anemia due to upper GI bleed causing exertional chest Pain due to demand Ischemia POA  CAD s/p recent Stent in June 2019, s/p CABG in 2001  H/o Afib s/p ablation s/p PPM  FOBT in the setting of triple therapy of ASA, Plavix, and Eliqis. Pt started on IV PPI. He underwent EGD with findings of moderate esophagitis, mild gastritis and normal duodenum. Pt will need to con't PPI daily. F/u with GI for Cscope outpt.     Pt was also evaluated by cardiology with recommendation to stop ASA and cont' BB, Eliquis and plavix. F/u outpt as scheduled. PCP Provider  None  Past Medical History:   Diagnosis Date    Abnormal nuclear cardiac imaging test 2019    Anemia 2017    CKD (chronic kidney disease) 2019    Coronary atherosclerosis of native coronary artery     Diabetes mellitus, type II (HCC)     MCCOLLUM (dyspnea on exertion) 2019    Mixed hyperlipidemia     Other dyspnea and respiratory abnormality     S/P ablation of atrial fibrillation 2016      Past Surgical History:   Procedure Laterality Date    HX CATARACT REMOVAL Bilateral 2018    HX CORONARY ARTERY BYPASS GRAFT  2001    TX INS NEW/RPLCMT PRM PM W/TRANSV ELTRD ATRIAL&VENT N/A 3/29/2019    INSERT PPM DUAL performed by Nadine Jarvis [facility] at [facility] CARDIAC [facility]    TX REMOVAL SUBCUTANEOUS CARDIAC RHYTHM MONITOR N/A 3/29/2019    LOOP RECORDER REMOVAL performed by Nadine Jarvis MD at [facility] CARDIAC CATH LAB    UPPER GI ENDOSCOPY,BIOPSY  2019          Allergies   Allergen Reactions    Ciprofloxacin Other (comments)     Difficulty breathing; 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      Family History   Problem Relation Age of Onset    Diabetes Mother     Emphysema Father          age 46 - smoker      Current Outpatient Medications   Medication Sig    nitroglycerin (NITROSTAT) 0.4 mg SL tablet 1 Tab by SubLINGual route every five (5) minutes as needed for Chest Pain.  pantoprazole (PROTONIX) 40 mg tablet Take 1 Tab by mouth Daily (before breakfast).  rosuvastatin (CRESTOR) 40 mg tablet TAKE 1 TABLET BY MOUTH EVERY DAY    metFORMIN ER (GLUCOPHAGE XR) 500 mg tablet Take 1 Tab by mouth two (2) times a day.  clopidogrel (PLAVIX) 75 mg tab Take 1 Tab by mouth daily.  metoprolol tartrate (LOPRESSOR) 25 mg tablet Take 1 Tab by mouth two (2) times a day.  ELIQUIS 5 mg tablet TAKE 1 TABLET BY MOUTH TWICE A DAY    cholecalciferol (VITAMIN D3) 1,000 unit cap Take 1,000 Units by mouth daily.     ferrous sulfate 324 mg (65 mg iron) tablet Take 324 mg by mouth Daily (before breakfast).  cyanocobalamin 1,000 mcg tablet Take 1,000 mcg by mouth daily. No current facility-administered medications for this visit.        Vitals:    08/15/19 1003 08/15/19 1018   BP: 126/64 130/68   Pulse: (!) 52    Resp: 18    SpO2: 98%    Weight: 281 lb (127.5 kg)    Height: 6' (1.829 m)      Social History     Socioeconomic History    Marital status:      Spouse name: Not on file    Number of children: Not on file    Years of education: Not on file    Highest education level: Not on file   Occupational History    Not on file   Social Needs    Financial resource strain: Not on file    Food insecurity:     Worry: Not on file     Inability: Not on file    Transportation needs:     Medical: Not on file     Non-medical: Not on file   Tobacco Use    Smoking status: Former Smoker     Packs/day: 4.00     Years: 20.00     Pack years: 80.00     Types: Cigarettes     Last attempt to quit: 1980     Years since quittin.0    Smokeless tobacco: Former User    Tobacco comment: QUIT AT AGE 39   Substance and Sexual Activity    Alcohol use: No     Alcohol/week: 0.0 standard drinks    Drug use: Never    Sexual activity: Not on file   Lifestyle    Physical activity:     Days per week: Not on file     Minutes per session: Not on file    Stress: Not on file   Relationships    Social connections:     Talks on phone: Not on file     Gets together: Not on file     Attends [employer] service: Not on file     Active member of club or organization: Not on file     Attends meetings of clubs or organizations: Not on file     Relationship status: Not on file    Intimate partner violence:     Fear of current or ex partner: Not on file     Emotionally abused: Not on file     Physically abused: Not on file     Forced sexual activity: Not on file   Other Topics Concern    Not on file   Social History Narrative    Not on file I have reviewed the nurses notes, vitals, problem list, allergy list, medical history, family, social history and medications. Review of Symptoms:    General: Pt denies excessive weight gain or loss. Pt is able to conduct ADL's  HEENT: Denies blurred vision, headaches, epistaxis and difficulty swallowing. Respiratory: Denies shortness of breath, + MCCOLLUM, wheezing or stridor. Cardiovascular: + Chest pain, denies palpitations, edema or PND  Gastrointestinal: Denies poor appetite, indigestion, abdominal pain or blood in stool  Musculoskeletal: Denies pain or swelling from muscles or joints  Neurologic: Denies tremor, paresthesias, or sensory motor disturbance  Skin: Denies rash, itching or texture change. Physical Exam:      General: Well developed, in no acute distress, cooperative and alert  HEENT: No carotid bruits, no JVD, trach is midline. Neck Supple, PEERL, EOM intact. Heart:  Normal S1/S2 negative S3 or S4. Regular, no murmur, gallop or rub.   Respiratory: Clear bilaterally x 4, no wheezing or rales  Abdomen:   Soft, non-tender, no masses, bowel sounds are active.   Extremities:  No edema, normal cap refill, no cyanosis, atraumatic. Neuro: A&Ox3, speech clear, gait stable. Skin: Skin color is normal. No rashes or lesions.  Non diaphoretic  Vascular: 2+ pulses symmetric in all extremities    Cardiographics    ECG: Sinus rhythm  Results for orders placed or performed during the hospital encounter of 07/25/19   EKG, 12 LEAD, INITIAL   Result Value Ref Range    Ventricular Rate 75 BPM    Atrial Rate 56 BPM    P-R Interval 232 ms    QRS Duration 106 ms    Q-T Interval 384 ms    QTC Calculation (Bezet) 428 ms    Calculated R Axis 24 degrees    Calculated T Axis 50 degrees    Diagnosis       Atrial-paced rhythm with prolonged AV conduction with frequent   supraventricular complexes and with frequent premature ventricular complexes    Confirmed by LUIS Abernathy ([id number]) on 7/26/2019 8:44:18 AM Cardiology Labs:  Lab Results   Component Value Date/Time    Cholesterol, total 83 (L) 07/31/2018 08:52 AM    HDL Cholesterol 31 (L) 07/31/2018 08:52 AM    LDL, calculated 20 07/31/2018 08:52 AM    Triglyceride 160 (H) 07/31/2018 08:52 AM       Lab Results   Component Value Date/Time    Sodium 143 07/26/2019 05:23 AM    Potassium 4.6 07/26/2019 05:23 AM    Chloride 114 (H) 07/26/2019 05:23 AM    CO2 22 07/26/2019 05:23 AM    Anion gap 7 07/26/2019 05:23 AM    Glucose 175 (H) 07/26/2019 05:23 AM    BUN 45 (H) 07/26/2019 05:23 AM    Creatinine 1.85 (H) 07/26/2019 05:23 AM    BUN/Creatinine ratio 24 (H) 07/26/2019 05:23 AM    GFR est AA 43 (L) 07/26/2019 05:23 AM    GFR est non-AA 36 (L) 07/26/2019 05:23 AM    Calcium 8.4 (L) 07/26/2019 05:23 AM    Bilirubin, total 0.5 07/25/2019 09:50 AM    AST (SGOT) 14 (L) 07/25/2019 09:50 AM    Alk. phosphatase 51 07/25/2019 09:50 AM    Protein, total 6.4 07/25/2019 09:50 AM    Albumin 3.3 (L) 07/25/2019 09:50 AM    Globulin 3.1 07/25/2019 09:50 AM    A-G Ratio 1.1 07/25/2019 09:50 AM    ALT (SGPT) 22 07/25/2019 09:50 AM           Assessment:     Assessment:     Diagnoses and all orders for this visit:    1. Paroxysmal atrial fibrillation (HCC)  -     LIPID PANEL  -     CBC WITH AUTOMATED DIFF  -     NUCLEAR CARDIAC STRESS TEST; Future    2. Coronary artery disease involving coronary bypass graft of native heart without angina pectoris  -     LIPID PANEL  -     CBC WITH AUTOMATED DIFF  -     NUCLEAR CARDIAC STRESS TEST; Future    3. Postsurgical aortocoronary bypass status  -     LIPID PANEL  -     CBC WITH AUTOMATED DIFF  -     NUCLEAR CARDIAC STRESS TEST; Future    4. Mixed hyperlipidemia  -     AMB POC EKG ROUTINE W/ 12 LEADS, INTER & REP  -     LIPID PANEL  -     CBC WITH AUTOMATED DIFF  -     NUCLEAR CARDIAC STRESS TEST; Future    5. S/P ablation of atrial fibrillation  -     LIPID PANEL  -     CBC WITH AUTOMATED DIFF  -     NUCLEAR CARDIAC STRESS TEST; Future    6. Gastrointestinal hemorrhage, unspecified gastrointestinal hemorrhage type  -     LIPID PANEL  -     CBC WITH AUTOMATED DIFF  -     NUCLEAR CARDIAC STRESS TEST; Future        ICD-10-CM ICD-9-CM    1. Paroxysmal atrial fibrillation (HCC) I48.0 427.31 LIPID PANEL      CBC WITH AUTOMATED DIFF      NUCLEAR CARDIAC STRESS TEST   2. Coronary artery disease involving coronary bypass graft of native heart without angina pectoris I25.810 414.05 LIPID PANEL      CBC WITH AUTOMATED DIFF      NUCLEAR CARDIAC STRESS TEST   3. Postsurgical aortocoronary bypass status Z95.1 V45.81 LIPID PANEL      CBC WITH AUTOMATED DIFF      NUCLEAR CARDIAC STRESS TEST   4. Mixed hyperlipidemia E78.2 272.2 AMB POC EKG ROUTINE W/ 12 LEADS, INTER & REP      LIPID PANEL      CBC WITH AUTOMATED DIFF      NUCLEAR CARDIAC STRESS TEST   5. S/P ablation of atrial fibrillation Z98.890 V45.89 LIPID PANEL    Z86.79  CBC WITH AUTOMATED DIFF      NUCLEAR CARDIAC STRESS TEST   6. Gastrointestinal hemorrhage, unspecified gastrointestinal hemorrhage type K92.2 578.9 LIPID PANEL      CBC WITH AUTOMATED DIFF      NUCLEAR CARDIAC STRESS TEST     Orders Placed This Encounter    LIPID PANEL    CBC WITH AUTOMATED DIFF    AMB POC EKG ROUTINE W/ 12 LEADS, INTER & REP     Order Specific Question:   Reason for Exam:     Answer:   Routine        Plan:     Patient is a 79-year-old male recently admitted for chest pain discovered to have upper GI bleed hemoglobin 7.0 upon admission. He was taking triple anticoagulant therapy, has since been only taking Eliquis and Plavix. His chest pain at time of admission was felt to be secondary to demand ischemia secondary to profound anemia. Hemoglobin at discharge 8.1, he continues to have exertional chest pain and tightness similar symptoms when he presented in May requiring PTCA stenting.   Will evaluate for ischemia with Lexiscan nuclear stress test.  1.  Atherosclerotic heart disease: Intermittent chest pain as described above evaluate for ischemia with Lexiscan. 2.  Hypertension: Controlled 130/68 continue current medications  3. Atrial fibrillation: Presents in sinus rhythm history of A. fib ablation, anticoagulated Eliquis 5 mg twice a day no signs of active bleeding. 4.  Upper GI bleed: We will repeat CBC today, if his nuclear stress test does not demonstrate ischemia he will be cleared before gastroenterology to perform colonoscopy. Given his PTCA stenting in May was elective it is reasonable to hold Plavix 5 days prior and resume immediately after the colonoscopy, in regards to Eliquis he may hold 2 days prior to colonoscopy. Determination for cardiac clearance will be given once stress test results are posted. Continue PPI  5. Hyperlipidemia: On statin therapy, will check lipid panel today  Follow-up in 4 to 6 weeks. Oksana Foy NP    This note was created using voice recognition software. Despite editing, there may be syntax errors. 8/20/2019: Hemoglobin 6.3, patient went to [address] [place] [address] had blood transfusion. Since blood transfusion symptoms of chest pain shortness of breath have resolved. Case discussed with Dr. Adi Brian will discontinue Eliquis as high risk secondary to GI bleed recurrent, will discontinue Plavix as his PTCA stenting was elective and continues to have blood loss. Will resume aspirin low-dose enteric-coated 81 mg. Follow-up with Dr. Sheela Daugherty. Follow-up with Dr. Adi Brian in 1 month. Given symptoms have resolved and previously demonstrated demand ischemia when anemic will cancel stress test.     Oksana Foy NP     8/21/2019: Patient is cleared from cardiology to proceed with colonoscopy and any additional GI work-up to address active bleed.   Oksana Foy DNP, ANP-BC

## 2019-08-15 NOTE — PROGRESS NOTES
1. Have you been to the ER, urgent care clinic since your last visit? Hospitalized since your last visit? Yes When: on 7/25/19 for GI bleed    2. Have you seen or consulted any other health care providers outside of the [address] [place] [address] since your last visit? Include any pap smears or colon screening.  Yes, Dr. Stevenson    Chief Complaint   Patient presents with   [facility] Follow Up     for GI bleed    Surgical Clearance     for colonoscopy    Chest Pain (Angina)     pt reports dull pain to chest yesterday; took nitro x 2 and felt better; duration about 4-5 mins    Shortness of Breath     on exertion

## 2019-08-15 NOTE — Clinical Note
Getting stress test, if OK will clear for colonoscopy and hold parameters for plavix and eliquis. Thanks [facility]

## 2019-08-16 LAB
BASOPHILS # BLD AUTO: 0 X10E3/UL (ref 0–0.2)
BASOPHILS NFR BLD AUTO: 0 %
CHOLEST SERPL-MCNC: 75 MG/DL (ref 100–199)
EOSINOPHIL # BLD AUTO: 0.1 X10E3/UL (ref 0–0.4)
EOSINOPHIL NFR BLD AUTO: 2 %
ERYTHROCYTE [DISTWIDTH] IN BLOOD BY AUTOMATED COUNT: 16.4 % (ref 12.3–15.4)
HCT VFR BLD AUTO: 21.3 % (ref 37.5–51)
HDLC SERPL-MCNC: 28 MG/DL
HGB BLD-MCNC: 6.3 G/DL (ref 13–17.7)
IMM GRANULOCYTES # BLD AUTO: 0 X10E3/UL (ref 0–0.1)
IMM GRANULOCYTES NFR BLD AUTO: 1 %
INTERPRETATION, 910389: NORMAL
LDLC SERPL CALC-MCNC: 23 MG/DL (ref 0–99)
LYMPHOCYTES # BLD AUTO: 0.7 X10E3/UL (ref 0.7–3.1)
LYMPHOCYTES NFR BLD AUTO: 17 %
MCH RBC QN AUTO: 26.4 PG (ref 26.6–33)
MCHC RBC AUTO-ENTMCNC: 29.6 G/DL (ref 31.5–35.7)
MCV RBC AUTO: 89 FL (ref 79–97)
MONOCYTES # BLD AUTO: 0.5 X10E3/UL (ref 0.1–0.9)
MONOCYTES NFR BLD AUTO: 11 %
NEUTROPHILS # BLD AUTO: 3 X10E3/UL (ref 1.4–7)
NEUTROPHILS NFR BLD AUTO: 69 %
PLATELET # BLD AUTO: 189 X10E3/UL (ref 150–450)
RBC # BLD AUTO: 2.39 X10E6/UL (ref 4.14–5.8)
TRIGL SERPL-MCNC: 122 MG/DL (ref 0–149)
VLDLC SERPL CALC-MCNC: 24 MG/DL (ref 5–40)
WBC # BLD AUTO: 4.3 X10E3/UL (ref 3.4–10.8)

## 2019-08-16 NOTE — PROGRESS NOTES
Spoke to patient using 2 identifiers. Followed up with patient.   He stated he has someone that can take him to the ED at [facility].

## 2019-08-16 NOTE — PROGRESS NOTES
EM. Please call patient CBC shows profound anemia, will need blood transfusion/admission send to ER.

## 2019-08-16 NOTE — PROGRESS NOTES
Spoke to patient using 2 identifiers. Per Napoleon Judd NP, patient was made aware that CBC shows profound anemia and will need blood transfusion/admission to ER. Patient stated he felt fine, but he was advised to make arrangements with spouse or someone that can take him to the ED.

## 2019-08-20 ENCOUNTER — TELEPHONE (OUTPATIENT)
Dept: CARDIOLOGY CLINIC | Age: 79
End: 2019-08-20

## 2019-08-20 RX ORDER — ASPIRIN 81 MG/1
81 TABLET ORAL DAILY
Qty: 90 TAB | Refills: 1
Start: 2019-08-20 | End: 2022-03-29

## 2019-08-20 NOTE — TELEPHONE ENCOUNTER
Spoke to Nancy with Dr. Qing Mistry office that patient is cleared for colonoscopy ASAP per Oksana Foy NP. She will route the message for a call back to the nurse who has left for the day.

## 2019-08-20 NOTE — TELEPHONE ENCOUNTER
Spoke to patient using 2 identifiers. Discussed the following with patient per Zainab Melo NP.    ----- Message from Zainab Melo NP sent at 8/20/2019  1:23 PM EDT -----  Regarding: med changes and cancel test   Wenceslao: Please call patient. Discussed case with Dr. Dangelo vinson we want to discontinue Eliquis at this time, discontinue Plavix at this time. Place him back on a enteric-coated aspirin 81 mg. Can cancel the stress test as he is feeling well after the blood transfusion. He needs to follow-up with Dr. Kailyn Morton to finish the GI work-up for source of bleeding other than the upper GI. Follow-up with Dr. Zhang Francisco in the next month. Patient stated Dr. Kailyn Morton needs clearance from cardiology for colonoscopy. Still ok to proceed? Please advise.

## 2019-08-20 NOTE — TELEPHONE ENCOUNTER
Spoke to patient using 2 identifiers. He stated that he went to [facility] on 8/16/19 for blood transfusion as recommended and feels better. He would like to know if this is something that could potentially be ongoing. He does not have a PCP or GI. Please advise.

## 2019-08-21 DIAGNOSIS — K92.2 GASTROINTESTINAL HEMORRHAGE, UNSPECIFIED GASTROINTESTINAL HEMORRHAGE TYPE: Primary | ICD-10-CM

## 2019-08-21 NOTE — TELEPHONE ENCOUNTER
Spoke to Sofya Mendoza, nurse for Dr. Tolu Alexis. She stated first availability is 9/11/19 for colonoscopy and then [facility] location is booked until Nov and Dec.  I requested that she goes ahead and schedule him for 9/11/19. If you feel this is not soon enough, she said feel free to talk to Dr. Tolu Alex[place].

## 2019-08-22 NOTE — TELEPHONE ENCOUNTER
Office note with clearance faxed to Dr. Rashad Olea office at [phone] with confirmation received.

## 2019-08-23 NOTE — TELEPHONE ENCOUNTER
Spoke to patient using 2 identifiers. He was made aware he has been cleared for colonoscopy procedure and to get labs done next week as indicated on the lab requisition. Will mail lab slip to patient. Patient verbalized understanding.

## 2019-08-28 DIAGNOSIS — K92.2 GASTROINTESTINAL HEMORRHAGE, UNSPECIFIED GASTROINTESTINAL HEMORRHAGE TYPE: ICD-10-CM

## 2019-09-04 ENCOUNTER — HOSPITAL ENCOUNTER (OUTPATIENT)
Dept: LAB | Age: 79
Discharge: HOME | End: 2019-09-04
Payer: MEDICARE

## 2019-09-04 PROCEDURE — 36415 COLL VENOUS BLD VENIPUNCTURE: CPT

## 2019-09-04 PROCEDURE — 85025 COMPLETE CBC W/AUTO DIFF WBC: CPT

## 2019-09-05 LAB
BASOPHILS # BLD AUTO: 0 X10E3/UL (ref 0–0.2)
BASOPHILS NFR BLD AUTO: 1 %
EOSINOPHIL # BLD AUTO: 0.1 X10E3/UL (ref 0–0.4)
EOSINOPHIL NFR BLD AUTO: 2 %
ERYTHROCYTE [DISTWIDTH] IN BLOOD BY AUTOMATED COUNT: 15.8 % (ref 12.3–15.4)
HCT VFR BLD AUTO: 27.8 % (ref 37.5–51)
HGB BLD-MCNC: 8.6 G/DL (ref 13–17.7)
IMM GRANULOCYTES # BLD AUTO: 0 X10E3/UL (ref 0–0.1)
IMM GRANULOCYTES NFR BLD AUTO: 1 %
LYMPHOCYTES # BLD AUTO: 1.7 X10E3/UL (ref 0.7–3.1)
LYMPHOCYTES NFR BLD AUTO: 27 %
MCH RBC QN AUTO: 26.2 PG (ref 26.6–33)
MCHC RBC AUTO-ENTMCNC: 30.9 G/DL (ref 31.5–35.7)
MCV RBC AUTO: 85 FL (ref 79–97)
MONOCYTES # BLD AUTO: 0.6 X10E3/UL (ref 0.1–0.9)
MONOCYTES NFR BLD AUTO: 10 %
NEUTROPHILS # BLD AUTO: 3.8 X10E3/UL (ref 1.4–7)
NEUTROPHILS NFR BLD AUTO: 59 %
PLATELET # BLD AUTO: 222 X10E3/UL (ref 150–450)
RBC # BLD AUTO: 3.28 X10E6/UL (ref 4.14–5.8)
WBC # BLD AUTO: 6.2 X10E3/UL (ref 3.4–10.8)

## 2019-09-05 NOTE — PROGRESS NOTES
Allegra: Please call patient CBC shows improvement in anemia. Continue to stay off of all blood thinners with the exception of an enteric-coated 81 mg aspirin.   Follow-up with Dr. Arriaza Cease as planned next month

## 2019-09-09 NOTE — PROGRESS NOTES
Spoke to patient using 2 identifiers. Per Akilah House NP, patient was made aware his CBC shows improvement in anemia. Continue to stay off all blood thinners with the exception of an enteric coated 81 mg ASA. Follow up with Dr. Swetha Reyez as planned next month. Patient verbalized understanding.

## 2019-10-21 ENCOUNTER — OFFICE VISIT (OUTPATIENT)
Dept: CARDIOLOGY CLINIC | Age: 79
End: 2019-10-21

## 2019-10-21 DIAGNOSIS — Z98.890 S/P ABLATION OF ATRIAL FIBRILLATION: ICD-10-CM

## 2019-10-21 DIAGNOSIS — Z86.79 S/P ABLATION OF ATRIAL FIBRILLATION: ICD-10-CM

## 2019-10-21 DIAGNOSIS — Z95.0 CARDIAC PACEMAKER IN SITU: Primary | ICD-10-CM

## 2019-10-25 ENCOUNTER — OFFICE VISIT (OUTPATIENT)
Dept: CARDIOLOGY CLINIC | Age: 79
End: 2019-10-25

## 2019-10-25 VITALS
RESPIRATION RATE: 18 BRPM | HEIGHT: 72 IN | OXYGEN SATURATION: 95 % | WEIGHT: 283.9 LBS | BODY MASS INDEX: 38.45 KG/M2 | DIASTOLIC BLOOD PRESSURE: 70 MMHG | HEART RATE: 73 BPM | SYSTOLIC BLOOD PRESSURE: 128 MMHG

## 2019-10-25 DIAGNOSIS — I48.0 PAROXYSMAL ATRIAL FIBRILLATION (HCC): ICD-10-CM

## 2019-10-25 DIAGNOSIS — I25.810 CORONARY ARTERY DISEASE INVOLVING CORONARY BYPASS GRAFT OF NATIVE HEART WITHOUT ANGINA PECTORIS: Primary | ICD-10-CM

## 2019-10-25 DIAGNOSIS — E78.2 MIXED HYPERLIPIDEMIA: Chronic | ICD-10-CM

## 2019-10-25 DIAGNOSIS — K29.61 GASTROINTESTINAL HEMORRHAGE ASSOCIATED WITH OTHER GASTRITIS: ICD-10-CM

## 2019-10-25 NOTE — PROGRESS NOTES
1. Have you been to the ER, urgent care clinic since your last visit? Hospitalized since your last visit? No.    2. Have you seen or consulted any other health care providers outside of the [address] [place] [address] since your last visit? Include any pap smears or colon screening.    No.    Chief Complaint   Patient presents with    Coronary Artery Disease     2 month follow up - swelling in rt leg for 2 weeks

## 2019-10-25 NOTE — H&P (VIEW-ONLY)
Jackelyn Briones, [facility] Subjective/HPI:  
 
Mr. Fermin Cerna is a 70 y.o. male is here for routine f/u. He has a PMHx of CAD, PAF, HTN, HLD and hx of GI bleed. Since last visit, he had blood transfusions at [facility] for anemia, Hgb 6.3. He was scheduled for stress testing, but this was canceled as his anginal symptoms improved once his anemia was corrected. He was pending colonoscopy, but this was canceled as his bowel prep was incomplete. He tells me he is not interested in having another bowel prep done. His colonoscopy has been rescheduled for January. He notes chest pain with exertion in the past month when he walks his dog. He denies similar fatigue symptoms associated with his anemia. He denies shortness of breath with exertion. He denies blood in stool, black stools, or blood-tinged toilet paper. PCP Provider None Past Medical History:  
Diagnosis Date  Abnormal nuclear cardiac imaging test 2019  Anemia 2017  CKD (chronic kidney disease) 2019  Coronary atherosclerosis of native coronary artery  Diabetes mellitus, type II ([facility] [address]  MCCOLLUM (dyspnea on exertion) 2019  Mixed hyperlipidemia  Other dyspnea and respiratory abnormality  S/P ablation of atrial fibrillation 2016 Past Surgical History:  
Procedure Laterality Date  HX CATARACT REMOVAL Bilateral 2018  HX CORONARY ARTERY BYPASS GRAFT  2001  KS INS NEW/RPLCMT PRM PM W/TRANSV ELTRD ATRIAL&VENT N/A 3/29/2019 INSERT PPM DUAL performed by Sheridan Anglin MD at [facility] CARDIAC CATH LAB  KS REMOVAL SUBCUTANEOUS CARDIAC RHYTHM MONITOR N/A 3/29/2019 LOOP RECORDER REMOVAL performed by Sheridan Anglin MD at [facility] CARDIAC CATH LAB  UPPER GI ENDOSCOPY,BIOPSY  2019 Family History Problem Relation Age of Onset  Diabetes Mother  Emphysema Father   
      age 46 - smoker Social History Socioeconomic History  Marital status:  Spouse name: Not on file  Number of children: Not on file  Years of education: Not on file  Highest education level: Not on file Occupational History  Not on file Social Needs  Financial resource strain: Not on file  Food insecurity:  
  Worry: Not on file Inability: Not on file  Transportation needs:  
  Medical: Not on file Non-medical: Not on file Tobacco Use  Smoking status: Former Smoker Packs/day: 4.00 Years: 20.00 Pack years: 80.00 Types: Cigarettes Last attempt to quit: 1980 Years since quittin.3  Smokeless tobacco: Former User  Tobacco comment: QUIT AT AGE 42 Substance and Sexual Activity  Alcohol use: No  
  Alcohol/week: 0.0 standard drinks  Drug use: Never  Sexual activity: Not on file Lifestyle  Physical activity:  
  Days per week: Not on file Minutes per session: Not on file  Stress: Not on file Relationships  Social connections:  
  Talks on phone: Not on file Gets together: Not on file Attends Episcopal service: Not on file Active member of club or organization: Not on file Attends meetings of clubs or organizations: Not on file Relationship status: Not on file  Intimate partner violence:  
  Fear of current or ex partner: Not on file Emotionally abused: Not on file Physically abused: Not on file Forced sexual activity: Not on file Other Topics Concern  Not on file Social History Narrative  Not on file Allergies Allergen Reactions  Ciprofloxacin Other (comments) Difficulty breathing; 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 Current Outpatient Medications Medication Sig  
 aspirin delayed-release 81 mg tablet Take 1 Tab by mouth daily.  nitroglycerin (NITROSTAT) 0.4 mg SL tablet 1 Tab by SubLINGual route every five (5) minutes as needed for Chest Pain.  rosuvastatin (CRESTOR) 40 mg tablet TAKE 1 TABLET BY MOUTH EVERY DAY  metFORMIN ER (GLUCOPHAGE XR) 500 mg tablet Take 1 Tab by mouth two (2) times a day.  metoprolol tartrate (LOPRESSOR) 25 mg tablet Take 1 Tab by mouth two (2) times a day.  cholecalciferol (VITAMIN D3) 1,000 unit cap Take 1,000 Units by mouth daily.  cyanocobalamin 1,000 mcg tablet Take 1,000 mcg by mouth daily.  glucose blood VI test strips (ACCU-CHEK YVES PLUS TEST STRP) strip Use to check Blood glucose 3 times daily (Diagnosis code: E11.21) No current facility-administered medications for this visit. I have reviewed the problem list, allergy list, medical history, family, social history and medications. Review of Symptoms: 
 
Review of Systems Constitutional: Negative for chills, fever and weight loss. HENT: Negative for nosebleeds. Eyes: Negative for blurred vision and double vision. Respiratory: Negative for cough, shortness of breath and wheezing. Cardiovascular: Positive for chest pain. Negative for palpitations, orthopnea, leg swelling and PND. Gastrointestinal: Negative for abdominal pain, blood in stool, diarrhea, nausea and vomiting. Musculoskeletal: Negative for joint pain. Skin: Negative for rash. Neurological: Negative for dizziness, tingling and loss of consciousness. Endo/Heme/Allergies: Does not bruise/bleed easily. Physical Exam:   
 
General: Well developed, in no acute distress, cooperative and alert HEENT: No carotid bruits, no JVD, trach is midline. Neck Supple, PEERL, EOM intact. Heart:  reg rate and rhythm; normal S1/S2; no murmurs, gallops or rubs. Respiratory: Clear bilaterally x 4, no wheezing or rales Abdomen:   Soft, non-tender, no distention, no masses. + BS. Extremities:  Normal cap refill, no cyanosis, atraumatic. No edema. Neuro: A&Ox3, speech clear, gait stable. Skin: Skin color is normal. No rashes or lesions. Non diaphoretic Vascular: 2+ pulses symmetric in all extremities Vitals:  
 10/25/19 1113 10/25/19 1123 BP: 126/68 128/70 Pulse: 73 Resp: 18 SpO2: 95% Weight: 283 lb 14.4 oz (128.8 kg) Height: 6' (1.829 m) Cardiographics ECG: sinus rhythm; trigeminy Results for orders placed or performed during the hospital encounter of 07/25/19 EKG, 12 LEAD, INITIAL Result Value Ref Range Ventricular Rate 75 BPM  
 Atrial Rate 56 BPM  
 P-R Interval 232 ms QRS Duration 106 ms  
 Q-T Interval 384 ms QTC Calculation (Bezet) 428 ms Calculated R Axis 24 degrees Calculated T Axis 50 degrees Diagnosis Atrial-paced rhythm with prolonged AV conduction with frequent  
supraventricular complexes and with frequent premature ventricular complexes Confirmed by LUIS Jason ([id number]) on 7/26/2019 8:44:18 AM 
  
 
 
Cardiology Labs: 
Lab Results Component Value Date/Time Cholesterol, total 75 (L) 08/15/2019 11:33 AM  
 HDL Cholesterol 28 (L) 08/15/2019 11:33 AM  
 LDL, calculated 23 08/15/2019 11:33 AM  
 Triglyceride 122 08/15/2019 11:33 AM  
 
 
Lab Results Component Value Date/Time Sodium 143 07/26/2019 05:23 AM  
 Potassium 4.6 07/26/2019 05:23 AM  
 Chloride 114 (H) 07/26/2019 05:23 AM  
 CO2 22 07/26/2019 05:23 AM  
 Anion gap 7 07/26/2019 05:23 AM  
 Glucose 175 (H) 07/26/2019 05:23 AM  
 BUN 45 (H) 07/26/2019 05:23 AM  
 Creatinine 1.85 (H) 07/26/2019 05:23 AM  
 BUN/Creatinine ratio 24 (H) 07/26/2019 05:23 AM  
 GFR est AA 43 (L) 07/26/2019 05:23 AM  
 GFR est non-AA 36 (L) 07/26/2019 05:23 AM  
 Calcium 8.4 (L) 07/26/2019 05:23 AM  
 Bilirubin, total 0.5 07/25/2019 09:50 AM  
 AST (SGOT) 14 (L) 07/25/2019 09:50 AM  
 Alk. phosphatase 51 07/25/2019 09:50 AM  
 Protein, total 6.4 07/25/2019 09:50 AM  
 Albumin 3.3 (L) 07/25/2019 09:50 AM  
 Globulin 3.1 07/25/2019 09:50 AM  
 A-G Ratio 1.1 07/25/2019 09:50 AM  
 ALT (SGPT) 22 07/25/2019 09:50 AM  
  
 
 
 Assessment: 
 
 Assessment: ICD-10-CM ICD-9-CM 1. Coronary artery disease involving coronary bypass graft of native heart without angina pectoris I25.810 414.05 AMB POC EKG ROUTINE W/ 12 LEADS, INTER & REP 2. Paroxysmal atrial fibrillation (HCC) I48.0 427.31   
3. Mixed hyperlipidemia E78.2 272.2 4. Gastrointestinal hemorrhage associated with other gastritis K29.61 535.51 CBC W/O DIFF Plan: 1. Coronary artery disease involving coronary bypass graft of native heart without angina pectoris S/p DEVONTE to mid RCA in 5/2019 Stopped Plavix 3 months post-procedure due to GI bleed which recurred, requiring blood transfusions Notes chest pain symptoms with exertion when he walks his dog. He denies shortness of breath symptoms Will check CBC. If Hgb is stable, will obtain stress testing. If Hgb is low, then symptoms likely due to demand ischemia and recommend blood transfusions Continue ASA therapy only 2. Paroxysmal atrial fibrillation (HCC) No recurrence; off Eliquis due to GI bleeding If he has recurrent A fib, may have to consider Watchman implant Atrial Fibrillation CHADSVASC2 Score Stroke Risk:  
70 y.o. > 76        +4   
male Male     +0  
CHF HX: No    + 0 HTN HX: Yes    +1 Stroke/TIA/Thromboembolism No    +0 Vascular Disease HX: Yes    +1 Diabetes Mellitus Yes    +1 CHADSVASC 2 Score 5      Annual Stroke Risk 7.2% - moderate-high 3. Mixed hyperlipidemia LDL 23 in 8/2019 Continue statin therapy and low fat, low cholesterol diet 4. GI bleed Was scheduled for colonoscopy, but his bowel prep was incomplete, so this was post-poned to January. Urged him to get colonoscopy done -- this will help determine further candidacy for anti-platelet and anti-thrombotic therapies if necessary Recommend to call Dr. Jamal Almanza and discuss options. F/u after testing completed Regian Lima MD

## 2019-10-25 NOTE — PROGRESS NOTES
Sergio Cardenas, [facility]    Subjective/HPI:     Mr. Rolanda Hickman is a 70 y.o. male is here for routine f/u. He has a PMHx of CAD, PAF, HTN, HLD and hx of GI bleed. Since last visit, he had blood transfusions at [facility] for anemia, Hgb 6.3. He was scheduled for stress testing, but this was canceled as his anginal symptoms improved once his anemia was corrected. He was pending colonoscopy, but this was canceled as his bowel prep was incomplete. He tells me he is not interested in having another bowel prep done. His colonoscopy has been rescheduled for January. He notes chest pain with exertion in the past month when he walks his dog. He denies similar fatigue symptoms associated with his anemia. He denies shortness of breath with exertion. He denies blood in stool, black stools, or blood-tinged toilet paper.          PCP Provider  None  Past Medical History:   Diagnosis Date    Abnormal nuclear cardiac imaging test 2019    Anemia 2017    CKD (chronic kidney disease) 2019    Coronary atherosclerosis of native coronary artery     Diabetes mellitus, type II ([facility] [address]     MCCOLLUM (dyspnea on exertion) 2019    Mixed hyperlipidemia     Other dyspnea and respiratory abnormality     S/P ablation of atrial fibrillation 2016      Past Surgical History:   Procedure Laterality Date    HX CATARACT REMOVAL Bilateral 2018    HX CORONARY ARTERY BYPASS GRAFT  2001    KS INS NEW/RPLCMT PRM PM W/TRANSV ELTRD ATRIAL&VENT N/A 3/29/2019    INSERT PPM DUAL performed by Logan Kennedy MD at [facility] CARDIAC CATH [facility]    KS REMOVAL SUBCUTANEOUS CARDIAC RHYTHM MONITOR N/A 3/29/2019    LOOP RECORDER REMOVAL performed by Logan Kennedy MD at [facility] C[facility]IAC CATH LAB    UPPER GI ENDOSCOPY,BIOPSY  2019          Family History   Problem Relation Age of Onset    Diabetes Mother     Emphysema Father          age 46 - smoker     Social History     Socioeconomic History    Marital status:      Spouse name: Not on file    Number of children: Not on file    Years of education: Not on file    Highest education level: Not on file   Occupational History    Not on file   Social Needs    Financial resource strain: Not on file    Food insecurity:     Worry: Not on file     Inability: Not on file    Transportation needs:     Medical: Not on file     Non-medical: Not on file   Tobacco Use    Smoking status: Former Smoker     Packs/day: 4.00     Years: 20.00     Pack years: 80.00     Types: Cigarettes     Last attempt to quit: 1980     Years since quittin.3    Smokeless tobacco: Former User    Tobacco comment: QUIT AT AGE 39   Substance and Sexual Activity    Alcohol use: No     Alcohol/week: 0.0 standard drinks    Drug use: Never    Sexual activity: Not on file   Lifestyle    Physical activity:     Days per week: Not on file     Minutes per session: Not on file    Stress: Not on file   Relationships    Social connections:     Talks on phone: Not on file     Gets together: Not on file     Attends Caodaism service: Not on file     Active member of club or organization: Not on file     Attends meetings of clubs or organizations: Not on file     Relationship status: Not on file    Intimate partner violence:     Fear of current or ex partner: Not on file     Emotionally abused: Not on file     Physically abused: Not on file     Forced sexual activity: Not on file   Other Topics Concern    Not on file   Social History Narrative    Not on file       Allergies   Allergen Reactions    Ciprofloxacin Other (comments)     Difficulty breathing; 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        Current Outpatient Medications   Medication Sig    aspirin delayed-release 81 mg tablet Take 1 Tab by mouth daily.  nitroglycerin (NITROSTAT) 0.4 mg SL tablet 1 Tab by SubLINGual route every five (5) minutes as needed for Chest Pain.     rosuvastatin (CRESTOR) 40 mg tablet TAKE 1 TABLET BY MOUTH EVERY DAY    metFORMIN ER (GLUCOPHAGE XR) 500 mg tablet Take 1 Tab by mouth two (2) times a day.  metoprolol tartrate (LOPRESSOR) 25 mg tablet Take 1 Tab by mouth two (2) times a day.  cholecalciferol (VITAMIN D3) 1,000 unit cap Take 1,000 Units by mouth daily.  cyanocobalamin 1,000 mcg tablet Take 1,000 mcg by mouth daily.  glucose blood VI test strips (ACCU-CHEK YVES PLUS TEST STRP) strip Use to check Blood glucose 3 times daily (Diagnosis code: E11.21)     No current facility-administered medications for this visit. I have reviewed the problem list, allergy list, medical history, family, social history and medications. Review of Symptoms:    Review of Systems   Constitutional: Negative for chills, fever and weight loss. HENT: Negative for nosebleeds. Eyes: Negative for blurred vision and double vision. Respiratory: Negative for cough, shortness of breath and wheezing. Cardiovascular: Positive for chest pain. Negative for palpitations, orthopnea, leg swelling and PND. Gastrointestinal: Negative for abdominal pain, blood in stool, diarrhea, nausea and vomiting. Musculoskeletal: Negative for joint pain. Skin: Negative for rash. Neurological: Negative for dizziness, tingling and loss of consciousness. Endo/Heme/Allergies: Does not bruise/bleed easily. Physical Exam:      General: Well developed, in no acute distress, cooperative and alert  HEENT: No carotid bruits, no JVD, trach is midline. Neck Supple, PEERL, EOM intact. Heart:  reg rate and rhythm; normal S1/S2; no murmurs, gallops or rubs. Respiratory: Clear bilaterally x 4, no wheezing or rales  Abdomen:   Soft, non-tender, no distention, no masses. + BS. Extremities:  Normal cap refill, no cyanosis, atraumatic. No edema. Neuro: A&Ox3, speech clear, gait stable. Skin: Skin color is normal. No rashes or lesions.  Non diaphoretic  Vascular: 2+ pulses symmetric in all extremities    Vitals:    10/25/19 1113 10/25/19 1123   BP: 126/68 128/70   Pulse: 73    Resp: 18    SpO2: 95%    Weight: 283 lb 14.4 oz (128.8 kg)    Height: 6' (1.829 m)        Cardiographics    ECG: sinus rhythm; trigeminy  Results for orders placed or performed during the hospital encounter of 07/25/19   EKG, 12 LEAD, INITIAL   Result Value Ref Range    Ventricular Rate 75 BPM    Atrial Rate 56 BPM    P-R Interval 232 ms    QRS Duration 106 ms    Q-T Interval 384 ms    QTC Calculation (Bezet) 428 ms    Calculated R Axis 24 degrees    Calculated T Axis 50 degrees    Diagnosis       Atrial-paced rhythm with prolonged AV conduction with frequent   supraventricular complexes and with frequent premature ventricular complexes    Confirmed by Radha Herrera P.VCherise ([id number]) on 7/26/2019 8:44:18 AM         Cardiology Labs:  Lab Results   Component Value Date/Time    Cholesterol, total 75 (L) 08/15/2019 11:33 AM    HDL Cholesterol 28 (L) 08/15/2019 11:33 AM    LDL, calculated 23 08/15/2019 11:33 AM    Triglyceride 122 08/15/2019 11:33 AM       Lab Results   Component Value Date/Time    Sodium 143 07/26/2019 05:23 AM    Potassium 4.6 07/26/2019 05:23 AM    Chloride 114 (H) 07/26/2019 05:23 AM    CO2 22 07/26/2019 05:23 AM    Anion gap 7 07/26/2019 05:23 AM    Glucose 175 (H) 07/26/2019 05:23 AM    BUN 45 (H) 07/26/2019 05:23 AM    Creatinine 1.85 (H) 07/26/2019 05:23 AM    BUN/Creatinine ratio 24 (H) 07/26/2019 05:23 AM    GFR est AA 43 (L) 07/26/2019 05:23 AM    GFR est non-AA 36 (L) 07/26/2019 05:23 AM    Calcium 8.4 (L) 07/26/2019 05:23 AM    Bilirubin, total 0.5 07/25/2019 09:50 AM    AST (SGOT) 14 (L) 07/25/2019 09:50 AM    Alk. phosphatase 51 07/25/2019 09:50 AM    Protein, total 6.4 07/25/2019 09:50 AM    Albumin 3.3 (L) 07/25/2019 09:50 AM    Globulin 3.1 07/25/2019 09:50 AM    A-G Ratio 1.1 07/25/2019 09:50 AM    ALT (SGPT) 22 07/25/2019 09:50 AM           Assessment:     Assessment:       ICD-10-CM ICD-9-CM    1.  Coronary artery disease involving coronary bypass graft of native heart without angina pectoris I25.810 414.05 AMB POC EKG ROUTINE W/ 12 LEADS, INTER & REP   2. Paroxysmal atrial fibrillation (HCC) I48.0 427.31    3. Mixed hyperlipidemia E78.2 272.2    4. Gastrointestinal hemorrhage associated with other gastritis K29.61 535.51 CBC W/O DIFF        Plan:     1. Coronary artery disease involving coronary bypass graft of native heart without angina pectoris  S/p DEVONTE to mid RCA in 5/2019  Stopped Plavix 3 months post-procedure due to GI bleed which recurred, requiring blood transfusions  Notes chest pain symptoms with exertion when he walks his dog. He denies shortness of breath symptoms  Will check CBC. If Hgb is stable, will obtain stress testing. If Hgb is low, then symptoms likely due to demand ischemia and recommend blood transfusions  Continue ASA therapy only    2. Paroxysmal atrial fibrillation (HCC)  No recurrence; off Eliquis due to GI bleeding  If he has recurrent A fib, may have to consider Watchman implant  Atrial Fibrillation CHADSVASC2 Score Stroke Risk:   70 y.o. > 76        +4    male Male     +0   CHF HX: No    + 0   HTN HX: Yes    +1   Stroke/TIA/Thromboembolism No    +0   Vascular Disease HX: Yes    +1   Diabetes Mellitus Yes    +1   CHADSVASC 2 Score 5      Annual Stroke Risk 7.2% - moderate-high        3. Mixed hyperlipidemia  LDL 23 in 8/2019  Continue statin therapy and low fat, low cholesterol diet    4. GI bleed  Was scheduled for colonoscopy, but his bowel prep was incomplete, so this was post-poned to January. Urged him to get colonoscopy done -- this will help determine further candidacy for anti-platelet and anti-thrombotic therapies if necessary  Recommend to call Dr. Megan Esparza and discuss options.     F/u after testing completed    Sanaz Baker MD

## 2019-10-25 NOTE — PROGRESS NOTES
Verified patient with 2 identified  Reviewed record in preparation for visit and obtained necessary documentation.

## 2019-10-28 DIAGNOSIS — E11.21 TYPE II DIABETES MELLITUS WITH NEPHROPATHY (HCC): Primary | ICD-10-CM

## 2019-10-28 NOTE — TELEPHONE ENCOUNTER
10/28/2019  11:05 AM      Please see message from answering service regarding a refill and a prabhjot back.           Thanks

## 2019-10-28 NOTE — TELEPHONE ENCOUNTER
----- Message from Ten Kirkland sent at 10/28/2019 10:59 AM EDT -----  Regarding: Dr. Gilda Lainez  General Message/Vendor Calls    Caller's first and last name: Kathy Rocha with [facility]      Reason for call: Requesting a script sent to [employer] plus test strips with exact directions as a diagnosis code. Please send for [facility] Part B coverage. Pt is currently out of test strips.       Callback required yes/no and why: Yes      Best contact number(s):  [phone] fax [phone]      Details to clarify the request:      Ten Kirkland

## 2019-10-29 ENCOUNTER — HOSPITAL ENCOUNTER (OUTPATIENT)
Dept: LAB | Age: 79
Discharge: HOME | End: 2019-10-29
Payer: MEDICARE

## 2019-10-29 PROCEDURE — 85027 COMPLETE CBC AUTOMATED: CPT

## 2019-10-29 PROCEDURE — 36415 COLL VENOUS BLD VENIPUNCTURE: CPT

## 2019-10-30 DIAGNOSIS — I25.119 ATHEROSCLEROSIS OF NATIVE CORONARY ARTERY OF NATIVE HEART WITH ANGINA PECTORIS (HCC): Primary | ICD-10-CM

## 2019-10-30 LAB
ERYTHROCYTE [DISTWIDTH] IN BLOOD BY AUTOMATED COUNT: 18 % (ref 12.3–15.4)
HCT VFR BLD AUTO: 31.9 % (ref 37.5–51)
HGB BLD-MCNC: 9.9 G/DL (ref 13–17.7)
MCH RBC QN AUTO: 26.8 PG (ref 26.6–33)
MCHC RBC AUTO-ENTMCNC: 31 G/DL (ref 31.5–35.7)
MCV RBC AUTO: 86 FL (ref 79–97)
PLATELET # BLD AUTO: 175 X10E3/UL (ref 150–450)
RBC # BLD AUTO: 3.7 X10E6/UL (ref 4.14–5.8)
WBC # BLD AUTO: 5.4 X10E3/UL (ref 3.4–10.8)

## 2019-10-30 NOTE — PROGRESS NOTES
Spoke to patient using 2 identifiers. Per Corey Chopra NP, patient was made aware that his hgb is stable at 9.9 which is an improvement from 8.6 a month ago. This is good. Proceed with stress test given his symptoms. Will notify  to call patient for an appt.

## 2019-10-30 NOTE — PROGRESS NOTES
Vidhi,    Please call the patient and inform that Hgb is stable, 9.9, which is an improvement from 8.6 a month ago. This is good. Let's proceed with stress testing given his symptoms. Order placed, please schedule patient.     Thanks,  [employer]

## 2019-11-11 DIAGNOSIS — E11.21 TYPE II DIABETES MELLITUS WITH NEPHROPATHY (HCC): ICD-10-CM

## 2019-11-15 ENCOUNTER — TELEPHONE (OUTPATIENT)
Dept: CARDIOLOGY CLINIC | Age: 79
End: 2019-11-15

## 2019-11-15 DIAGNOSIS — I25.119 ATHEROSCLEROSIS OF NATIVE CORONARY ARTERY OF NATIVE HEART WITH ANGINA PECTORIS (HCC): Primary | ICD-10-CM

## 2019-11-15 NOTE — TELEPHONE ENCOUNTER
----- Message from Bere Cunningham LPN sent at 41/08/0790  8:33 AM EST -----  Spoke to patient using 2 identifiers. Per Benedicto Burton NP, patient was made aware his stress test was abnormal.  He will need catheterization for further evaluation. Patient agreed but requested that Dr. Joaquín Dumont performs his procedure.

## 2019-11-18 ENCOUNTER — HOSPITAL ENCOUNTER (OUTPATIENT)
Dept: LAB | Age: 79
Discharge: HOME | End: 2019-11-18
Payer: MEDICARE

## 2019-11-18 PROCEDURE — 80053 COMPREHEN METABOLIC PANEL: CPT

## 2019-11-18 PROCEDURE — 36415 COLL VENOUS BLD VENIPUNCTURE: CPT

## 2019-11-18 PROCEDURE — 85610 PROTHROMBIN TIME: CPT

## 2019-11-19 LAB
ALBUMIN SERPL-MCNC: 4.2 G/DL (ref 3.5–4.8)
ALBUMIN/GLOB SERPL: 1.6 {RATIO} (ref 1.2–2.2)
ALP SERPL-CCNC: 69 IU/L (ref 39–117)
ALT SERPL-CCNC: 12 IU/L (ref 0–44)
AST SERPL-CCNC: 14 IU/L (ref 0–40)
BILIRUB SERPL-MCNC: 0.3 MG/DL (ref 0–1.2)
BUN SERPL-MCNC: 39 MG/DL (ref 8–27)
BUN/CREAT SERPL: 22 (ref 10–24)
CALCIUM SERPL-MCNC: 9.5 MG/DL (ref 8.6–10.2)
CHLORIDE SERPL-SCNC: 102 MMOL/L (ref 96–106)
CO2 SERPL-SCNC: 19 MMOL/L (ref 20–29)
CREAT SERPL-MCNC: 1.77 MG/DL (ref 0.76–1.27)
GLOBULIN SER CALC-MCNC: 2.6 G/DL (ref 1.5–4.5)
GLUCOSE SERPL-MCNC: 262 MG/DL (ref 65–99)
INR PPP: 1.1 (ref 0.8–1.2)
INTERPRETATION: NORMAL
POTASSIUM SERPL-SCNC: 4.8 MMOL/L (ref 3.5–5.2)
PROT SERPL-MCNC: 6.8 G/DL (ref 6–8.5)
PROTHROMBIN TIME: 11.1 SEC (ref 9.1–12)
SODIUM SERPL-SCNC: 139 MMOL/L (ref 134–144)

## 2019-11-21 ENCOUNTER — HOSPITAL ENCOUNTER (OUTPATIENT)
Age: 79
Discharge: HOME | End: 2019-11-22
Attending: INTERNAL MEDICINE | Admitting: INTERNAL MEDICINE
Payer: MEDICARE

## 2019-11-21 DIAGNOSIS — R07.9 CHEST PAIN, UNSPECIFIED TYPE: ICD-10-CM

## 2019-11-21 LAB
GLUCOSE BLD STRIP.AUTO-MCNC: 248 MG/DL (ref 65–100)
SERVICE CMNT-IMP: ABNORMAL

## 2019-11-21 PROCEDURE — 74011000258 HC RX REV CODE- 258: Performed by: INTERNAL MEDICINE

## 2019-11-21 PROCEDURE — C1894 INTRO/SHEATH, NON-LASER: HCPCS | Performed by: INTERNAL MEDICINE

## 2019-11-21 PROCEDURE — 80053 COMPREHEN METABOLIC PANEL: CPT

## 2019-11-21 PROCEDURE — 99152 MOD SED SAME PHYS/QHP 5/>YRS: CPT | Performed by: INTERNAL MEDICINE

## 2019-11-21 PROCEDURE — 93455 CORONARY ART/GRFT ANGIO S&I: CPT | Performed by: INTERNAL MEDICINE

## 2019-11-21 PROCEDURE — 92933 PRQ TRLML C ATHRC ST ANGIOP1: CPT | Performed by: INTERNAL MEDICINE

## 2019-11-21 PROCEDURE — 77030004549 HC CATH ANGI DX PRF MRTM -A: Performed by: INTERNAL MEDICINE

## 2019-11-21 PROCEDURE — C1725 CATH, TRANSLUMIN NON-LASER: HCPCS | Performed by: INTERNAL MEDICINE

## 2019-11-21 PROCEDURE — 77030010221 HC SPLNT WR POS TELE -B: Performed by: INTERNAL MEDICINE

## 2019-11-21 PROCEDURE — 74011636320 HC RX REV CODE- 636/320: Performed by: INTERNAL MEDICINE

## 2019-11-21 PROCEDURE — 93458 L HRT ARTERY/VENTRICLE ANGIO: CPT | Performed by: INTERNAL MEDICINE

## 2019-11-21 PROCEDURE — 82962 GLUCOSE BLOOD TEST: CPT

## 2019-11-21 PROCEDURE — C1769 GUIDE WIRE: HCPCS | Performed by: INTERNAL MEDICINE

## 2019-11-21 PROCEDURE — 77030030195 HC CATH ANGI DX PRF4 MRTM -A: Performed by: INTERNAL MEDICINE

## 2019-11-21 PROCEDURE — 77030008591 HC TRAP FNGR HK CNMD -B: Performed by: INTERNAL MEDICINE

## 2019-11-21 PROCEDURE — 92920 PRQ TRLUML C ANGIOP 1ART&/BR: CPT | Performed by: INTERNAL MEDICINE

## 2019-11-21 PROCEDURE — 77030008543 HC TBNG MON PRSS MRTM -A: Performed by: INTERNAL MEDICINE

## 2019-11-21 PROCEDURE — 76937 US GUIDE VASCULAR ACCESS: CPT | Performed by: INTERNAL MEDICINE

## 2019-11-21 PROCEDURE — 74011000250 HC RX REV CODE- 250: Performed by: INTERNAL MEDICINE

## 2019-11-21 PROCEDURE — 85027 COMPLETE CBC AUTOMATED: CPT

## 2019-11-21 PROCEDURE — 74011250637 HC RX REV CODE- 250/637: Performed by: INTERNAL MEDICINE

## 2019-11-21 PROCEDURE — 77030004543 HC CATH ANGI DX MRTM -A: Performed by: INTERNAL MEDICINE

## 2019-11-21 PROCEDURE — 74011250636 HC RX REV CODE- 250/636: Performed by: INTERNAL MEDICINE

## 2019-11-21 PROCEDURE — 99153 MOD SED SAME PHYS/QHP EA: CPT | Performed by: INTERNAL MEDICINE

## 2019-11-21 PROCEDURE — 77030016704 HC CATH ANGI DX PRF1 MRTM -B: Performed by: INTERNAL MEDICINE

## 2019-11-21 PROCEDURE — 77030019569 HC BND COMPR RAD TERU -B: Performed by: INTERNAL MEDICINE

## 2019-11-21 PROCEDURE — 77030012468 HC VLV BLEEDBK CNTRL ABBT -B: Performed by: INTERNAL MEDICINE

## 2019-11-21 PROCEDURE — C1887 CATHETER, GUIDING: HCPCS | Performed by: INTERNAL MEDICINE

## 2019-11-21 PROCEDURE — 77030019697 HC SYR ANGI INFL MRTM -B: Performed by: INTERNAL MEDICINE

## 2019-11-21 PROCEDURE — 77030019698 HC SYR ANGI MDLON MRTM -A: Performed by: INTERNAL MEDICINE

## 2019-11-21 PROCEDURE — 36415 COLL VENOUS BLD VENIPUNCTURE: CPT

## 2019-11-21 RX ORDER — FENTANYL CITRATE 50 UG/ML
INJECTION, SOLUTION INTRAMUSCULAR; INTRAVENOUS AS NEEDED
Status: DISCONTINUED | OUTPATIENT
Start: 2019-11-21 | End: 2019-11-21

## 2019-11-21 RX ORDER — SODIUM CHLORIDE 9 MG/ML
200 INJECTION, SOLUTION INTRAVENOUS CONTINUOUS
Status: DISCONTINUED | OUTPATIENT
Start: 2019-11-21 | End: 2019-11-22 | Stop reason: HOSPADM

## 2019-11-21 RX ORDER — HEPARIN SODIUM 1000 [USP'U]/ML
INJECTION, SOLUTION INTRAVENOUS; SUBCUTANEOUS AS NEEDED
Status: DISCONTINUED | OUTPATIENT
Start: 2019-11-21 | End: 2019-11-21

## 2019-11-21 RX ORDER — HEPARIN SODIUM 200 [USP'U]/100ML
INJECTION, SOLUTION INTRAVENOUS
Status: DISCONTINUED | OUTPATIENT
Start: 2019-11-21 | End: 2019-11-21

## 2019-11-21 RX ORDER — VERAPAMIL HYDROCHLORIDE 2.5 MG/ML
INJECTION, SOLUTION INTRAVENOUS AS NEEDED
Status: DISCONTINUED | OUTPATIENT
Start: 2019-11-21 | End: 2019-11-21

## 2019-11-21 RX ORDER — GUAIFENESIN 100 MG/5ML
81 LIQUID (ML) ORAL
Status: COMPLETED | OUTPATIENT
Start: 2019-11-21 | End: 2019-11-21

## 2019-11-21 RX ORDER — MIDAZOLAM HYDROCHLORIDE 1 MG/ML
INJECTION, SOLUTION INTRAMUSCULAR; INTRAVENOUS AS NEEDED
Status: DISCONTINUED | OUTPATIENT
Start: 2019-11-21 | End: 2019-11-21

## 2019-11-21 RX ORDER — LIDOCAINE HYDROCHLORIDE 10 MG/ML
INJECTION, SOLUTION EPIDURAL; INFILTRATION; INTRACAUDAL; PERINEURAL AS NEEDED
Status: DISCONTINUED | OUTPATIENT
Start: 2019-11-21 | End: 2019-11-21

## 2019-11-21 RX ORDER — CLOPIDOGREL BISULFATE 75 MG/1
TABLET ORAL AS NEEDED
Status: DISCONTINUED | OUTPATIENT
Start: 2019-11-21 | End: 2019-11-21

## 2019-11-21 RX ADMIN — SODIUM CHLORIDE 200 ML/HR: 900 INJECTION, SOLUTION INTRAVENOUS at 11:26

## 2019-11-21 RX ADMIN — ASPIRIN 81 MG 81 MG: 81 TABLET ORAL at 11:06

## 2019-11-21 NOTE — Clinical Note
Lesion located in the Mid RCA. Balloon inserted. Balloon inflated using multiple inflations inflation technique. Pressure = 12 lorraine; Duration = 12 sec. Inflation 2: Pressure: 12 lorraine; Duration: 20 sec.

## 2019-11-21 NOTE — Clinical Note
TRANSFER - OUT REPORT:     Verbal report given to: Wolfgang Arredondo. Report consisted of patient's Situation, Background, Assessment and   Recommendations(SBAR). Opportunity for questions and clarification was provided. Patient transported with a Registered Nurse and [address] [place] [address] / [employer] [email or web address] [email or web address]. Patient transported to: [facility].

## 2019-11-21 NOTE — Clinical Note
Lesion located in the Mid RCA. Balloon inserted. Balloon inflated using multiple inflations inflation technique. Pressure = 8 lorraine; Duration = 25 sec. Inflation 2: Pressure: 122 lorraine; Duration: 28 sec.

## 2019-11-21 NOTE — Clinical Note
Lesion located in the Mid RCA. Balloon inserted. Balloon inflated using single inflation technique. Pressure = 20 lorraine; Duration = 30 sec.

## 2019-11-22 VITALS
HEART RATE: 71 BPM | TEMPERATURE: 97.8 F | BODY MASS INDEX: 38.33 KG/M2 | OXYGEN SATURATION: 98 % | RESPIRATION RATE: 17 BRPM | SYSTOLIC BLOOD PRESSURE: 141 MMHG | HEIGHT: 72 IN | WEIGHT: 283 LBS | DIASTOLIC BLOOD PRESSURE: 68 MMHG

## 2019-11-22 LAB
ALBUMIN SERPL-MCNC: 3.1 G/DL (ref 3.5–5)
ALBUMIN/GLOB SERPL: 1 {RATIO} (ref 1.1–2.2)
ALP SERPL-CCNC: 62 U/L (ref 45–117)
ALT SERPL-CCNC: 16 U/L (ref 12–78)
ANION GAP SERPL CALC-SCNC: 5 MMOL/L (ref 5–15)
AST SERPL-CCNC: 14 U/L (ref 15–37)
BILIRUB SERPL-MCNC: 0.4 MG/DL (ref 0.2–1)
BUN SERPL-MCNC: 36 MG/DL (ref 6–20)
BUN/CREAT SERPL: 20 (ref 12–20)
CALCIUM SERPL-MCNC: 8.2 MG/DL (ref 8.5–10.1)
CHLORIDE SERPL-SCNC: 112 MMOL/L (ref 97–108)
CO2 SERPL-SCNC: 24 MMOL/L (ref 21–32)
CREAT SERPL-MCNC: 1.83 MG/DL (ref 0.7–1.3)
ERYTHROCYTE [DISTWIDTH] IN BLOOD BY AUTOMATED COUNT: 18.4 % (ref 11.5–14.5)
GLOBULIN SER CALC-MCNC: 3.1 G/DL (ref 2–4)
GLUCOSE BLD STRIP.AUTO-MCNC: 230 MG/DL (ref 65–100)
GLUCOSE SERPL-MCNC: 256 MG/DL (ref 65–100)
HCT VFR BLD AUTO: 26.7 % (ref 36.6–50.3)
HGB BLD-MCNC: 8.5 G/DL (ref 12.1–17)
MCH RBC QN AUTO: 27.2 PG (ref 26–34)
MCHC RBC AUTO-ENTMCNC: 31.8 G/DL (ref 30–36.5)
MCV RBC AUTO: 85.3 FL (ref 80–99)
NRBC # BLD: 0 K/UL (ref 0–0.01)
NRBC BLD-RTO: 0 PER 100 WBC
PLATELET # BLD AUTO: 151 K/UL (ref 150–400)
PMV BLD AUTO: 10.8 FL (ref 8.9–12.9)
POTASSIUM SERPL-SCNC: 4.2 MMOL/L (ref 3.5–5.1)
PROT SERPL-MCNC: 6.2 G/DL (ref 6.4–8.2)
RBC # BLD AUTO: 3.13 M/UL (ref 4.1–5.7)
SERVICE CMNT-IMP: ABNORMAL
SODIUM SERPL-SCNC: 141 MMOL/L (ref 136–145)
WBC # BLD AUTO: 4.5 K/UL (ref 4.1–11.1)

## 2019-11-22 PROCEDURE — 82962 GLUCOSE BLOOD TEST: CPT

## 2019-11-22 PROCEDURE — 74011250637 HC RX REV CODE- 250/637: Performed by: NURSE PRACTITIONER

## 2019-11-22 RX ORDER — METOPROLOL TARTRATE 25 MG/1
25 TABLET, FILM COATED ORAL 2 TIMES DAILY
Status: DISCONTINUED | OUTPATIENT
Start: 2019-11-22 | End: 2019-11-22 | Stop reason: HOSPADM

## 2019-11-22 RX ORDER — CLOPIDOGREL BISULFATE 75 MG/1
75 TABLET ORAL DAILY
Status: DISCONTINUED | OUTPATIENT
Start: 2019-11-22 | End: 2019-11-22 | Stop reason: HOSPADM

## 2019-11-22 RX ORDER — CLOPIDOGREL BISULFATE 75 MG/1
75 TABLET ORAL DAILY
Qty: 30 TAB | Refills: 11 | Status: SHIPPED | OUTPATIENT
Start: 2019-11-22 | End: 2022-03-29

## 2019-11-22 RX ORDER — ASPIRIN 81 MG/1
81 TABLET ORAL DAILY
Status: DISCONTINUED | OUTPATIENT
Start: 2019-11-22 | End: 2019-11-22 | Stop reason: HOSPADM

## 2019-11-22 RX ORDER — METFORMIN HYDROCHLORIDE 500 MG/1
500 TABLET, EXTENDED RELEASE ORAL 2 TIMES DAILY
Qty: 120 TAB | Refills: 10 | Status: SHIPPED | OUTPATIENT
Start: 2019-11-22 | End: 2020-12-14 | Stop reason: SDUPTHER

## 2019-11-22 RX ADMIN — ASPIRIN 81 MG: 81 TABLET, COATED ORAL at 09:43

## 2019-11-22 RX ADMIN — CLOPIDOGREL BISULFATE 75 MG: 75 TABLET ORAL at 09:43

## 2019-11-22 RX ADMIN — METOPROLOL TARTRATE 25 MG: 25 TABLET ORAL at 09:43

## 2019-11-22 NOTE — PROGRESS NOTES
1900: Received report from day shift nurse Guillermina Oakes. Reviewed SBAR, Kardex, I/O, MAR, Procedural information and Recent results. VSS, Paced, PVCs (rhythm), RA (oxygenation). A/O X 4  Pt resting in bed/sitting up in the bed. Family at bedside. Cath lab site: L SNFF box, CDI, no bleeding/hematoma. Pt reporting no CP/SOB. 2110: TR band off, gauze and tegaderm to cover. CDI, no bleeding/hematoma. No complaints. VSS, Paced/PVCs, no complaints. Pt resting in bed, family at bedside. 2300: VSS, Paced/PVCs, no complaints. RR site CDI, no bleeding/hematoma. 0300: VSS, Paced/PVCs, no complaints. RR site CDI, no bleeding/hematoma. 0700: Bedside and Verbal shift change report given to day shift nurse Trace Kinsey (oncoming nurse) by Angel Weston RN (offgoing nurse). Report included the following information: SBAR, Kardex, Intake/Output, MAR, Procedural information, and Recent Results.

## 2019-11-22 NOTE — PROGRESS NOTES
Problem: Falls - Risk of  Goal: *Absence of Falls  Description  Document Suzan Ruts Fall Risk and appropriate interventions in the flowsheet.   Outcome: Progressing Towards Goal  Note: Fall Risk Interventions:            Medication Interventions: Teach patient to arise slowly                   Problem: Patient Education: Go to Patient Education Activity  Goal: Patient/Family Education  Outcome: Progressing Towards Goal     Problem: Patient Education: Go to Patient Education Activity  Goal: Patient/Family Education  Outcome: Progressing Towards Goal     Problem: Cath Lab Procedures: Post-Cath Day 1  Goal: Off Pathway (Use only if patient is Off Pathway)  Outcome: Progressing Towards Goal  Goal: Activity/Safety  Outcome: Progressing Towards Goal  Goal: Diagnostic Test/Procedures  Outcome: Progressing Towards Goal  Goal: Nutrition/Diet  Outcome: Progressing Towards Goal  Goal: Discharge Planning  Outcome: Progressing Towards Goal  Goal: Medications  Outcome: Progressing Towards Goal  Goal: Respiratory  Outcome: Progressing Towards Goal  Goal: Treatments/Interventions/Procedures  Outcome: Progressing Towards Goal  Goal: Psychosocial  Outcome: Progressing Towards Goal

## 2019-11-22 NOTE — DISCHARGE INSTRUCTIONS
[address]  [phone]        Patient ID:  Nicole Daniel  [id number]  59 y.o.  1940    Admit Date: 11/21/2019    Discharge Date: 11/22/2019     Admitting Physician: Chepe Franklin MD     Discharge Physician: Gisell Jensen NP    Admission Diagnoses:   Chest pain, unspecified type [R07.9]    Discharge Diagnoses: Active Problems:    Mixed hyperlipidemia (4/25/2012)      Type 2 diabetes with nephropathy ([address] (4/10/2018)      S/P cardiac cath (5/7/2019)      Overview: 11/21/19 PTCA to RCA (no stent)      5/7/19: DEVONTE to RCA        Discharge Condition: Good    Cardiology Procedures this Admission:  Left heart catheterization with PCI    Disposition: home    Reference discharge instructions provided by nursing for diet and activity. Signed:  Gisell Jensen NP  11/22/2019  8:50 AM      Radial Cardiac Catheterization/Angiography Discharge Instructions    It is normal to feel tired the first couple days. Take it easy and follow the physicians instructions. CHECK THE CATHETER INSERTION SITE DAILY:    Remove the wrist dressing 24 hours after the procedure. You may shower 24 hours after the procedure. Wash with soap and water and pat dry. Gentle cleaning of the site with soap and water is sufficient, cover with a dry clean dressing or bandage. Do not apply creams or powders to the area. No soaking the wrist for 3 days. Leave the puncture site open to air after 24 hours post-procedure. CALL THE PHYSICIANS:     If the site becomes red, swollen or feels warm to the touch  If there is bleeding or drainage or if there is unusual pain at the radial site. If there is any minor oozing, you may apply a band-aid and remove after 12 hours.    If the bleeding continues, hold pressure with the middle finger against the puncture site and the thumb against the back of the wrist,call 911 to be transported to the hospital.  DO  [facility], OR HAVE ANYONE ELSE DRIVE YOU - CALL [phone]. ACTIVITY:   For the first 24 hours do not manipulate the wrist.  No lifting, pushing or pulling over 3-5 pounds with the affected wrist for 7 daysand no straining the insertion site. Do not life grocery bags or the garbage can, do not run the vacuum  or  for 7 days. Start with short walks as in the hospital and gradually increase as tolerated each day. It is recommended to walk 30 minutes 5-7 days per week. Follow your physicians instructions on activity. Avoid walking outside in extremes of heat or cold. Walk inside when it is cold and windy or hot and humid. Things to keep in mind:  No driving for at least 24 hours, or as designated by your physician. Limit the number of times you go up and down the stairs  Take rests and pace yourself with activity. Be careful and do not strain with bowel movements. MEDICATIONS:    Take all medications as prescribed  Call your physician if you have any questions  Keep an updated list of your medications with you at all times and give a list to your physician and pharmacist    SIGNS AND SYMPTOMS:   Be cautious of symptoms of angina or recurrent symptoms such as chest discomfort, unusual shortness of breath or fatigue. These could be symptoms of restenosis, a new blockage or a heart attack. If your symptoms are relieved with rest it is still recommended that you notify your physician of recurrent chest pain or discomfort. For CHEST PAIN or symptoms of angina not relieved with rest:  If the discomfort is not relieved with rest, and you have been prescribed Nitroglycerin, take as directed (taken under the tongue, one at a time 5 minutes apart for a total of 3 doses). If the discomfort is not relieved after the 3rd nitroglycerin, call 911. If you have not been prescribed Nitroglycerin  and your chest discomfort is not relieved with rest, call 911.      AFTER CARE:   Follow up with your physician as instructed. Follow a heart healthy diet with proper portion control, daily stress management, daily exercise, blood pressure and cholesterol control , and smoking cessation.

## 2019-11-22 NOTE — CARDIO/PULMONARY
Cardiac Rehab Note: chart review     S/P PCI/stenting on 11/21/2019    Smoking history assessed. Patient is a former smoker. EF 51%  on 11/21/2019 per nuc stress    CAD education folder, with heart heathy diet, warning signs, heart facts, catheterization brochure, and out patient cardiac rehab program provided to Carlos Eduardo Lindsay on 11/22/2019                                              Educated using teach back method. Reviewed CAD diagnosis definition and purpose of intervention. Discussed risk factors for CAD to include the following: family history, elevated BMI, hyperlipidemia, hypertension, diabetes, and stress. Discussed Heart Healthy/Low Sodium (less than 2000 mg) diet. Pt states he needs to make appt with Dr. Mae Macias for diabetes f/u as he is \"really not doing anything much for this. \"    Reviewed the importance of medication compliance(Plavix), follow up appointments with cardiologist, signs and symptoms of angina, and what to report to physician after discharge. Pt lives in [place] and declined OP Cardiac Rehab due to distance and cost of gas. Encouraged walking 3-5 x week for 30-45 min gradually working up to this. Pt walks dog but states he has to stop and wait for dog frequently. Reminded he could also join a close by gym if available. Reminded no fried foods and ask for nutritional info if eating out/moderation,low to no marbled meats,no processed food. Emphasized the value of cardiac rehab. Discussed Cardiac Rehab Program format, benefits, and encouraged enrollment to assist with risk modification and management. Carlos Eduardo Lindsay verbalized understanding with questions answered.   Angel Garcia RN

## 2019-11-22 NOTE — PROGRESS NOTES
[address]  [phone]      Cardiology Progress Note      11/22/2019 10:02 AM    Admit Date: 11/21/2019    Admit Diagnosis:   Chest pain, unspecified type [R07.9]    Subjective:     Minesh Kaba has no c/o CP, SOB. S/P PTCA of RCA, unable to deploy stent. Visit Vitals  /68   Pulse 71   Temp 97.8 °F (36.6 °C)   Resp 17   Ht 6' (1.829 m)   Wt 128.4 kg (283 lb)   SpO2 98%   BMI 38.38 kg/m²       Current Facility-Administered Medications   Medication Dose Route Frequency    aspirin delayed-release tablet 81 mg  81 mg Oral DAILY    metoprolol tartrate (LOPRESSOR) tablet 25 mg  25 mg Oral BID    clopidogrel (PLAVIX) tablet 75 mg  75 mg Oral DAILY    0.9% sodium chloride infusion  200 mL/hr IntraVENous CONTINUOUS       Objective:      Physical Exam:  General Appearance:  obese  male in no acute distress  Chest:   Clear  Cardiovascular:  Regular rate and rhythm, no murmur. Abdomen:   Soft, non-tender, bowel sounds are active. Extremities: right radial site D/I no  hematoma  Skin:  Warm and dry. Data Review:   Recent Labs     11/21/19  2339   WBC 4.5   HGB 8.5*   HCT 26.7*        Recent Labs     11/21/19  2339      K 4.2   *   CO2 24   *   BUN 36*   CREA 1.83*   CA 8.2*   ALB 3.1*   TBILI 0.4   SGOT 14*   ALT 16       No results for input(s): TROIQ, CPK, CKMB in the last 72 hours. Intake/Output Summary (Last 24 hours) at 11/22/2019 1002  Last data filed at 11/22/2019 0701  Gross per 24 hour   Intake 360 ml   Output 1300 ml   Net -940 ml        Telemetry: paced    Assessment:     Active Problems:    Mixed hyperlipidemia (4/25/2012)      Type 2 diabetes with nephropathy ([facility] (4/10/2018)      S/P cardiac cath (5/7/2019)      Overview: 11/21/19 PTCA to RCA (no stent)      5/7/19: DEVONTE to RCA        Plan:     Angina class III:  S/p PTCA of RCA, unable to deploy stent.    Continue on Plavix 75mg daily, ASA, BB, statin    F/u with Dr. Arreguin Numbers [facility]  Cardiology

## 2019-12-09 ENCOUNTER — OFFICE VISIT (OUTPATIENT)
Dept: CARDIOLOGY CLINIC | Age: 79
End: 2019-12-09

## 2019-12-09 VITALS
HEART RATE: 59 BPM | BODY MASS INDEX: 38.22 KG/M2 | WEIGHT: 282.2 LBS | SYSTOLIC BLOOD PRESSURE: 130 MMHG | OXYGEN SATURATION: 96 % | HEIGHT: 72 IN | DIASTOLIC BLOOD PRESSURE: 68 MMHG | RESPIRATION RATE: 20 BRPM

## 2019-12-09 DIAGNOSIS — I48.0 PAROXYSMAL ATRIAL FIBRILLATION (HCC): Primary | Chronic | ICD-10-CM

## 2019-12-09 DIAGNOSIS — Z95.1 POSTSURGICAL AORTOCORONARY BYPASS STATUS: ICD-10-CM

## 2019-12-09 DIAGNOSIS — I25.810 CORONARY ARTERY DISEASE INVOLVING CORONARY BYPASS GRAFT OF NATIVE HEART WITHOUT ANGINA PECTORIS: ICD-10-CM

## 2019-12-09 DIAGNOSIS — I49.5 SSS (SICK SINUS SYNDROME) (HCC): Chronic | ICD-10-CM

## 2019-12-09 RX ORDER — RANOLAZINE 500 MG/1
500 TABLET, EXTENDED RELEASE ORAL 2 TIMES DAILY
Qty: 60 TAB | Refills: 3 | Status: SHIPPED | OUTPATIENT
Start: 2019-12-09 | End: 2020-04-27

## 2019-12-09 NOTE — PROGRESS NOTES
[facility] [address] DNP, ANP-BC  Subjective/HPI:     Vee Patino is a 70 y.o. male is here for follow-up from cardiac catheterization. Had stenosis in the mid RCA out side of the stent due to calcium build up, angioplasty has residual 50% stenosis. Patient reports feeling well at rest however with raking leaves or taking his dog for walk he does experience intermittent chest discomfort but does not want to admit that its pain. He says the discomfort will alleviate after a minute of rest.  In regards to being back on dual antiplatelet therapy he denies any darkening of his stools. Denies any fatigue lightheadedness or dizziness. He also reports having new onset of diffuse strange weakness type sensation bilateral arms and legs.     PCP Provider  None  Past Medical History:   Diagnosis Date    Abnormal nuclear cardiac imaging test 5/8/2019    Anemia 6/5/2017    CKD (chronic kidney disease) 5/8/2019    Coronary atherosclerosis of native coronary artery     Diabetes mellitus, type II (HCC)     MCCOLLUM (dyspnea on exertion) 5/8/2019    Mixed hyperlipidemia     Other dyspnea and respiratory abnormality     S/P ablation of atrial fibrillation 8/4/2016      Past Surgical History:   Procedure Laterality Date    HX CATARACT REMOVAL Bilateral 2018    HX CORONARY ARTERY BYPASS GRAFT  2001    HX HEART CATHETERIZATION  11/21/2019    Left     IA INS NEW/RPLCMT PRM PM W/TRANSV ELTRD ATRIAL&VENT N/A 3/29/2019    INSERT PPM DUAL performed by Kaylee Jarquin MD at [facility] CARDIAC CATH [facility]    IA REMOVAL SUBCUTANEOUS CARDIAC RHYTHM MONITOR N/A 3/29/2019    LOOP RECORDER REMOVAL performed by Kaylee Jarquin MD at [facility] CARDIAC CATH LAB    UPPER GI ENDOSCOPY,BIOPSY  7/26/2019          Allergies   Allergen Reactions    Ciprofloxacin Other (comments)     Difficulty breathing; 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      Family History   Problem Relation Age of Onset    Diabetes Mother     Emphysema Father          age 46 - smoker      Current Outpatient Medications   Medication Sig    ranolazine ER (RANEXA) 500 mg SR tablet Take 1 Tab by mouth two (2) times a day.  metFORMIN ER (GLUCOPHAGE XR) 500 mg tablet Take 1 Tab by mouth two (2) times a day. Please restart Metformin on 19    clopidogrel (PLAVIX) 75 mg tab Take 1 Tab by mouth daily.  glucose blood VI test strips (ACCU-CHEK YVES PLUS TEST STRP) strip Use to check Blood glucose 3 times daily (Diagnosis code: E11.21)    aspirin delayed-release 81 mg tablet Take 1 Tab by mouth daily.  nitroglycerin (NITROSTAT) 0.4 mg SL tablet 1 Tab by SubLINGual route every five (5) minutes as needed for Chest Pain.  rosuvastatin (CRESTOR) 40 mg tablet TAKE 1 TABLET BY MOUTH EVERY DAY    metoprolol tartrate (LOPRESSOR) 25 mg tablet Take 1 Tab by mouth two (2) times a day.  cholecalciferol (VITAMIN D3) 1,000 unit cap Take 1,000 Units by mouth daily.  cyanocobalamin 1,000 mcg tablet Take 1,000 mcg by mouth daily. No current facility-administered medications for this visit.        Vitals:    19 0859 19 0907   BP: 132/66 130/68   Pulse: (!) 59    Resp: 20    SpO2: 96%    Weight: 282 lb 3.2 oz (128 kg)    Height: 6' (1.829 m)      Social History     Socioeconomic History    Marital status:      Spouse name: Not on file    Number of children: Not on file    Years of education: Not on file    Highest education level: Not on file   Occupational History    Not on file   Social Needs    Financial resource strain: Not on file    Food insecurity:     Worry: Not on file     Inability: Not on file    Transportation needs:     Medical: Not on file     Non-medical: Not on file   Tobacco Use    Smoking status: Former Smoker     Packs/day: 4.00     Years: 20.00     Pack years: 80.00     Types: Cigarettes     Last attempt to quit: 1980     Years since quittin.4    Smokeless tobacco: Former User    Tobacco comment: QUIT AT AGE 42   Substance and Sexual Activity    Alcohol use: No     Alcohol/week: 0.0 standard drinks    Drug use: Never    Sexual activity: Not on file   Lifestyle    Physical activity:     Days per week: Not on file     Minutes per session: Not on file    Stress: Not on file   Relationships    Social connections:     Talks on phone: Not on file     Gets together: Not on file     Attends Bahai service: Not on file     Active member of club or organization: Not on file     Attends meetings of clubs or organizations: Not on file     Relationship status: Not on file    Intimate partner violence:     Fear of current or ex partner: Not on file     Emotionally abused: Not on file     Physically abused: Not on file     Forced sexual activity: Not on file   Other Topics Concern    Not on file   Social History Narrative    Not on file       I have reviewed the nurses notes, vitals, problem list, allergy list, medical history, family, social history and medications. Review of Symptoms  11 systems reviewed, negative other than as stated in the HPI      Physical Exam:      General: Well developed, in no acute distress, cooperative and alert  HEENT: No carotid bruits, no JVD, trach is midline. Neck Supple, PERRL, EOM intact. Heart:  Normal S1/S2 negative S3 or S4. Regular, no murmur, gallop or rub. Respiratory: Clear bilaterally x 4, no wheezing or rales  Abdomen:   Soft, non-tender, no masses, bowel sounds are active. Extremities:  No edema, normal cap refill, no cyanosis, atraumatic. Neuro: A&Ox3, speech clear, gait stable. Skin: Skin color is normal. No rashes or lesions.  Non diaphoretic  Vascular: 2+ pulses symmetric in all extremities left snuffbox access site well-healed    Cardiographics    ECG: Sinus        Cardiology Labs:  Lab Results   Component Value Date/Time    Cholesterol, total 75 (L) 08/15/2019 11:33 AM    HDL Cholesterol 28 (L) 08/15/2019 11:33 AM    LDL, calculated 23 08/15/2019 11:33 AM    Triglyceride 122 08/15/2019 11:33 AM       Lab Results   Component Value Date/Time    Sodium 141 11/21/2019 11:39 PM    Potassium 4.2 11/21/2019 11:39 PM    Chloride 112 (H) 11/21/2019 11:39 PM    CO2 24 11/21/2019 11:39 PM    Anion gap 5 11/21/2019 11:39 PM    Glucose 256 (H) 11/21/2019 11:39 PM    BUN 36 (H) 11/21/2019 11:39 PM    Creatinine 1.83 (H) 11/21/2019 11:39 PM    BUN/Creatinine ratio 20 11/21/2019 11:39 PM    GFR est AA 44 (L) 11/21/2019 11:39 PM    GFR est non-AA 36 (L) 11/21/2019 11:39 PM    Calcium 8.2 (L) 11/21/2019 11:39 PM    Bilirubin, total 0.4 11/21/2019 11:39 PM    AST (SGOT) 14 (L) 11/21/2019 11:39 PM    Alk. phosphatase 62 11/21/2019 11:39 PM    Protein, total 6.2 (L) 11/21/2019 11:39 PM    Albumin 3.1 (L) 11/21/2019 11:39 PM    Globulin 3.1 11/21/2019 11:39 PM    A-G Ratio 1.0 (L) 11/21/2019 11:39 PM    ALT (SGPT) 16 11/21/2019 11:39 PM           Assessment:     Assessment:     Diagnoses and all orders for this visit:    1. Paroxysmal atrial fibrillation (HCC)  -     AMB POC EKG ROUTINE W/ 12 LEADS, INTER & REP  -     CBC WITH AUTOMATED DIFF  -     CK    2. Coronary artery disease involving coronary bypass graft of native heart without angina pectoris  -     CBC WITH AUTOMATED DIFF  -     CK    3. SSS (sick sinus syndrome) (HCC)  -     CBC WITH AUTOMATED DIFF  -     CK    4. Postsurgical aortocoronary bypass status  -     CBC WITH AUTOMATED DIFF  -     CK    Other orders  -     ranolazine ER (RANEXA) 500 mg SR tablet; Take 1 Tab by mouth two (2) times a day. ICD-10-CM ICD-9-CM    1. Paroxysmal atrial fibrillation (HCC) I48.0 427.31 AMB POC EKG ROUTINE W/ 12 LEADS, INTER & REP      CBC WITH AUTOMATED DIFF      CK   2. Coronary artery disease involving coronary bypass graft of native heart without angina pectoris I25.810 414.05 CBC WITH AUTOMATED DIFF      CK   3. SSS (sick sinus syndrome) (HCC) I49.5 427.81 CBC WITH AUTOMATED DIFF      CK   4. Postsurgical aortocoronary bypass status Z95.1 V45.81 CBC WITH AUTOMATED DIFF      CK     Orders Placed This Encounter    CBC WITH AUTOMATED DIFF    CK    AMB POC EKG ROUTINE W/ 12 LEADS, INTER & REP     Order Specific Question:   Reason for Exam:     Answer:   routine    ranolazine ER (RANEXA) 500 mg SR tablet     Sig: Take 1 Tab by mouth two (2) times a day. Dispense:  60 Tab     Refill:  3        Plan:     1. Atherosclerotic heart disease: History of CABG, recent cardiac catheterization demonstrates 99% stent crush stenosis from calcium outside the stent lumen, mid RCA angioplastied with 50% residual lesion. Presents with exertional discomfort, will add Ranexa 500 mg twice a day. Continue dual antiplatelet therapy. Will repeat CBC to reevaluate H&H.  2.  Paroxysmal atrial fibrillation with sick sinus syndrome: Has pacemaker, high risk for GI bleeding has required transfusions, he is off [address] [place] [address] [employer]. Has follow-up colonoscopy with GI in January. 3.  Hypertension: Controlled 130/68  4. Hyperlipidemia: Continue statin therapy,? Muscle weakness as side effect will check CK with repeat CBC. If there is a CK elevation would then warrant reduction of the statin and the addition of PSK 9 therapy. El Lee MD      Please note that this dictation was completed with [employer], the computer voice recognition software. Quite often unanticipated grammatical, syntax, homophones, and other interpretive errors are inadvertently transcribed by the computer software. Please disregard these errors. Please excuse any errors that have escaped final proofreading. Thank you.

## 2019-12-09 NOTE — PROGRESS NOTES
Verified patient with 2 identifiers   Reviewed record in preparation for visit and obtained necessary documentation. Chief Complaint   Patient presents with   [facility] Follow Up     left heart cath done on 11/21/19 - here for follow up     Chest Pain     when walking the dog - hurts where pacemaker is and across the chest     Leg Swelling     right leg      1. Have you been to the ER, urgent care clinic since your last visit? Hospitalized since your last visit? Yes ED [facility] - card cath     2. Have you seen or consulted any other health care providers outside of the [address] [place] [address] since your last visit? Include any pap smears or colon screening.   No

## 2019-12-10 ENCOUNTER — HOSPITAL ENCOUNTER (OUTPATIENT)
Dept: LAB | Age: 79
Discharge: HOME | End: 2019-12-10
Payer: MEDICARE

## 2019-12-10 PROCEDURE — 85025 COMPLETE CBC W/AUTO DIFF WBC: CPT

## 2019-12-10 PROCEDURE — 36415 COLL VENOUS BLD VENIPUNCTURE: CPT

## 2019-12-10 PROCEDURE — 82550 ASSAY OF CK (CPK): CPT

## 2019-12-12 LAB
BASOPHILS # BLD AUTO: 0 X10E3/UL (ref 0–0.2)
BASOPHILS NFR BLD AUTO: 0 %
CK SERPL-CCNC: 96 U/L (ref 24–204)
EOSINOPHIL # BLD AUTO: 0.2 X10E3/UL (ref 0–0.4)
EOSINOPHIL NFR BLD AUTO: 3 %
ERYTHROCYTE [DISTWIDTH] IN BLOOD BY AUTOMATED COUNT: 16.5 % (ref 12.3–15.4)
HCT VFR BLD AUTO: 24.5 % (ref 37.5–51)
HGB BLD-MCNC: 7.7 G/DL (ref 13–17.7)
IMM GRANULOCYTES # BLD AUTO: 0 X10E3/UL (ref 0–0.1)
IMM GRANULOCYTES NFR BLD AUTO: 0 %
LYMPHOCYTES # BLD AUTO: 1 X10E3/UL (ref 0.7–3.1)
LYMPHOCYTES NFR BLD AUTO: 22 %
MCH RBC QN AUTO: 26.6 PG (ref 26.6–33)
MCHC RBC AUTO-ENTMCNC: 31.4 G/DL (ref 31.5–35.7)
MCV RBC AUTO: 85 FL (ref 79–97)
MONOCYTES # BLD AUTO: 0.7 X10E3/UL (ref 0.1–0.9)
MONOCYTES NFR BLD AUTO: 15 %
NEUTROPHILS # BLD AUTO: 2.7 X10E3/UL (ref 1.4–7)
NEUTROPHILS NFR BLD AUTO: 60 %
PLATELET # BLD AUTO: 172 X10E3/UL (ref 150–450)
RBC # BLD AUTO: 2.89 X10E6/UL (ref 4.14–5.8)
WBC # BLD AUTO: 4.5 X10E3/UL (ref 3.4–10.8)

## 2019-12-13 DIAGNOSIS — K29.61 GASTROINTESTINAL HEMORRHAGE ASSOCIATED WITH OTHER GASTRITIS: Primary | ICD-10-CM

## 2019-12-13 NOTE — PROGRESS NOTES
Spoke to patient using 2 identifiers. Per Analia Chong NP, patient was made aware H/H dropped now that he is back on ASA and Plavix. Hx GI bleeding, just has stent re expanded. He wants to D/C ASA continue Plavix and have him see his GI ASAP. Repeat CBC in 2 weeks, if still trending down Dr Enriqueta Devries will need to make the call to D/C plavix. Patient verbalized understanding.

## 2019-12-13 NOTE — PROGRESS NOTES
Em: Please call patient, H/H dropped now that he is back on ASA and Plavix. Hx GI bleeding, just has stent re expanded, I want to D/C ASA continue Plavix and have him see his GI ASAP. Repeat CBC in 2 weeks, if still trending down Dr Franklin Reis will need to make the call to D/C plavix.

## 2019-12-19 NOTE — PROGRESS NOTES
See device report - MDT ILR remote send, next remote send in >31 days. 59 year old female patient previously discharged after inpatient admission for stercoral colitis presented back to the ED complaining of severe abdominal pain associated with nausea, anorexia and constipation. Patient states she has not had a bowel movement since given the bowel prep for her last colonoscopy on 12/12/19. Patient describes a diffuse, sharp abdominal pain with no clear aggravating or alleviating factors. Patient was not able to see GI Specialist(Dr Durant) for possible bowel dilation procedure. Patient still taking cipro/flagyl. Denies any fever, chills, vomiting, chest discomfort, bloody bowel movements or diarrhea. Patient still passing flatus.            In the ED patient had a repeat CT abdomen, which revealed Persistent stercoral colitis involving the sigmoid colon. Large amount of     stool throughout the colon, mildly decreased compared with December 09, 2019. (15 Dec 2019 19:30)        12/15 Pt was put IV Cipro/flagyl. Pt received bolus hydration and fluid maintenance. Pt on pain control for painful bowel movement. Patient is continued on laxatives and mineral oil. 12/16 Pt seen by surgery. Reports pain free on defecation. No surgical intervention at this time. 12/17 Patient reports intermittent LLQ pain. Pt seen by surgery and GI. Large fecaliths noted. Could not be broken apart with the water jet, biopsy forceps or snare. Discuss with the patient regarding options of therapy. Electrohydraulic lithotripsy, surgical intervention, attempting to dilate the area of stricturing. Pt refused and she wish to continue conservative management- MiraLAX and mineral oil enemas. Discussed she may require emergency surgery and benefit from colonic dilation. Pt will need further evaluation to be sure she is clearing the stool. Discussed surgery is required if stricture is present. 12/18 Pt seen by GI: At this time, this region is too edematous to be assessed appropriately. Continue laxatives, mineral oil, increased ambulation and activity. 12/19 Senna added per GI. Pt continue to opt for conservative management and understands risk of recurrence. 59 year old female patient previously discharged after inpatient admission for stercoral colitis presented back to the ED complaining of severe abdominal pain associated with nausea, anorexia and constipation. Patient states she has not had a bowel movement since given the bowel prep for her last colonoscopy on 12/12/19. Patient describes a diffuse, sharp abdominal pain with no clear aggravating or alleviating factors. Patient was not able to see GI Specialist(Dr Durant) for possible bowel dilation procedure. Patient still taking cipro/flagyl. Denies any fever, chills, vomiting, chest discomfort, bloody bowel movements or diarrhea. Patient still passing flatus.            In the ED patient had a repeat CT abdomen, which revealed Persistent stercoral colitis involving the sigmoid colon. Large amount of     stool throughout the colon, mildly decreased compared with December 09, 2019. (15 Dec 2019 19:30)        12/15 Pt was put IV Cipro/flagyl. Pt received bolus hydration and fluid maintenance. Pt on pain control for painful bowel movement. Patient is continued on laxatives and mineral oil. 12/16 Pt seen by surgery. Reports pain free on defecation. No surgical intervention at this time. 12/17 Patient reports intermittent LLQ pain. Pt seen by surgery and GI. Large fecaliths noted. Could not be broken apart with the water jet, biopsy forceps or snare. Discuss with the patient regarding options of therapy. Electrohydraulic lithotripsy, surgical intervention, attempting to dilate the area of stricturing. Pt refused and she wish to continue conservative management- MiraLAX and mineral oil enemas. Discussed she may require emergency surgery and benefit from colonic dilation. Pt will need further evaluation to be sure she is clearing the stool. Discussed surgery is required if stricture is present. 12/18 Pt seen by GI: At this time, this region is too edematous to be assessed appropriately. Continue laxatives, mineral oil, increased ambulation and activity. 12/19 Senna added per GI. Pt continue to opt for conservative management and understands risk of recurrence.            # Stercoral colitis:    s/p multiple BMs, intermittent llq pain    c/w Cipro/Flagyl IV    IV hydration     analgesia prn    GI and surgical evaluations noted - pt aware of need for possible colonic dilation vs surgery. pt has outpt appt with GI - dr durant     on last admission she was offered surgery to remove large fecalith  noted during colonoscopy     c/w mineral oil, c/w miralax         #hypothyroidism    -Continue Synthroid        #HTN    -Continue Losartan         #DVT Px    -Continue Lovenox 59 year old female patient previously discharged after inpatient admission for stercoral colitis presented back to the ED complaining of severe abdominal pain associated with nausea, anorexia and constipation. Patient states she has not had a bowel movement since given the bowel prep for her last colonoscopy on 12/12/19. Patient describes a diffuse, sharp abdominal pain with no clear aggravating or alleviating factors. Patient was not able to see GI Specialist(Dr Durant) for possible bowel dilation procedure. Patient still taking cipro/flagyl. Denies any fever, chills, vomiting, chest discomfort, bloody bowel movements or diarrhea. Patient still passing flatus.            In the ED patient had a repeat CT abdomen, which revealed Persistent stercoral colitis involving the sigmoid colon. Large amount of     stool throughout the colon, mildly decreased compared with December 09, 2019. on 12/15 Pt was put IV Cipro/flagyl and improved on abx.  Pt received bolus hydration and fluid maintenance. Pt on pain control for painful bowel movement. Patient is continued on laxatives and mineral oil. 12/16 Pt seen by surgery. Reports pain free on defecation. No surgical intervention at this time.  Pt seen by surgery and GI. Large fecaliths noted. Could not be broken apart with the water jet, biopsy forceps or snare. Discuss with the patient regarding options of therapy. Electrohydraulic lithotripsy, surgical intervention, attempting to dilate the area of stricturing. Pt refused and she wish to continue conservative management- MiraLAX and mineral oil enemas. Discussed she may require emergency surgery and benefit from colonic dilation. Pt will need further evaluation to be sure she is clearing the stool. Discussed surgery is required if stricture is present. 12/18 Pt seen by GI: At this time, this region is too edematous to be assessed appropriately. 12/19 Senna added per GI. Pt continue to opt for conservative management and understands risk of recurrence.    Vital Signs Last 24 Hrs    T(C): 36.9 (19 Dec 2019 11:07), Max: 37.1 (18 Dec 2019 20:27)    T(F): 98.4 (19 Dec 2019 11:07), Max: 98.7 (18 Dec 2019 20:27)    HR: 77 (19 Dec 2019 11:07) (61 - 77)    BP: 125/77 (19 Dec 2019 11:07) (125/77 - 152/69)    BP(mean): --    RR: 18 (19 Dec 2019 11:07) (17 - 18)    SpO2: 100% (19 Dec 2019 11:07) (100% - 100%)        GEN: lying in bed, NAD    HEENT:   NC/AT, pupils equal and reactive, EOMI    CV:  +S1, +S2, RRR    RESP:   lungs clear to auscultation bilaterally, no wheeze, rales, rhonchi     BREAST:  not examined    GI:  abdomen soft, non-tender, non-distended, normoactive BS    NEURO:  AAOX3, no focal neurological deficits            # Stercoral colitis:    s/p multiple BMs, intermittent llq pain    c/w Cipro/Flagyl po for 2 more days to finish 7 day course    analgesia prn    GI and surgical evaluations noted - pt aware of need for possible colonic dilation vs surgery. pt has outpt appt with GI - dr durant     on last admission she was offered surgery to remove large fecalith  noted during colonoscopy     c/w mineral oil, c/w miralax         #hypothyroidism    -Continue Synthroid        #HTN    -Continue Losartan         total time spent 42 mins

## 2019-12-23 RX ORDER — ROSUVASTATIN CALCIUM 40 MG/1
TABLET, COATED ORAL
Qty: 30 TAB | Refills: 3 | Status: SHIPPED | OUTPATIENT
Start: 2019-12-23 | End: 2020-05-29

## 2019-12-27 ENCOUNTER — HOSPITAL ENCOUNTER (OUTPATIENT)
Dept: LAB | Age: 79
Discharge: HOME | End: 2019-12-27
Payer: MEDICARE

## 2019-12-27 PROCEDURE — 36415 COLL VENOUS BLD VENIPUNCTURE: CPT

## 2019-12-27 PROCEDURE — 85025 COMPLETE CBC W/AUTO DIFF WBC: CPT

## 2019-12-28 LAB
BASOPHILS # BLD AUTO: 0 X10E3/UL (ref 0–0.2)
BASOPHILS NFR BLD AUTO: 1 %
EOSINOPHIL # BLD AUTO: 0.1 X10E3/UL (ref 0–0.4)
EOSINOPHIL NFR BLD AUTO: 2 %
ERYTHROCYTE [DISTWIDTH] IN BLOOD BY AUTOMATED COUNT: 15.8 % (ref 12.3–15.4)
HCT VFR BLD AUTO: 27.4 % (ref 37.5–51)
HGB BLD-MCNC: 8.8 G/DL (ref 13–17.7)
IMM GRANULOCYTES # BLD AUTO: 0 X10E3/UL (ref 0–0.1)
IMM GRANULOCYTES NFR BLD AUTO: 1 %
LYMPHOCYTES # BLD AUTO: 1.3 X10E3/UL (ref 0.7–3.1)
LYMPHOCYTES NFR BLD AUTO: 21 %
MCH RBC QN AUTO: 26 PG (ref 26.6–33)
MCHC RBC AUTO-ENTMCNC: 32.1 G/DL (ref 31.5–35.7)
MCV RBC AUTO: 81 FL (ref 79–97)
MONOCYTES # BLD AUTO: 0.7 X10E3/UL (ref 0.1–0.9)
MONOCYTES NFR BLD AUTO: 11 %
NEUTROPHILS # BLD AUTO: 4.2 X10E3/UL (ref 1.4–7)
NEUTROPHILS NFR BLD AUTO: 64 %
PLATELET # BLD AUTO: 195 X10E3/UL (ref 150–450)
RBC # BLD AUTO: 3.38 X10E6/UL (ref 4.14–5.8)
WBC # BLD AUTO: 6.3 X10E3/UL (ref 3.4–10.8)

## 2019-12-30 ENCOUNTER — TELEPHONE (OUTPATIENT)
Dept: CARDIOLOGY CLINIC | Age: 79
End: 2019-12-30

## 2019-12-30 NOTE — TELEPHONE ENCOUNTER
----- Message from Artie Martins NP sent at 12/30/2019  9:12 AM EST -----  Vidhi or covering LPN, please call patient CBC improved with holding ASA and maintaining Plavix, has he been or up coming appt to GI yet?

## 2019-12-30 NOTE — PROGRESS NOTES
Vidhi or covering LPN, please call patient CBC improved with holding ASA and maintaining Plavix, has he been or up coming appt to GI yet?

## 2019-12-30 NOTE — TELEPHONE ENCOUNTER
Spoke with patient. Verified patient with two patient identifiers. Advised HGB holding while holding ASA and Plavix. States he has meant to schedule the appt with [facility] but just has not done this. Advised strongly, to call today and schedule the appt. Advised to let us know when he has the appt. Patient verbalized understanding.

## 2019-12-31 NOTE — TELEPHONE ENCOUNTER
Tried to reach pt again, now cannot leave message on pt cell phone and wife's cell phone has VM not set up yet. Need to make sure pt is continuing on Plavix, only holding ASA.

## 2019-12-31 NOTE — TELEPHONE ENCOUNTER
Spoke with patient. Verified patient with two patient identifiers. States he is holding ASA and is taking Plavix. Patient verbalized understanding.

## 2019-12-31 NOTE — TELEPHONE ENCOUNTER
Was able to [facility] this time. Advised he should be holding ASA, should be taking the Plavix. LMVM to call me to confirm.

## 2019-12-31 NOTE — TELEPHONE ENCOUNTER
[facility] to call me. Per HERNANDO Martínez NP, should be holding   ASA only, shoud continue the Plavix. What has pt been holding?

## 2020-01-20 ENCOUNTER — OFFICE VISIT (OUTPATIENT)
Dept: CARDIOLOGY CLINIC | Age: 80
End: 2020-01-20

## 2020-01-20 DIAGNOSIS — Z95.0 CARDIAC PACEMAKER IN SITU: Primary | ICD-10-CM

## 2020-01-20 DIAGNOSIS — I49.5 SSS (SICK SINUS SYNDROME) (HCC): ICD-10-CM

## 2020-04-27 RX ORDER — RANOLAZINE 500 MG/1
TABLET, EXTENDED RELEASE ORAL
Qty: 60 TAB | Refills: 3 | Status: SHIPPED | OUTPATIENT
Start: 2020-04-27 | End: 2020-09-28

## 2020-04-28 ENCOUNTER — OFFICE VISIT (OUTPATIENT)
Dept: CARDIOLOGY CLINIC | Age: 80
End: 2020-04-28

## 2020-04-28 DIAGNOSIS — I49.5 SSS (SICK SINUS SYNDROME) (HCC): ICD-10-CM

## 2020-04-28 DIAGNOSIS — Z95.0 CARDIAC PACEMAKER IN SITU: Primary | ICD-10-CM

## 2020-05-29 RX ORDER — ROSUVASTATIN CALCIUM 40 MG/1
TABLET, COATED ORAL
Qty: 30 TAB | Refills: 3 | Status: SHIPPED | OUTPATIENT
Start: 2020-05-29 | End: 2020-09-28

## 2020-07-01 NOTE — PROGRESS NOTES
1. Have you been to the ER, urgent care clinic since your last visit? Hospitalized since your last visit? No    2. Have you seen or consulted any other health care providers outside of the [address] [place] [address] since your last visit? Include any pap smears or colon screening.  No

## 2020-07-02 NOTE — PROGRESS NOTES
[facility] @ [address] [facility]      Subjective/HPI:     Vee Patino is a 68 y.o. male is here for follow-up appt. Hx of cardiac cath showing stenosis in the mid RCA outside of the stent due to calcium build up, angioplasty has residual 50% stenosis. He had a decrease in his H/H after DAPT with ASA being stopped 12/19. He was asked to see GI for evaluation in December. He reports he attempted to do prep for colonoscopy and couldn't complete it, never had it done. He has stopped by ASA and Plavix. He reports a month ago he started having left should tingling/numbness sensation goes down his left chest and arm. Happens regardless of activity. Just goes away on its own. He denies SOB/MCCOLLUM, orthopnea, PND, edema, lightheadedness, palpitations, near syncope, or syncope.      PCP Provider  None  Past Medical History:   Diagnosis Date    Abnormal nuclear cardiac imaging test 5/8/2019    Anemia 6/5/2017    CKD (chronic kidney disease) 5/8/2019    Coronary atherosclerosis of native coronary artery     Diabetes mellitus, type II (HCC)     MCCOLLUM (dyspnea on exertion) 5/8/2019    Mixed hyperlipidemia     Other dyspnea and respiratory abnormality     S/P ablation of atrial fibrillation 8/4/2016      Past Surgical History:   Procedure Laterality Date    HX CATARACT REMOVAL Bilateral 2018    HX CORONARY ARTERY BYPASS GRAFT  2001    HX HEART CATHETERIZATION  11/21/2019    Left     NC INS NEW/RPLCMT PRM PM W/TRANSV ELTRD ATRIAL&VENT N/A 3/29/2019    INSERT PPM DUAL performed by Kaylee Jarquin MD at [facility] CARDIAC [facility]ATH [facility]    NC REMOVAL SUBCUTANEOUS CARDIAC RHYTHM MONITOR N/A 3/29/2019    LOOP RECORDER REMOVAL performed by Kaylee Jarquin MD at [facility] CARDIAC CATH LAB    UPPER GI ENDOSCOPY,BIOPSY  7/26/2019          Allergies   Allergen Reactions    Ciprofloxacin Other (comments)     Difficulty breathing; 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      Family History   Problem Relation Age of Onset  Diabetes Mother     Emphysema Father          age 46 - smoker      Current Outpatient Medications   Medication Sig    metoprolol tartrate (LOPRESSOR) 25 mg tablet TAKE 1 TABLET BY MOUTH TWICE A DAY    rosuvastatin (CRESTOR) 40 mg tablet TAKE 1 TABLET BY MOUTH EVERY DAY    ranolazine ER (RANEXA) 500 mg SR tablet TAKE 1 TABLET BY MOUTH TWICE A DAY    metFORMIN ER (GLUCOPHAGE XR) 500 mg tablet Take 1 Tab by mouth two (2) times a day. Please restart Metformin on 19    glucose blood VI test strips (ACCU-CHEK YVES PLUS TEST STRP) strip Use to check Blood glucose 3 times daily (Diagnosis code: E11.21)    nitroglycerin (NITROSTAT) 0.4 mg SL tablet 1 Tab by SubLINGual route every five (5) minutes as needed for Chest Pain.  cholecalciferol (VITAMIN D3) 1,000 unit cap Take 1,000 Units by mouth daily.  cyanocobalamin 1,000 mcg tablet Take 1,000 mcg by mouth daily.  clopidogrel (PLAVIX) 75 mg tab Take 1 Tab by mouth daily.  aspirin delayed-release 81 mg tablet Take 1 Tab by mouth daily. No current facility-administered medications for this visit. There were no vitals filed for this visit.   Social History     Socioeconomic History    Marital status:      Spouse name: Not on file    Number of children: Not on file    Years of education: Not on file    Highest education level: Not on file   Occupational History    Not on file   Social Needs    Financial resource strain: Not on file    Food insecurity     Worry: Not on file     Inability: Not on file    Transportation needs     Medical: Not on file     Non-medical: Not on file   Tobacco Use    Smoking status: Former Smoker     Packs/day: 4.00     Years: 20.00     Pack years: 80.00     Types: Cigarettes     Last attempt to quit: 1980     Years since quittin.9    Smokeless tobacco: Former User    Tobacco comment: QUIT AT AGE 39   Substance and Sexual Activity    Alcohol use: No     Alcohol/week: 0.0 standard drinks    Drug use: Never    Sexual activity: Not on file   Lifestyle    Physical activity     Days per week: Not on file     Minutes per session: Not on file    Stress: Not on file   Relationships    Social connections     Talks on phone: Not on file     Gets together: Not on file     Attends Denominational service: Not on file     Active member of club or organization: Not on file     Attends meetings of clubs or organizations: Not on file     Relationship status: Not on file    Intimate partner violence     Fear of current or ex partner: Not on file     Emotionally abused: Not on file     Physically abused: Not on file     Forced sexual activity: Not on file   Other Topics Concern    Not on file   Social History Narrative    Not on file       Review of Symptoms  11 systems reviewed, negative other than as stated in the HPI      Physical Exam:      General: Well developed, in no acute distress, cooperative and alert  HEENT: No carotid bruits, no JVD, trach is midline. Neck Supple, PERRL, EOM intact. Heart:  Normal S1/S2 negative S3 or S4. Regular, no murmur, gallop or rub. Respiratory: Clear bilaterally x 4, no wheezing or rales  Abdomen:   Soft, non-tender, no masses, bowel sounds are active. Extremities:  No edema, normal cap refill, no cyanosis, atraumatic. Neuro: A&Ox3, speech clear, gait stable. Skin: Skin color is normal. No rashes or lesions.  Non diaphoretic  Vascular: 2+ pulses symmetric in all extremities    Cardiographics    ECG: Sinus Rhythm, PVCs        Cardiology Labs:  Lab Results   Component Value Date/Time    Cholesterol, total 75 (L) 08/15/2019 11:33 AM    HDL Cholesterol 28 (L) 08/15/2019 11:33 AM    LDL, calculated 23 08/15/2019 11:33 AM    Triglyceride 122 08/15/2019 11:33 AM       Lab Results   Component Value Date/Time    Sodium 141 11/21/2019 11:39 PM    Potassium 4.2 11/21/2019 11:39 PM    Chloride 112 (H) 11/21/2019 11:39 PM    CO2 24 11/21/2019 11:39 PM    Anion gap 5 11/21/2019 11:39 PM    Glucose 256 (H) 11/21/2019 11:39 PM    BUN 36 (H) 11/21/2019 11:39 PM    Creatinine 1.83 (H) 11/21/2019 11:39 PM    BUN/Creatinine ratio 20 11/21/2019 11:39 PM    GFR est AA 44 (L) 11/21/2019 11:39 PM    GFR est non-AA 36 (L) 11/21/2019 11:39 PM    Calcium 8.2 (L) 11/21/2019 11:39 PM    Bilirubin, total 0.4 11/21/2019 11:39 PM    Alk. phosphatase 62 11/21/2019 11:39 PM    Protein, total 6.2 (L) 11/21/2019 11:39 PM    Albumin 3.1 (L) 11/21/2019 11:39 PM    Globulin 3.1 11/21/2019 11:39 PM    A-G Ratio 1.0 (L) 11/21/2019 11:39 PM    ALT (SGPT) 16 11/21/2019 11:39 PM           Assessment:     Assessment:     Diagnoses and all orders for this visit:    1. Coronary artery disease involving coronary bypass graft of native heart without angina pectoris    2. Mixed hyperlipidemia    3. Paroxysmal atrial fibrillation (HCC)    4. SSS (sick sinus syndrome) ([facility] [address]    5. Postsurgical aortocoronary bypass status        ICD-10-CM ICD-9-CM    1. Coronary artery disease involving coronary bypass graft of native heart without angina pectoris I25.810 414.05    2. Mixed hyperlipidemia E78.2 272.2    3. Paroxysmal atrial fibrillation (HCC) I48.0 427.31    4. SSS (sick sinus syndrome) ([facility]) I49.5 427.81    5. Postsurgical aortocoronary bypass status Z95.1 V45.81      No orders of the defined types were placed in this encounter. Plan:     1. Atherosclerotic heart disease: History of CABG, 11/19 cardiac catheterization demonstrates 99% stent crush stenosis from calcium outside the stent lumen, mid RCA angioplastied with 50% residual lesion. Reporting atypical tingling/numbness in his left shoulder for the past month. Essentially daily, not worsened by exertion. EKG SR. He has not been taking either Plavix or ASA since December. Discussed with patient that he should be taking Plavix, has never been cleared by GI. Will discuss symptoms and lack of DAPT with Dr Armen Washington. Cont BB, Ranexa, statin.    2.  Paroxysmal atrial fibrillation with sick sinus syndrome: hx of pacemaker, high risk for GI bleeding has required transfusions, he is off [address]. No events on January 2020 interrogation. 3.  Hypertension: Well controlled   4. Hyperlipidemia: Labs due, will order.  Continue statin therapy      Mick Colindres NP

## 2020-07-06 ENCOUNTER — OFFICE VISIT (OUTPATIENT)
Dept: CARDIOLOGY CLINIC | Age: 80
End: 2020-07-06

## 2020-07-06 VITALS
DIASTOLIC BLOOD PRESSURE: 72 MMHG | WEIGHT: 265.4 LBS | RESPIRATION RATE: 16 BRPM | SYSTOLIC BLOOD PRESSURE: 112 MMHG | OXYGEN SATURATION: 97 % | HEART RATE: 51 BPM | HEIGHT: 72 IN | BODY MASS INDEX: 35.95 KG/M2

## 2020-07-06 DIAGNOSIS — I48.0 PAROXYSMAL ATRIAL FIBRILLATION (HCC): ICD-10-CM

## 2020-07-06 DIAGNOSIS — Z95.1 POSTSURGICAL AORTOCORONARY BYPASS STATUS: ICD-10-CM

## 2020-07-06 DIAGNOSIS — E78.2 MIXED HYPERLIPIDEMIA: ICD-10-CM

## 2020-07-06 DIAGNOSIS — I49.5 SSS (SICK SINUS SYNDROME) (HCC): ICD-10-CM

## 2020-07-06 DIAGNOSIS — I25.810 CORONARY ARTERY DISEASE INVOLVING CORONARY BYPASS GRAFT OF NATIVE HEART WITHOUT ANGINA PECTORIS: ICD-10-CM

## 2020-07-08 ENCOUNTER — TELEPHONE (OUTPATIENT)
Dept: CARDIOLOGY CLINIC | Age: 80
End: 2020-07-08

## 2020-07-08 DIAGNOSIS — R07.89 ATYPICAL CHEST PAIN: ICD-10-CM

## 2020-07-08 DIAGNOSIS — I25.810 CORONARY ARTERY DISEASE INVOLVING CORONARY BYPASS GRAFT OF NATIVE HEART WITHOUT ANGINA PECTORIS: Primary | ICD-10-CM

## 2020-07-08 DIAGNOSIS — E78.2 MIXED HYPERLIPIDEMIA: ICD-10-CM

## 2020-07-08 NOTE — TELEPHONE ENCOUNTER
Spoke with patient. Verified patient with two patient identifiers. Advised needs Lexiscan EST due to his CP. Will ask PSR to call pt to set up. Patient verbalized understanding.

## 2020-07-08 NOTE — TELEPHONE ENCOUNTER
Please contact pt and advise him that Dr Malini Caballero wants him to have a lexiscan stress test. Please schedule.

## 2020-07-10 ENCOUNTER — VIRTUAL VISIT (OUTPATIENT)
Dept: ENDOCRINOLOGY | Age: 80
End: 2020-07-10

## 2020-07-10 ENCOUNTER — TELEPHONE (OUTPATIENT)
Dept: ENDOCRINOLOGY | Age: 80
End: 2020-07-10

## 2020-07-10 DIAGNOSIS — E11.21 TYPE II DIABETES MELLITUS WITH NEPHROPATHY (HCC): Primary | ICD-10-CM

## 2020-07-10 NOTE — TELEPHONE ENCOUNTER
----- Message from Elsa Campos sent at 7/8/2020  4:58 PM EDT -----  Regarding: Dr Monica Aguilar  Patient return call    Caller's first and last name and relationship (if not the patient):      Best contact number(s):[phone]    Whose call is being returned: not sure      Details to clarify the request: regarding Telemedicine visit scheduled for 07/10/20 at 1:30.  Pt is advising that he does not have reliable WiFi and can do a phone only visit  on  # [phone]        Elsa Campos

## 2020-07-20 ENCOUNTER — HOSPITAL ENCOUNTER (OUTPATIENT)
Dept: LAB | Age: 80
Discharge: HOME | End: 2020-07-20
Payer: MEDICARE

## 2020-07-20 PROCEDURE — 80053 COMPREHEN METABOLIC PANEL: CPT

## 2020-07-20 PROCEDURE — 82550 ASSAY OF CK (CPK): CPT

## 2020-07-20 PROCEDURE — 80061 LIPID PANEL: CPT

## 2020-07-20 PROCEDURE — 36415 COLL VENOUS BLD VENIPUNCTURE: CPT

## 2020-07-21 LAB
ALBUMIN SERPL-MCNC: 4.2 G/DL (ref 3.7–4.7)
ALBUMIN/GLOB SERPL: 1.8 {RATIO} (ref 1.2–2.2)
ALP SERPL-CCNC: 58 IU/L (ref 39–117)
ALT SERPL-CCNC: 11 IU/L (ref 0–44)
AST SERPL-CCNC: 12 IU/L (ref 0–40)
BILIRUB SERPL-MCNC: 0.6 MG/DL (ref 0–1.2)
BUN SERPL-MCNC: 28 MG/DL (ref 8–27)
BUN/CREAT SERPL: 14 (ref 10–24)
CALCIUM SERPL-MCNC: 9.6 MG/DL (ref 8.6–10.2)
CHLORIDE SERPL-SCNC: 103 MMOL/L (ref 96–106)
CHOLEST SERPL-MCNC: 92 MG/DL (ref 100–199)
CK SERPL-CCNC: 118 U/L (ref 41–331)
CO2 SERPL-SCNC: 21 MMOL/L (ref 20–29)
CREAT SERPL-MCNC: 2.07 MG/DL (ref 0.76–1.27)
GLOBULIN SER CALC-MCNC: 2.4 G/DL (ref 1.5–4.5)
GLUCOSE SERPL-MCNC: 146 MG/DL (ref 65–99)
HDLC SERPL-MCNC: 33 MG/DL
INTERPRETATION, 910389: NORMAL
INTERPRETATION: NORMAL
LDLC SERPL CALC-MCNC: 35 MG/DL (ref 0–99)
PDF IMAGE, 910387: NORMAL
POTASSIUM SERPL-SCNC: 5 MMOL/L (ref 3.5–5.2)
PROT SERPL-MCNC: 6.6 G/DL (ref 6–8.5)
SODIUM SERPL-SCNC: 142 MMOL/L (ref 134–144)
TRIGL SERPL-MCNC: 118 MG/DL (ref 0–149)
VLDLC SERPL CALC-MCNC: 24 MG/DL (ref 5–40)

## 2020-07-22 ENCOUNTER — TELEPHONE (OUTPATIENT)
Dept: CARDIOLOGY CLINIC | Age: 80
End: 2020-07-22

## 2020-07-22 DIAGNOSIS — I25.810 CORONARY ARTERY DISEASE INVOLVING CORONARY BYPASS GRAFT OF NATIVE HEART WITHOUT ANGINA PECTORIS: Primary | ICD-10-CM

## 2020-07-22 DIAGNOSIS — R07.89 ATYPICAL CHEST PAIN: ICD-10-CM

## 2020-07-22 NOTE — TELEPHONE ENCOUNTER
Please let pt know his stress test did not show signs of blood flow blockage. However, it did show a decrease in his heart pumping strength. An echo is a more definitive test for this. I would like him to get an echo and we can determine best treatment plan after that. Labs showed kidney function is reduced, had been somewhat improved and now is back to where it was a year ago. Other labs good including cholesterol. Make sure to limit taking OTC medications. F/U with PCP or nephrology if he has been seen by them.

## 2020-07-22 NOTE — TELEPHONE ENCOUNTER
Unable to attached to message.     (TEST RESULTS)    Patient returning your call    Thanks  Lynda Fields

## 2020-07-22 NOTE — TELEPHONE ENCOUNTER
Spoke with patient. Verified patient with two patient identifiers. Discussed EST results in depth as noted below. EST did not show any blockages, did show heart not pumping as well as it should. Get echo for more info on EF. Labs showed worsening kidney function, limit OTC meds, FU with PCP. Does not have nephrologist. States he does not have one. Advised he needs to establish with a PCP where he lives as he is an hour away. Agreed to make appt to establish with PCP. Will call us when he picks one and we can send records. Advised to do so ASAP, not to wait. Patient verbalized understanding.

## 2020-09-11 RX ORDER — METOPROLOL TARTRATE 25 MG/1
TABLET, FILM COATED ORAL
Qty: 180 TAB | Refills: 0 | Status: SHIPPED | OUTPATIENT
Start: 2020-09-11 | End: 2021-03-26

## 2020-09-17 ENCOUNTER — OFFICE VISIT (OUTPATIENT)
Dept: CARDIOLOGY CLINIC | Age: 80
End: 2020-09-17
Payer: MEDICARE

## 2020-09-17 ENCOUNTER — CLINICAL SUPPORT (OUTPATIENT)
Dept: CARDIOLOGY CLINIC | Age: 80
End: 2020-09-17
Payer: MEDICARE

## 2020-09-17 ENCOUNTER — ANCILLARY PROCEDURE (OUTPATIENT)
Dept: CARDIOLOGY CLINIC | Age: 80
End: 2020-09-17
Payer: MEDICARE

## 2020-09-17 VITALS
DIASTOLIC BLOOD PRESSURE: 80 MMHG | OXYGEN SATURATION: 98 % | HEART RATE: 60 BPM | RESPIRATION RATE: 16 BRPM | BODY MASS INDEX: 35.2 KG/M2 | SYSTOLIC BLOOD PRESSURE: 150 MMHG | WEIGHT: 259.9 LBS | HEIGHT: 72 IN

## 2020-09-17 VITALS
BODY MASS INDEX: 35.89 KG/M2 | HEIGHT: 72 IN | DIASTOLIC BLOOD PRESSURE: 74 MMHG | WEIGHT: 265 LBS | SYSTOLIC BLOOD PRESSURE: 136 MMHG

## 2020-09-17 DIAGNOSIS — I49.5 SSS (SICK SINUS SYNDROME) (HCC): ICD-10-CM

## 2020-09-17 DIAGNOSIS — I10 ESSENTIAL HYPERTENSION: ICD-10-CM

## 2020-09-17 DIAGNOSIS — E11.9 CONTROLLED TYPE 2 DIABETES MELLITUS WITHOUT COMPLICATION, WITHOUT LONG-TERM CURRENT USE OF INSULIN (HCC): ICD-10-CM

## 2020-09-17 DIAGNOSIS — R07.89 ATYPICAL CHEST PAIN: ICD-10-CM

## 2020-09-17 DIAGNOSIS — E78.2 MIXED HYPERLIPIDEMIA: ICD-10-CM

## 2020-09-17 DIAGNOSIS — I48.0 PAROXYSMAL ATRIAL FIBRILLATION (HCC): Primary | Chronic | ICD-10-CM

## 2020-09-17 DIAGNOSIS — Z95.0 CARDIAC PACEMAKER IN SITU: Primary | ICD-10-CM

## 2020-09-17 DIAGNOSIS — I25.810 CORONARY ARTERY DISEASE INVOLVING CORONARY BYPASS GRAFT OF NATIVE HEART WITHOUT ANGINA PECTORIS: ICD-10-CM

## 2020-09-17 PROCEDURE — 93010 ELECTROCARDIOGRAM REPORT: CPT | Performed by: INTERNAL MEDICINE

## 2020-09-17 PROCEDURE — G8536 NO DOC ELDER MAL SCRN: HCPCS | Performed by: INTERNAL MEDICINE

## 2020-09-17 PROCEDURE — 93306 TTE W/DOPPLER COMPLETE: CPT | Performed by: INTERNAL MEDICINE

## 2020-09-17 PROCEDURE — G0463 HOSPITAL OUTPT CLINIC VISIT: HCPCS | Performed by: INTERNAL MEDICINE

## 2020-09-17 PROCEDURE — G8432 DEP SCR NOT DOC, RNG: HCPCS | Performed by: INTERNAL MEDICINE

## 2020-09-17 PROCEDURE — G8427 DOCREV CUR MEDS BY ELIG CLIN: HCPCS | Performed by: INTERNAL MEDICINE

## 2020-09-17 PROCEDURE — 1101F PT FALLS ASSESS-DOCD LE1/YR: CPT | Performed by: INTERNAL MEDICINE

## 2020-09-17 PROCEDURE — 93280 PM DEVICE PROGR EVAL DUAL: CPT | Performed by: INTERNAL MEDICINE

## 2020-09-17 PROCEDURE — 99215 OFFICE O/P EST HI 40 MIN: CPT | Performed by: INTERNAL MEDICINE

## 2020-09-17 PROCEDURE — 93005 ELECTROCARDIOGRAM TRACING: CPT | Performed by: INTERNAL MEDICINE

## 2020-09-17 PROCEDURE — G8417 CALC BMI ABV UP PARAM F/U: HCPCS | Performed by: INTERNAL MEDICINE

## 2020-09-17 NOTE — PROGRESS NOTES
Chief Complaint   Patient presents with    Follow-up    Results    Coronary Artery Disease    Irregular Heart Beat       1. Have you been to the ER, urgent care clinic since your last visit? Hospitalized since your last visit? No    2. Have you seen or consulted any other health care providers outside of the [facility] since your last visit? Include any pap smears or colon screening. No     Patient not taking any blood thinners.

## 2020-09-17 NOTE — PROGRESS NOTES
Subjective:      Claudette Rosales is a 68 y.o. male is here for EP consult. He is here for annual device follow up. The patient denies chest pain/ shortness of breath, orthopnea, PND, LE edema, palpitations, syncope, presyncope or fatigue. Patient Active Problem List    Diagnosis Date Noted    Chest pain 07/25/2019    GI bleed 07/25/2019    Abnormal nuclear cardiac imaging test 05/08/2019    MCCOLLUM (dyspnea on exertion) 05/08/2019    CKD (chronic kidney disease) 05/08/2019    S/P cardiac cath 05/07/2019    Ventricular escape rhythm 03/29/2019    Type 2 diabetes with nephropathy ([address] 04/10/2018    Severe obesity (BMI 35.0-39. 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 angina pectoris 06/02/2017    Age-related cataract of both eyes 06/02/2017    S/P ablation of atrial fibrillation 08/04/2016    SSS (sick sinus syndrome) ([address] 07/19/2016    Irregular heartbeat 05/31/2016    Postsurgical aortocoronary bypass status 04/25/2012    Mixed hyperlipidemia 04/25/2012    Coronary atherosclerosis of native coronary artery 04/25/2012    Atrial fibrillation ([address] 04/25/2012      None  Past Medical History:   Diagnosis Date    Abnormal nuclear cardiac imaging test 5/8/2019    Anemia 6/5/2017    CKD (chronic kidney disease) 5/8/2019    Coronary atherosclerosis of native coronary artery     Diabetes mellitus, type II ([address]     MCCOLLUM (dyspnea on exertion) 5/8/2019    Mixed hyperlipidemia     Other dyspnea and respiratory abnormality     S/P ablation of atrial fibrillation 8/4/2016      Past Surgical History:   Procedure Laterality Date    HX CATARACT REMOVAL Bilateral 2018    HX CORONARY ARTERY BYPASS GRAFT  2001    HX HEART CATHETERIZATION  2019    Left     SC INS NEW/RPLCMT PRM PM W/TRANSV ELTRD ATRIAL&VENT N/A 3/29/2019    INSERT PPM DUAL performed by Lzu Elena Jeffries MD at [facility] CARD[facility]ATH [facility]    SC REMOVAL SUBCUTANEOUS CARDIAC RHYTHM MONITOR N/A 3/29/2019    LOOP RECORDER REMOVAL performed by Luz Elena Jeffries MD at [facility] CARDIAC CATH LAB    UPPER GI ENDOSCOPY,BIOPSY  2019          Allergies   Allergen Reactions    Ciprofloxacin Other (comments)     Difficulty breathing; 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      Family History   Problem Relation Age of Onset    Diabetes Mother     Emphysema Father          age 46 - smoker    negative for cardiac disease  Social History     Socioeconomic History    Marital status:      Spouse name: Not on file    Number of children: Not on file    Years of education: Not on file    Highest education level: Not on file   Tobacco Use    Smoking status: Former Smoker     Packs/day: 4.00     Years: 20.00     Pack years: 80.00     Types: Cigarettes     Last attempt to quit: 1980     Years since quittin.1    Smokeless tobacco: Former User    Tobacco comment: QUIT AT AGE 42   Substance and Sexual Activity    Alcohol use: No     Alcohol/week: 0.0 standard drinks    Drug use: Never     Current Outpatient Medications   Medication Sig    metoprolol tartrate (LOPRESSOR) 25 mg tablet TAKE 1 TABLET BY MOUTH TWICE A DAY    rosuvastatin (CRESTOR) 40 mg tablet TAKE 1 TABLET BY MOUTH EVERY DAY    ranolazine ER (RANEXA) 500 mg SR tablet TAKE 1 TABLET BY MOUTH TWICE A DAY    metFORMIN ER (GLUCOPHAGE XR) 500 mg tablet Take 1 Tab by mouth two (2) times a day. Please restart Metformin on 19    glucose blood VI test strips (ACCU-CHEK YVES PLUS TEST STRP) strip Use to check Blood glucose 3 times daily (Diagnosis code: E11.21)    nitroglycerin (NITROSTAT) 0.4 mg SL tablet 1 Tab by SubLINGual route every five (5) minutes as needed for Chest Pain.     cholecalciferol (VITAMIN D3) 1,000 unit cap Take 1,000 Units by mouth daily.  cyanocobalamin 1,000 mcg tablet Take 1,000 mcg by mouth daily.  clopidogrel (PLAVIX) 75 mg tab Take 1 Tab by mouth daily. (Patient not taking: Reported on 9/17/2020)    aspirin delayed-release 81 mg tablet Take 1 Tab by mouth daily. No current facility-administered medications for this visit. Vitals:    09/17/20 0944   BP: (!) 150/80   Pulse: 60   Resp: 16   SpO2: 98%   Weight: 259 lb 14.4 oz (117.9 kg)   Height: 6' (1.829 m)       I have reviewed the nurses notes, vitals, problem list, allergy list, medical history, family, social history and medications. Review of Symptoms:    General: Pt denies excessive weight gain or loss. Pt is able to conduct ADL's  HEENT: Denies blurred vision, headaches, hearing loss, epistaxis and difficulty swallowing. Respiratory: Denies cough, congestion, shortness of breath, MCCOLLUM, wheezing or stridor. Cardiovascular: Denies precordial pain, palpitations, edema or PND  Gastrointestinal: Denies poor appetite, indigestion, abdominal pain or blood in stool  Genitourinary: Denies hematuria, dysuria, increased urinary frequency  Musculoskeletal: Denies joint pain or swelling from muscles or joints  Neurologic: Denies tremor, paresthesias, headache, or sensory motor disturbance  Psychiatric: Denies confusion, insomnia, depression  Integumentray: Denies rash, itching or ulcers. Hematologic: Denies easy bruising, bleeding    Physical Exam:      General: Well developed, in no acute distress. HEENT: Eyes - PERRL, no jvd  Heart:  Normal S1/S2 negative S3 or S4. Regular, no murmur, gallop or rub. Respiratory: Clear bilaterally x 4, no wheezing or rales  Abdomen:   Soft, non-tender, bowel sounds are active. Extremities:  No edema, normal cap refill, no cyanosis. Musculoskeletal: No clubbing  Neuro: A&Ox3, speech clear, gait stable. Skin: Skin color is normal. No rashes or lesions.  Non diaphoretic, no ulcers or subcutaneous nodule  Vascular: 2+ pulses symmetric in all extremities  Psych - judgement intact and orientation is wnl     Cardiographics    Ekg: a paced    Pacer - A paced 90%, paf <0.1%    Results for orders placed or performed during the hospital encounter of 07/25/19   EKG, 12 LEAD, INITIAL   Result Value Ref Range    Ventricular Rate 75 BPM    Atrial Rate 56 BPM    P-R Interval 232 ms    QRS Duration 106 ms    Q-T Interval 384 ms    QTC Calculation (Bezet) 428 ms    Calculated R Axis 24 degrees    Calculated T Axis 50 degrees    Diagnosis       Atrial-paced rhythm with prolonged AV conduction with frequent   supraventricular complexes and with frequent premature ventricular complexes    Confirmed by LUIS Patiño ([id number]) on 7/26/2019 8:44:18 AM           Lab Results   Component Value Date/Time    WBC 6.3 12/27/2019 01:37 PM    HGB 8.8 (L) 12/27/2019 01:37 PM    HCT 27.4 (L) 12/27/2019 01:37 PM    PLATELET 314 55/95/2079 01:37 PM    MCV 81 12/27/2019 01:37 PM      Lab Results   Component Value Date/Time    Sodium 142 07/20/2020 01:41 PM    Potassium 5.0 07/20/2020 01:41 PM    Chloride 103 07/20/2020 01:41 PM    CO2 21 07/20/2020 01:41 PM    Anion gap 5 11/21/2019 11:39 PM    Glucose 146 (H) 07/20/2020 01:41 PM    BUN 28 (H) 07/20/2020 01:41 PM    Creatinine 2.07 (H) 07/20/2020 01:41 PM    BUN/Creatinine ratio 14 07/20/2020 01:41 PM    GFR est AA 34 (L) 07/20/2020 01:41 PM    GFR est non-AA 30 (L) 07/20/2020 01:41 PM    Calcium 9.6 07/20/2020 01:41 PM    Bilirubin, total 0.6 07/20/2020 01:41 PM    Alk. phosphatase 58 07/20/2020 01:41 PM    Protein, total 6.6 07/20/2020 01:41 PM    Albumin 4.2 07/20/2020 01:41 PM    Globulin 3.1 11/21/2019 11:39 PM    A-G Ratio 1.8 07/20/2020 01:41 PM    ALT (SGPT) 11 07/20/2020 01:41 PM         Assessment:     Assessment:        ICD-10-CM ICD-9-CM    1. Paroxysmal atrial fibrillation (HCC)  I48.0 427.31 AMB POC EKG ROUTINE W/ 12 LEADS, INTER & REP   2. SSS (sick sinus syndrome) ([facility])  I49.5 427.81    3. Controlled type 2 diabetes mellitus without complication, without long-term current use of insulin (HCC)  E11.9 250.00    4. Mixed hyperlipidemia  E78.2 272.2    5. Essential hypertension  I10 401.9      Orders Placed This Encounter    AMB POC EKG ROUTINE W/ 12 LEADS, INTER & REP     Order Specific Question:   Reason for Exam:     Answer:   routine        Plan:   Renetta Park is A paced 90% for his sick sinus. He has PAF less than 0.1% of the time sp AF abl. The patient has a CHADSVASC/CHADS 2 score of 4. He/she should avoid long term anticoagulation past/current medical history of GI bleed. The patient feels strongly that anticoagulation and documented stroke risk greatly impacts his/her quality of life. The patient is a candidate for Watchman, a left atrial appendage closure (LAAC) device to reduce risk of thromboembolism. The patient has need for anticoagulation and is considered a high risk for stroke, but has a relative contraindication for long term anticoagulation. The patient is suitable for short term warfarin but deemed unable to take long term oral anticoagulation following the conclusion of shared decision making interaction with the patient. I have discussed at length the risks/benefits and alternatives of this procedure with regards to stroke risk versus bleeding risk. The patient understands that anticoagulation will be maintained in a variety of forms during the first year and agrees with plan. Risks include but not limited to: infection, bleeding, vessel injury, cardiac perforation at times requiring drainage or surgery, heart failure, migration of the device, emergency surgery, myocardial infarction, stroke and death. Will schedule for november      Continue medical management for sss, af, htn and hyperlipidemia. Thank you for allowing me to participate in Renetta Park 's care.     Austin Silverio MD, Winifred Smalls

## 2020-09-18 LAB
ECHO AO ASC DIAM: 3.4 CM
ECHO AO ROOT DIAM: 3.61 CM
ECHO AV AREA PEAK VELOCITY: 2.13 CM2
ECHO AV AREA PEAK VELOCITY: 2.18 CM2
ECHO AV PEAK GRADIENT: 8.83 MMHG
ECHO AV PEAK VELOCITY: 148.57 CM/S
ECHO EST RA PRESSURE: 10 MMHG
ECHO LA AREA 4C: 31.97 CM2
ECHO LA MAJOR AXIS: 5.2 CM
ECHO LA MINOR AXIS: 2.17 CM
ECHO LA VOL 2C: 96.41 ML (ref 18–58)
ECHO LA VOL 4C: 108.76 ML (ref 18–58)
ECHO LA VOL BP: 108.85 ML (ref 18–58)
ECHO LA VOL/BSA BIPLANE: 45.33 ML/M2 (ref 16–28)
ECHO LA VOLUME INDEX A2C: 40.15 ML/M2 (ref 16–28)
ECHO LA VOLUME INDEX A4C: 45.3 ML/M2 (ref 16–28)
ECHO LV E' LATERAL VELOCITY: 3.79 CM/S
ECHO LV E' SEPTAL VELOCITY: 7.86 CM/S
ECHO LV INTERNAL DIMENSION DIASTOLIC: 5.36 CM (ref 4.2–5.9)
ECHO LV INTERNAL DIMENSION SYSTOLIC: 3.06 CM
ECHO LV IVSD: 1.39 CM (ref 0.6–1)
ECHO LV MASS 2D: 326.2 G (ref 88–224)
ECHO LV MASS INDEX 2D: 135.9 G/M2 (ref 49–115)
ECHO LV POSTERIOR WALL DIASTOLIC: 1.42 CM (ref 0.6–1)
ECHO LVOT DIAM: 2.03 CM
ECHO LVOT PEAK GRADIENT: 3.84 MMHG
ECHO LVOT PEAK GRADIENT: 4.02 MMHG
ECHO LVOT PEAK VELOCITY: 100.24 CM/S
ECHO LVOT PEAK VELOCITY: 98.02 CM/S
ECHO LVOT SV: 79.3 ML
ECHO LVOT VTI: 24.51 CM
ECHO MV A VELOCITY: 57.47 CM/S
ECHO MV AREA PHT: 4.12 CM2
ECHO MV E DECELERATION TIME (DT): 0.18 S
ECHO MV E VELOCITY: 103.6 CM/S
ECHO MV E/A RATIO: 1.8
ECHO MV E/E' LATERAL: 27.34
ECHO MV E/E' RATIO (AVERAGED): 20.26
ECHO MV E/E' SEPTAL: 13.18
ECHO MV PRESSURE HALF TIME (PHT): 0.05 S
ECHO RIGHT VENTRICULAR SYSTOLIC PRESSURE (RVSP): 53.14 MMHG
ECHO RV TAPSE: 1.74 CM (ref 1.5–2)
ECHO TV REGURGITANT MAX VELOCITY: 328.39 CM/S
ECHO TV REGURGITANT PEAK GRADIENT: 43.14 MMHG
ECHO TV REGURGITANT PEAK GRADIENT: 43.14 MMHG

## 2020-09-28 RX ORDER — ROSUVASTATIN CALCIUM 40 MG/1
TABLET, COATED ORAL
Qty: 30 TAB | Refills: 3 | Status: SHIPPED | OUTPATIENT
Start: 2020-09-28 | End: 2021-02-14

## 2020-11-27 NOTE — Clinical Note
Reviewed the patient history and certify that there are no changes.  Plan procedure: Total laparoscopic hysterectomy, bilateral salpingectomy, lysis of adhesions, possible in bag power morcellation, cystoscopy  Given extensive prior abdominal surgery, will proceed with LUQ entry    Consents per Dr. Evangelina Barreto MD     Stent catheter removed. Stent deployed.

## 2020-12-14 DIAGNOSIS — E11.21 TYPE II DIABETES MELLITUS WITH NEPHROPATHY (HCC): Primary | ICD-10-CM

## 2020-12-14 RX ORDER — METFORMIN HYDROCHLORIDE 500 MG/1
500 TABLET, EXTENDED RELEASE ORAL 2 TIMES DAILY
Qty: 180 TAB | Refills: 3 | Status: SHIPPED | OUTPATIENT
Start: 2020-12-14 | End: 2021-12-20

## 2021-02-14 RX ORDER — ROSUVASTATIN CALCIUM 40 MG/1
TABLET, COATED ORAL
Qty: 30 TAB | Refills: 3 | Status: SHIPPED | OUTPATIENT
Start: 2021-02-14 | End: 2021-06-29

## 2021-03-09 NOTE — TELEPHONE ENCOUNTER
Pharmacy request RX Auth received for refill for:   metFORMIN (GLUCOPHAGE) 500 MG tablet 270 tablet 0 12/8/2020     Sig - Route: Take 1 tablet by mouth 3 times daily (with meals). - Oral    Sent to pharmacy as: metFORMIN HCl 500 MG Oral Tablet (GLUCOPHAGE)    Class: Eprescribe        ?   Last filled: 12/8/2020  Qty: 270  Last seen: 10/8/2020  Next visit: 4/22/2021    Refilled per Protocol

## 2021-03-17 ENCOUNTER — CLINICAL SUPPORT (OUTPATIENT)
Dept: CARDIOLOGY CLINIC | Age: 81
End: 2021-03-17
Payer: MEDICARE

## 2021-03-17 DIAGNOSIS — I49.5 SSS (SICK SINUS SYNDROME) (HCC): ICD-10-CM

## 2021-03-17 DIAGNOSIS — Z95.0 CARDIAC PACEMAKER IN SITU: Primary | ICD-10-CM

## 2021-03-17 PROCEDURE — 93280 PM DEVICE PROGR EVAL DUAL: CPT | Performed by: INTERNAL MEDICINE

## 2021-03-26 ENCOUNTER — OFFICE VISIT (OUTPATIENT)
Dept: CARDIOLOGY CLINIC | Age: 81
End: 2021-03-26
Payer: MEDICARE

## 2021-03-26 VITALS
OXYGEN SATURATION: 97 % | RESPIRATION RATE: 18 BRPM | HEART RATE: 56 BPM | WEIGHT: 256.3 LBS | DIASTOLIC BLOOD PRESSURE: 64 MMHG | BODY MASS INDEX: 34.72 KG/M2 | HEIGHT: 72 IN | SYSTOLIC BLOOD PRESSURE: 104 MMHG

## 2021-03-26 DIAGNOSIS — I49.5 SSS (SICK SINUS SYNDROME) (HCC): ICD-10-CM

## 2021-03-26 DIAGNOSIS — I25.119 ATHEROSCLEROSIS OF NATIVE CORONARY ARTERY OF NATIVE HEART WITH ANGINA PECTORIS (HCC): Primary | ICD-10-CM

## 2021-03-26 DIAGNOSIS — I25.810 CORONARY ARTERY DISEASE INVOLVING CORONARY BYPASS GRAFT OF NATIVE HEART WITHOUT ANGINA PECTORIS: ICD-10-CM

## 2021-03-26 DIAGNOSIS — Z95.1 POSTSURGICAL AORTOCORONARY BYPASS STATUS: ICD-10-CM

## 2021-03-26 DIAGNOSIS — I48.0 PAROXYSMAL ATRIAL FIBRILLATION (HCC): ICD-10-CM

## 2021-03-26 DIAGNOSIS — Z95.0 CARDIAC PACEMAKER IN SITU: ICD-10-CM

## 2021-03-26 PROCEDURE — 93010 ELECTROCARDIOGRAM REPORT: CPT | Performed by: INTERNAL MEDICINE

## 2021-03-26 PROCEDURE — G8536 NO DOC ELDER MAL SCRN: HCPCS | Performed by: INTERNAL MEDICINE

## 2021-03-26 PROCEDURE — 99214 OFFICE O/P EST MOD 30 MIN: CPT | Performed by: INTERNAL MEDICINE

## 2021-03-26 PROCEDURE — G0463 HOSPITAL OUTPT CLINIC VISIT: HCPCS | Performed by: INTERNAL MEDICINE

## 2021-03-26 PROCEDURE — G8510 SCR DEP NEG, NO PLAN REQD: HCPCS | Performed by: INTERNAL MEDICINE

## 2021-03-26 PROCEDURE — 93005 ELECTROCARDIOGRAM TRACING: CPT | Performed by: INTERNAL MEDICINE

## 2021-03-26 PROCEDURE — G8427 DOCREV CUR MEDS BY ELIG CLIN: HCPCS | Performed by: INTERNAL MEDICINE

## 2021-03-26 PROCEDURE — 1101F PT FALLS ASSESS-DOCD LE1/YR: CPT | Performed by: INTERNAL MEDICINE

## 2021-03-26 PROCEDURE — G8417 CALC BMI ABV UP PARAM F/U: HCPCS | Performed by: INTERNAL MEDICINE

## 2021-03-26 RX ORDER — METOPROLOL TARTRATE 25 MG/1
12.5 TABLET, FILM COATED ORAL 2 TIMES DAILY
Qty: 180 TAB | Refills: 3 | Status: SHIPPED | OUTPATIENT
Start: 2021-03-26 | End: 2021-10-25

## 2021-03-26 RX ORDER — LATANOPROST 50 UG/ML
1 SOLUTION/ DROPS OPHTHALMIC DAILY
COMMUNITY
Start: 2021-01-30

## 2021-03-26 NOTE — PROGRESS NOTES
Ze Colin MD          NAME:  Fabian Khalil   :   1940   MRN:   [id number]   PCP:  None           Subjective: The patient is a 80y.o. year old male  who returns for a routine follow-up. Since the last visit, patient reports no change in exercise tolerance, chest pain, edema, medication intolerance, palpitations, shortness of breath, PND/orthopnea wheezing, sputum, syncope, dizziness or light headedness. Doing well. Past Medical History:   Diagnosis Date    Abnormal nuclear cardiac imaging test 2019    Anemia 2017    CKD (chronic kidney disease) 2019    Coronary atherosclerosis of native coronary artery     Diabetes mellitus, type II ([address]     MCCOLLUM (dyspnea on exertion) 2019    Mixed hyperlipidemia     Other dyspnea and respiratory abnormality     S/P ablation of atrial fibrillation 2016        ICD-10-CM ICD-9-CM    1. Atherosclerosis of native coronary artery of native heart with angina pectoris ([facility])  I25.119 414.01 AMB POC EKG ROUTINE W/ 12 LEADS, INTER & REP     413.9    2. Cardiac pacemaker in situ  Z95.0 V45.01    3. SSS (sick sinus syndrome) ([facility])  I49.5 427.81    4. Paroxysmal atrial fibrillation ([facility])  I48.0 427.31    5. 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  Postsurgical aortocoronary bypass status  Z95.1 V45.81       Social History     Tobacco Use    Smoking status: Former Smoker     Packs/day: 4.00     Years: 20.00     Pack years: 80.00     Types: Cigarettes     Quit date: 1980     Years since quittin.7    Smokeless tobacco: Former User    Tobacco comment: QUIT AT AGE 42   Substance Use Topics    Alcohol use: No     Alcohol/week: 0.0 standard drinks      Family History   Problem Relation Age of Onset    Diabetes Mother     Emphysema Father          age 46 - smoker        Review of Systems  Cardiovascular: Negative except as noted in HPI      Objective: Vitals:    03/26/21 0905 03/26/21 0930 03/26/21 0931   BP: 122/70 112/66 104/64   Pulse: 63 60 (!) 56   Resp: 18     SpO2: 97%     Weight: 256 lb 4.8 oz (116.3 kg)     Height: 6' (1.829 m)      Body mass index is 34.76 kg/m². General PE  Mental Status - Alert. General Appearance - Not in acute distress. Chest and Lung Exam   Inspection: Accessory muscles - No use of accessory muscles in breathing. Auscultation:   Breath sounds: - Normal.    Cardiovascular   Inspection: Jugular vein - Bilateral - Inspection Normal.  Palpation/Percussion:   Apical Impulse: - Normal.  Auscultation: Rhythm - Regular. Heart Sounds - S1 WNL and S2 WNL. No S3 or S4. Murmurs & Other Heart Sounds: Auscultation of the heart reveals - No Murmurs. Peripheral Vascular   Upper Extremity: Inspection - Bilateral - No Cyanotic nailbeds or Digital clubbing. Lower Extremity:   Palpation: Edema - Bilateral - No edema. Data Review:     EKG -  EKG: normal EKG, normal sinus rhythm, unchanged from previous tracings, occasional PVC noted, unifocal.    LABS- @brieflabs@      Allergies reviewed  Allergies   Allergen Reactions    Ciprofloxacin Other (comments)     Difficulty breathing; 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       Medications reviewed  Current Outpatient Medications   Medication Sig    latanoprost (XALATAN) 0.005 % ophthalmic solution     metoprolol tartrate (LOPRESSOR) 25 mg tablet Take 0.5 Tabs by mouth two (2) times a day.  rosuvastatin (CRESTOR) 40 mg tablet TAKE 1 TABLET BY MOUTH EVERY DAY    metFORMIN ER (GLUCOPHAGE XR) 500 mg tablet Take 1 Tab by mouth two (2) times a day. Please restart Metformin on 11/23/19    glucose blood VI test strips (ACCU-CHEK YVES PLUS TEST STRP) strip Use to check Blood glucose 3 times daily (Diagnosis code: E11.21)    nitroglycerin (NITROSTAT) 0.4 mg SL tablet 1 Tab by SubLINGual route every five (5) minutes as needed for Chest Pain.     cholecalciferol (VITAMIN D3) 1,000 unit cap Take 1,000 Units by mouth daily.  cyanocobalamin 1,000 mcg tablet Take 1,000 mcg by mouth daily.  ranolazine ER (RANEXA) 500 mg SR tablet TAKE 1 TABLET BY MOUTH TWICE A DAY    clopidogrel (PLAVIX) 75 mg tab Take 1 Tab by mouth daily. (Patient not taking: Reported on 9/17/2020)    aspirin delayed-release 81 mg tablet Take 1 Tab by mouth daily. No current facility-administered medications for this visit. Assessment:       ICD-10-CM ICD-9-CM    1. Atherosclerosis of native coronary artery of native heart with angina pectoris ([facility])  I25.119 414.01 AMB POC EKG ROUTINE W/ 12 LEADS, INTER & REP     413.9    2. Cardiac pacemaker in situ  Z95.0 V45.01    3. SSS (sick sinus syndrome) ([facility])  I49.5 427.81    4. Paroxysmal atrial fibrillation ([facility])  I48.0 427.31    5. 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 Postsurgical aortocoronary bypass status  Z95.1 V45.81         Orders Placed This Encounter    AMB POC EKG ROUTINE W/ 12 LEADS, INTER & REP     Order Specific Question:   Reason for Exam:     Answer:   ekg    latanoprost (XALATAN) 0.005 % ophthalmic solution    metoprolol tartrate (LOPRESSOR) 25 mg tablet     Sig: Take 0.5 Tabs by mouth two (2) times a day. Dispense:  180 Tab     Refill:  3       Plan:     No angina. NSR. BP on the low side and he reports mild dizziness. Will reduce metoprolol to 12.5 bid. Lipids at target.   F/U 1 yr    Cat Gates MD

## 2021-03-26 NOTE — PROGRESS NOTES
1. Have you been to the ER, urgent care clinic since your last visit? Hospitalized since your last visit? Yes, 11/11/20, [facility], Atrial Fibrillation    2. Have you seen or consulted any other health care providers outside of the [address] [place] [address] since your last visit? Include any pap smears or colon screening.  No         Chief Complaint   Patient presents with    Coronary Artery Disease     C/O Dizziness

## 2021-06-29 RX ORDER — ROSUVASTATIN CALCIUM 40 MG/1
TABLET, COATED ORAL
Qty: 30 TABLET | Refills: 5 | Status: SHIPPED | OUTPATIENT
Start: 2021-06-29

## 2021-09-23 ENCOUNTER — OFFICE VISIT (OUTPATIENT)
Dept: CARDIOLOGY CLINIC | Age: 81
End: 2021-09-23
Payer: MEDICARE

## 2021-09-23 VITALS — WEIGHT: 238.5 LBS | HEIGHT: 72 IN | BODY MASS INDEX: 32.3 KG/M2 | HEART RATE: 73 BPM

## 2021-09-23 DIAGNOSIS — I49.5 SSS (SICK SINUS SYNDROME) (HCC): Primary | ICD-10-CM

## 2021-09-23 DIAGNOSIS — I25.810 CORONARY ARTERY DISEASE INVOLVING CORONARY BYPASS GRAFT OF NATIVE HEART WITHOUT ANGINA PECTORIS: ICD-10-CM

## 2021-09-23 DIAGNOSIS — E78.2 MIXED HYPERLIPIDEMIA: ICD-10-CM

## 2021-09-23 DIAGNOSIS — K29.61 GASTROINTESTINAL HEMORRHAGE ASSOCIATED WITH OTHER GASTRITIS: ICD-10-CM

## 2021-09-23 DIAGNOSIS — Z95.0 CARDIAC PACEMAKER IN SITU: Primary | ICD-10-CM

## 2021-09-23 DIAGNOSIS — I10 ESSENTIAL HYPERTENSION: ICD-10-CM

## 2021-09-23 DIAGNOSIS — I49.5 SSS (SICK SINUS SYNDROME) (HCC): ICD-10-CM

## 2021-09-23 DIAGNOSIS — I48.0 PAROXYSMAL ATRIAL FIBRILLATION (HCC): ICD-10-CM

## 2021-09-23 PROCEDURE — 99214 OFFICE O/P EST MOD 30 MIN: CPT | Performed by: INTERNAL MEDICINE

## 2021-09-23 PROCEDURE — G0463 HOSPITAL OUTPT CLINIC VISIT: HCPCS | Performed by: INTERNAL MEDICINE

## 2021-09-23 PROCEDURE — G8510 SCR DEP NEG, NO PLAN REQD: HCPCS | Performed by: INTERNAL MEDICINE

## 2021-09-23 PROCEDURE — 93010 ELECTROCARDIOGRAM REPORT: CPT | Performed by: INTERNAL MEDICINE

## 2021-09-23 PROCEDURE — 93005 ELECTROCARDIOGRAM TRACING: CPT | Performed by: INTERNAL MEDICINE

## 2021-09-23 PROCEDURE — 1101F PT FALLS ASSESS-DOCD LE1/YR: CPT | Performed by: INTERNAL MEDICINE

## 2021-09-23 PROCEDURE — G8536 NO DOC ELDER MAL SCRN: HCPCS | Performed by: INTERNAL MEDICINE

## 2021-09-23 PROCEDURE — G8417 CALC BMI ABV UP PARAM F/U: HCPCS | Performed by: INTERNAL MEDICINE

## 2021-09-23 PROCEDURE — 93288 INTERROG EVL PM/LDLS PM IP: CPT | Performed by: INTERNAL MEDICINE

## 2021-09-23 PROCEDURE — G8427 DOCREV CUR MEDS BY ELIG CLIN: HCPCS | Performed by: INTERNAL MEDICINE

## 2021-09-23 NOTE — PROGRESS NOTES
Subjective:      Sukumar Hood is a 80 y.o. male is here for EP consult. The patient denies chest pain/ shortness of breath, orthopnea, PND, LE edema, palpitations, syncope, presyncope or fatigue. Patient Active Problem List    Diagnosis Date Noted    Chest pain 07/25/2019    GI bleed 07/25/2019    Abnormal nuclear cardiac imaging test 05/08/2019    MCCOLLUM (dyspnea on exertion) 05/08/2019    CKD (chronic kidney disease) 05/08/2019    S/P cardiac cath 05/07/2019    Ventricular escape rhythm 03/29/2019    Type 2 diabetes with nephropathy ([address] 04/10/2018    Severe obesity (BMI 35.0-39. 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 angina pectoris 06/02/2017    Age-related cataract of both eyes 06/02/2017    S/P ablation of atrial fibrillation 08/04/2016    SSS (sick sinus syndrome) ([address] 07/19/2016    Irregular heartbeat 05/31/2016    Postsurgical aortocoronary bypass status 04/25/2012    Mixed hyperlipidemia 04/25/2012    Coronary atherosclerosis of native coronary artery 04/25/2012    Atrial fibrillation ([address] 04/25/2012      None  Past Medical History:   Diagnosis Date    Abnormal nuclear cardiac imaging test 5/8/2019    Anemia 6/5/2017    Atrial fibrillation (HCC)     Chronic kidney disease     CKD (chronic kidney disease) 5/8/2019    Coronary atherosclerosis of native coronary artery     Diabetes mellitus, type II ([address]     MCCOLLUM (dyspnea on exertion) 5/8/2019    Glaucoma     Mixed hyperlipidemia     Other dyspnea and respiratory abnormality     Pacemaker     S/P ablation of atrial fibrillation 8/4/2016      Past Surgical History:   Procedure Laterality Date    HX AFIB ABLATION      HX CATARACT REMOVAL Bilateral 2018    HX CORONARY ARTERY BYPASS GRAFT  2001    HX CORONARY STENT PLACEMENT      HX HEART CATHETERIZATION  2019    Left     HX PTCA      DC INS NEW/RPLCMT PRM PM W/TRANSV ELTRD ATRIAL&VENT N/A 3/29/2019    INSERT PPM DUAL performed [facility]idi [facility] CARDIAC C[facility]    DC REMOVAL SUBCUTANEOUS CARDIAC RHYTHM MONITOR N/A 3/29/2019    LOOP RECORDER REMOVAL performed by Heidi Kim MD at [facility] CARDIAC CATH LAB    UPPER GI ENDOSCOPY,BIOPSY  2019          Allergies   Allergen Reactions    Ciprofloxacin Other (comments)     Difficulty breathing; 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      Family History   Problem Relation Age of Onset    Diabetes Mother     Emphysema Father          age 46 - smoker    negative for cardiac disease  Social History     Socioeconomic History    Marital status:      Spouse name: Not on file    Number of children: Not on file    Years of education: Not on file    Highest education level: Not on file   Tobacco Use    Smoking status: Former Smoker     Packs/day: 4.00     Years: 20.00     Pack years: 80.00     Types: Cigarettes     Quit date: 1980     Years since quittin.2    Smokeless tobacco: Former User    Tobacco comment: QUIT AT AGE 42   Substance and Sexual Activity    Alcohol use: No     Alcohol/week: 0.0 standard drinks    Drug use: Never     Social Determinants of Health     Financial Resource Strain:     Difficulty of Paying Living Expenses:    Food Insecurity:     Worried About Running Out of Food in the Last Year:     Ran Out of Food in the Last Year:    Transportation Needs:     Lack of Transportation (Medical):      Lack of Transportation (Non-Medical):    Physical Activity:     Days of Exercise per Week:     Minutes of Exercise per Session:    Stress:     Feeling of Stress :    Social Connections:     Frequency of Communication with Friends and Family:     Frequency of Social Gatherings with Friends and Family:     Attends Yarsani Services:     Active Member of Clubs or Organizations:     Attends Club or Organization Meetings:     Marital Status:      Current Outpatient Medications   Medication Sig    rosuvastatin (CRESTOR) 40 mg tablet TAKE 1 TABLET BY MOUTH EVERY DAY    latanoprost (XALATAN) 0.005 % ophthalmic solution Administer 1 Drop to both eyes daily.  metoprolol tartrate (LOPRESSOR) 25 mg tablet Take 0.5 Tabs by mouth two (2) times a day.  clopidogrel (PLAVIX) 75 mg tab Take 1 Tab by mouth daily.  glucose blood VI test strips (ACCU-CHEK YVES PLUS TEST STRP) strip Use to check Blood glucose 3 times daily (Diagnosis code: E11.21)    nitroglycerin (NITROSTAT) 0.4 mg SL tablet 1 Tab by SubLINGual route every five (5) minutes as needed for Chest Pain.  cholecalciferol (VITAMIN D3) 1,000 unit cap Take 1,000 Units by mouth daily.  cyanocobalamin 1,000 mcg tablet Take 1,000 mcg by mouth daily.  metFORMIN ER (GLUCOPHAGE XR) 500 mg tablet Take 1 Tab by mouth two (2) times a day. Please restart Metformin on 11/23/19 (Patient not taking: Reported on 9/23/2021)    ranolazine ER (RANEXA) 500 mg SR tablet TAKE 1 TABLET BY MOUTH TWICE A DAY (Patient not taking: Reported on 9/23/2021)    aspirin delayed-release 81 mg tablet Take 1 Tab by mouth daily. (Patient not taking: Reported on 9/23/2021)     No current facility-administered medications for this visit. Vitals:    09/23/21 1100   Pulse: 73   Weight: 238 lb 8 oz (108.2 kg)   Height: 6' (1.829 m)       I have reviewed the nurses notes, vitals, problem list, allergy list, medical history, family, social history and medications. Review of Symptoms:    General: Pt denies excessive weight gain or loss. Pt is able to conduct ADL's  HEENT: Denies blurred vision, headaches, hearing loss, epistaxis and difficulty swallowing.   Respiratory: Denies cough, congestion, shortness of breath, MCCOLLUM, wheezing or stridor. Cardiovascular: Denies precordial pain, palpitations, edema or PND  Gastrointestinal: Denies poor appetite, indigestion, abdominal pain or blood in stool  Genitourinary: Denies hematuria, dysuria, increased urinary frequency  Musculoskeletal: Denies joint pain or swelling from muscles or joints  Neurologic: Denies tremor, paresthesias, headache, or sensory motor disturbance  Psychiatric: Denies confusion, insomnia, depression  Integumentray: Denies rash, itching or ulcers. Hematologic: Denies easy bruising, bleeding    Physical Exam:      General: Well developed, in no acute distress. HEENT: Eyes - PERRL, no jvd  Heart:  Normal S1/S2 negative S3 or S4. Regular, no murmur, gallop or rub. Respiratory: Clear bilaterally x 4, no wheezing or rales  Abdomen:   Soft, non-tender, bowel sounds are active. Extremities:  No edema, normal cap refill, no cyanosis. Musculoskeletal: No clubbing  Neuro: A&Ox3, speech clear, gait stable. Skin: Skin color is normal. No rashes or lesions.  Non diaphoretic, no ulcers or subcutaneous nodule  Vascular: 2+ pulses symmetric in all extremities  Psych - judgement intact and orientation is wnl     Cardiographics    Ekg: nsr, pvcs    Pacer - a paced 86% AF less than 0.1%    Results for orders placed or performed during the hospital encounter of 07/25/19   EKG, 12 LEAD, INITIAL   Result Value Ref Range    Ventricular Rate 75 BPM    Atrial Rate 56 BPM    P-R Interval 232 ms    QRS Duration 106 ms    Q-T Interval 384 ms    QTC Calculation (Bezet) 428 ms    Calculated R Axis 24 degrees    Calculated T Axis 50 degrees    Diagnosis       Atrial-paced rhythm with prolonged AV conduction with frequent   supraventricular complexes and with frequent premature ventricular complexes    Confirmed by Nuno Spicer, P.VCherise ([id number]) on 7/26/2019 8:44:18 AM           Lab Results   Component Value Date/Time    WBC 6.3 12/27/2019 01:37 PM    HGB 8.8 (L) 12/27/2019 01:37 PM    HCT 27.4 (L) 12/27/2019 01:37 PM    PLATELET 285 00/45/4191 01:37 PM    MCV 81 12/27/2019 01:37 PM      Lab Results   Component Value Date/Time    Sodium 142 07/20/2020 01:41 PM    Potassium 5.0 07/20/2020 01:41 PM    Chloride 103 07/20/2020 01:41 PM    CO2 21 07/20/2020 01:41 PM    Anion gap 5 11/21/2019 11:39 PM    Glucose 146 (H) 07/20/2020 01:41 PM    BUN 28 (H) 07/20/2020 01:41 PM    Creatinine 2.07 (H) 07/20/2020 01:41 PM    BUN/Creatinine ratio 14 07/20/2020 01:41 PM    GFR est AA 34 (L) 07/20/2020 01:41 PM    GFR est non-AA 30 (L) 07/20/2020 01:41 PM    Calcium 9.6 07/20/2020 01:41 PM    Bilirubin, total 0.6 07/20/2020 01:41 PM    Alk. phosphatase 58 07/20/2020 01:41 PM    Protein, total 6.6 07/20/2020 01:41 PM    Albumin 4.2 07/20/2020 01:41 PM    Globulin 3.1 11/21/2019 11:39 PM    A-G Ratio 1.8 07/20/2020 01:41 PM    ALT (SGPT) 11 07/20/2020 01:41 PM         Assessment:     Assessment:        ICD-10-CM ICD-9-CM    1. SSS (sick sinus syndrome) (HCC)  I49.5 427.81 AMB POC EKG ROUTINE W/ 12 LEADS, INTER & REP   2. Mixed hyperlipidemia  E78.2 272.2    3. Coronary artery disease involving coronary bypass graft of native heart without angina pectoris  I25.810 414.05    4. Paroxysmal atrial fibrillation (HCC)  I48.0 427.31    5. Essential hypertension  I10 401.9    6. Gastrointestinal hemorrhage associated with other gastritis  K29.61 535.51      Orders Placed This Encounter    AMB POC EKG ROUTINE W/ 12 LEADS, INTER & REP     Order Specific Question:   Reason for Exam:     Answer:   routine        Plan:   Rodri Pineda is A paced 89% for sick sinus. He has asymptomatic PAF less than 0.1% of the time. He has had GI bleed on oac in the past. We discussed watchman as an alternative - he is not interested and understands that he is at risk for cva. He is stable and compliant with med rx for cad, htn and hyperlipidemia. F/u in one year      Thank you for allowing me to participate in Rodri Pineda 's care.     Kelli Garcia MD, Alize Galdamez    On this date 09/23/2021 I have spent 30 minutes reviewing previous notes, test results and face to face with the patient discussing the diagnosis and importance of compliance with the treatment plan as well as documenting on the day of the visit.

## 2021-09-23 NOTE — PROGRESS NOTES
1. Have you been to the ER, urgent care clinic since your last visit? Hospitalized since your last visit? No    2. Have you seen or consulted any other health care providers outside of the [address] [place] [address] since your last visit? Include any pap smears or colon screening.  No

## 2021-10-01 NOTE — PROGRESS NOTES
Patient is going to establish in [place], due to location; [phone] should be done there. (4) walks frequently

## 2021-10-25 RX ORDER — METOPROLOL TARTRATE 25 MG/1
TABLET, FILM COATED ORAL
Qty: 180 TABLET | Refills: 3 | Status: SHIPPED | OUTPATIENT
Start: 2021-10-25 | End: 2021-12-20

## 2021-11-12 RX ORDER — RANOLAZINE 500 MG/1
TABLET, EXTENDED RELEASE ORAL
Qty: 180 TABLET | Refills: 3 | Status: SHIPPED | OUTPATIENT
Start: 2021-11-12

## 2021-11-26 ENCOUNTER — HOSPITAL ENCOUNTER (EMERGENCY)
Age: 81
Discharge: ANOTHER HOSPITAL | End: 2021-11-27
Attending: EMERGENCY MEDICINE
Payer: MEDICARE

## 2021-11-26 ENCOUNTER — APPOINTMENT (OUTPATIENT)
Dept: GENERAL RADIOLOGY | Age: 81
End: 2021-11-26
Attending: EMERGENCY MEDICINE
Payer: MEDICARE

## 2021-11-26 ENCOUNTER — APPOINTMENT (OUTPATIENT)
Dept: CT IMAGING | Age: 81
End: 2021-11-26
Attending: FAMILY MEDICINE
Payer: MEDICARE

## 2021-11-26 ENCOUNTER — TELEPHONE (OUTPATIENT)
Dept: CARDIOLOGY CLINIC | Age: 81
End: 2021-11-26

## 2021-11-26 DIAGNOSIS — R41.82 ALTERED MENTAL STATUS, UNSPECIFIED ALTERED MENTAL STATUS TYPE: ICD-10-CM

## 2021-11-26 DIAGNOSIS — I48.91 ATRIAL FIBRILLATION WITH RVR (HCC): Primary | ICD-10-CM

## 2021-11-26 LAB
ALBUMIN SERPL-MCNC: 3.1 G/DL (ref 3.5–5)
ALBUMIN/GLOB SERPL: 0.7 {RATIO} (ref 1.1–2.2)
ALP SERPL-CCNC: 65 U/L (ref 45–117)
ALT SERPL-CCNC: 33 U/L (ref 12–78)
ANION GAP SERPL CALC-SCNC: 16 MMOL/L (ref 5–15)
ARTERIAL PATENCY WRIST A: YES
AST SERPL-CCNC: 27 U/L (ref 15–37)
BASE DEFICIT BLDA-SCNC: 4.8 MMOL/L
BASOPHILS # BLD: 0 K/UL (ref 0–0.1)
BASOPHILS NFR BLD: 0 % (ref 0–1)
BDY SITE: ABNORMAL
BILIRUB SERPL-MCNC: 1.1 MG/DL (ref 0.2–1)
BNP SERPL-MCNC: 6362 PG/ML (ref 0–450)
BUN SERPL-MCNC: 48 MG/DL (ref 6–20)
BUN/CREAT SERPL: 18 (ref 12–20)
CALCIUM SERPL-MCNC: 9 MG/DL (ref 8.5–10.1)
CHLORIDE SERPL-SCNC: 103 MMOL/L (ref 97–108)
CO2 SERPL-SCNC: 18 MMOL/L (ref 21–32)
CREAT SERPL-MCNC: 2.66 MG/DL (ref 0.7–1.3)
DIFFERENTIAL METHOD BLD: ABNORMAL
EOSINOPHIL # BLD: 0 K/UL (ref 0–0.4)
EOSINOPHIL NFR BLD: 0 % (ref 0–7)
ERYTHROCYTE [DISTWIDTH] IN BLOOD BY AUTOMATED COUNT: 13.6 % (ref 11.5–14.5)
ETHANOL SERPL-MCNC: <10 MG/DL
GLOBULIN SER CALC-MCNC: 4.3 G/DL (ref 2–4)
GLUCOSE BLD STRIP.AUTO-MCNC: 127 MG/DL (ref 65–117)
GLUCOSE SERPL-MCNC: 142 MG/DL (ref 65–100)
HCO3 BLDA-SCNC: 18 MMOL/L (ref 22–26)
HCT VFR BLD AUTO: 36.6 % (ref 36.6–50.3)
HGB BLD-MCNC: 12.6 G/DL (ref 12.1–17)
IMM GRANULOCYTES # BLD AUTO: 0.1 K/UL (ref 0–0.04)
IMM GRANULOCYTES NFR BLD AUTO: 1 % (ref 0–0.5)
LYMPHOCYTES # BLD: 0.8 K/UL (ref 0.8–3.5)
LYMPHOCYTES NFR BLD: 10 % (ref 12–49)
MAGNESIUM SERPL-MCNC: 2 MG/DL (ref 1.6–2.4)
MCH RBC QN AUTO: 30.8 PG (ref 26–34)
MCHC RBC AUTO-ENTMCNC: 34.4 G/DL (ref 30–36.5)
MCV RBC AUTO: 89.5 FL (ref 80–99)
MONOCYTES # BLD: 0.8 K/UL (ref 0–1)
MONOCYTES NFR BLD: 10 % (ref 5–13)
NEUTS SEG # BLD: 5.9 K/UL (ref 1.8–8)
NEUTS SEG NFR BLD: 79 % (ref 32–75)
NRBC # BLD: 0 K/UL (ref 0–0.01)
NRBC BLD-RTO: 0 PER 100 WBC
PCO2 BLDA: 26 MMHG (ref 35–45)
PH BLDA: 7.44 [PH] (ref 7.35–7.45)
PLATELET # BLD AUTO: 259 K/UL (ref 150–400)
PMV BLD AUTO: 10.7 FL (ref 8.9–12.9)
PO2 BLDA: 80 MMHG (ref 80–100)
POTASSIUM SERPL-SCNC: 3.5 MMOL/L (ref 3.5–5.1)
PROT SERPL-MCNC: 7.4 G/DL (ref 6.4–8.2)
RBC # BLD AUTO: 4.09 M/UL (ref 4.1–5.7)
RBC MORPH BLD: ABNORMAL
SAO2 % BLD: 97 % (ref 92–97)
SAO2% DEVICE SAO2% SENSOR NAME: ABNORMAL
SERVICE CMNT-IMP: ABNORMAL
SODIUM SERPL-SCNC: 137 MMOL/L (ref 136–145)
SPECIMEN SITE: ABNORMAL
TROPONIN-HIGH SENSITIVITY: 34 NG/L (ref 0–76)
TROPONIN-HIGH SENSITIVITY: 34 NG/L (ref 0–76)
TSH SERPL DL<=0.05 MIU/L-ACNC: 0.64 UIU/ML (ref 0.36–3.74)
WBC # BLD AUTO: 7.6 K/UL (ref 4.1–11.1)

## 2021-11-26 PROCEDURE — 82962 GLUCOSE BLOOD TEST: CPT

## 2021-11-26 PROCEDURE — 36600 WITHDRAWAL OF ARTERIAL BLOOD: CPT

## 2021-11-26 PROCEDURE — 74011250636 HC RX REV CODE- 250/636: Performed by: FAMILY MEDICINE

## 2021-11-26 PROCEDURE — 71045 X-RAY EXAM CHEST 1 VIEW: CPT

## 2021-11-26 PROCEDURE — 99285 EMERGENCY DEPT VISIT HI MDM: CPT

## 2021-11-26 PROCEDURE — 36415 COLL VENOUS BLD VENIPUNCTURE: CPT

## 2021-11-26 PROCEDURE — 74011250636 HC RX REV CODE- 250/636: Performed by: EMERGENCY MEDICINE

## 2021-11-26 PROCEDURE — 84443 ASSAY THYROID STIM HORMONE: CPT

## 2021-11-26 PROCEDURE — 80053 COMPREHEN METABOLIC PANEL: CPT

## 2021-11-26 PROCEDURE — 74011000250 HC RX REV CODE- 250: Performed by: FAMILY MEDICINE

## 2021-11-26 PROCEDURE — 82077 ASSAY SPEC XCP UR&BREATH IA: CPT

## 2021-11-26 PROCEDURE — 96375 TX/PRO/DX INJ NEW DRUG ADDON: CPT

## 2021-11-26 PROCEDURE — 96374 THER/PROPH/DIAG INJ IV PUSH: CPT

## 2021-11-26 PROCEDURE — 83880 ASSAY OF NATRIURETIC PEPTIDE: CPT

## 2021-11-26 PROCEDURE — 85025 COMPLETE CBC W/AUTO DIFF WBC: CPT

## 2021-11-26 PROCEDURE — 70450 CT HEAD/BRAIN W/O DYE: CPT

## 2021-11-26 PROCEDURE — 84484 ASSAY OF TROPONIN QUANT: CPT

## 2021-11-26 PROCEDURE — 83735 ASSAY OF MAGNESIUM: CPT

## 2021-11-26 PROCEDURE — 82803 BLOOD GASES ANY COMBINATION: CPT

## 2021-11-26 PROCEDURE — 74011250636 HC RX REV CODE- 250/636

## 2021-11-26 PROCEDURE — 74011000250 HC RX REV CODE- 250: Performed by: EMERGENCY MEDICINE

## 2021-11-26 PROCEDURE — 96372 THER/PROPH/DIAG INJ SC/IM: CPT

## 2021-11-26 PROCEDURE — 93005 ELECTROCARDIOGRAM TRACING: CPT

## 2021-11-26 RX ORDER — METOPROLOL TARTRATE 5 MG/5ML
5 INJECTION INTRAVENOUS
Status: COMPLETED | OUTPATIENT
Start: 2021-11-26 | End: 2021-11-26

## 2021-11-26 RX ORDER — ZIPRASIDONE MESYLATE 20 MG/ML
INJECTION, POWDER, LYOPHILIZED, FOR SOLUTION INTRAMUSCULAR
Status: COMPLETED
Start: 2021-11-26 | End: 2021-11-26

## 2021-11-26 RX ORDER — DIPHENHYDRAMINE HYDROCHLORIDE 50 MG/ML
25 INJECTION, SOLUTION INTRAMUSCULAR; INTRAVENOUS
Status: COMPLETED | OUTPATIENT
Start: 2021-11-26 | End: 2021-11-27

## 2021-11-26 RX ORDER — LORAZEPAM 2 MG/ML
1 INJECTION INTRAMUSCULAR
Status: COMPLETED | OUTPATIENT
Start: 2021-11-26 | End: 2021-11-27

## 2021-11-26 RX ORDER — LORAZEPAM 2 MG/ML
0.5 INJECTION INTRAMUSCULAR
Status: COMPLETED | OUTPATIENT
Start: 2021-11-26 | End: 2021-11-26

## 2021-11-26 RX ADMIN — ZIPRASIDONE MESYLATE 10 MG: 20 INJECTION, POWDER, LYOPHILIZED, FOR SOLUTION INTRAMUSCULAR at 20:48

## 2021-11-26 RX ADMIN — ZIPRASIDONE MESYLATE 10 MG: 20 INJECTION, POWDER, LYOPHILIZED, FOR SOLUTION INTRAMUSCULAR at 20:58

## 2021-11-26 RX ADMIN — SODIUM CHLORIDE 500 ML: 9 INJECTION, SOLUTION INTRAVENOUS at 19:11

## 2021-11-26 RX ADMIN — METOPROLOL TARTRATE 5 MG: 5 INJECTION INTRAVENOUS at 18:53

## 2021-11-26 RX ADMIN — LORAZEPAM 0.5 MG: 2 INJECTION INTRAMUSCULAR; INTRAVENOUS at 18:54

## 2021-11-26 NOTE — ED NOTES
Pt not able to get comfortable  This writer put down one side rail so pt can sit on the side of the stretcher. Pt educated that he needs to keep the monitoring equipment on while he is sitting on the side of the stretcher, pt vocalized understanding.
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
## 2021-11-26 NOTE — TELEPHONE ENCOUNTER
Spoke to patient's wife who called regarding her . She states her  has had some more SOB past couple of days and last night he was \"out of his mind\", saying things that didn't make sense, and ran off, and she went and found him. Patient's wife said she had no pulse oximeter or BP. Patient also does not have a remote monitor due to poor cell service. Last in clinic his pacemaker was working fine. I advised her that she takes him to the ER or call 911 to get him to the ER. I told her that patient seems like he could be hypoxic and she said he does not have any oxygen at home. Patient's wife wanted to know if they need to let Dr. Coco Chauhan know, I told her that I would pass the message along to his nurse.

## 2021-11-26 NOTE — ED TRIAGE NOTES
Pt arrived by EMS with complaint of SOB for 1 day. Pt reports he has been feeling SOB since yesterday with a non-productive cough. Pt was given 1 a;lbuterol neb and 1 due neb, placed on 2l NC  PTA. Pt with history of A-fib, DM and a pacemaker. Pt arrived awake alert and oriented x 4. Pt denies pain.   Pt educated on ER flow

## 2021-11-26 NOTE — ED NOTES
resp was able to obtain labs via art stick. Pt placed back no monitoring equipment  Pt reports he is ready to go home, pt educated that he is awaiting for all his results. Pt complains of back pain from the stretcher, pt repositioned for comfort.   pts wife at bedside

## 2021-11-27 ENCOUNTER — HOSPITAL ENCOUNTER (INPATIENT)
Age: 81
LOS: 23 days | Discharge: NURSING, REHAB OR LONG-TERM CARE | DRG: 177 | End: 2021-12-20
Attending: STUDENT IN AN ORGANIZED HEALTH CARE EDUCATION/TRAINING PROGRAM | Admitting: HOSPITALIST
Payer: MEDICARE

## 2021-11-27 VITALS
BODY MASS INDEX: 32.51 KG/M2 | HEART RATE: 95 BPM | RESPIRATION RATE: 21 BRPM | HEIGHT: 72 IN | WEIGHT: 240 LBS | SYSTOLIC BLOOD PRESSURE: 130 MMHG | TEMPERATURE: 98.1 F | OXYGEN SATURATION: 99 % | DIASTOLIC BLOOD PRESSURE: 71 MMHG

## 2021-11-27 DIAGNOSIS — I65.23 BILATERAL CAROTID ARTERY STENOSIS: ICD-10-CM

## 2021-11-27 DIAGNOSIS — I67.89 CEREBRAL MICROVASCULAR DISEASE: ICD-10-CM

## 2021-11-27 DIAGNOSIS — R41.82 ACUTE ALTERATION IN MENTAL STATUS: ICD-10-CM

## 2021-11-27 PROBLEM — I48.91 RAPID ATRIAL FIBRILLATION (HCC): Status: ACTIVE | Noted: 2021-11-27

## 2021-11-27 LAB
AMPHET UR QL SCN: NEGATIVE
BARBITURATES UR QL SCN: NEGATIVE
BENZODIAZ UR QL: NEGATIVE
CANNABINOIDS UR QL SCN: NEGATIVE
COCAINE UR QL SCN: NEGATIVE
DRUG SCRN COMMENT,DRGCM: NORMAL
GLUCOSE BLD STRIP.AUTO-MCNC: 101 MG/DL (ref 65–117)
GLUCOSE BLD STRIP.AUTO-MCNC: 114 MG/DL (ref 65–117)
GLUCOSE BLD STRIP.AUTO-MCNC: 139 MG/DL (ref 65–117)
GLUCOSE BLD STRIP.AUTO-MCNC: 151 MG/DL (ref 65–117)
METHADONE UR QL: NEGATIVE
OPIATES UR QL: NEGATIVE
PCP UR QL: NEGATIVE
SERVICE CMNT-IMP: ABNORMAL
SERVICE CMNT-IMP: ABNORMAL
SERVICE CMNT-IMP: NORMAL
SERVICE CMNT-IMP: NORMAL

## 2021-11-27 PROCEDURE — 82962 GLUCOSE BLOOD TEST: CPT

## 2021-11-27 PROCEDURE — 65270000029 HC RM PRIVATE

## 2021-11-27 PROCEDURE — 74011636637 HC RX REV CODE- 636/637: Performed by: STUDENT IN AN ORGANIZED HEALTH CARE EDUCATION/TRAINING PROGRAM

## 2021-11-27 PROCEDURE — 87635 SARS-COV-2 COVID-19 AMP PRB: CPT

## 2021-11-27 PROCEDURE — 74011250636 HC RX REV CODE- 250/636: Performed by: FAMILY MEDICINE

## 2021-11-27 PROCEDURE — 80307 DRUG TEST PRSMV CHEM ANLYZR: CPT

## 2021-11-27 PROCEDURE — 74011250636 HC RX REV CODE- 250/636: Performed by: STUDENT IN AN ORGANIZED HEALTH CARE EDUCATION/TRAINING PROGRAM

## 2021-11-27 RX ORDER — ENOXAPARIN SODIUM 100 MG/ML
30 INJECTION SUBCUTANEOUS DAILY
Status: DISCONTINUED | OUTPATIENT
Start: 2021-11-28 | End: 2021-11-28

## 2021-11-27 RX ORDER — ACETAMINOPHEN 325 MG/1
650 TABLET ORAL
Status: DISCONTINUED | OUTPATIENT
Start: 2021-11-27 | End: 2021-12-20 | Stop reason: HOSPADM

## 2021-11-27 RX ORDER — INSULIN LISPRO 100 [IU]/ML
INJECTION, SOLUTION INTRAVENOUS; SUBCUTANEOUS
Status: DISCONTINUED | OUTPATIENT
Start: 2021-11-27 | End: 2021-12-20 | Stop reason: HOSPADM

## 2021-11-27 RX ORDER — METOPROLOL TARTRATE 25 MG/1
25 TABLET, FILM COATED ORAL 2 TIMES DAILY
Status: DISCONTINUED | OUTPATIENT
Start: 2021-11-27 | End: 2021-12-01 | Stop reason: SDUPTHER

## 2021-11-27 RX ORDER — HALOPERIDOL 5 MG/ML
2 INJECTION INTRAMUSCULAR
Status: DISCONTINUED | OUTPATIENT
Start: 2021-11-27 | End: 2021-11-27

## 2021-11-27 RX ORDER — INSULIN LISPRO 100 [IU]/ML
INJECTION, SOLUTION INTRAVENOUS; SUBCUTANEOUS
Status: DISCONTINUED | OUTPATIENT
Start: 2021-11-27 | End: 2021-11-27 | Stop reason: SDUPTHER

## 2021-11-27 RX ORDER — SODIUM CHLORIDE 9 MG/ML
75 INJECTION, SOLUTION INTRAVENOUS CONTINUOUS
Status: DISCONTINUED | OUTPATIENT
Start: 2021-11-27 | End: 2021-12-01

## 2021-11-27 RX ORDER — SODIUM CHLORIDE 0.9 % (FLUSH) 0.9 %
5-40 SYRINGE (ML) INJECTION AS NEEDED
Status: DISCONTINUED | OUTPATIENT
Start: 2021-11-27 | End: 2021-12-20 | Stop reason: HOSPADM

## 2021-11-27 RX ORDER — DEXTROSE 50 % IN WATER (D50W) INTRAVENOUS SYRINGE
12.5-25 AS NEEDED
Status: DISCONTINUED | OUTPATIENT
Start: 2021-11-27 | End: 2021-11-27 | Stop reason: SDUPTHER

## 2021-11-27 RX ORDER — HALOPERIDOL 5 MG/ML
4 INJECTION INTRAMUSCULAR
Status: COMPLETED | OUTPATIENT
Start: 2021-11-27 | End: 2021-11-27

## 2021-11-27 RX ORDER — LANOLIN ALCOHOL/MO/W.PET/CERES
1000 CREAM (GRAM) TOPICAL DAILY
Status: DISCONTINUED | OUTPATIENT
Start: 2021-11-27 | End: 2021-12-20 | Stop reason: HOSPADM

## 2021-11-27 RX ORDER — GUAIFENESIN 100 MG/5ML
200 SOLUTION ORAL
Status: DISCONTINUED | OUTPATIENT
Start: 2021-11-27 | End: 2021-12-20 | Stop reason: HOSPADM

## 2021-11-27 RX ORDER — DEXTROSE 50 % IN WATER (D50W) INTRAVENOUS SYRINGE
12.5-25 AS NEEDED
Status: DISCONTINUED | OUTPATIENT
Start: 2021-11-27 | End: 2021-12-20 | Stop reason: HOSPADM

## 2021-11-27 RX ORDER — LORAZEPAM 2 MG/ML
1 INJECTION INTRAMUSCULAR
Status: DISCONTINUED | OUTPATIENT
Start: 2021-11-27 | End: 2021-11-28

## 2021-11-27 RX ORDER — IPRATROPIUM BROMIDE AND ALBUTEROL SULFATE 2.5; .5 MG/3ML; MG/3ML
3 SOLUTION RESPIRATORY (INHALATION)
Status: DISCONTINUED | OUTPATIENT
Start: 2021-11-27 | End: 2021-12-20 | Stop reason: HOSPADM

## 2021-11-27 RX ORDER — POLYETHYLENE GLYCOL 3350 17 G/17G
17 POWDER, FOR SOLUTION ORAL DAILY PRN
Status: DISCONTINUED | OUTPATIENT
Start: 2021-11-27 | End: 2021-12-20 | Stop reason: HOSPADM

## 2021-11-27 RX ORDER — LATANOPROST 50 UG/ML
1 SOLUTION/ DROPS OPHTHALMIC DAILY
Status: DISCONTINUED | OUTPATIENT
Start: 2021-11-27 | End: 2021-12-20 | Stop reason: HOSPADM

## 2021-11-27 RX ORDER — ONDANSETRON 4 MG/1
4 TABLET, ORALLY DISINTEGRATING ORAL
Status: DISCONTINUED | OUTPATIENT
Start: 2021-11-27 | End: 2021-12-20 | Stop reason: HOSPADM

## 2021-11-27 RX ORDER — ROSUVASTATIN CALCIUM 20 MG/1
40 TABLET, COATED ORAL DAILY
Status: DISCONTINUED | OUTPATIENT
Start: 2021-11-27 | End: 2021-12-20 | Stop reason: HOSPADM

## 2021-11-27 RX ORDER — MAGNESIUM SULFATE 100 %
4 CRYSTALS MISCELLANEOUS AS NEEDED
Status: DISCONTINUED | OUTPATIENT
Start: 2021-11-27 | End: 2021-11-27 | Stop reason: SDUPTHER

## 2021-11-27 RX ORDER — ONDANSETRON 2 MG/ML
4 INJECTION INTRAMUSCULAR; INTRAVENOUS
Status: DISCONTINUED | OUTPATIENT
Start: 2021-11-27 | End: 2021-12-20 | Stop reason: HOSPADM

## 2021-11-27 RX ORDER — ACETAMINOPHEN 650 MG/1
650 SUPPOSITORY RECTAL
Status: DISCONTINUED | OUTPATIENT
Start: 2021-11-27 | End: 2021-12-20 | Stop reason: HOSPADM

## 2021-11-27 RX ORDER — SODIUM CHLORIDE 0.9 % (FLUSH) 0.9 %
5-40 SYRINGE (ML) INJECTION EVERY 8 HOURS
Status: DISCONTINUED | OUTPATIENT
Start: 2021-11-27 | End: 2021-12-20 | Stop reason: HOSPADM

## 2021-11-27 RX ORDER — ASPIRIN 81 MG/1
81 TABLET ORAL DAILY
Status: DISCONTINUED | OUTPATIENT
Start: 2021-11-27 | End: 2021-11-28

## 2021-11-27 RX ORDER — MAGNESIUM SULFATE 100 %
4 CRYSTALS MISCELLANEOUS AS NEEDED
Status: DISCONTINUED | OUTPATIENT
Start: 2021-11-27 | End: 2021-12-20 | Stop reason: HOSPADM

## 2021-11-27 RX ORDER — RANOLAZINE 500 MG/1
1 TABLET, EXTENDED RELEASE ORAL 2 TIMES DAILY
Status: DISCONTINUED | OUTPATIENT
Start: 2021-11-27 | End: 2021-12-20 | Stop reason: HOSPADM

## 2021-11-27 RX ORDER — HALOPERIDOL 5 MG/ML
3 INJECTION INTRAMUSCULAR
Status: DISCONTINUED | OUTPATIENT
Start: 2021-11-27 | End: 2021-11-28

## 2021-11-27 RX ORDER — CLOPIDOGREL BISULFATE 75 MG/1
75 TABLET ORAL DAILY
Status: DISCONTINUED | OUTPATIENT
Start: 2021-11-27 | End: 2021-12-20 | Stop reason: HOSPADM

## 2021-11-27 RX ADMIN — LORAZEPAM 1 MG: 2 INJECTION INTRAMUSCULAR; INTRAVENOUS at 01:26

## 2021-11-27 RX ADMIN — Medication 10 ML: at 21:19

## 2021-11-27 RX ADMIN — LORAZEPAM 1 MG: 2 INJECTION INTRAMUSCULAR; INTRAVENOUS at 23:41

## 2021-11-27 RX ADMIN — Medication 2 UNITS: at 13:17

## 2021-11-27 RX ADMIN — Medication 10 ML: at 08:00

## 2021-11-27 RX ADMIN — DIPHENHYDRAMINE HYDROCHLORIDE 25 MG: 50 INJECTION, SOLUTION INTRAMUSCULAR; INTRAVENOUS at 01:27

## 2021-11-27 RX ADMIN — SODIUM CHLORIDE 500 ML: 900 INJECTION, SOLUTION INTRAVENOUS at 08:00

## 2021-11-27 RX ADMIN — LORAZEPAM 1 MG: 2 INJECTION INTRAMUSCULAR; INTRAVENOUS at 11:10

## 2021-11-27 RX ADMIN — HALOPERIDOL LACTATE 4 MG: 5 INJECTION, SOLUTION INTRAMUSCULAR at 01:45

## 2021-11-27 RX ADMIN — HALOPERIDOL LACTATE 3 MG: 5 INJECTION, SOLUTION INTRAMUSCULAR at 21:02

## 2021-11-27 RX ADMIN — SODIUM CHLORIDE 75 ML/HR: 9 INJECTION, SOLUTION INTRAVENOUS at 10:28

## 2021-11-27 RX ADMIN — HALOPERIDOL LACTATE 4 MG: 5 INJECTION, SOLUTION INTRAMUSCULAR at 02:16

## 2021-11-27 RX ADMIN — HALOPERIDOL LACTATE 2 MG: 5 INJECTION, SOLUTION INTRAMUSCULAR at 08:20

## 2021-11-27 RX ADMIN — Medication 10 ML: at 14:00

## 2021-11-27 RX ADMIN — HALOPERIDOL LACTATE 3 MG: 5 INJECTION, SOLUTION INTRAMUSCULAR at 14:00

## 2021-11-27 RX ADMIN — LORAZEPAM 1 MG: 2 INJECTION INTRAMUSCULAR; INTRAVENOUS at 17:12

## 2021-11-27 NOTE — ED NOTES
This chart note is of events that took place between the hours of 7160-5774, due to direct patient care and hands on care; real time charting was not possible. - Patient has pulled out his IV and removed all monitoring devices that was placed by Day shift, stating that he wants to go home. Writer enter the room requested to replace the IV medication there was a fluid infusion order that staff need to administer, patient allowed Nurse Gisela Bolden to place the IV;and replace the monitoring wires. Patient exits room 3 about 5 minutes after IV placement having pulled out the new IV and removing the monitors; yelling he is ready to go and wants to leave, Supervisor Fadi Saenz, informed the patient that he would need to sign out AMA, questioning the patient for cognitive status it was founded that the patient was not lionel to make clear medical decisions. Doctor Brice Kat was made aware of the situation. Doctor Brice Kat and security at bedside, patient has became increasingly combative. Patient attempted to hit , Security requested to call for back up from the local police. Writer entered the patient room in attempts to redirect and calm the patient. Writer requested permission from patients wife to administer medication to calm the patients aggression, patient wife gave verbal permission to administered medication. Doctor Brice Kat was made aware of the verbal permission given and came in and spoke with the wife. Local police arrived at bedside. Patient continue to yell at his spouse requesting for her to take him home. Writer was able to calm the patient for about 10 minutes, than patient circled to the right of the room and broke the lock on the Crash cart, writer again attempted to redirect the patient, writer requested Medication to calm the combative patient (See Mar 2048), a few minutes after medication administration, patient continued be aggressive and combative.  Wife was asked to step outside the room while Writer and local police attempted to defuse and calm the patient. After about 6 minutes later the patient exited his bed from the foot end, patient approached his wife demanding that she takes him home, wife attempts to redirect ,  grabs wife clothing around the collar, writer and local police asked patient to release his wife patient continue pulling at her clothing, writer placed herself between patient and spouse, local police grab patients left arm writer grabbed his right arm releasing his hand from his wife's clothing. Patient was restrained and placed back in bed. Writer requested 4 point soft restrains, and additional medication to calm patient. (see MAR 2058). Patient continue thrashing around, treating staff and officers. When restraint request was honored and placed at 2101 patient continue to fight against the restraints. Patient went to have a CT scan done at  Approximately 2300 four hour evaluation for restraints release performed and patient was still extremely combative/aggressive. Patient placed back in restraints continuation order entered. Patient is still refusing all vitals and monitoring devices to be placed on his person. 0126/0127 additional anxiety medication given (see MAR). After medication administration patient appeared to become increasingly more agitated and aggressive. Patient is still yelling at staff and his wife. Multiple redirection techniques were attempted. Hospital security is at bedside. Writer requested additional medication to calm the patient because at this time the patient is fighting strongly against the restraints and there is a fear of self harm. Additional medication administered (see MAR 0145). Patient appears to be calming down some but still fighting against the medication. Patient is thrashing around pulling against the restraints additional medication was ordered to induce calmness (see MAR 0216). 0230.  Patient has calmed enough were he is intermittently sleeping, Patients wife has left bedside and gone home for the evening, [facility] is due to arrive 0300. [facility] arrived approximately 46. Report was given in detail to [facility]. [facility] secured the patient applied/restrainted the patient on their stretcher. [facility] left the emergency department approximately 0401.

## 2021-11-27 NOTE — PROGRESS NOTES
2220 -  Pt talking w/wife at bedside - officer also at bedside - pt still pulling against restraints at times.

## 2021-11-27 NOTE — ED NOTES
Writer requested assistance from doctor because patient is still combative. (See Mar) patient will be given medication to reduce Anxiety.

## 2021-11-27 NOTE — PROGRESS NOTES
36 - spoke with AOC/Risk Management along with [facility] supervisor for transporting Mr. Candace Larsen in 4pt restraints. Mr. Candace Larsen still attempting to get out of bed without reasonable measures to keep pt from hurting himself and others - pt back in 4 pt restaints,  remains at bedside along with wife and nursing staff - medical TDO was considered but not pursued by . Marbella Smith was accepting and awaiting room assignment. Andie Rogers advised to arranged ALS transportation from [facility] to [facility] [id number]. Arranged ALS transport w/[facility]) -  advised to bring appropiate equipment (cardiac monitor, 4pt restraints) - ETA will be called back - updated ED staff.

## 2021-11-27 NOTE — ED NOTES
I have spent 65 minutes of critical care time involved in lab review, consultations with specialist, family decision-making, and documentation. During this entire length of time I was immediately available to the patient. Critical Care: The reason for providing this level of medical care for this critically ill patient was due a critical illness that impaired one or more vital organ systems such that there was a high probability of imminent or life threatening deterioration in the patients condition. This care involved high complexity decision making to assess, manipulate, and support vital system functions, to treat this degreee vital organ system failure and to prevent further life threatening deterioration of the patients condition.

## 2021-11-27 NOTE — H&P
Hospitalist Admission Note    NAME: Samir Cartagena   :  1940   MRN:  [id number]     Date/Time:  2021 7:32 AM    Patient PCP: None  ______________________________________________________________________  Given the patient's current clinical presentation, I have a high level of concern for decompensation if discharged from the emergency department. Complex decision making was performed, which includes reviewing the patient's available past medical records, laboratory results, and x-ray films. My assessment of this patient's clinical condition and my plan of care is as follows. Assessment / Plan:  Acute metabolic encephalopathy    Reportedly presented at baseline mental status to OSH, and became acutely altered in the ED, was violent  Etiology remains unclear at this time. CT head negative, Serum alcohol <10, Urine drug screening negative. No evidence of ongoing infection   C/w 1:1 supervision for now, Haldol as needed PRN  Consider neurology consult if failed to improve  F/u ammonia    Paroxysmal Afib: Was in Rapid Afib at OSH, which got better with metoprolol  Currently in 100s  Resume oral metoprolol  Not on AC d/t hx of GI bleed, was recently seen by Dr. Valero Gain was discussed which he was not interested  Cardiology consult if goes into RVR again.     Acute on chronic kidney disease:  Creatinine 2.6 yesterday from [facility] around 1.8  Repeat labs this Am  BP is also low in 80s, will give a bolus and give gentle hydration    CAD s/p CABG  Resume plavix and statin    DM:  Hold metformin  Sliding scale  Monitor FS    Code Status: Full  Surrogate Decision Maker: His wife    DVT Prophylaxis: lovenox  GI Prophylaxis: not indicated    Baseline: Ambulatory      Subjective:   CHIEF COMPLAINT: Shortness of breath    HISTORY OF PRESENT ILLNESS:     Sandra Marc is a 80 y.o. male with past medical history of CAD status post CABG, CKD, A. fib status post ablation status post pacemaker was transferred from outside hospital with acute change in mental status. He presented to OSH yesterday with shortness of breath, had A. fib RVR on presentation which was controlled with metoprolol. However he got very confused in the ED, became violent, was trying to hit a . Had a CT scan done head which was negative, and was transferred to our center for further evaluation. He had received Geodon, Haldol prior to being sent to our center. On my evaluation he was sleepy, unable to give any history. He is on room air saturating well. No reported chest pain, belly pain, change in bladder or bowel habits, lower extreme edema or tenderness. We were asked to admit for work up and evaluation of the above problems.      Past Medical History:   Diagnosis Date    Abnormal nuclear cardiac imaging test 5/8/2019    Anemia 6/5/2017    Atrial fibrillation (HCC)     Chronic kidney disease     CKD (chronic kidney disease) 5/8/2019    Coronary atherosclerosis of native coronary artery     Diabetes mellitus, type II (HCC)     MCCOLLUM (dyspnea on exertion) 5/8/2019    Glaucoma     Mixed hyperlipidemia     Other dyspnea and respiratory abnormality     Pacemaker     S/P ablation of atrial fibrillation 8/4/2016        Past Surgical History:   Procedure Laterality Date    HX AFIB ABLATION      HX CATARACT REMOVAL Bilateral 2018    HX CORONARY ARTERY BYPASS GRAFT  2001    HX CORONARY STENT PLACEMENT      HX HEART CATHETERIZATION  11/21/2019    Left     HX PTCA      CO INS NEW/RPLCMT PRM PM W/TRANSV ELTRD ATRIAL&VENT N/A 3/29/2019    INSERT PPM DUAL performed by [facility] at [facility] CARDIAC CATH [facility]    CO REMOVAL SUBCUTANEOUS CARDIAC RHYTHM MONITOR N/A 3/29/2019    LOOP RECORDER REMOVAL performed by Joseph Alvarez MD at [facility] CARDIAC CATH LAB    UPPER GI ENDOSCOPY,BIOPSY  7/26/2019            Social History     Tobacco Use    Smoking status: Former Smoker     Packs/day: 4.00 Years: 20.00     Pack years: 80.00     Types: Cigarettes     Quit date: 1980     Years since quittin.3    Smokeless tobacco: Former User    Tobacco comment: QUIT AT AGE 42   Substance Use Topics    Alcohol use: No     Alcohol/week: 0.0 standard drinks        Family History   Problem Relation Age of Onset    Diabetes Mother     Emphysema Father          age 46 - smoker     Allergies   Allergen Reactions    Ciprofloxacin Other (comments)     Difficulty breathing; 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        Prior to Admission medications    Medication Sig Start Date End Date Taking? Authorizing Provider   ranolazine ER (RANEXA) 500 mg SR tablet TAKE 1 TABLET BY MOUTH TWICE A DAY 21   Bjorn Reyes MD   metoprolol tartrate (LOPRESSOR) 25 mg tablet TAKE 1 TABLET BY MOUTH TWICE A DAY 10/25/21   Bianca Godfrey ANP   rosuvastatin (CRESTOR) 40 mg tablet TAKE 1 TABLET BY MOUTH EVERY DAY 21   Cathie BARAKAT NP   latanoprost (XALATAN) 0.005 % ophthalmic solution Administer 1 Drop to both eyes daily. 21   Provider, Historical   metFORMIN ER (GLUCOPHAGE XR) 500 mg tablet Take 1 Tab by mouth two (2) times a day. Please restart Metformin on 19  Patient not taking: Reported on 20   Shiva Vines MD   clopidogrel (PLAVIX) 75 mg tab Take 1 Tab by mouth daily. 19   Ann-Marie Loza NP   glucose blood VI test strips (ACCU-CHEK YVES PLUS TEST STRP) strip Use to check Blood glucose 3 times daily (Diagnosis code: E11.21) 19   Lenore Phelan MD   aspirin delayed-release 81 mg tablet Take 1 Tab by mouth daily. Patient not taking: Reported on 2021   Otto Perez NP   nitroglycerin (NITROSTAT) 0.4 mg SL tablet 1 Tab by SubLINGual route every five (5) minutes as needed for Chest Pain. 19   Manual Crumbly., NP   cholecalciferol (VITAMIN D3) 1,000 unit cap Take 1,000 Units by mouth daily.     Provider, Historical   cyanocobalamin 1,000 mcg tablet Take 1,000 mcg by mouth daily. Provider, Historical       REVIEW OF SYSTEMS:     I am not able to complete the review of systems because: The patient is intubated and sedated   x The patient has altered mental status due to his acute medical problems    The patient has baseline aphasia from prior stroke(s)    The patient has baseline dementia and is not reliable historian    The patient is in acute medical distress and unable to provide information           Total of 12 systems reviewed as follows:       POSITIVE= underlined text  Negative = text not underlined  General:  fever, chills, sweats, generalized weakness, weight loss/gain,      loss of appetite   Eyes:    blurred vision, eye pain, loss of vision, double vision  ENT:    rhinorrhea, pharyngitis   Respiratory:   cough, sputum production, SOB, MCCOLLUM, wheezing, pleuritic pain   Cardiology:   chest pain, palpitations, orthopnea, PND, edema, syncope   Gastrointestinal:  abdominal pain , N/V, diarrhea, dysphagia, constipation, bleeding   Genitourinary:  frequency, urgency, dysuria, hematuria, incontinence   Muskuloskeletal :  arthralgia, myalgia, back pain  Hematology:  easy bruising, nose or gum bleeding, lymphadenopathy   Dermatological: rash, ulceration, pruritis, color change / jaundice  Endocrine:   hot flashes or polydipsia   Neurological:  headache, dizziness, confusion, focal weakness, paresthesia,     Speech difficulties, memory loss, gait difficulty  Psychological: Feelings of anxiety, depression, agitation    Objective:   VITALS:    Visit Vitals  BP (!) 84/42   Pulse (!) 105   Temp 98.4 °F (36.9 °C)   Resp 20   SpO2 98%       PHYSICAL EXAM:    General:    Alert, cooperative, no distress, appears stated age. HEENT: Atraumatic, anicteric sclerae, pink conjunctivae     No oral ulcers, mucosa moist, throat clear, dentition fair  Neck:  Supple, symmetrical,  thyroid: non tender  Lungs:   Clear to auscultation bilaterally. No Wheezing or Rhonchi.  No rales.  Chest wall:  No tenderness  No Accessory muscle use. Heart:   Regular  rhythm,  No  murmur   No edema  Abdomen:   Soft, non-tender. Not distended. Bowel sounds normal  Extremities: No cyanosis. No clubbing,      Skin turgor normal, Capillary refill normal, Radial dial pulse 2+  Skin:     Not pale. Not Jaundiced  No rashes   Psych:  Not agitated currenlty  Neurologic: Sleepy, doesn't follow command  _______________________________________________________________________  Care Plan discussed with:    Comments   Patient y    Family      RN y    Care Manager                    Consultant:      _______________________________________________________________________  Expected  Disposition:   Home with Family    HH/PT/OT/RN y   SNF/LTC    SHEA    ________________________________________________________________________  TOTAL TIME: 39 Minutes    Critical Care Provided     Minutes non procedure based      Comments     Reviewed previous records   >50% of visit spent in counseling and coordination of care  Discussion with patient and/or family and questions answered       ________________________________________________________________________  Signed: Soledad Asher MD    Procedures: see electronic medical records for all procedures/Xrays and details which were not copied into this note but were reviewed prior to creation of Plan. LAB DATA REVIEWED:    Recent Results (from the past 24 hour(s))   EKG, 12 LEAD, INITIAL    Collection Time: 11/26/21  4:42 PM   Result Value Ref Range    Ventricular Rate 131 BPM    Atrial Rate 182 BPM    QRS Duration 96 ms    Q-T Interval 334 ms    QTC Calculation (Bezet) 493 ms    Calculated R Axis 46 degrees    Calculated T Axis 43 degrees    Diagnosis       Atrial fibrillation with rapid ventricular response  Nonspecific ST abnormality  Abnormal ECG  When compared with ECG of 25-JUL-2019 09:46,  Atrial fibrillation has replaced Electronic atrial pacemaker  Vent.  rate has increased BY  69 BPM  Nonspecific T wave abnormality now evident in Inferior leads     CBC WITH AUTOMATED DIFF    Collection Time: 11/26/21  6:18 PM   Result Value Ref Range    WBC 7.6 4.1 - 11.1 K/uL    RBC 4.09 (L) 4.10 - 5.70 M/uL    HGB 12.6 12.1 - 17.0 g/dL    HCT 36.6 36.6 - 50.3 %    MCV 89.5 80.0 - 99.0 FL    MCH 30.8 26.0 - 34.0 PG    MCHC 34.4 30.0 - 36.5 g/dL    RDW 13.6 11.5 - 14.5 %    PLATELET 294 728 - 614 K/uL    MPV 10.7 8.9 - 12.9 FL    NRBC 0.0 0  WBC    ABSOLUTE NRBC 0.00 0.00 - 0.01 K/uL    NEUTROPHILS 79 (H) 32 - 75 %    LYMPHOCYTES 10 (L) 12 - 49 %    MONOCYTES 10 5 - 13 %    EOSINOPHILS 0 0 - 7 %    BASOPHILS 0 0 - 1 %    IMMATURE GRANULOCYTES 1 (H) 0.0 - 0.5 %    ABS. NEUTROPHILS 5.9 1.8 - 8.0 K/UL    ABS. LYMPHOCYTES 0.8 0.8 - 3.5 K/UL    ABS. MONOCYTES 0.8 0.0 - 1.0 K/UL    ABS. EOSINOPHILS 0.0 0.0 - 0.4 K/UL    ABS. BASOPHILS 0.0 0.0 - 0.1 K/UL    ABS. IMM. GRANS. 0.1 (H) 0.00 - 0.04 K/UL    DF AUTOMATED      RBC COMMENTS NORMOCYTIC, NORMOCHROMIC     METABOLIC PANEL, COMPREHENSIVE    Collection Time: 11/26/21  6:18 PM   Result Value Ref Range    Sodium 137 136 - 145 mmol/L    Potassium 3.5 3.5 - 5.1 mmol/L    Chloride 103 97 - 108 mmol/L    CO2 18 (L) 21 - 32 mmol/L    Anion gap 16 (H) 5 - 15 mmol/L    Glucose 142 (H) 65 - 100 mg/dL    BUN 48 (H) 6 - 20 MG/DL    Creatinine 2.66 (H) 0.70 - 1.30 MG/DL    BUN/Creatinine ratio 18 12 - 20      GFR est AA 28 (L) >60 ml/min/1.73m2    GFR est non-AA 23 (L) >60 ml/min/1.73m2    Calcium 9.0 8.5 - 10.1 MG/DL    Bilirubin, total 1.1 (H) 0.2 - 1.0 MG/DL    ALT (SGPT) 33 12 - 78 U/L    AST (SGOT) 27 15 - 37 U/L    Alk.  phosphatase 65 45 - 117 U/L    Protein, total 7.4 6.4 - 8.2 g/dL    Albumin 3.1 (L) 3.5 - 5.0 g/dL    Globulin 4.3 (H) 2.0 - 4.0 g/dL    A-G Ratio 0.7 (L) 1.1 - 2.2     TROPONIN-HIGH SENSITIVITY    Collection Time: 11/26/21  6:18 PM   Result Value Ref Range    Troponin-High Sensitivity 34 0 - 76 ng/L   NT-PRO BNP    Collection Time: 11/26/21  6:18 PM   Result Value Ref Range    NT pro-BNP 6,362 (H) 0 - 450 PG/ML   MAGNESIUM    Collection Time: 11/26/21  6:18 PM   Result Value Ref Range    Magnesium 2.0 1.6 - 2.4 mg/dL   TSH 3RD GENERATION    Collection Time: 11/26/21  6:18 PM   Result Value Ref Range    TSH 0.64 0.36 - 3.74 uIU/mL   ETHYL ALCOHOL    Collection Time: 11/26/21  6:18 PM   Result Value Ref Range    ALCOHOL(ETHYL),SERUM <10 <10 MG/DL   TROPONIN-HIGH SENSITIVITY    Collection Time: 11/26/21  8:05 PM   Result Value Ref Range    Troponin-High Sensitivity 34 0 - 76 ng/L   GLUCOSE, POC    Collection Time: 11/26/21  8:31 PM   Result Value Ref Range    Glucose (POC) 127 (H) 65 - 117 mg/dL    Performed by Elvis Chopra    BLOOD GAS, ARTERIAL    Collection Time: 11/26/21 10:16 PM   Result Value Ref Range    pH 7.44 7.35 - 7.45      PCO2 26 (L) 35 - 45 mmHg    PO2 80 80 - 100 mmHg    O2 SAT 97 92 - 97 %    BICARBONATE 18 (L) 22 - 26 mmol/L    BASE DEFICIT 4.8 mmol/L    O2 METHOD ROOM AIR      Sample source ARTERIAL      SITE RIGHT RADIAL      KAREEM'S TEST YES

## 2021-11-27 NOTE — ED NOTES
Patient seems agitated, unwilling to remain in bed, removing all monitoring devices. Patient family member is at bedside, attempting to calm patient. Will continue to monitor.

## 2021-11-27 NOTE — PROGRESS NOTES
2101 - pt remains combative w/officer and wife - wife gave verbal order for soft 4 pt restraints - Dr. Estiven Ovalle gave verbal order for restaints - officers assisted w/ER RN in [address] [place] [address] 2102 pt remains agitated.

## 2021-11-27 NOTE — PROGRESS NOTES
2058 - Dr. Dave Chilel gave verbal order for another 10mg Ziprasidone IM  - pt swinging at officers - given IM left buttock.

## 2021-11-27 NOTE — PROGRESS NOTES
2300 - pt going to CT w/two officers - pt released from 4 pt restraints - attempted least restrictive alternative measures which all failed - pt placed back in 4 pt restraints and returned to ER department room #3.    7990 - Dr. Marques miller pt and re-ordered of 4 pt soft restraints continued. Securing  remains at bedside along with nursing staff, following soft 4 pt restrain protocol. Pt declined pain/discomfort, declined PO fluids.

## 2021-11-27 NOTE — ED NOTES
Transfer of care and bedside report given to  Elizabeth Mccord RN    ,ending my assignment with this patient.   Report included the following information:  SBAR, KARDEX, ED Summary and Recent Results

## 2021-11-27 NOTE — ED NOTES
Writer requested medication help to calm combative patient, last course of treatment did not resolve patient agitation.
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
## 2021-11-28 PROBLEM — I67.89 CEREBRAL MICROVASCULAR DISEASE: Status: ACTIVE | Noted: 2021-11-28

## 2021-11-28 PROBLEM — R56.9 CONVULSION (HCC): Status: ACTIVE | Noted: 2021-11-28

## 2021-11-28 PROBLEM — I65.23 BILATERAL CAROTID ARTERY STENOSIS: Status: ACTIVE | Noted: 2021-11-28

## 2021-11-28 PROBLEM — G93.41 ACUTE METABOLIC ENCEPHALOPATHY: Status: ACTIVE | Noted: 2021-11-28

## 2021-11-28 PROBLEM — R41.82 ACUTE ALTERATION IN MENTAL STATUS: Status: ACTIVE | Noted: 2021-11-28

## 2021-11-28 LAB
25(OH)D3 SERPL-MCNC: 28 NG/ML (ref 30–100)
AMMONIA PLAS-SCNC: 28 UMOL/L
ANION GAP SERPL CALC-SCNC: 7 MMOL/L (ref 5–15)
ATRIAL RATE: 182 BPM
B PERT DNA SPEC QL NAA+PROBE: NOT DETECTED
BORDETELLA PARAPERTUSSIS PCR, BORPAR: NOT DETECTED
BUN SERPL-MCNC: 41 MG/DL (ref 6–20)
BUN/CREAT SERPL: 21 (ref 12–20)
C PNEUM DNA SPEC QL NAA+PROBE: NOT DETECTED
CALCIUM SERPL-MCNC: 9.5 MG/DL (ref 8.5–10.1)
CALCULATED R AXIS, ECG10: 46 DEGREES
CALCULATED T AXIS, ECG11: 43 DEGREES
CHLORIDE SERPL-SCNC: 116 MMOL/L (ref 97–108)
CO2 SERPL-SCNC: 22 MMOL/L (ref 21–32)
COVID-19 RAPID TEST, COVR: NOT DETECTED
CREAT SERPL-MCNC: 1.96 MG/DL (ref 0.7–1.3)
DIAGNOSIS, 93000: NORMAL
ERYTHROCYTE [DISTWIDTH] IN BLOOD BY AUTOMATED COUNT: 13.5 % (ref 11.5–14.5)
FLUAV H1 2009 PAND RNA SPEC QL NAA+PROBE: NOT DETECTED
FLUAV H1 RNA SPEC QL NAA+PROBE: NOT DETECTED
FLUAV H3 RNA SPEC QL NAA+PROBE: NOT DETECTED
FLUAV SUBTYP SPEC NAA+PROBE: NOT DETECTED
FLUBV RNA SPEC QL NAA+PROBE: NOT DETECTED
GLUCOSE BLD STRIP.AUTO-MCNC: 104 MG/DL (ref 65–117)
GLUCOSE BLD STRIP.AUTO-MCNC: 120 MG/DL (ref 65–117)
GLUCOSE BLD STRIP.AUTO-MCNC: 130 MG/DL (ref 65–117)
GLUCOSE SERPL-MCNC: 119 MG/DL (ref 65–100)
HADV DNA SPEC QL NAA+PROBE: NOT DETECTED
HCOV 229E RNA SPEC QL NAA+PROBE: NOT DETECTED
HCOV HKU1 RNA SPEC QL NAA+PROBE: NOT DETECTED
HCOV NL63 RNA SPEC QL NAA+PROBE: NOT DETECTED
HCOV OC43 RNA SPEC QL NAA+PROBE: NOT DETECTED
HCT VFR BLD AUTO: 38.6 % (ref 36.6–50.3)
HGB BLD-MCNC: 12.9 G/DL (ref 12.1–17)
HMPV RNA SPEC QL NAA+PROBE: NOT DETECTED
HPIV1 RNA SPEC QL NAA+PROBE: NOT DETECTED
HPIV2 RNA SPEC QL NAA+PROBE: NOT DETECTED
HPIV3 RNA SPEC QL NAA+PROBE: NOT DETECTED
HPIV4 RNA SPEC QL NAA+PROBE: NOT DETECTED
M PNEUMO DNA SPEC QL NAA+PROBE: NOT DETECTED
MCH RBC QN AUTO: 31.6 PG (ref 26–34)
MCHC RBC AUTO-ENTMCNC: 33.4 G/DL (ref 30–36.5)
MCV RBC AUTO: 94.6 FL (ref 80–99)
NRBC # BLD: 0 K/UL (ref 0–0.01)
NRBC BLD-RTO: 0 PER 100 WBC
PLATELET # BLD AUTO: 260 K/UL (ref 150–400)
PMV BLD AUTO: 10.6 FL (ref 8.9–12.9)
POTASSIUM SERPL-SCNC: 4.4 MMOL/L (ref 3.5–5.1)
Q-T INTERVAL, ECG07: 334 MS
QRS DURATION, ECG06: 96 MS
QTC CALCULATION (BEZET), ECG08: 493 MS
RBC # BLD AUTO: 4.08 M/UL (ref 4.1–5.7)
RSV RNA SPEC QL NAA+PROBE: NOT DETECTED
RV+EV RNA SPEC QL NAA+PROBE: NOT DETECTED
SARS-COV-2 PCR, COVPCR: DETECTED
SERVICE CMNT-IMP: ABNORMAL
SERVICE CMNT-IMP: ABNORMAL
SERVICE CMNT-IMP: NORMAL
SODIUM SERPL-SCNC: 145 MMOL/L (ref 136–145)
SOURCE, COVRS: NORMAL
VENTRICULAR RATE, ECG03: 131 BPM
WBC # BLD AUTO: 6.6 K/UL (ref 4.1–11.1)

## 2021-11-28 PROCEDURE — 99223 1ST HOSP IP/OBS HIGH 75: CPT | Performed by: PSYCHIATRY & NEUROLOGY

## 2021-11-28 PROCEDURE — 74011250636 HC RX REV CODE- 250/636: Performed by: STUDENT IN AN ORGANIZED HEALTH CARE EDUCATION/TRAINING PROGRAM

## 2021-11-28 PROCEDURE — 80048 BASIC METABOLIC PNL TOTAL CA: CPT

## 2021-11-28 PROCEDURE — 0202U NFCT DS 22 TRGT SARS-COV-2: CPT

## 2021-11-28 PROCEDURE — 82140 ASSAY OF AMMONIA: CPT

## 2021-11-28 PROCEDURE — 74011000250 HC RX REV CODE- 250: Performed by: INTERNAL MEDICINE

## 2021-11-28 PROCEDURE — 82306 VITAMIN D 25 HYDROXY: CPT

## 2021-11-28 PROCEDURE — 74011250636 HC RX REV CODE- 250/636: Performed by: INTERNAL MEDICINE

## 2021-11-28 PROCEDURE — 36415 COLL VENOUS BLD VENIPUNCTURE: CPT

## 2021-11-28 PROCEDURE — 82962 GLUCOSE BLOOD TEST: CPT

## 2021-11-28 PROCEDURE — 85027 COMPLETE CBC AUTOMATED: CPT

## 2021-11-28 PROCEDURE — 65270000029 HC RM PRIVATE

## 2021-11-28 PROCEDURE — 95816 EEG AWAKE AND DROWSY: CPT | Performed by: INTERNAL MEDICINE

## 2021-11-28 RX ORDER — LORAZEPAM 2 MG/ML
2 INJECTION INTRAMUSCULAR
Status: DISCONTINUED | OUTPATIENT
Start: 2021-11-28 | End: 2021-11-29

## 2021-11-28 RX ORDER — ZIPRASIDONE MESYLATE 20 MG/ML
10 INJECTION, POWDER, LYOPHILIZED, FOR SOLUTION INTRAMUSCULAR
Status: DISCONTINUED | OUTPATIENT
Start: 2021-11-28 | End: 2021-11-28

## 2021-11-28 RX ORDER — LORAZEPAM 2 MG/ML
2 INJECTION INTRAMUSCULAR
Status: DISCONTINUED | OUTPATIENT
Start: 2021-11-28 | End: 2021-11-28

## 2021-11-28 RX ADMIN — Medication 10 ML: at 23:43

## 2021-11-28 RX ADMIN — HALOPERIDOL LACTATE 3 MG: 5 INJECTION, SOLUTION INTRAMUSCULAR at 05:01

## 2021-11-28 RX ADMIN — ZIPRASIDONE MESYLATE 10 MG: 20 INJECTION, POWDER, LYOPHILIZED, FOR SOLUTION INTRAMUSCULAR at 18:02

## 2021-11-28 RX ADMIN — ENOXAPARIN SODIUM 30 MG: 100 INJECTION SUBCUTANEOUS at 09:59

## 2021-11-28 RX ADMIN — LORAZEPAM 2 MG: 2 INJECTION INTRAMUSCULAR; INTRAVENOUS at 23:56

## 2021-11-28 RX ADMIN — Medication 10 ML: at 05:02

## 2021-11-28 RX ADMIN — SODIUM CHLORIDE 75 ML/HR: 9 INJECTION, SOLUTION INTRAVENOUS at 03:50

## 2021-11-28 RX ADMIN — LORAZEPAM 2 MG: 2 INJECTION INTRAMUSCULAR; INTRAVENOUS at 11:48

## 2021-11-28 RX ADMIN — ZIPRASIDONE MESYLATE 10 MG: 20 INJECTION, POWDER, LYOPHILIZED, FOR SOLUTION INTRAMUSCULAR at 09:46

## 2021-11-28 RX ADMIN — ACYCLOVIR SODIUM 900 MG: 50 INJECTION, SOLUTION INTRAVENOUS at 23:42

## 2021-11-28 NOTE — PROGRESS NOTES
Hospitalist Progress Note    NAME: Adeel Felton   :  1940   MRN:  [id number]       Assessment / Plan:  Acute metabolic encephalopathy, unknown etiology     -Progressive AMS since 21  Etiology remains unclear at this time. CT head negative, Serum alcohol <10, Urine drug screening negative. No evidence of ongoing infection   C/w 1:1 supervision for now, Haldol as needed PRN    -Unlikely bacterial meningitis, with normal white count, no fever. Empiric acyclovir. May try lumbar puncture tomorrow, if is more cooperative  -Get MRI brain, at risk for CVA  -Get EEG  -Neurology following  -Wife reported upper respiratory symptoms few days ago, vaccinated with COVID-19. Check PCR Covid       Paroxysmal Afib: Was in Rapid Afib at OSH, which got better with metoprolol  Currently in 100s  Resume oral metoprolol  Not on AC d/t hx of GI bleed, was recently seen by Dr. Kasia Dudley was discussed which he was not interested  Cardiology consult if goes into RVR again.     Acute on chronic kidney disease:  Creatinine 2.6 yesterday from baselne around 1.8  Continue IVF  -Unable to get the labs, as he is noncooperative     CAD s/p CABG  Aspirin, statin  Hold Plavix, may need LP     DM:  Hold metformin  Sliding scale  Monitor FS     Code Status: Full  Surrogate Decision Maker: His wife     DVT Prophylaxis: lovenox  GI Prophylaxis: not indicated     Baseline: Ambulatory     Subjective:     Chief Complaint / Reason for Physician Visit  \" Remains encephalopathic, wife at the bedside\". Discussed with RN events overnight.      Review of Systems:  Symptom Y/N Comments  Symptom Y/N Comments   Fever/Chills    Chest Pain     Poor Appetite    Edema     Cough    Abdominal Pain     Sputum    Joint Pain     SOB/MCCOLLUM    Pruritis/Rash     Nausea/vomit    Tolerating PT/OT     Diarrhea    Tolerating Diet     Constipation    Other       Could NOT obtain due to:  AMS     Objective:     VITALS:   Last 24hrs VS reviewed since prior progress note. Most recent are:  Patient Vitals for the past 24 hrs:   Temp Pulse Resp BP SpO2   11/28/21 0506 -- (!) 106 -- 124/81 --   11/28/21 0410 99.2 °F (37.3 °C) (!) 103 20 (!) 88/73 95 %   11/27/21 2322 98.5 °F (36.9 °C) 89 14 112/64 95 %   11/27/21 2055 98.4 °F (36.9 °C) -- 16 -- 95 %   11/27/21 1511 98 °F (36.7 °C) 95 20 -- 96 %     No intake or output data in the 24 hours ending 11/28/21 1132     I had a face to face encounter and independently examined this patient on 11/28/2021, as outlined below:  PHYSICAL EXAM:  General: WD, WN. Alert, cooperative, no acute distress    EENT:  EOMI. Anicteric sclerae. MMM  Resp:  CTA bilaterally, no wheezing or rales. No accessory muscle use  CV:  Regular  rhythm,  No edema  GI:  Soft, Non distended, Non tender. +Bowel sounds  Neurologic:  Disoriented, follows some commands, moving all extremities, no focal deficits  Psych:   Unable to assess  Skin:  No rashes. No jaundice    Reviewed most current lab test results and cultures  YES  Reviewed most current radiology test results   YES  Review and summation of old records today    NO  Reviewed patient's current orders and MAR    YES  PMH/ reviewed - no change compared to H&P  ________________________________________________________________________  Care Plan discussed with:    Comments   Patient     Family      RN     Care Manager     Consultant                        Multidiciplinary team rounds were held today with , nursing, pharmacist and clinical coordinator. Patient's plan of care was discussed; medications were reviewed and discharge planning was addressed.      ________________________________________________________________________  Total NON critical care TIME:23   Minutes    Total CRITICAL CARE TIME Spent:   Minutes non procedure based      Comments   >50% of visit spent in counseling and coordination of care ________________________________________________________________________  Caity Haji MD     Procedures: see electronic medical records for all procedures/Xrays and details which were not copied into this note but were reviewed prior to creation of Plan. LABS:  I reviewed today's most current labs and imaging studies.   Pertinent labs include:  Recent Labs     11/26/21 1818   WBC 7.6   HGB 12.6   HCT 36.6        Recent Labs     11/26/21 1818      K 3.5      CO2 18*   *   BUN 48*   CREA 2.66*   CA 9.0   MG 2.0   ALB 3.1*   TBILI 1.1*   ALT 33       Signed: Caiyt Haji MD

## 2021-11-28 NOTE — PROGRESS NOTES
Notified NP Julia Fisher that right now pt too uncooperative to obtain labs or take any po medications. Cannot even obtain full set of vitals as pt resists and pulls away from medical staff. Per NP Julia Fisher continue to hold po meds as needed. If pt's heart rhythm becomes sustained uncontrolled afib to notify NP for IV metoprolol.

## 2021-11-28 NOTE — ED NOTES
TRANSFER - OUT REPORT:    Verbal report given to [address]  being transferred to [facility]) for routine progression of care       Report consisted of patients Situation, Background, Assessment and   Recommendations(SBAR). Information from the following report(s) SBAR, ED Summary, [facility] and Recent Results was reviewed with the receiving nurse. Lines:   Peripheral IV 11/27/21 Right Forearm (Active)   Site Assessment Clean, dry, & intact 11/28/21 0506   Phlebitis Assessment 0 11/28/21 0506   Infiltration Assessment 0 11/28/21 0506   Dressing Status Clean, dry, & intact 11/28/21 0506   Dressing Type Tape; Transparent 11/28/21 0506   Hub Color/Line Status Blue; Flushed; Patent 11/28/21 0233        Opportunity for questions and clarification was provided.       Patient transported with:   [employer]

## 2021-11-28 NOTE — FORENSIC NURSE
11/28/21 0528 -- KATHERINE Aragonshanna contacted by Lilibeth Rubin of [facility] regarding the patient's combative behavior last night. Per Amilcar Rubin, RN, pt transferred to [facility] for further evalution of confusion/AMS.

## 2021-11-28 NOTE — PROGRESS NOTES
Received notification from bedside RN about patient with regards to: needs COVID test prior to discharge back to [facility] home, cough and wheezing noted - needs medication for relief      Intervention given: Rapid COVID screening, Duoneb prn, Robitussin PO prn ordered

## 2021-11-28 NOTE — PROGRESS NOTES
Pt finally calmed down and sleeping. Wife, Russell Muse at bedside. Pt would not allow this writer to draw am labs. Report given to [facility], overnight nurse. Pt left clinically stable.

## 2021-11-28 NOTE — PROGRESS NOTES
Bedside shift change report given to Tristan Jules RN (oncoming nurse) by Oscar Cole (offgoing nurse). Report included the following information SBAR, Kardex, MAR, Recent Results and Cardiac Rhythm afib, although currently not on telemetry as pt has pulled off multiple times. Sitter and wife remain at bedside.

## 2021-11-28 NOTE — PROGRESS NOTES
Following message sent to Dr. Tamera Bella via [employer]:    Haldol seems to work better than Ativan for pt, but 3mg does not seem to be quite enough. Still too restless for labs trying avoid restraints. Can we try a higher dose of Haldol-5mg?

## 2021-11-28 NOTE — CONSULTS
Consult  REFERRED BY:  None    CHIEF COMPLAINT: Altered mental status      Subjective:     Agustin Shown is a 80 y.o. right-handed  male seen as a new patient to me, at the request of the hospitalist, for evaluation of new problem of altered mental status that his wife said began on Wednesday the day before Thanksgiving and he became more irritable and just seemed to be off, for no clear reason, and then on Thursday Thanksgiving day started seeing things and saying crazy things off the wall, and could not follow conversations, but had no complaint of headache, focal weakness, fever, sensory loss, visual changes, meningismus, and had no history of trauma, toxin exposure or new medications or any precipitating factors for this. His tox screen is negative. He came to the emergency room atraumatic yesterday morning because he was so confused, and was found to be in rapid A. fib, and was confused and try to fight with the  in the ER. No unusual depression or stress and no prior history of any psychiatric disease. He has had no focal neurologic deficit, no fever, no complaints of headache, and no other cause of this syndrome. His head CT scan was normal on admission yesterday. He has a pacemaker and cannot get an MRI until he is cleared by cardiology, and his pacemaker is MRI conditional.  He has been getting Haldol, and now is getting a dose of Geodon for his agitation. He is able to give his name correctly, and denies headache, but not very cooperative in general.  Patient is lying in bed without any clothes on, somewhat agitated and constantly pulling off his leads, and monitoring devices. He does have a history of A. fib, and has had cardiac ablations in the past, and a GI bleed on anticoagulation in the past, so he is not being anticoagulated.   He has a strong history of heart disease, and is a type II diabetic agents, is on a high-dose statin for cholesterol and takes a baby aspirin every day also. We will hold his antiplatelets in case we needed LP in the morning. He is way too agitated now to attempt 1. Talk to the hospitalist we'll go ahead and cover him for herpes encephalitis as the only treatable viral syndrome, and he doesn't seem to have any evidence of a bacterial infection being afebrile, with clinically normal white count, and no complaints of headache or meningismus and a completely normal CT of the head. He has no rashes anywhere either. His neck seems supple on examination.     Past Medical History:   Diagnosis Date    Abnormal nuclear cardiac imaging test 2019    Anemia 2017    Atrial fibrillation (HCC)     Chronic kidney disease     CKD (chronic kidney disease) 2019    Coronary atherosclerosis of native coronary artery     Diabetes mellitus, type II ([address]     MCCOLLUM (dyspnea on exertion) 2019    Glaucoma     Mixed hyperlipidemia     Other dyspnea and respiratory abnormality     Pacemaker     S/P ablation of atrial fibrillation 2016      Past Surgical History:   Procedure Laterality Date    HX AFIB ABLATION      HX CATARACT REMOVAL Bilateral     HX CORONARY ARTERY BYPASS GRAFT      HX CORONARY STENT PLACEMENT      HX HEART CATHETERIZATION  2019    Left     HX PTCA      IL INS NEW/RPLCMT PRM PM W/TRANSV ELTRD ATRIAL&VENT N/A 3/29/2019    INSERT PPM DUAL performed by Sade Hickey MD at [facility] CARDIAC CATH LAB    IL REMOVAL SUBCUTANEOUS CARDIAC RHYTHM MONITOR N/A 3/29/2019    LOOP RECORDER REMOVAL performed by Sade Hcikey MD at [facility] CARDIAC CATH LAB    UPPER GI ENDOSCOPY,BIOPSY  2019          Family History   Problem Relation Age of Onset    Diabetes Mother     Emphysema Father          age 46 - smoker      Social History     Tobacco Use    Smoking status: Former Smoker     Packs/day: 4.00     Years: 20.00     Pack years: 80.00     Types: Cigarettes     Quit date: 1980     Years since quittin.3  Smokeless tobacco: Former User    Tobacco comment: QUIT AT AGE 39   Substance Use Topics    Alcohol use: No     Alcohol/week: 0.0 standard drinks         Current Facility-Administered Medications:     ziprasidone (GEODON) 10 mg in sterile water (preservative free) 0.5 mL injection, 10 mg, IntraMUSCular, Q6H PRN, Abilio Garcia MD, 10 mg at 11/28/21 0946    LORazepam (ATIVAN) injection 2 mg, 2 mg, IntraMUSCular, Q6H PRN, Abilio Garcia MD, 2 mg at 11/28/21 1148    acyclovir (ZOVIRAX) 900 mg in 0.9% sodium chloride 250 mL IVPB, 900 mg, IntraVENous, Q12H, Neri Nelson MD    aspirin delayed-release tablet 81 mg, 81 mg, Oral, DAILY, Waleska Fan MD    [Held by provider] clopidogreL (PLAVIX) tablet 75 mg, 75 mg, Oral, DAILY, Waleska Fan MD    cyanocobalamin (VITAMIN B12) tablet 1,000 mcg, 1,000 mcg, Oral, DAILY, Waleska Fan MD    latanoprost (XALATAN) 0.005 % ophthalmic solution 1 Drop, 1 Drop, Both Eyes, DAILY, Waleska Fan MD    metoprolol tartrate (LOPRESSOR) tablet 25 mg, 25 mg, Oral, BID, Waleska Fan MD    ranolazine ER (RANEXA) tablet 500 mg, 1 Tablet, Oral, BID, Waleska Fan MD    rosuvastatin (CRESTOR) tablet 40 mg, 40 mg, Oral, DAILY, Waleska Fan MD    sodium chloride (NS) flush 5-40 mL, 5-40 mL, IntraVENous, Q8H, Waleska Fan MD, 10 mL at 11/28/21 0502    sodium chloride (NS) flush 5-40 mL, 5-40 mL, IntraVENous, PRN, Waleska Fan MD    acetaminophen (TYLENOL) tablet 650 mg, 650 mg, Oral, Q6H PRN **OR** acetaminophen (TYLENOL) suppository 650 mg, 650 mg, Rectal, Q6H PRN, Waleska Fan MD    polyethylene glycol (MIRALAX) packet 17 g, 17 g, Oral, DAILY PRN, Waleska Fan MD    ondansetron (ZOFRAN ODT) tablet 4 mg, 4 mg, Oral, Q8H PRN **OR** ondansetron (ZOFRAN) injection 4 mg, 4 mg, IntraVENous, Q6H PRN, Tommie Mercado MD    enoxaparin (LOVENOX) injection 30 mg, 30 mg, SubCUTAneous, DAILY, Waleska Fan MD, 30 mg at 11/28/21 0959    0.9% sodium chloride infusion, 75 mL/hr, IntraVENous, CONTINUOUS, Angelito Mercado MD, Last Rate: 75 mL/hr at 11/28/21 0350, 75 mL/hr at 11/28/21 0350    insulin lispro (HUMALOG) injection, , SubCUTAneous, AC&HS, Bon Johnson MD, 2 Units at 11/27/21 1317    glucose chewable tablet 16 g, 4 Tablet, Oral, PRN, Bon Johnson MD    dextrose (D50W) injection syrg 12.5-25 g, 12.5-25 g, IntraVENous, PRN, Bon Johnson MD    glucagon (GLUCAGEN) injection 1 mg, 1 mg, IntraMUSCular, PRN, Bon Johnson MD    albuterol-ipratropium (DUO-NEB) 2.5 MG-0.5 MG/3 ML, 3 mL, Nebulization, Q4H PRN, Brea Mejia NP    guaiFENesin (ROBITUSSIN) 100 mg/5 mL oral liquid 200 mg, 200 mg, Oral, Q4H PRN, Brea Mejia NP        Allergies   Allergen Reactions    Ciprofloxacin Other (comments)     Difficulty breathing; 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 No results found for this or any previous visit. Review of Systems:  Review of systems not obtained due to patient factors. Vitals:    11/27/21 2322 11/28/21 0410 11/28/21 0506 11/28/21 1152   BP: 112/64 (!) 88/73 124/81 118/60   Pulse: 89 (!) 103 (!) 106    Resp: 14 20     Temp: 98.5 °F (36.9 °C) 99.2 °F (37.3 °C)  98.5 °F (36.9 °C)   SpO2: 95% 95%  98%     Objective:     I      NEUROLOGICAL EXAM:    Appearance: The patient is well developed, well nourished, provides a incoherent history and is in a confused state, constantly thrashing about and lying in bed without clothes. Mental Status: Oriented to person only, and not the date or the president, cognitive function is abnormal and speech is fluent and no aphasia or dysarthria. Mood and affect agitated. Cranial Nerves:   Intact visual fields. Fundi are poorly seen secondary to lack of cooperation. LISA, EOM's full, no nystagmus, no ptosis. Facial sensation is normal. Corneal reflexes are not tested. Facial movement is symmetric. Hearing is abnormal bilaterally. Palate is midline with normal sternocleidomastoid and trapezius muscles are normal. Tongue is midline. Neck without meningismus or bruits  Temporal arteries are not tender or enlarged  TMJ areas are not tender on palpation   Motor:  5/5 strength in upper and lower proximal and distal muscles. Normal bulk and tone. No fasciculations. Rapid alternating movement is not testable bilaterally   Reflexes:   Deep tendon reflexes 1+/4 and symmetrical.  No babinski or clonus present   Sensory:   Normal to touch, pinprick and vibration and temperature. DSS is not testable   Gait:  Not testable. Tremor:   No tremor noted. Cerebellar:  Not testable abnormal cerebellar signs present on Romberg and tandem testing and finger-nose-finger exam.   Neurovascular:  Normal heart sounds and regular rhythm, peripheral pulses decreased, and no carotid bruits. No skin lesions or rashes noted anywhere. 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 Hold his antiplatelet agents for now, in case we need to proceed with LP. He has a nonfocal exam and just is encephalopathic but supple neck. We will consult his cardiologist Dr. Jesenia Haley to clear him for the MRI in the morning, he is way too agitated right now.   Metabolic parameters sent to rule out connective tissue disease, rule out vasculitis, drawn. Have discussed with the hospitalist and the patient's wife in detail.   Very difficult case    Signed By: Jeevan Lomeli MD     November 28, 2021       CC: None  FAX: None

## 2021-11-28 NOTE — PROGRESS NOTES
-Please complete MRI History and Safety Screening Form   - Patient cannot be scanned until this form is completed and reviewed in MRI to ensure patient is SAFE and eligible for MRI. - CALL MRI when this has been successfully completed at [phone].

## 2021-11-29 ENCOUNTER — APPOINTMENT (OUTPATIENT)
Dept: VASCULAR SURGERY | Age: 81
DRG: 177 | End: 2021-11-29
Attending: PSYCHIATRY & NEUROLOGY
Payer: MEDICARE

## 2021-11-29 LAB
GLUCOSE BLD STRIP.AUTO-MCNC: 112 MG/DL (ref 65–117)
GLUCOSE BLD STRIP.AUTO-MCNC: 126 MG/DL (ref 65–117)
GLUCOSE BLD STRIP.AUTO-MCNC: 86 MG/DL (ref 65–117)
GLUCOSE BLD STRIP.AUTO-MCNC: 94 MG/DL (ref 65–117)
SERVICE CMNT-IMP: ABNORMAL
SERVICE CMNT-IMP: NORMAL

## 2021-11-29 PROCEDURE — 99233 SBSQ HOSP IP/OBS HIGH 50: CPT | Performed by: PSYCHIATRY & NEUROLOGY

## 2021-11-29 PROCEDURE — 74011250636 HC RX REV CODE- 250/636: Performed by: NURSE PRACTITIONER

## 2021-11-29 PROCEDURE — 82962 GLUCOSE BLOOD TEST: CPT

## 2021-11-29 PROCEDURE — 65270000029 HC RM PRIVATE

## 2021-11-29 PROCEDURE — 93880 EXTRACRANIAL BILAT STUDY: CPT | Performed by: PSYCHIATRY & NEUROLOGY

## 2021-11-29 PROCEDURE — 74011250636 HC RX REV CODE- 250/636: Performed by: INTERNAL MEDICINE

## 2021-11-29 PROCEDURE — 74011000250 HC RX REV CODE- 250: Performed by: NURSE PRACTITIONER

## 2021-11-29 PROCEDURE — 93880 EXTRACRANIAL BILAT STUDY: CPT

## 2021-11-29 PROCEDURE — 74011250636 HC RX REV CODE- 250/636: Performed by: STUDENT IN AN ORGANIZED HEALTH CARE EDUCATION/TRAINING PROGRAM

## 2021-11-29 PROCEDURE — 95819 EEG AWAKE AND ASLEEP: CPT | Performed by: PSYCHIATRY & NEUROLOGY

## 2021-11-29 PROCEDURE — 74011000250 HC RX REV CODE- 250: Performed by: INTERNAL MEDICINE

## 2021-11-29 PROCEDURE — 2709999900 HC NON-CHARGEABLE SUPPLY

## 2021-11-29 PROCEDURE — 74011250636 HC RX REV CODE- 250/636: Performed by: PSYCHIATRY & NEUROLOGY

## 2021-11-29 RX ORDER — LORAZEPAM 2 MG/ML
2 INJECTION INTRAMUSCULAR
Status: DISCONTINUED | OUTPATIENT
Start: 2021-11-29 | End: 2021-12-02

## 2021-11-29 RX ADMIN — ACYCLOVIR SODIUM 900 MG: 50 INJECTION, SOLUTION INTRAVENOUS at 09:07

## 2021-11-29 RX ADMIN — ZIPRASIDONE MESYLATE 10 MG: 20 INJECTION, POWDER, LYOPHILIZED, FOR SOLUTION INTRAMUSCULAR at 13:15

## 2021-11-29 RX ADMIN — LORAZEPAM 2 MG: 2 INJECTION INTRAMUSCULAR; INTRAVENOUS at 23:08

## 2021-11-29 RX ADMIN — Medication 10 ML: at 14:00

## 2021-11-29 RX ADMIN — WATER 5 MG: 1 INJECTION, SOLUTION INTRAMUSCULAR; INTRAVENOUS; SUBCUTANEOUS at 03:59

## 2021-11-29 RX ADMIN — LORAZEPAM 2 MG: 2 INJECTION INTRAMUSCULAR; INTRAVENOUS at 19:50

## 2021-11-29 RX ADMIN — Medication 10 ML: at 06:20

## 2021-11-29 RX ADMIN — SODIUM CHLORIDE 75 ML/HR: 9 INJECTION, SOLUTION INTRAVENOUS at 22:02

## 2021-11-29 RX ADMIN — Medication 10 ML: at 22:02

## 2021-11-29 RX ADMIN — ACYCLOVIR SODIUM 900 MG: 50 INJECTION, SOLUTION INTRAVENOUS at 22:05

## 2021-11-29 NOTE — PROGRESS NOTES
CM attempted to contact pt's wife to complete initial assessment. CM left a HIPAA complaint voicemail requesting a call back. CM will attempt to complete assessment again at a later time.     Ca Mcintosh, MSW  Care Manager [id number] [facility]  [phone]

## 2021-11-29 NOTE — PROGRESS NOTES
This nurse notified NP of patients behavior and pulling of IV's. NP made aware patient is anxious and X2-3 IV's were attempted and unsuccessful due to patient removing them immediately after insertion. Requested if NP could order mittens for patient. NP ordered non-restraint mittens for patient and made aware of Covid 19 status. Wife at bedside and made aware of mitten restraint order and verbalized understanding. Mittens placed on patient, patient still appears anxious Prn medication given.

## 2021-11-29 NOTE — PROGRESS NOTES
Interdisciplinary Rounds were completed on 11/29/21 for this patient. Rounds included nursing, clinical care leader, pharmacy, and case management. Plan of care discussed. See clinical pathway and/or care plan for interventions and desired outcomes.

## 2021-11-29 NOTE — PROGRESS NOTES
Hospitalist Progress Note    NAME: Jennett Gosselin   :  1940   MRN:  [id number]       Assessment / Plan:  Acute metabolic encephalopathy, unknown etiology     -Progressive acute encephalopathy since 21  Etiology remains unclear at this time. CT head negative, Serum alcohol <10, Urine drug screening negative. No evidence of ongoing infection   C/w 1:1 supervision for now, Haldol as needed PRN    -Unlikely bacterial meningitis, with normal white count, no fever. Empiric acyclovir started. -We will need LP  -MRI, MRA pending, at risk for CVA  -EEG was completed this morning, awaiting reading  -Neurology following, input is appreciated    COVID-19 disease  -On room air  -COVID-19 PCR positive  -Chest x-ray with no evidence of acute cardiopulmonary process  -We will hold on antibiotics  -No indication for dexamethasone remdesivir given patient on room air not hypoxic  -Patient is vaccinated against COVID-19  -Patient's wife at bedside, I advised her not to be at bedside given patient COVID-19 positive test and to wear full PPE all times       Paroxysmal Afib: Was in Rapid Afib at OSH, which got better with metoprolol  Currently rate controlled  Resume oral metoprolol once able to take p.o. Not on AC d/t hx of GI bleed, was recently seen by Dr. Jamila Stein was discussed which he was not interested  Cardiology consult if goes into RVR again.     Acute on chronic kidney disease:  Creatinine 1.96 yesterday  Labs still pending today  Continue IVF     CAD s/p CABG  Aspirin, statin  Holding Plavix for anticipated LP     DM:  Hold metformin  Sliding scale with hypoglycemia protocol  Monitor FS    Anticipated discharge   Barriers clinical improvement     Code Status: Full  Surrogate Decision Maker: His wife     DVT Prophylaxis: lovenox  GI Prophylaxis: not indicated     Baseline: Ambulatory     Subjective:     Patient was seen and examined. No acute events overnight.     Discussed with RN overnight events. Patient's wife at bedside, questions were answered. Not able to answer questions        Review of Systems:  Symptom Y/N Comments  Symptom Y/N Comments   Fever/Chills    Chest Pain     Poor Appetite    Edema     Cough    Abdominal Pain     Sputum    Joint Pain     SOB/MCCOLLUM    Pruritis/Rash     Nausea/vomit    Tolerating PT/OT     Diarrhea    Tolerating Diet     Constipation    Other       Could NOT obtain due to:  AMS     Objective:     VITALS:   Last 24hrs VS reviewed since prior progress note. Most recent are:  Patient Vitals for the past 24 hrs:   Temp Pulse Resp BP SpO2   11/29/21 0859 -- 90 20 126/78 98 %   11/29/21 0607 98.5 °F (36.9 °C) 98 20 -- 92 %   11/28/21 2336 98 °F (36.7 °C) 86 22 (!) 140/70 96 %   11/28/21 2311 98 °F (36.7 °C) 85 22 131/69 95 %   11/28/21 1914 97.7 °F (36.5 °C) (!) 102 -- (!) 140/75 97 %   11/28/21 1152 98.5 °F (36.9 °C) -- -- 118/60 98 %     No intake or output data in the 24 hours ending 11/29/21 1136     I had a face to face encounter and independently examined this patient on 11/29/2021, as outlined below:  PHYSICAL EXAM:  General: WD, WN. Alert, not cooperative, no acute distress    EENT:  EOMI. Anicteric sclerae. MMM  Resp:  CTA bilaterally, no wheezing or rales. No accessory muscle use  CV:  Regular  rhythm,  No edema  GI:  Soft, Non distended, Non tender. +Bowel sounds  Neurologic:  Disoriented, follows some simple commands, moving all extremities, no focal deficits  Psych:   Unable to assess  Skin:  No rashes.   No jaundice    Reviewed most current lab test results and cultures  YES  Reviewed most current radiology test results   YES  Review and summation of old records today    NO  Reviewed patient's current orders and MAR    YES  PMH/SH reviewed - no change compared to H&P  ________________________________________________________________________  Care Plan discussed with:    Comments   Patient x    Family  x    RN x    Care Manager     Consultant Multidiciplinary team rounds were held today with , nursing, pharmacist and clinical coordinator. Patient's plan of care was discussed; medications were reviewed and discharge planning was addressed. ________________________________________________________________________  Total NON critical care TIME: 36   Minutes    Total CRITICAL CARE TIME Spent:   Minutes non procedure based      Comments   >50% of visit spent in counseling and coordination of care     ________________________________________________________________________  Darline Quintero MD     Procedures: see electronic medical records for all procedures/Xrays and details which were not copied into this note but were reviewed prior to creation of Plan. LABS:  I reviewed today's most current labs and imaging studies.   Pertinent labs include:  Recent Labs     11/28/21  1432 11/26/21  1818   WBC 6.6 7.6   HGB 12.9 12.6   HCT 38.6 36.6    259     Recent Labs     11/28/21  1432 11/26/21  1818    137   K 4.4 3.5   * 103   CO2 22 18*   * 142*   BUN 41* 48*   CREA 1.96* 2.66*   CA 9.5 9.0   MG  --  2.0   ALB  --  3.1*   TBILI  --  1.1*   ALT  --  33       Signed: Darline Quintero MD

## 2021-11-29 NOTE — PROGRESS NOTES
2120 Attempted x2 for IV access. Pt pulled out immediately. Nurse made aware. Pt with good turgor, eyes moist, veins flat, mouth dry. Pt appears encephalopathic has been receiving Geodon IM q6hrs.  Sitter is at door with camera in place due to being COVID +

## 2021-11-29 NOTE — PROGRESS NOTES
Problem: Falls - Risk of  Goal: *Absence of Falls  Description: Document Lawrence Puentes Fall Risk and appropriate interventions in the flowsheet.   Outcome: Progressing Towards Goal  Note: Fall Risk Interventions:  Mobility Interventions: Bed/chair exit alarm, Communicate number of staff needed for ambulation/transfer    Mentation Interventions: Adequate sleep, hydration, pain control, Bed/chair exit alarm, Door open when patient unattended, Familiar objects from home, Toileting rounds    Medication Interventions: Bed/chair exit alarm, Evaluate medications/consider consulting pharmacy, Patient to call before getting OOB, Teach patient to arise slowly    Elimination Interventions: Call light in reach, Bed/chair exit alarm, Stay With Me (per policy), Patient to call for help with toileting needs, Toilet paper/wipes in reach              Problem: Patient Education: Go to Patient Education Activity  Goal: Patient/Family Education  Outcome: Progressing Towards Goal     Problem: Patient Education:  Go to Education Activity  Goal: Patient/Family Education  Outcome: Progressing Towards Goal     Problem: Non-Violent Restraints  Goal: No harm/injury to patient while restraints in use  Outcome: Not Progressing Towards Goal     Problem: Non-Violent Restraints  Goal: Removal from restraints as soon as assessed to be safe  Outcome: Not Progressing Towards Goal

## 2021-11-29 NOTE — PROGRESS NOTES
Paged MD regarding patient's increased agitation and restlessness. MD made aware of patient's increased moving and agitation. PRN ativan given w/ short term success. MD ordered X1 dose of Zyprexa IM. Wife made aware and verbalized understanding.

## 2021-11-29 NOTE — PROGRESS NOTES
Received notification from bedside RN about patient with regards to: increasing restlessness and agitation.  Ativan was given at 2356 with short term effect, needs additional medication to help calm patient down  VS: /70, HR 86, RR 22, O2 sat 96% on RA    Intervention given: Zyprexa 5 mg IM x 1 dose ordered

## 2021-11-29 NOTE — PROGRESS NOTES
Unable to obtain patient's AM labs x2 attempts by 2 different nurses. Charge nurse made aware, IV access team called and made aware message left regarding obtaining AM labs today.

## 2021-11-30 ENCOUNTER — APPOINTMENT (OUTPATIENT)
Dept: CT IMAGING | Age: 81
DRG: 177 | End: 2021-11-30
Attending: PSYCHIATRY & NEUROLOGY
Payer: MEDICARE

## 2021-11-30 LAB
BASOPHILS # BLD: 0 K/UL (ref 0–0.1)
BASOPHILS NFR BLD: 0 % (ref 0–1)
CK SERPL-CCNC: 328 U/L (ref 39–308)
DIFFERENTIAL METHOD BLD: ABNORMAL
EOSINOPHIL # BLD: 0.1 K/UL (ref 0–0.4)
EOSINOPHIL NFR BLD: 1 % (ref 0–7)
ERYTHROCYTE [DISTWIDTH] IN BLOOD BY AUTOMATED COUNT: 13.9 % (ref 11.5–14.5)
ERYTHROCYTE [SEDIMENTATION RATE] IN BLOOD: 62 MM/HR (ref 0–20)
FOLATE SERPL-MCNC: 16.1 NG/ML (ref 5–21)
GLUCOSE BLD STRIP.AUTO-MCNC: 104 MG/DL (ref 65–117)
GLUCOSE BLD STRIP.AUTO-MCNC: 104 MG/DL (ref 65–117)
GLUCOSE BLD STRIP.AUTO-MCNC: 78 MG/DL (ref 65–117)
GLUCOSE BLD STRIP.AUTO-MCNC: 89 MG/DL (ref 65–117)
HCT VFR BLD AUTO: 37.8 % (ref 36.6–50.3)
HGB BLD-MCNC: 12.9 G/DL (ref 12.1–17)
IMM GRANULOCYTES # BLD AUTO: 0.1 K/UL (ref 0–0.04)
IMM GRANULOCYTES NFR BLD AUTO: 1 % (ref 0–0.5)
LYMPHOCYTES # BLD: 0.7 K/UL (ref 0.8–3.5)
LYMPHOCYTES NFR BLD: 10 % (ref 12–49)
MCH RBC QN AUTO: 31.5 PG (ref 26–34)
MCHC RBC AUTO-ENTMCNC: 34.1 G/DL (ref 30–36.5)
MCV RBC AUTO: 92.4 FL (ref 80–99)
MONOCYTES # BLD: 0.5 K/UL (ref 0–1)
MONOCYTES NFR BLD: 8 % (ref 5–13)
NEUTS SEG # BLD: 5.4 K/UL (ref 1.8–8)
NEUTS SEG NFR BLD: 80 % (ref 32–75)
NRBC # BLD: 0 K/UL (ref 0–0.01)
NRBC BLD-RTO: 0 PER 100 WBC
PLATELET # BLD AUTO: 249 K/UL (ref 150–400)
PMV BLD AUTO: 10.4 FL (ref 8.9–12.9)
RBC # BLD AUTO: 4.09 M/UL (ref 4.1–5.7)
SERVICE CMNT-IMP: NORMAL
VIT B12 SERPL-MCNC: 1226 PG/ML (ref 193–986)
WBC # BLD AUTO: 6.8 K/UL (ref 4.1–11.1)

## 2021-11-30 PROCEDURE — 82607 VITAMIN B-12: CPT

## 2021-11-30 PROCEDURE — 65270000029 HC RM PRIVATE

## 2021-11-30 PROCEDURE — 36415 COLL VENOUS BLD VENIPUNCTURE: CPT

## 2021-11-30 PROCEDURE — 70450 CT HEAD/BRAIN W/O DYE: CPT

## 2021-11-30 PROCEDURE — 82746 ASSAY OF FOLIC ACID SERUM: CPT

## 2021-11-30 PROCEDURE — 86695 HERPES SIMPLEX TYPE 1 TEST: CPT

## 2021-11-30 PROCEDURE — 85652 RBC SED RATE AUTOMATED: CPT

## 2021-11-30 PROCEDURE — 99233 SBSQ HOSP IP/OBS HIGH 50: CPT | Performed by: PSYCHIATRY & NEUROLOGY

## 2021-11-30 PROCEDURE — 74011250636 HC RX REV CODE- 250/636: Performed by: INTERNAL MEDICINE

## 2021-11-30 PROCEDURE — 74011250636 HC RX REV CODE- 250/636: Performed by: PSYCHIATRY & NEUROLOGY

## 2021-11-30 PROCEDURE — 82550 ASSAY OF CK (CPK): CPT

## 2021-11-30 PROCEDURE — 74011250637 HC RX REV CODE- 250/637: Performed by: STUDENT IN AN ORGANIZED HEALTH CARE EDUCATION/TRAINING PROGRAM

## 2021-11-30 PROCEDURE — 85025 COMPLETE CBC W/AUTO DIFF WBC: CPT

## 2021-11-30 PROCEDURE — 74011000250 HC RX REV CODE- 250: Performed by: STUDENT IN AN ORGANIZED HEALTH CARE EDUCATION/TRAINING PROGRAM

## 2021-11-30 PROCEDURE — 86038 ANTINUCLEAR ANTIBODIES: CPT

## 2021-11-30 PROCEDURE — 82784 ASSAY IGA/IGD/IGG/IGM EACH: CPT

## 2021-11-30 PROCEDURE — 82962 GLUCOSE BLOOD TEST: CPT

## 2021-11-30 PROCEDURE — 86618 LYME DISEASE ANTIBODY: CPT

## 2021-11-30 RX ADMIN — LORAZEPAM 2 MG: 2 INJECTION INTRAMUSCULAR; INTRAVENOUS at 14:54

## 2021-11-30 RX ADMIN — Medication 10 ML: at 21:34

## 2021-11-30 RX ADMIN — ACYCLOVIR SODIUM 900 MG: 50 INJECTION, SOLUTION INTRAVENOUS at 21:34

## 2021-11-30 RX ADMIN — LORAZEPAM 2 MG: 2 INJECTION INTRAMUSCULAR; INTRAVENOUS at 21:34

## 2021-11-30 RX ADMIN — LATANOPROST 1 DROP: 50 SOLUTION OPHTHALMIC at 18:38

## 2021-11-30 RX ADMIN — Medication 10 ML: at 05:58

## 2021-11-30 RX ADMIN — LORAZEPAM 2 MG: 2 INJECTION INTRAMUSCULAR; INTRAVENOUS at 05:58

## 2021-11-30 RX ADMIN — ACYCLOVIR SODIUM 900 MG: 50 INJECTION, SOLUTION INTRAVENOUS at 14:20

## 2021-11-30 NOTE — PROGRESS NOTES
Problem: Non-Violent Restraints  Goal: No harm/injury to patient while restraints in use  Outcome: Progressing Towards Goal  Goal: Patient's dignity will be maintained  Outcome: Progressing Towards Goal  Goal: Patient Interventions  Outcome: Progressing Towards Goal     Problem: Falls - Risk of  Goal: *Absence of Falls  Description: Document Peng Fall Risk and appropriate interventions in the flowsheet. Outcome: Progressing Towards Goal  Note: Fall Risk Interventions:  Mobility Interventions: Assess mobility with egress test, Bed/chair exit alarm    Mentation Interventions: Bed/chair exit alarm, Adequate sleep, hydration, pain control, Toileting rounds    Medication Interventions: Bed/chair exit alarm    Elimination Interventions: Bed/chair exit alarm, Call light in reach              Problem: Risk for Spread of Infection  Goal: Prevent transmission of infectious organism to others  Description: Prevent the transmission of infectious organisms to other patients, staff members, and visitors.   Outcome: Progressing Towards Goal     Problem: Non-Violent Restraints  Goal: Removal from restraints as soon as assessed to be safe  Outcome: Not Progressing Towards Goal     Problem: Violent Restraints  Goal: No harm/injury to patient while restraints in use  Outcome: Not Progressing Towards Goal     Problem: Patient Education: Go to Patient Education Activity  Goal: Patient/Family Education  Outcome: Not Progressing Towards Goal     Problem: Patient Education:  Go to Education Activity  Goal: Patient/Family Education  Outcome: Not Progressing Towards Goal

## 2021-11-30 NOTE — PROCEDURES
[address]  EEG    Name:  Domi Groves  MR#:  [id number]  :  1940  ACCOUNT #:  [de-identified]  DATE OF SERVICE:  2021    CLINICAL INDICATION:  The patient is 61-year-old male with a history of altered mental status, acute confusion, acute encephalopathy. EEG to rule out seizures, rule out cortical abnormality, rule out encephalopathy. EEG CLASSIFICATION:  On this patient is dysrhythmia grade 1, generalized. DESCRIPTION OF THE RECORD:  This is a 16-channel EEG with EKG monitoring throughout the recording. During the recording, there was a mild increase in 2-6 Hz activity seen in generalized fashion without clear areas of focal slowing or spike or spike-and-wave discharges being seen. During the recording, the patient did have a posteriorly located occipital alpha rhythm of 8-9 Hz that did attenuate some with eye opening. The patient frequent states of drowsiness and states of sleep with K complexes and sleep spindles seen in the central head regions. Photic stimulation produced little change in the background recording. Hyperventilation was not performed. INTERPRETATION:  This is a mildly abnormal electroencephalogram due to the generalized slowing seen most consistent with diffuse encephalopathy of toxic, metabolic or degenerative type. No clear focal process or spike or spike-and-wave discharges were seen. Clinical correlation recommended. Abel Lopez MD      [id number]  D:  2021 21:50  T:  2021 0:47  JOB #:  [id number]  CC:   Roge Fernandes MD

## 2021-11-30 NOTE — PROGRESS NOTES
Physician Progress Note      PATIENT:               Sharee Zelaya  CSN #:                  [id number]  :                       1940  ADMIT DATE:       2021 4:40 AM  DISCH DATE:  RESPONDING  PROVIDER #:        Uyen Moss MD          QUERY TEXT:    DR Lucas:  Pt admitted with Acute metabolic encephalopathy, unknown etiology. Pt noted to have Acute on chronic kidney disease. If possible, please document in the progress notes and discharge summary if you are evaluating and/or treating any of the following: The medical record reflects the following:  Risk Factors: 81yoM w/ pAfib, BL creat around 1.8, hypotension 108/51  - 117  ()  -- 82/45 ()  Clinical Indicators: Creat 2.66 BUN 48 GFR 23 - Creat 1.96  Treatment: 500 ml IVF  bolus and gentle hydration, follow BMP    Thank you,  Mary Stanford RN, CDI    Defined by Kidney Disease Improving Global Outcomes (KDIGO) clinical practice guideline for acute kidney injury:?  -Increase in?SCr?by greater than or equal to 0.3 mg/dl within 48?hours;?or?  -Increase or decrease in?SCr?to greater than or equal to 1.5 times baseline, which is known or presumed to have occurred within the prior 7?days;?or?  -Urine volume < 0.5ml/kg/h for 6 hours  Options provided:  -- Acute kidney injury  -- Chronic kidney disease  -- Other - I will add my own diagnosis  -- Disagree - Not applicable / Not valid  -- Disagree - Clinically unable to determine / Unknown  -- Refer to Clinical Documentation Reviewer    PROVIDER RESPONSE TEXT:    This patient has Acute kidney injury.  POA    Query created by: Ar Layton on 2021 1:47 PM      Electronically signed by:  Uyen Moss MD 2021 3:02 PM

## 2021-11-30 NOTE — PROGRESS NOTES
Called spouse Guillermo Gan at [phone] regarding the information needed for the MRI screening. Spouse did not  and left call back numbers [phone] and [phone]. Information passed along to primary RN Darryn Baldwin. Called MRI to update that the wife still had not called back and they said that the rep was here and if she did not call back then it probably wasn't happening today. 12- Called back wife and still no answer. MRI will have to be postponed.

## 2021-11-30 NOTE — PROGRESS NOTES
Consult  REFERRED BY:  None    CHIEF COMPLAINT: Altered mental status      Subjective:     Park Gagnon is a 80 y.o. right-handed  male seen as a new patient to me, at the request of the hospitalist, for evaluation of new problem of altered mental status that his wife said began on Wednesday the day before Thanksgiving and he became more irritable and just seemed to be off, for no clear reason, and then on Thursday Thanksgiving day started seeing things and saying crazy things off the wall, and could not follow conversations, but had no complaint of headache, focal weakness, fever, sensory loss, visual changes, meningismus, and had no history of trauma, toxin exposure or new medications or any precipitating factors for this. His tox screen is negative. He came to the emergency room atraumatic yesterday morning because he was so confused, and was found to be in rapid A. fib, and was confused and try to fight with the  in the ER. No unusual depression or stress and no prior history of any psychiatric disease. He has had no focal neurologic deficit, no fever, no complaints of headache, and no other cause of this syndrome. His head CT scan was normal on admission yesterday. He has a pacemaker and cannot get an MRI until he is cleared by cardiology, and his pacemaker is MRI conditional.  He has been getting Haldol, and now is getting a dose of Geodon for his agitation. He is able to give his name correctly, and denies headache, but not very cooperative in general.  Patient is lying in bed without any clothes on, somewhat agitated and constantly pulling off his leads, and monitoring devices. He does have a history of A. fib, and has had cardiac ablations in the past, and a GI bleed on anticoagulation in the past, so he is not being anticoagulated.   He has a strong history of heart disease, and is a type II diabetic agents, is on a high-dose statin for cholesterol and takes a baby aspirin every day also. We will hold his antiplatelets in case we needed LP in the morning. He is way too agitated now to attempt 1. Talk to the hospitalist we'll go ahead and cover him for herpes encephalitis as the only treatable viral syndrome, and he doesn't seem to have any evidence of a bacterial infection being afebrile, with clinically normal white count, and no complaints of headache or meningismus and a completely normal CT of the head. He has no rashes anywhere either. His neck seems supple on examination. Patient perhaps a little bit better today, he is talking some with his wife mainly, he denies headache or pain, still agitated and wearing mittens because of his pulling at his leads. His EEG was done and that was only showing mild generalized slowing and not that bad. There was no focal process or seizure activity which would seem to rule out herpes encephalitis. That usually gives you some temporal slowing in the involved temple area. His MRI scan is pending, he remains afebrile with normal white count and no focal neurologic deficit. He did test positive for Covid and this might just be a Covid encephalopathy. He may need a spinal tap eventually, but he still seems to agitated, will reassess in the morning.   Past Medical History:   Diagnosis Date    Abnormal nuclear cardiac imaging test 5/8/2019    Anemia 6/5/2017    Atrial fibrillation (HCC)     Chronic kidney disease     CKD (chronic kidney disease) 5/8/2019    Coronary atherosclerosis of native coronary artery     Diabetes mellitus, type II ([facility] [address]     MCCOLLUM (dyspnea on exertion) 5/8/2019    Glaucoma     Mixed hyperlipidemia     Other dyspnea and respiratory abnormality     Pacemaker     S/P ablation of atrial fibrillation 8/4/2016      Past Surgical History:   Procedure Laterality Date    HX AFIB ABLATION      HX CATARACT REMOVAL Bilateral 2018    HX CORONARY ARTERY BYPASS GRAFT  2001    HX CORONARY STENT PLACEMENT      HX HEART CATHETERIZATION  2019    Left     HX PTCA      WA INS NEW/RPLCMT PRM PM W/TRANSV ELTRD ATRIAL&VENT N/A 3/29/2019    INSERT PPM DUAL performed [facility]ARD[facility]AC [facility]    WA REMOVAL SUBCUTANEOUS CARDIAC RHYTHM MONITOR N/A 3/29/2019    LOOP RECORDER REMOVAL performed by Poncho Chao MD at [facility] CARDIAC CATH LAB    UPPER GI ENDOSCOPY,BIOPSY  2019          Family History   Problem Relation Age of Onset    Diabetes Mother     Emphysema Father          age 46 - smoker      Social History     Tobacco Use    Smoking status: Former Smoker     Packs/day: 4.00     Years: 20.00     Pack years: 80.00     Types: Cigarettes     Quit date: 1980     Years since quittin.3    Smokeless tobacco: Former User    Tobacco comment: QUIT AT AGE 39   Substance Use Topics    Alcohol use: No     Alcohol/week: 0.0 standard drinks         Current Facility-Administered Medications:     ziprasidone (GEODON) 10 mg in sterile water (preservative free) 0.5 mL injection, 10 mg, IntraMUSCular, Q6H PRN, Ever Massey MD, 10 mg at 21 1315    LORazepam (ATIVAN) injection 2 mg, 2 mg, IntraMUSCular, Q6H PRN, Ever Massey MD, 2 mg at 21 2356    acyclovir (ZOVIRAX) 900 mg in 0.9% sodium chloride 250 mL IVPB, 900 mg, IntraVENous, Q12H, Ever Massey MD, Last Rate: 250 mL/hr at 21 0907, 900 mg at 21 0907    [Held by provider] clopidogreL (PLAVIX) tablet 75 mg, 75 mg, Oral, DAILY, Nelli Desai MD    cyanocobalamin (VITAMIN B12) tablet 1,000 mcg, 1,000 mcg, Oral, DAILY, Nelli Desai MD    latanoprost (XALATAN) 0.005 % ophthalmic solution 1 Drop, 1 Drop, Both Eyes, DAILY, Nelli Desai MD    metoprolol tartrate (LOPRESSOR) tablet 25 mg, 25 mg, Oral, BID, Nelli Desai MD    ranolazine ER (RANEXA) tablet 500 mg, 1 Tablet, Oral, BID, Nelli Desai MD    rosuvastatin (CRESTOR) tablet 40 mg, 40 mg, Oral, DAILY, MD Dia Haji sodium chloride (NS) flush 5-40 mL, 5-40 mL, IntraVENous, Q8H, Edel Stuart MD, 10 mL at 11/29/21 1400    sodium chloride (NS) flush 5-40 mL, 5-40 mL, IntraVENous, PRN, Edel Stuart MD    acetaminophen (TYLENOL) tablet 650 mg, 650 mg, Oral, Q6H PRN **OR** acetaminophen (TYLENOL) suppository 650 mg, 650 mg, Rectal, Q6H PRN, Edel Stuart MD    polyethylene glycol (MIRALAX) packet 17 g, 17 g, Oral, DAILY PRN, Edel Stuart MD    ondansetron (ZOFRAN ODT) tablet 4 mg, 4 mg, Oral, Q8H PRN **OR** ondansetron (ZOFRAN) injection 4 mg, 4 mg, IntraVENous, Q6H PRN, Edel Stuart MD    0.9% sodium chloride infusion, 75 mL/hr, IntraVENous, CONTINUOUS, Edel Stuart MD, Last Rate: 75 mL/hr at 11/28/21 0350, 75 mL/hr at 11/28/21 0350    insulin lispro (HUMALOG) injection, , SubCUTAneous, AC&HS, Edel Stuart MD, 2 Units at 11/27/21 1317    glucose chewable tablet 16 g, 4 Tablet, Oral, PRN, Edel Stuart MD    dextrose (D50W) injection syrg 12.5-25 g, 12.5-25 g, IntraVENous, PRN, Edel Stuart MD    glucagon (GLUCAGEN) injection 1 mg, 1 mg, IntraMUSCular, PRN, Edel Stuart MD    albuterol-ipratropium (DUO-NEB) 2.5 MG-0.5 MG/3 ML, 3 mL, Nebulization, Q4H PRN, Brea Padron NP    guaiFENesin (ROBITUSSIN) 100 mg/5 mL oral liquid 200 mg, 200 mg, Oral, Q4H PRN, Brea Padron NP        Allergies   Allergen Reactions    Ciprofloxacin Other (comments)     Difficulty breathing; 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 No results found for this or any previous visit. Review of Systems:  Review of systems not obtained due to patient factors.    Vitals:    11/29/21 0607 11/29/21 0730 11/29/21 0859 11/29/21 1604   BP:   126/78 (!) 146/77   Pulse: 98  90 (!) 104   Resp: 20 20 21   Temp: 98.5 °F (36.9 °C)   97.8 °F (36.6 °C)   SpO2: 92% 98% 98% 96%     Objective:     I      NEUROLOGICAL EXAM:    Appearance: The patient is well developed, well nourished, provides a incoherent history and is in a confused state, constantly thrashing about and lying in bed without clothes. Mental Status: Oriented to person only, and not the date or the president, cognitive function is abnormal and speech is fluent and no aphasia or dysarthria. Mood and affect agitated. Cranial Nerves:   Intact visual fields. Fundi are poorly seen secondary to lack of cooperation. LISA, EOM's full, no nystagmus, no ptosis. Facial sensation is normal. Corneal reflexes are not tested. Facial movement is symmetric. Hearing is abnormal bilaterally. Palate is midline with normal sternocleidomastoid and trapezius muscles are normal. Tongue is midline. Neck without meningismus or bruits  Temporal arteries are not tender or enlarged  TMJ areas are not tender on palpation   Motor:  5/5 strength in upper and lower proximal and distal muscles. Normal bulk and tone. No fasciculations. Rapid alternating movement is not testable bilaterally   Reflexes:   Deep tendon reflexes 1+/4 and symmetrical.  No babinski or clonus present   Sensory:   Normal to touch, pinprick and vibration and temperature. DSS is not testable   Gait:  Not testable. Tremor:   No tremor noted. Cerebellar:  Not testable abnormal cerebellar signs present on Romberg and tandem testing and finger-nose-finger exam.   Neurovascular:  Normal heart sounds and regular rhythm, peripheral pulses decreased, and no carotid bruits. No skin lesions or rashes noted anywhere.            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 Hold his antiplatelet agents for now, in case we need to proceed with LP. He has a nonfocal exam and just is encephalopathic but supple neck. We will consult his cardiologist Dr. Tiago Walter to clear him for the MRI in the morning, he is way too agitated right now. Metabolic parameters sent to rule out connective tissue disease, rule out vasculitis, drawn. Have discussed with the hospitalist and the patient's wife in detail. Very difficult case  Patient slightly better today, and his EEG showed only mild generalized slowing and nothing focal and no seizure activity which would seem to rule out herpes encephalitis. We will continue acyclovir empirically for now because in his improvement possibly could be related to that. He has Covid positive and this might be a Covid encephalopathy. Remains afebrile with normal vital signs and no focal neurologic symptoms. MRI scan is pending.   Signed By: Emelia Holder MD     November 29, 2021       CC: None  FAX: None

## 2021-11-30 NOTE — PROGRESS NOTES
Consult  REFERRED BY:  None    CHIEF COMPLAINT: Altered mental status      Subjective:     Audrey Reeder is a 80 y.o. right-handed  male seen at the request of the hospitalist, for evaluation of new problem of altered mental status that his wife said began on Wednesday the day before Thanksgiving and he became more irritable and just seemed to be off, for no clear reason, and then on Thursday Thanksgiving day started seeing things and saying crazy things off the wall, and could not follow conversations, but had no complaint of headache, focal weakness, fever, sensory loss, visual changes, meningismus, and had no history of trauma, toxin exposure or new medications or any precipitating factors for this. His tox screen is negative. He came to the emergency room on the day of admission because he was so confused, and was found to be in rapid A. fib, and was confused and try to fight with the  in the ER. No unusual depression or stress and no prior history of any psychiatric disease. He has had no focal neurologic deficit, no fever, no complaints of headache, and no other cause of this syndrome. His head CT scan was normal on admission. He has a pacemaker and MRI has been ordered because it is MRI conditional, but they are waiting because they can get a rep late at night, and I told him we need the MRI today to try to get an rep for the pacemaker there so they can do it today. 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 He is very drowsy today, but not as agitated and his sedatives have not been given for a while. He does have a history of A. fib, and has had cardiac ablations in the past, and a GI bleed on anticoagulation in the past, so he is not being anticoagulated.   He has a strong history of heart disease, and is a type II diabetic agents, is on a high-dose statin for cholesterol and takes a baby aspirin every day also. We will hold his antiplatelets in case we needed LP in the morning. He is way too agitated now to attempt 1. Patient has clinically normal white count, and no complaints of headache or meningismus and a completely normal CT of the head. He has no rashes anywhere either. His neck seems supple on examination. His EEG was done and that was only showing mild generalized slowing and not that bad. There was no focal process or seizure activity which would seem to rule out herpes encephalitis. That usually gives you some temporal slowing in the involved temple area. His MRI scan is pending, he remains afebrile with normal white count and no focal neurologic deficit. He did test positive for Covid and this might just be a Covid encephalopathy. He may need a spinal tap eventually, but he still seems to agitated, will reassess after the MRI, need to rule out multiple areas of ischemia from A.  Fib    Past Medical History:   Diagnosis Date    Abnormal nuclear cardiac imaging test 5/8/2019    Anemia 6/5/2017    Atrial fibrillation (HCC)     Chronic kidney disease     CKD (chronic kidney disease) 5/8/2019    Coronary atherosclerosis of native coronary artery     Diabetes mellitus, type II (HCC)     MCCOLLUM (dyspnea on exertion) 5/8/2019    Glaucoma     Mixed hyperlipidemia     Other dyspnea and respiratory abnormality     Pacemaker     S/P ablation of atrial fibrillation 8/4/2016      Past Surgical History:   Procedure Laterality Date    HX AFIB ABLATION      HX CATARACT REMOVAL Bilateral 2018    HX CORONARY ARTERY BYPASS GRAFT  2001    HX CORONARY STENT PLACEMENT      HX HEART CATHETERIZATION  11/21/2019    Left     HX PTCA      AL INS NEW/RPLCMT PRM PM W/TRANSV ELTRD ATRIAL&VENT N/A 3/29/2019    INSERT PPM DUAL performed by Sade Hickey MD at [facility] CARDIAC CATH LAB    AL REMOVAL SUBCUTANEOUS CARDIAC RHYTHM MONITOR N/A 3/29/2019    LOOP RECORDER REMOVAL performed by Nica Hoyos MD [address]  CARDIAC CATH LAB    UPPER GI ENDOSCOPY,BIOPSY  2019          Family History   Problem Relation Age of Onset    Diabetes Mother     Emphysema Father          age 46 - smoker      Social History     Tobacco Use    Smoking status: Former Smoker     Packs/day: 4.00     Years: 20.00     Pack years: 80.00     Types: Cigarettes     Quit date: 1980     Years since quittin.3    Smokeless tobacco: Former User    Tobacco comment: QUIT AT AGE 39   Substance Use Topics    Alcohol use: No     Alcohol/week: 0.0 standard drinks         Current Facility-Administered Medications:     LORazepam (ATIVAN) injection 2 mg, 2 mg, IntraVENous, Q4H PRN, Trav Ahuja MD, 2 mg at 21 0558    ziprasidone (GEODON) 10 mg in sterile water (preservative free) 0.5 mL injection, 10 mg, IntraMUSCular, Q6H PRN, Harpreet Lara MD, 10 mg at 21 1315    acyclovir (ZOVIRAX) 900 mg in 0.9% sodium chloride 250 mL IVPB, 900 mg, IntraVENous, Q12H, Harpreet Lara MD, Last Rate: 250 mL/hr at 21, 900 mg at 21    [Held by provider] clopidogreL (PLAVIX) tablet 75 mg, 75 mg, Oral, DAILY, Maryam Tran MD    cyanocobalamin (VITAMIN B12) tablet 1,000 mcg, 1,000 mcg, Oral, DAILY, Maryam Tran MD    latanoprost (XALATAN) 0.005 % ophthalmic solution 1 Drop, 1 Drop, Both Eyes, DAILY, Maryam Tran MD    metoprolol tartrate (LOPRESSOR) tablet 25 mg, 25 mg, Oral, BID, Maryam Tran MD    ranolazine ER (RANEXA) tablet 500 mg, 1 Tablet, Oral, BID, Maryam Tran MD    rosuvastatin (CRESTOR) tablet 40 mg, 40 mg, Oral, DAILY, Maryam Tran MD    sodium chloride (NS) flush 5-40 mL, 5-40 mL, IntraVENous, Q8H, Maryam Tran MD, 10 mL at 21 0558    sodium chloride (NS) flush 5-40 mL, 5-40 mL, IntraVENous, PRN, Maryam Tran MD    acetaminophen (TYLENOL) tablet 650 mg, 650 mg, Oral, Q6H PRN **OR** acetaminophen (TYLENOL) suppository 650 mg, 650 mg, Rectal, Q6H PRN, Pako Salgado MD    polyethylene glycol (MIRALAX) packet 17 g, 17 g, Oral, DAILY PRN, Pako Salgado MD    ondansetron (ZOFRAN ODT) tablet 4 mg, 4 mg, Oral, Q8H PRN **OR** ondansetron (ZOFRAN) injection 4 mg, 4 mg, IntraVENous, Q6H PRN, Pako Salgado MD    0.9% sodium chloride infusion, 75 mL/hr, IntraVENous, CONTINUOUS, Katie Mercado MD, Last Rate: 75 mL/hr at 11/29/21 2202, 75 mL/hr at 11/29/21 2202    insulin lispro (HUMALOG) injection, , SubCUTAneous, AC&HS, Pako Salgado MD, 2 Units at 11/27/21 1317    glucose chewable tablet 16 g, 4 Tablet, Oral, PRN, Pako Salgado MD    dextrose (D50W) injection syrg 12.5-25 g, 12.5-25 g, IntraVENous, PRN, Pako Salgado MD    glucagon (GLUCAGEN) injection 1 mg, 1 mg, IntraMUSCular, PRN, Pako Salgado MD    albuterol-ipratropium (DUO-NEB) 2.5 MG-0.5 MG/3 ML, 3 mL, Nebulization, Q4H PRN, Brea Bryant NP    guaiFENesin (ROBITUSSIN) 100 mg/5 mL oral liquid 200 mg, 200 mg, Oral, Q4H PRN, Brea Bryant NP        Allergies   Allergen Reactions    Ciprofloxacin Other (comments)     Difficulty breathing; 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 No results found for this or any previous visit. Review of Systems:  Review of systems not obtained due to patient factors. Vitals:    11/29/21 0859 11/29/21 1604 11/29/21 2022 11/30/21 0847   BP: 126/78 (!) 146/77 (!) 150/92 127/68   Pulse: 90 (!) 104 (!) 122 88   Resp: 20 21 22 18   Temp:  97.8 °F (36.6 °C) 98.4 °F (36.9 °C) 97.6 °F (36.4 °C)   SpO2: 98% 96% 95% 97%     Objective:     I      NEUROLOGICAL EXAM:    Appearance: The patient is well developed, well nourished, provides a incoherent history and is in a confused state, drowsy today, more lethargic.    Mental Status: Oriented to person only, drowsy and lethargic, and not verbal   Cranial Nerves:   Intact visual fields. Patient has a tendency to gaze to the right. Fundi are poorly seen secondary to lack of cooperation. LISA, EOM's full, no nystagmus, no ptosis. Facial sensation is normal. Corneal reflexes are not tested. Facial movement is symmetric. Hearing is abnormal bilaterally. Palate is midline with normal sternocleidomastoid and trapezius muscles are normal. Tongue is midline. Neck without meningismus or bruits  Temporal arteries are not tender or enlarged  TMJ areas are not tender on palpation   Motor:  5/5 strength in upper and lower proximal and distal muscles. Normal bulk and tone. No fasciculations. Rapid alternating movement is not testable bilaterally   Reflexes:   Deep tendon reflexes 1+/4 and symmetrical.  No babinski or clonus present   Sensory:   Normal to touch, pinprick and vibration and temperature are not testable. DSS is not testable   Gait:  Not testable. Tremor:   No tremor noted. Cerebellar:  Not testable abnormal cerebellar signs present on Romberg and tandem testing and finger-nose-finger exam.   Neurovascular:  Normal heart sounds and regular rhythm, peripheral pulses decreased, and no carotid bruits. No skin lesions or rashes noted anywhere. Assessment:   Active Problems:    Rapid atrial fibrillation ([address] (11/27/2021)      AMS (altered mental status) (11/27/2021)      Acute alteration in mental status (11/28/2021)      Convulsion ([address] (11/28/2021)      Bilateral carotid artery stenosis (11/28/2021)      Cerebral microvascular disease (11/28/2021)      Acute metabolic encephalopathy (95/61/3831)        Plan:     I called MRI, due to his lethargic condition we will try to proceed with MRI scan today, they are calling in a device rep and will have to sit down MRI scan for 70 minutes afterwards. Difficult case, but he remains afebrile with normal white count and a supple neck.   He is being treated for herpes encephalitis, and his EEG showed only mild slowing and no seizures, and his Dopplers were normal yesterday. Head CT on admission was normal  Patient with altered mental status of unclear etiology, because the possibility of a recent viral syndrome we will empirically cover with acyclovir, and plan an LP tomorrow if his MRI scan shows no contraindication. Hold his antiplatelet agents for now, in case we need to proceed with LP. He has a nonfocal exam and just is encephalopathic but supple neck. Metabolic parameters sent to rule out connective tissue disease, rule out vasculitis, drawn. Have discussed with the hospitalist and the patient's wife in detail. Very difficult case  Patient slightly better today, and his EEG showed only mild generalized slowing and nothing focal and no seizure activity which would seem to rule out herpes encephalitis. We will continue acyclovir empirically for now because in his improvement possibly could be related to that. He has Covid positive and this might be a Covid encephalopathy. Remains afebrile with normal vital signs and no focal neurologic symptoms. MRI scan is pending.     Signed By: Mary Kate Patel MD     November 30, 2021       CC: None  FAX: None

## 2021-11-30 NOTE — PROGRESS NOTES
Hospitalist Progress Note    NAME: Daryl Cloud   :  1940   MRN:  [id number]       Assessment / Plan:  Acute metabolic encephalopathy, unknown etiology     -Progressive acute encephalopathy since 21  Etiology remains unclear at this time. CT head negative, Serum alcohol <10, Urine drug screening negative. No evidence of ongoing infection   C/w 1:1 supervision for now, Haldol as needed PRN    -Unlikely bacterial meningitis, with normal white count, no fever. Empiric acyclovir started. -MRI, MRA pending, at risk for CVA  -EEG was completed and showed only mild slowing and no seizures  -Dopplers with no acute abnormality reported  -Neurology planning on LP after obtaining MRI to make sure there is no contraindication  -Neurology following, input is appreciated    COVID-19 disease  -Continue to be on room air  -COVID-19 PCR positive  -Chest x-ray with no evidence of acute cardiopulmonary process  -We will hold on antibiotics  -No indication for dexamethasone remdesivir given patient on room air not hypoxic  -Patient is vaccinated against COVID-19       Paroxysmal Afib: Was in Rapid Afib at OSH, which got better with metoprolol  Currently rate controlled  Resume oral metoprolol once able to take p.o. Not on AC d/t hx of GI bleed, was recently seen by Dr. Darcie Galan was discussed which he was not interested  Cardiology consult if goes into RVR again.     Acute on chronic kidney disease:  Creatinine 1.96 yesterday  Labs still pending today  Continue IVF     CAD s/p CABG  Aspirin, statin  Holding Plavix for anticipated LP     DM:  Hold metformin  Sliding scale with hypoglycemia protocol  Monitor FS    Anticipated discharge   Barriers clinical improvement     Code Status: Full  Surrogate Decision Maker: His wife     DVT Prophylaxis: lovenox  GI Prophylaxis: not indicated     Baseline: Ambulatory     Subjective:     Patient was seen and examined.   No acute events overnight. Discussed with RN overnight events. Patient continued to be agitated. Review of Systems:  Symptom Y/N Comments  Symptom Y/N Comments   Fever/Chills    Chest Pain     Poor Appetite    Edema     Cough    Abdominal Pain     Sputum    Joint Pain     SOB/MCCOLLUM    Pruritis/Rash     Nausea/vomit    Tolerating PT/OT     Diarrhea    Tolerating Diet     Constipation    Other       Could NOT obtain due to:  AMS     Objective:     VITALS:   Last 24hrs VS reviewed since prior progress note. Most recent are:  Patient Vitals for the past 24 hrs:   Temp Pulse Resp BP SpO2   11/30/21 0847 97.6 °F (36.4 °C) 88 18 127/68 97 %   11/29/21 2022 98.4 °F (36.9 °C) (!) 122 22 (!) 150/92 95 %   11/29/21 1604 97.8 °F (36.6 °C) (!) 104 21 (!) 146/77 96 %     No intake or output data in the 24 hours ending 11/30/21 1439     I had a face to face encounter and independently examined this patient on 11/30/2021, as outlined below:  PHYSICAL EXAM:  General: WD, WN. Alert, not cooperative, no acute distress    EENT:  EOMI. Anicteric sclerae. MMM  Resp:  CTA bilaterally, no wheezing or rales. No accessory muscle use  CV:  Regular  rhythm,  No edema  GI:  Soft, Non distended, Non tender. +Bowel sounds  Neurologic:  Disoriented, follows some simple commands, moving all extremities, no focal deficits  Psych:   Unable to assess  Skin:  No rashes. No jaundice    Reviewed most current lab test results and cultures  YES  Reviewed most current radiology test results   YES  Review and summation of old records today    NO  Reviewed patient's current orders and MAR    YES  PMH/SH reviewed - no change compared to H&P  ________________________________________________________________________  Care Plan discussed with:    Comments   Patient x    Family  x    RN x    Care Manager     Consultant                        Multidiciplinary team rounds were held today with , nursing, pharmacist and clinical coordinator.   Patient's plan of care was discussed; medications were reviewed and discharge planning was addressed. ________________________________________________________________________  Total NON critical care TIME: 36   Minutes    Total CRITICAL CARE TIME Spent:   Minutes non procedure based      Comments   >50% of visit spent in counseling and coordination of care     ________________________________________________________________________  Tonya Nichole MD     Procedures: see electronic medical records for all procedures/Xrays and details which were not copied into this note but were reviewed prior to creation of Plan. LABS:  I reviewed today's most current labs and imaging studies.   Pertinent labs include:  Recent Labs     11/28/21  1432   WBC 6.6   HGB 12.9   HCT 38.6        Recent Labs     11/28/21  1432      K 4.4   *   CO2 22   *   BUN 41*   CREA 1.96*   CA 9.5       Signed: Tonya Nichole MD

## 2021-11-30 NOTE — PROGRESS NOTES
Problem: Falls - Risk of  Goal: *Absence of Falls  Description: Document Lawrence Puentes Fall Risk and appropriate interventions in the flowsheet.   Outcome: Progressing Towards Goal  Note: Fall Risk Interventions:  Mobility Interventions: Assess mobility with egress test, Bed/chair exit alarm    Mentation Interventions: Bed/chair exit alarm, Adequate sleep, hydration, pain control, Toileting rounds    Medication Interventions: Bed/chair exit alarm    Elimination Interventions: Bed/chair exit alarm, Call light in reach              Problem: Patient Education: Go to Patient Education Activity  Goal: Patient/Family Education  Outcome: Progressing Towards Goal     Problem: Non-Violent Restraints  Goal: Removal from restraints as soon as assessed to be safe  Outcome: Not Progressing Towards Goal     Problem: Non-Violent Restraints  Goal: No harm/injury to patient while restraints in use  Outcome: Not Progressing Towards Goal

## 2021-12-01 ENCOUNTER — APPOINTMENT (OUTPATIENT)
Dept: GENERAL RADIOLOGY | Age: 81
DRG: 177 | End: 2021-12-01
Attending: PSYCHIATRY & NEUROLOGY
Payer: MEDICARE

## 2021-12-01 LAB
ANA SER QL: NEGATIVE
ANION GAP SERPL CALC-SCNC: 9 MMOL/L (ref 5–15)
APPEARANCE CSF: CLEAR
APPEARANCE CSF: CLEAR
B BURGDOR IGG+IGM SER-ACNC: <0.91 ISR (ref 0–0.9)
BUN SERPL-MCNC: 33 MG/DL (ref 6–20)
BUN/CREAT SERPL: 19 (ref 12–20)
CALCIUM SERPL-MCNC: 9.2 MG/DL (ref 8.5–10.1)
CHLORIDE SERPL-SCNC: 119 MMOL/L (ref 97–108)
CO2 SERPL-SCNC: 20 MMOL/L (ref 21–32)
COLOR CSF: COLORLESS
COLOR CSF: COLORLESS
CREAT SERPL-MCNC: 1.7 MG/DL (ref 0.7–1.3)
ERYTHROCYTE [DISTWIDTH] IN BLOOD BY AUTOMATED COUNT: 13.8 % (ref 11.5–14.5)
GLUCOSE BLD STRIP.AUTO-MCNC: 100 MG/DL (ref 65–117)
GLUCOSE BLD STRIP.AUTO-MCNC: 105 MG/DL (ref 65–117)
GLUCOSE BLD STRIP.AUTO-MCNC: 91 MG/DL (ref 65–117)
GLUCOSE BLD STRIP.AUTO-MCNC: 94 MG/DL (ref 65–117)
GLUCOSE BLD STRIP.AUTO-MCNC: 94 MG/DL (ref 65–117)
GLUCOSE BLD STRIP.AUTO-MCNC: 98 MG/DL (ref 65–117)
GLUCOSE CSF-MCNC: 61 MG/DL (ref 40–70)
GLUCOSE SERPL-MCNC: 94 MG/DL (ref 65–100)
HCT VFR BLD AUTO: 36.9 % (ref 36.6–50.3)
HGB BLD-MCNC: 12.5 G/DL (ref 12.1–17)
HSV1 IGG SER IA-ACNC: 2.81 INDEX (ref 0–0.9)
HSV2 IGG SER IA-ACNC: <0.91 INDEX (ref 0–0.9)
IGA SERPL-MCNC: 375 MG/DL (ref 61–437)
IGG SERPL-MCNC: 1335 MG/DL (ref 603–1613)
IGM SERPL-MCNC: 58 MG/DL (ref 15–143)
INDIA INK PREP CSF: NORMAL
MCH RBC QN AUTO: 31.1 PG (ref 26–34)
MCHC RBC AUTO-ENTMCNC: 33.9 G/DL (ref 30–36.5)
MCV RBC AUTO: 91.8 FL (ref 80–99)
NRBC # BLD: 0 K/UL (ref 0–0.01)
NRBC BLD-RTO: 0 PER 100 WBC
PLATELET # BLD AUTO: 258 K/UL (ref 150–400)
PMV BLD AUTO: 10.2 FL (ref 8.9–12.9)
POTASSIUM SERPL-SCNC: 4 MMOL/L (ref 3.5–5.1)
PROT CSF-MCNC: 48 MG/DL (ref 15–45)
PROT PATTERN SERPL IFE-IMP: ABNORMAL
RBC # BLD AUTO: 4.02 M/UL (ref 4.1–5.7)
RBC # CSF: 0 /CU MM
RBC # CSF: 1 /CU MM
SERVICE CMNT-IMP: NORMAL
SODIUM SERPL-SCNC: 148 MMOL/L (ref 136–145)
TUBE # CSF: 1
TUBE # CSF: 4
WBC # BLD AUTO: 6.6 K/UL (ref 4.1–11.1)
WBC # CSF: 0 /CU MM (ref 0–5)
WBC # CSF: 0 /CU MM (ref 0–5)

## 2021-12-01 PROCEDURE — 87327 CRYPTOCOCCUS NEOFORM AG IA: CPT

## 2021-12-01 PROCEDURE — 87529 HSV DNA AMP PROBE: CPT

## 2021-12-01 PROCEDURE — 80048 BASIC METABOLIC PNL TOTAL CA: CPT

## 2021-12-01 PROCEDURE — 87015 SPECIMEN INFECT AGNT CONCNTJ: CPT

## 2021-12-01 PROCEDURE — 62270 DX LMBR SPI PNXR: CPT | Performed by: PSYCHIATRY & NEUROLOGY

## 2021-12-01 PROCEDURE — 82945 GLUCOSE OTHER FLUID: CPT

## 2021-12-01 PROCEDURE — 87483 CNS DNA AMP PROBE TYPE 12-25: CPT

## 2021-12-01 PROCEDURE — 85027 COMPLETE CBC AUTOMATED: CPT

## 2021-12-01 PROCEDURE — 74011000250 HC RX REV CODE- 250: Performed by: INTERNAL MEDICINE

## 2021-12-01 PROCEDURE — 74011250636 HC RX REV CODE- 250/636: Performed by: INTERNAL MEDICINE

## 2021-12-01 PROCEDURE — 87498 ENTEROVIRUS PROBE&REVRS TRNS: CPT

## 2021-12-01 PROCEDURE — 89050 BODY FLUID CELL COUNT: CPT

## 2021-12-01 PROCEDURE — 88108 CYTOPATH CONCENTRATE TECH: CPT

## 2021-12-01 PROCEDURE — 84157 ASSAY OF PROTEIN OTHER: CPT

## 2021-12-01 PROCEDURE — 74018 RADEX ABDOMEN 1 VIEW: CPT

## 2021-12-01 PROCEDURE — 87210 SMEAR WET MOUNT SALINE/INK: CPT

## 2021-12-01 PROCEDURE — 65270000029 HC RM PRIVATE

## 2021-12-01 PROCEDURE — 82962 GLUCOSE BLOOD TEST: CPT

## 2021-12-01 PROCEDURE — 86788 WEST NILE VIRUS AB IGM: CPT

## 2021-12-01 PROCEDURE — 99233 SBSQ HOSP IP/OBS HIGH 50: CPT | Performed by: PSYCHIATRY & NEUROLOGY

## 2021-12-01 PROCEDURE — 86592 SYPHILIS TEST NON-TREP QUAL: CPT

## 2021-12-01 PROCEDURE — 74011250636 HC RX REV CODE- 250/636: Performed by: PSYCHIATRY & NEUROLOGY

## 2021-12-01 PROCEDURE — 009U3ZX DRAINAGE OF SPINAL CANAL, PERCUTANEOUS APPROACH, DIAGNOSTIC: ICD-10-PCS | Performed by: PSYCHIATRY & NEUROLOGY

## 2021-12-01 PROCEDURE — 87205 SMEAR GRAM STAIN: CPT

## 2021-12-01 PROCEDURE — 87476 LYME DIS DNA AMP PROBE: CPT

## 2021-12-01 PROCEDURE — 36415 COLL VENOUS BLD VENIPUNCTURE: CPT

## 2021-12-01 RX ORDER — DILTIAZEM HYDROCHLORIDE 5 MG/ML
5 INJECTION INTRAVENOUS ONCE
Status: COMPLETED | OUTPATIENT
Start: 2021-12-01 | End: 2021-12-01

## 2021-12-01 RX ORDER — METOPROLOL TARTRATE 5 MG/5ML
2.5 INJECTION INTRAVENOUS EVERY 6 HOURS
Status: DISCONTINUED | OUTPATIENT
Start: 2021-12-01 | End: 2021-12-02 | Stop reason: SDUPTHER

## 2021-12-01 RX ORDER — DEXTROSE MONOHYDRATE 50 MG/ML
75 INJECTION, SOLUTION INTRAVENOUS CONTINUOUS
Status: DISCONTINUED | OUTPATIENT
Start: 2021-12-01 | End: 2021-12-13

## 2021-12-01 RX ADMIN — DILTIAZEM HYDROCHLORIDE 5 MG: 5 INJECTION INTRAVENOUS at 16:19

## 2021-12-01 RX ADMIN — LORAZEPAM 2 MG: 2 INJECTION INTRAMUSCULAR; INTRAVENOUS at 03:18

## 2021-12-01 RX ADMIN — Medication 10 ML: at 06:02

## 2021-12-01 RX ADMIN — LORAZEPAM 2 MG: 2 INJECTION INTRAMUSCULAR; INTRAVENOUS at 14:40

## 2021-12-01 RX ADMIN — ACYCLOVIR SODIUM 900 MG: 50 INJECTION, SOLUTION INTRAVENOUS at 22:06

## 2021-12-01 RX ADMIN — DEXTROSE MONOHYDRATE 75 ML/HR: 5 INJECTION, SOLUTION INTRAVENOUS at 11:43

## 2021-12-01 RX ADMIN — Medication 10 ML: at 14:40

## 2021-12-01 RX ADMIN — METOPROLOL TARTRATE 2.5 MG: 1 INJECTION, SOLUTION INTRAVENOUS at 17:23

## 2021-12-01 RX ADMIN — ACYCLOVIR SODIUM 900 MG: 50 INJECTION, SOLUTION INTRAVENOUS at 11:38

## 2021-12-01 NOTE — PROGRESS NOTES
Transition of Care Plan:    RUR: 13% \"low risk\"  Disposition: TBD- home with family  Follow up appointments: Has no PCP  DME needed: None  Transportation at Discharge: Wife  Varun Klein or means to access home:   No     IM Medicare Letter: Needed at discharge  Is patient a BCPI-A Bundle: No      If yes, was Bundle Letter given?:  N/A  Is patient a [place] and connected with the [place]? No  If yes, was [employer] transfer form completed and [facility] notified? N/A  Caregiver Contact: Wife- Lo Cho, [phone]  Discharge Caregiver contacted prior to discharge? CM reviewed pt's chart. [facility] d/t A. fib status post ablation status post pacemaker was transferred from outside hospital with acute change in mental status  Per chart review, pt not oriented & unable to participate in assessment. CM received call back from pt's wife Carina Dhillon, [phone]). CM introduced role & completed initial assessment. Wife confirmed demographic information is up to date. Pt lives with his wife at [address] [place] [address]; single level home. Wife reports they were living in a trailer in [place] prior to pt's admission. Reports pt being oriented x4 & independent at baseline; pt drives. No use of DME or home O2. No prior HH or rehab; wife not very agreeable to [facility] at this time- unknown of pt's needs at discharge. Pt has no PCP & has refused support in the past. Wife reports pt relies on his cardiologist Nick Walker for primary care. Wife to transport at discharge. Unit CM to follow.     Reason for Admission:  Altered mental status, unspecified altered mental status type [R41.82]                   RUR Score:   13%                  Plan for utilizing home health:  TBD-  No PT/OT yajaira    PCP: First and Last name:  None   Name of Practice:    Are you a current patient: Yes/No:    Approximate date of last visit:    Can you participate in a virtual visit with your PCP:                     Current Advanced Directive/Advance Care Plan: Full Code  Advance Care Planning     General Advance Care Planning (ACP) Conversation  Date of Conversation: 12/1/2021  Conducted with: Patient with Decision Making Capacity    Healthcare Decision Maker:   No healthcare decision makers have been documented. Click here to complete [address] including selection of the Healthcare Decision Maker Relationship (ie \"Primary\")    Today we documented Decision Maker(s) consistent with Legal Next of Kin hierarchy. Content/Action Overview:   unable to discuss with pt d/t mentation. Reviewed DNR/DNI and patient elects Full Code (Attempt Resuscitation)    Length of Voluntary ACP Conversation in minutes:  <16 minutes (Non-Billable)    [address] Management Interventions  PCP Verified by CM: Yes (no PCP- per wife, pt relies on Cardio)  Palliative Care Criteria Met (RRAT>21 & CHF Dx)?: No  Palliative Consult Recommended?: No  Mode of Transport at Discharge:  Other (see comment) (Wife)  Transition of Care Consult (CM Consult): Discharge Planning  Discharge Durable Medical Equipment: No  Physical Therapy Consult: No  Occupational Therapy Consult: No  Speech Therapy Consult: No  Support Systems: Spouse/Significant Other  Confirm Follow Up Transport: Self (family prn)    BENNY Alicia  Care Management

## 2021-12-01 NOTE — PROGRESS NOTES
CM attempted to complete initial assessment, called numbers provided on face sheet for wife and pt cell phone, no success at this time. CM will attempt later to complete.     [address]  Phone: [phone]

## 2021-12-01 NOTE — PROGRESS NOTES
Spiritual Care Assessment/Progress Note  [facility]      NAME: Dariusz Dawkins      MRN: [id number]  AGE: 80 y.o. SEX: male  Mormon Affiliation: No Confucianist   Language: English     12/1/2021     Total Time (in minutes): 7     Spiritual Assessment begun in [facility] 2 MED TELE through conversation with:         []Patient        [] Family    [] Friend(s)        Reason for Consult: Initial/Spiritual assessment, patient floor     Spiritual beliefs: (Please include comment if needed)     [] Identifies with a victorina tradition:         [] Supported by a victorina community:            [] Claims no spiritual orientation:           [] Seeking spiritual identity:                [] Adheres to an individual form of spirituality:           [x] Not able to assess:                           Identified resources for coping:      [] Prayer                               [] Music                  [] Guided Imagery     [] Family/friends                 [] Pet visits     [] Devotional reading                         [x] Unknown     [] Other:                                               Interventions offered during this visit: (See comments for more details)    Patient Interventions: Initial visit           Plan of Care:     [] Support spiritual and/or cultural needs    [] Support AMD and/or advance care planning process      [] Support grieving process   [] Coordinate Rites and/or Rituals    [] Coordination with community clergy   [] No spiritual needs identified at this time   [] Detailed Plan of Care below (See Comments)  [] Make referral to Music Therapy  [] Make referral to Pet Therapy     [] Make referral to Addiction services  [] Make referral to [facility]  [] Make referral to Spiritual Care Partner  [] No future visits requested        [x] Contact Spiritual Care for further referrals     Comments:     Initial Spiritual Care assessment attempt for Mr. Gina Sánchez in 2132. Reviewed the chart before visit.  Due to medical concern, attempt of visit was made by the phone. Pt did not answer the phone despite multiple attempts.  made a phone call attempt for pt's wife Mrs. Eleuterio Zapata, had no answer. Unable to assess.      HERNANDO RodriguesNortheast Regional Medical Center,Th.M, [address] Provider   Paging Service [phone])

## 2021-12-01 NOTE — PROGRESS NOTES
Hospitalist Progress Note    NAME: Megan Prieto   :  1940   MRN:  [id number]       Assessment / Plan:  Acute metabolic encephalopathy, unknown etiology     -Progressive acute encephalopathy since 23  Etiology remains unclear at this time. CT head negative, Serum alcohol <10, Urine drug screening negative. No evidence of ongoing infection   C/w 1:1 supervision for now, Haldol as needed PRN    -Unlikely bacterial meningitis, with normal white count, no fever. Empiric acyclovir started.    -MRI, MRA pending, at risk for CVA  -EEG was completed and showed only mild slowing and no seizures  -Dopplers with no acute abnormality reported  -Neurology planning on LP after obtaining MRI to make sure there is no contraindication  -Neurology following, input is appreciated    COVID-19 disease  -Continue to be on room air  -COVID-19 PCR positive  -Chest x-ray with no evidence of acute cardiopulmonary process  -We will hold on antibiotics  -No indication for dexamethasone remdesivir given patient on room air not hypoxic  -Patient is vaccinated against COVID-19       Paroxysmal Afib:  He is back in RVR likely secondary to underlying infectious process  Challenges with p.o. intake  We will start IV Lopressor 2.5 mg every 6 hours  We will give 1 dose of Cardizem 5 mg IV now  Not on AC d/t hx of GI bleed, was recently seen by Dr. Pablito Oneill was discussed which he was not interested     Acute on chronic kidney disease:   , Hypervolemic hypernatremia  Creatinine mildly improving  Change normal saline to D5     CAD s/p CABG  Aspirin, statin  Holding Plavix for anticipated LP     DM:  Hold metformin  Sliding scale with hypoglycemia protocol  Monitor FS    Anticipated discharge   Barriers clinical improvement     Code Status: Full  Surrogate Decision Maker: His wife     DVT Prophylaxis: lovenox  GI Prophylaxis: not indicated     Baseline: Ambulatory     Subjective:     Patient was seen and examined. No acute events overnight. Discussed with RN overnight events. Need to be agitated today          Review of Systems:  Symptom Y/N Comments  Symptom Y/N Comments   Fever/Chills    Chest Pain     Poor Appetite    Edema     Cough    Abdominal Pain     Sputum    Joint Pain     SOB/MCCOLLUM    Pruritis/Rash     Nausea/vomit    Tolerating PT/OT     Diarrhea    Tolerating Diet     Constipation    Other       Could NOT obtain due to:  AMS     Objective:     VITALS:   Last 24hrs VS reviewed since prior progress note. Most recent are:  Patient Vitals for the past 24 hrs:   Temp Pulse Resp BP SpO2   12/01/21 1359 97.8 °F (36.6 °C) 94 20 (!) 145/75 97 %   12/01/21 1145 97.3 °F (36.3 °C) 100 18 116/86 94 %   12/01/21 0731 97.6 °F (36.4 °C) 83 18 102/62 94 %   12/01/21 0339 98 °F (36.7 °C) 74 18 116/78 96 %   12/01/21 0336 98 °F (36.7 °C) 65 20 99/69 100 %   11/30/21 2308 98.1 °F (36.7 °C) 91 20 119/79 94 %   11/30/21 1909 98.1 °F (36.7 °C) (!) 116 22 121/75 96 %   11/30/21 1624 98 °F (36.7 °C) 89 18 (!) 120/53 95 %       Intake/Output Summary (Last 24 hours) at 12/1/2021 1419  Last data filed at 12/1/2021 0600  Gross per 24 hour   Intake 250 ml   Output 650 ml   Net -400 ml        I had a face to face encounter and independently examined this patient on 12/1/2021, as outlined below:  PHYSICAL EXAM:  General: WD, WN. Alert, not cooperative, no acute distress    EENT:  EOMI. Anicteric sclerae. MMM  Resp:  CTA bilaterally, no wheezing or rales. No accessory muscle use  CV:  Regular  rhythm,  No edema  GI:  Soft, Non distended, Non tender. +Bowel sounds  Neurologic:  Disoriented, follows some simple commands, moving all extremities, no focal deficits  Psych:   Unable to assess  Skin:  No rashes.   No jaundice    Reviewed most current lab test results and cultures  YES  Reviewed most current radiology test results   YES  Review and summation of old records today    NO  Reviewed patient's current orders and [facility] YES  PMH/SH reviewed - no change compared to H&P  ________________________________________________________________________  Care Plan discussed with:    Comments   Patient x    Family  x    RN x    Care Manager     Consultant                        Multidiciplinary team rounds were held today with , nursing, pharmacist and clinical coordinator. Patient's plan of care was discussed; medications were reviewed and discharge planning was addressed. ________________________________________________________________________  Total NON critical care TIME: 36   Minutes    Total CRITICAL CARE TIME Spent:   Minutes non procedure based      Comments   >50% of visit spent in counseling and coordination of care     ________________________________________________________________________  Guyann Prader, MD     Procedures: see electronic medical records for all procedures/Xrays and details which were not copied into this note but were reviewed prior to creation of Plan. LABS:  I reviewed today's most current labs and imaging studies.   Pertinent labs include:  Recent Labs     12/01/21 0153 11/30/21  1403 11/28/21  1432   WBC 6.6 6.8 6.6   HGB 12.5 12.9 12.9   HCT 36.9 37.8 38.6    249 260     Recent Labs     12/01/21  0153 11/28/21  1432   * 145   K 4.0 4.4   * 116*   CO2 20* 22   GLU 94 119*   BUN 33* 41*   CREA 1.70* 1.96*   CA 9.2 9.5       Signed: Guyann Prader, MD

## 2021-12-01 NOTE — PROGRESS NOTES
CM reviewed pt's chart. Noted attempts x2 to contact pt/ family to complete assessment. CM contacted pt's wife Hardy Cmapa, [phone]); no answer & unable to leave VM. CM contacted other number on file ([phone])- received answer but not pt's wife nor family. CM removed number from pt's chart. Full assessment to follow.     Farhat Morris MSW  Care Management   negative -  no rash

## 2021-12-02 ENCOUNTER — APPOINTMENT (OUTPATIENT)
Dept: MRI IMAGING | Age: 81
DRG: 177 | End: 2021-12-02
Attending: PSYCHIATRY & NEUROLOGY
Payer: MEDICARE

## 2021-12-02 ENCOUNTER — APPOINTMENT (OUTPATIENT)
Dept: MRI IMAGING | Age: 81
DRG: 177 | End: 2021-12-02
Attending: INTERNAL MEDICINE
Payer: MEDICARE

## 2021-12-02 LAB
ANION GAP SERPL CALC-SCNC: 8 MMOL/L (ref 5–15)
BUN SERPL-MCNC: 29 MG/DL (ref 6–20)
BUN/CREAT SERPL: 20 (ref 12–20)
CALCIUM SERPL-MCNC: 9.3 MG/DL (ref 8.5–10.1)
CHLORIDE SERPL-SCNC: 112 MMOL/L (ref 97–108)
CO2 SERPL-SCNC: 22 MMOL/L (ref 21–32)
CREAT SERPL-MCNC: 1.42 MG/DL (ref 0.7–1.3)
CRYPTOC AG CSF QL IA: NEGATIVE
CRYPTOCOCCUS NEOFORMANS/GATTII, CRNEOG: NOT DETECTED
CYTOMEGALOVIRUS, CYMEG: NOT DETECTED
ENTEROVIRUS, ENTVIR: NOT DETECTED
ERYTHROCYTE [DISTWIDTH] IN BLOOD BY AUTOMATED COUNT: 14.1 % (ref 11.5–14.5)
ESCHERICHIA COLI K1, ECK1: NOT DETECTED
GLUCOSE BLD STRIP.AUTO-MCNC: 106 MG/DL (ref 65–117)
GLUCOSE BLD STRIP.AUTO-MCNC: 121 MG/DL (ref 65–117)
GLUCOSE BLD STRIP.AUTO-MCNC: 131 MG/DL (ref 65–117)
GLUCOSE BLD STRIP.AUTO-MCNC: 132 MG/DL (ref 65–117)
GLUCOSE SERPL-MCNC: 135 MG/DL (ref 65–100)
HAEMOPHILUS INFLUENZAE, HAEFLU: NOT DETECTED
HCT VFR BLD AUTO: 43.3 % (ref 36.6–50.3)
HERPES SIMPLEX VIRUS 2, HSIMV2: NOT DETECTED
HGB BLD-MCNC: 14.5 G/DL (ref 12.1–17)
HSV1 DNA CSF QL NAA+PROBE: NOT DETECTED
HUMAN HERPESVIRUS 6, HUHV6: NOT DETECTED
HUMAN PARECHOVIRUS, HUPARV: NOT DETECTED
LISTERIA MONOCYTOGENES, LISMON: NOT DETECTED
MCH RBC QN AUTO: 31.7 PG (ref 26–34)
MCHC RBC AUTO-ENTMCNC: 33.5 G/DL (ref 30–36.5)
MCV RBC AUTO: 94.7 FL (ref 80–99)
NEISSERIA MENINGITIDIS, NEIMEN: NOT DETECTED
NRBC # BLD: 0 K/UL (ref 0–0.01)
NRBC BLD-RTO: 0 PER 100 WBC
PLATELET # BLD AUTO: 144 K/UL (ref 150–400)
PMV BLD AUTO: 11.7 FL (ref 8.9–12.9)
POTASSIUM SERPL-SCNC: 3.8 MMOL/L (ref 3.5–5.1)
RBC # BLD AUTO: 4.57 M/UL (ref 4.1–5.7)
SERVICE CMNT-IMP: ABNORMAL
SERVICE CMNT-IMP: NORMAL
SODIUM SERPL-SCNC: 142 MMOL/L (ref 136–145)
STREPTOCOCCUS AGALACTIAE, SAGA: NOT DETECTED
STREPTOCOCCUS PNEUMONIAE, STRPNE: NOT DETECTED
VARICELLA ZOSTER VIRUS, VAZOVI: NOT DETECTED
WBC # BLD AUTO: 6.5 K/UL (ref 4.1–11.1)

## 2021-12-02 PROCEDURE — 82962 GLUCOSE BLOOD TEST: CPT

## 2021-12-02 PROCEDURE — 80048 BASIC METABOLIC PNL TOTAL CA: CPT

## 2021-12-02 PROCEDURE — 74011250636 HC RX REV CODE- 250/636: Performed by: INTERNAL MEDICINE

## 2021-12-02 PROCEDURE — 74011000250 HC RX REV CODE- 250: Performed by: INTERNAL MEDICINE

## 2021-12-02 PROCEDURE — 65270000029 HC RM PRIVATE

## 2021-12-02 PROCEDURE — 99233 SBSQ HOSP IP/OBS HIGH 50: CPT | Performed by: PSYCHIATRY & NEUROLOGY

## 2021-12-02 PROCEDURE — 70544 MR ANGIOGRAPHY HEAD W/O DYE: CPT

## 2021-12-02 PROCEDURE — 70551 MRI BRAIN STEM W/O DYE: CPT

## 2021-12-02 PROCEDURE — 74011250636 HC RX REV CODE- 250/636: Performed by: PSYCHIATRY & NEUROLOGY

## 2021-12-02 PROCEDURE — 85027 COMPLETE CBC AUTOMATED: CPT

## 2021-12-02 PROCEDURE — 74011250637 HC RX REV CODE- 250/637: Performed by: PSYCHIATRY & NEUROLOGY

## 2021-12-02 PROCEDURE — 36415 COLL VENOUS BLD VENIPUNCTURE: CPT

## 2021-12-02 RX ORDER — ASPIRIN 300 MG/1
300 SUPPOSITORY RECTAL DAILY
Status: DISCONTINUED | OUTPATIENT
Start: 2021-12-02 | End: 2021-12-07 | Stop reason: SDUPTHER

## 2021-12-02 RX ORDER — LORAZEPAM 2 MG/ML
0.5 INJECTION INTRAMUSCULAR
Status: DISCONTINUED | OUTPATIENT
Start: 2021-12-02 | End: 2021-12-09

## 2021-12-02 RX ORDER — METOPROLOL TARTRATE 5 MG/5ML
2.5 INJECTION INTRAVENOUS EVERY 6 HOURS
Status: DISCONTINUED | OUTPATIENT
Start: 2021-12-02 | End: 2021-12-20

## 2021-12-02 RX ORDER — ENOXAPARIN SODIUM 100 MG/ML
40 INJECTION SUBCUTANEOUS EVERY 12 HOURS
Status: DISCONTINUED | OUTPATIENT
Start: 2021-12-02 | End: 2021-12-20 | Stop reason: HOSPADM

## 2021-12-02 RX ADMIN — Medication 10 ML: at 06:54

## 2021-12-02 RX ADMIN — ASPIRIN 300 MG: 300 SUPPOSITORY RECTAL at 14:50

## 2021-12-02 RX ADMIN — METOPROLOL TARTRATE 2.5 MG: 1 INJECTION, SOLUTION INTRAVENOUS at 10:27

## 2021-12-02 RX ADMIN — METOPROLOL TARTRATE 2.5 MG: 1 INJECTION, SOLUTION INTRAVENOUS at 16:06

## 2021-12-02 RX ADMIN — METOPROLOL TARTRATE 2.5 MG: 1 INJECTION, SOLUTION INTRAVENOUS at 23:43

## 2021-12-02 RX ADMIN — ENOXAPARIN SODIUM 40 MG: 100 INJECTION SUBCUTANEOUS at 21:44

## 2021-12-02 RX ADMIN — LORAZEPAM 2 MG: 2 INJECTION INTRAMUSCULAR; INTRAVENOUS at 03:45

## 2021-12-02 RX ADMIN — Medication 10 ML: at 14:08

## 2021-12-02 RX ADMIN — ENOXAPARIN SODIUM 40 MG: 100 INJECTION SUBCUTANEOUS at 10:27

## 2021-12-02 RX ADMIN — Medication 10 ML: at 23:42

## 2021-12-02 RX ADMIN — METOPROLOL TARTRATE 2.5 MG: 1 INJECTION, SOLUTION INTRAVENOUS at 02:18

## 2021-12-02 RX ADMIN — DEXTROSE MONOHYDRATE 75 ML/HR: 5 INJECTION, SOLUTION INTRAVENOUS at 23:49

## 2021-12-02 RX ADMIN — LATANOPROST 1 DROP: 50 SOLUTION OPHTHALMIC at 10:34

## 2021-12-02 RX ADMIN — DEXTROSE MONOHYDRATE 75 ML/HR: 5 INJECTION, SOLUTION INTRAVENOUS at 06:55

## 2021-12-02 NOTE — PROGRESS NOTES
Hospitalist Progress Note    NAME: Marianna Goodman   :  1940   MRN:  [id number]       Assessment / Plan:  Acute metabolic encephalopathy, unknown etiology might be Covid encephalopathy     -Progressive acute encephalopathy since 23  Etiology remains unclear at this time. CT head negative, Serum alcohol <10, Urine drug screening negative. No evidence of ongoing infection   C/w 1:1 supervision for now, Haldol as needed PRN    -Unlikely bacterial meningitis, with normal white count, no fever. Empiric acyclovir started.    -EEG was completed and showed only mild slowing and no seizures  -Dopplers with no acute abnormality reported  -MRI still pending unfortunately  -Status post LP yesterday with no white blood cells, does not look infected however infectious etiologies blood work including HSV, enterovirus, VDRL, West Nile all pending  -Neurology following, input is appreciated  -Patient continued to be agitated and not following commands    COVID-19 disease  -Continue to be on room air  -COVID-19 PCR positive  -Chest x-ray with no evidence of acute cardiopulmonary process  -We will hold on antibiotics  -No indication for dexamethasone remdesivir given patient on room air not hypoxic  -Patient is vaccinated against COVID-19       Paroxysmal Afib:  He is back in RVR likely secondary to underlying infectious process  Challenges with p.o. intake  We started IV Lopressor  We will give 1 dose of Cardizem 5 mg yesterday  Not on AC d/t hx of GI bleed, was recently seen by Dr. Justo Jc was discussed which he was not interested     Acute on chronic kidney disease:   , Hypervolemic hypernatremia  Continue D5  Lab work is pending for today     CAD s/p CABG  Aspirin, statin  Resume Plavix since patient had LP yesterday     DM:  Hold metformin  Sliding scale with hypoglycemia protocol  Monitor FS    Anticipated discharge   Barriers clinical improvement     Code Status: Full  Surrogate Decision Maker: His wife     DVT Prophylaxis: lovenox  GI Prophylaxis: not indicated     Baseline: Ambulatory     Subjective:     Patient was seen and examined. No acute events overnight. Discussed with RN overnight events. Patient continue not to follow any commands mildly agitated in bed. Review of Systems:  Symptom Y/N Comments  Symptom Y/N Comments   Fever/Chills    Chest Pain     Poor Appetite    Edema     Cough    Abdominal Pain     Sputum    Joint Pain     SOB/MCCOLLUM    Pruritis/Rash     Nausea/vomit    Tolerating PT/OT     Diarrhea    Tolerating Diet     Constipation    Other       Could NOT obtain due to:  AMS     Objective:     VITALS:   Last 24hrs VS reviewed since prior progress note. Most recent are:  Patient Vitals for the past 24 hrs:   Temp Pulse Resp BP SpO2   12/02/21 1026 97.7 °F (36.5 °C) (!) 104 20 123/73 96 %   12/02/21 0218 97.7 °F (36.5 °C) 91 18 (!) 165/77 96 %   12/01/21 2150 98.3 °F (36.8 °C) 100 -- (!) 148/79 95 %   12/01/21 1723 -- (!) 107 -- (!) 139/99 --   12/01/21 1619 -- (!) 114 -- 112/69 --   12/01/21 1541 98.1 °F (36.7 °C) 80 20 114/79 94 %   12/01/21 1537 -- (!) 103 -- (!) 142/77 --   12/01/21 1359 97.8 °F (36.6 °C) 94 20 (!) 145/75 97 %   12/01/21 1145 97.3 °F (36.3 °C) 100 18 116/86 94 %       Intake/Output Summary (Last 24 hours) at 12/2/2021 1139  Last data filed at 12/2/2021 0816  Gross per 24 hour   Intake 1688.75 ml   Output 900 ml   Net 788.75 ml        I had a face to face encounter and independently examined this patient on 12/2/2021, as outlined below:  PHYSICAL EXAM:  General: WD, WN. Alert, not cooperative, no acute distress    EENT:  EOMI. Anicteric sclerae. MMM  Resp:  CTA bilaterally, no wheezing or rales. No accessory muscle use  CV:  Regular  rhythm,  No edema  GI:  Soft, Non distended, Non tender. +Bowel sounds  Neurologic:  Disoriented, not following commands, moving all extremities, no focal deficits  Psych:   Unable to assess  Skin:  No rashes.   No jaundice    Reviewed most current lab test results and cultures  YES  Reviewed most current radiology test results   YES  Review and summation of old records today    NO  Reviewed patient's current orders and MAR    YES  PMH/SH reviewed - no change compared to H&P  ________________________________________________________________________  Care Plan discussed with:    Comments   Patient x    Family  x    RN x    Care Manager     Consultant                        Multidiciplinary team rounds were held today with , nursing, pharmacist and clinical coordinator. Patient's plan of care was discussed; medications were reviewed and discharge planning was addressed. ________________________________________________________________________  Total NON critical care TIME: 36   Minutes    Total CRITICAL CARE TIME Spent:   Minutes non procedure based      Comments   >50% of visit spent in counseling and coordination of care     ________________________________________________________________________  Vickie Ying MD     Procedures: see electronic medical records for all procedures/Xrays and details which were not copied into this note but were reviewed prior to creation of Plan. LABS:  I reviewed today's most current labs and imaging studies.   Pertinent labs include:  Recent Labs     12/02/21 0228 12/01/21  0153 11/30/21  1403   WBC 6.5 6.6 6.8   HGB 14.5 12.5 12.9   HCT 43.3 36.9 37.8   * 258 249     Recent Labs     12/01/21  0153   *   K 4.0   *   CO2 20*   GLU 94   BUN 33*   CREA 1.70*   CA 9.2       Signed: Vickie Ying MD

## 2021-12-02 NOTE — PROGRESS NOTES
End of Shift Note    Bedside shift change report given to Marilou CASTLE (oncoming nurse) by Nina Beverly RN (offgoing nurse). Report included the following information SBAR, Kardex, ED Summary, Intake/Output, MAR, Recent Results and Cardiac Rhythm paced, afib, tachy cardia    Shift worked:  [id number]     Shift summary and any significant changes:     Patient non verbal.  Medicate with Ativan for Anxiety. Slept most of night. Wrist restraints bilat. Documented. Voiding QS. B/P WNL. Patient medicate with lopressor for a fib. Patient unable to take any PO's. Concerns for physician to address:  See above     Zone phone for oncoming shift:  [phone]     Activity:  Activity Level: Bed Rest  Number times ambulated in hallways past shift: 0  Number of times OOB to chair past shift: 0    Cardiac:   Cardiac Monitoring: Yes      Cardiac Rhythm: Atrial Fib    Access:   Current line(s): PIV     Genitourinary:   Urinary status: incontinent and external catheter    Respiratory:   O2 Device: None (Room air)  Chronic home O2 use?: NO  Incentive spirometer at bedside: NO     GI:  Last Bowel Movement Date:  (unknown)  Current diet:  ADULT DIET Regular; 3 carb choices (45 gm/meal); Low Fat/Low Chol/High Fiber/NICOLE  DIET ONE TIME MESSAGE  Passing flatus: YES  Tolerating current diet: NO       Pain Management:   Patient states pain is manageable on current regimen: N/A    Skin:  Alexys Score: 11  Interventions: turn team, speciality bed, float heels, increase time out of bed, PT/OT consult and internal/external urinary devices    Patient Safety:  Fall Score:  Total Score: 1  Interventions: bed/chair alarm, assistive device (walker, cane, etc), gripper socks and pt to call before getting OOB  High Fall Risk: Yes    Length of Stay:  Expected LOS: 3d 12h  Actual LOS: 5      Letha Eugene RN

## 2021-12-02 NOTE — PROGRESS NOTES
Consult  REFERRED BY:  None    CHIEF COMPLAINT: Altered mental status      Subjective:     Ale Soto is a 80 y.o. right-handed  male seen at the request of the hospitalist, for evaluation of new problem of altered mental status that his wife said began on Wednesday the day before Thanksgiving and he became more irritable and just seemed to be off, for no clear reason, and then on Thursday Thanksgiving day started seeing things and saying crazy things off the wall, and could not follow conversations, but had no complaint of headache, focal weakness, fever, sensory loss, visual changes, meningismus, and had no history of trauma, toxin exposure or new medications or any precipitating factors for this. His tox screen is negative. He came to the emergency room on the day of admission because he was so confused, and was found to be in rapid A. fib, and was confused and try to fight with the  in the ER. No unusual depression or stress and no prior history of any psychiatric disease. He has had no focal neurologic deficit, no fever, no complaints of headache, and no other cause of this syndrome. His head CT scan was normal on admission. He has a pacemaker and MRI has been ordered because it is MRI conditional, but they are waiting because they can get a rep late at night, and I told him we need the MRI today to try to get an rep for the pacemaker there so they can do it today. 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 He is very drowsy today, but not as agitated and his sedatives have not been given for a while. He does have a history of A. fib, and has had cardiac ablations in the past, and a GI bleed on anticoagulation in the past, so he is not being anticoagulated.   He has a strong history of heart disease, and is a type II diabetic agents, is on a high-dose statin for cholesterol and takes a baby aspirin every day also. We will hold his antiplatelets in case we needed LP in the morning. He is way too agitated now to attempt 1. Patient has clinically normal white count, and no complaints of headache or meningismus and a completely normal CT of the head. He has no rashes anywhere either. His neck seems supple on examination. His EEG was done and that was only showing mild generalized slowing and not that bad. There was no focal process or seizure activity which would seem to rule out herpes encephalitis. That usually gives you some temporal slowing in the involved temple area. His MRI scan is pending, he remains afebrile with normal white count and no focal neurologic deficit. He did test positive for Covid and this might just be a Covid encephalopathy. He may need a spinal tap eventually, but he still seems to agitated, will reassess after the MRI, need to rule out multiple areas of ischemia from A. Fib  Patient had a CT scan of the head done last night that was normal and showed no lesions and no change from his admission 1. The nurses could not get a hold of his wife yesterday to fill out the MRI screening sheet, so we could not get the MRI as planned at 4:00. She came in today and we got her to sign the screening she can fill it out, so he is on hold for an MRI whenever the pacemaker rep can get there. She also gave verbal permission to do a spinal tap and we did that today. 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 His CSF was sent out, and results are still pending. It does not look like a spinal fluid is going to show much. This may just be a Covid encephalopathy.     Past Medical History:   Diagnosis Date    Abnormal nuclear cardiac imaging test 5/8/2019    Anemia 6/5/2017    Atrial fibrillation (HCC)     Chronic kidney disease     CKD (chronic kidney disease) 2019    Coronary atherosclerosis of native coronary artery     Diabetes mellitus, type II ([facility] [address]     MCCOLLUM (dyspnea on exertion) 2019    Glaucoma     Mixed hyperlipidemia     Other dyspnea and respiratory abnormality     Pacemaker     S/P ablation of atrial fibrillation 2016      Past Surgical History:   Procedure Laterality Date    HX AFIB ABLATION      HX CATARACT REMOVAL Bilateral 2018    HX CORONARY ARTERY BYPASS GRAFT  2001    HX CORONARY STENT PLACEMENT      HX HEART CATHETERIZATION  2019    Left     HX PTCA      NM INS NEW/RPLCMT PRM PM W/TRANSV ELTRD ATRIAL&VENT N/A 3/29/2019    INSERT PPM DUAL performed by Tom Rodriguez MD at [facility] CARDIAC CATH LAB    NM REMOVAL SUBCUTANEOUS CARDIAC RHYTHM MONITOR N/A 3/29/2019    LOOP RECORDER REMOVAL performed by Tom Rodriguez MD at [facility] CARDIAC CATH LAB    UPPER GI ENDOSCOPY,BIOPSY  2019          Family History   Problem Relation Age of Onset    Diabetes Mother     Emphysema Father          age 46 - smoker      Social History     Tobacco Use    Smoking status: Former Smoker     Packs/day: 4.00     Years: 20.00     Pack years: 80.00     Types: Cigarettes     Quit date: 1980     Years since quittin.3    Smokeless tobacco: Former User    Tobacco comment: QUIT AT AGE 42   Substance Use Topics    Alcohol use: No     Alcohol/week: 0.0 standard drinks         Current Facility-Administered Medications:     dextrose 5% infusion, 75 mL/hr, IntraVENous, CONTINUOUS, Dayna Lucas MD, Last Rate: 75 mL/hr at 21 1143, 75 mL/hr at 21 1143    metoprolol (LOPRESSOR) injection 2.5 mg, 2.5 mg, IntraVENous, Q6H, Nicky Martines MD, 2.5 mg at 21 1723    LORazepam (ATIVAN) injection 2 mg, 2 mg, IntraVENous, Q4H PRN, Ama Bailey MD, 2 mg at 21 1440    ziprasidone (GEODON) 10 mg in sterile water (preservative free) 0.5 mL injection, 10 mg, IntraMUSCular, Q6H PRN, Sb Barr MD, 10 mg at 11/29/21 1315    acyclovir (ZOVIRAX) 900 mg in 0.9% sodium chloride 250 mL IVPB, 900 mg, IntraVENous, Q12H, Kang Urrutia MD, Last Rate: 250 mL/hr at 12/01/21 1138, 900 mg at 12/01/21 1138    [Held by provider] clopidogreL (PLAVIX) tablet 75 mg, 75 mg, Oral, DAILY, Nolberto White MD    cyanocobalamin (VITAMIN B12) tablet 1,000 mcg, 1,000 mcg, Oral, DAILY, Nolberto White MD    latanoprost (XALATAN) 0.005 % ophthalmic solution 1 Drop, 1 Drop, Both Eyes, DAILY, Nolberto White MD, 1 Drop at 11/30/21 1838    ranolazine ER (RANEXA) tablet 500 mg, 1 Tablet, Oral, BID, Nolberto White MD    rosuvastatin (CRESTOR) tablet 40 mg, 40 mg, Oral, DAILY, Nolberto White MD    sodium chloride (NS) flush 5-40 mL, 5-40 mL, IntraVENous, Q8H, Nolberto White MD, 10 mL at 12/01/21 1440    sodium chloride (NS) flush 5-40 mL, 5-40 mL, IntraVENous, PRN, Nolberto White MD    acetaminophen (TYLENOL) tablet 650 mg, 650 mg, Oral, Q6H PRN **OR** acetaminophen (TYLENOL) suppository 650 mg, 650 mg, Rectal, Q6H PRN, Nolberto White MD    polyethylene glycol (MIRALAX) packet 17 g, 17 g, Oral, DAILY PRN, Nolberto White MD    ondansetron (ZOFRAN ODT) tablet 4 mg, 4 mg, Oral, Q8H PRN **OR** ondansetron (ZOFRAN) injection 4 mg, 4 mg, IntraVENous, Q6H PRN, Nolberto White MD    insulin lispro (HUMALOG) injection, , SubCUTAneous, AC&HS, Nolberto White MD, 2 Units at 11/27/21 1317    glucose chewable tablet 16 g, 4 Tablet, Oral, PRN, Nolberto White MD    dextrose (D50W) injection syrg 12.5-25 g, 12.5-25 g, IntraVENous, PRN, Nolberto White MD    glucagon (GLUCAGEN) injection 1 mg, 1 mg, IntraMUSCular, PRN, Nolberto White MD    albuterol-ipratropium (DUO-NEB) 2.5 MG-0.5 MG/3 ML, 3 mL, Nebulization, Q4H PRN, Brea Shepherd NP    guaiFENesin (ROBITUSSIN) 100 mg/5 mL oral liquid 200 mg, 200 mg, Oral, Q4H PRN, Brea Brmufield NP        Allergies   Allergen Reactions    Ciprofloxacin Other (comments)     Difficulty breathing; 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 No results found for this or any previous visit. Review of Systems:  Review of systems not obtained due to patient factors. Vitals:    12/01/21 1537 12/01/21 1541 12/01/21 1619 12/01/21 1723   BP: (!) 142/77 114/79 112/69 (!) 139/99   Pulse: (!) 103 80 (!) 114 (!) 107   Resp:  20     Temp:  98.1 °F (36.7 °C)     SpO2:  94%       Objective:     I      NEUROLOGICAL EXAM:    Appearance: The patient is well developed, well nourished, provides a incoherent history and is in a confused state, drowsy today, more lethargic. Patient has mittens on. Mental Status:  Basically nonverbal, drowsy and lethargic, and not verbal   Cranial Nerves:    Not testable visual fields. Fundi are poorly seen secondary to lack of cooperation. LISA, EOM's full, no nystagmus, no ptosis. Facial sensation is normal. Corneal reflexes are not tested. Facial movement is symmetric. Hearing is abnormal bilaterally. Palate is midline with normal sternocleidomastoid and trapezius muscles are normal. Tongue is midline. Neck without meningismus or bruits  Temporal arteries are not tender or enlarged  TMJ areas are not tender on palpation   Motor:  5/5 strength in upper and lower proximal and distal muscles. Normal bulk and tone. No fasciculations. Rapid alternating movement is not testable bilaterally   Reflexes:   Deep tendon reflexes 1+/4 and symmetrical.  No babinski or clonus present   Sensory:   Normal to touch, pinprick and vibration and temperature are not testable. DSS is not testable   Gait:  Not testable. Tremor:   No tremor noted. Cerebellar:  Not testable abnormal cerebellar signs present on Romberg and tandem testing and finger-nose-finger exam.   Neurovascular:  Normal heart sounds and regular rhythm, peripheral pulses decreased, and no carotid bruits.   No skin lesions or rashes noted anywhere. Assessment:   Active Problems:    Rapid atrial fibrillation ([address] (11/27/2021)      AMS (altered mental status) (11/27/2021)      Acute alteration in mental status (11/28/2021)      Convulsion ([address] (11/28/2021)      Bilateral carotid artery stenosis (11/28/2021)      Cerebral microvascular disease (11/28/2021)      Acute metabolic encephalopathy (85/18/0313)        Plan:     Patient had a CT scan of the head done last night that was normal and showed no lesions and no change from his admission 1. The nurses could not get a hold of his wife yesterday to fill out the MRI screening sheet, so we could not get the MRI as planned at 4:00. She came in today and we got her to sign the screening she can fill it out, so he is on hold for an MRI whenever the pacemaker rep can get there. She also gave verbal permission to do a spinal tap and we did that today. 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 His CSF was sent out, and results are still pending. It does not look like a spinal fluid is going to show much. This may just be a Covid encephalopathy. I called MRI, due to his lethargic condition we will try to proceed with MRI scan today, they are calling in a device rep and will have to sit down MRI scan for 70 minutes afterwards. Difficult case, but he remains afebrile with normal white count and a supple neck. He is being treated for herpes encephalitis, and his EEG showed only mild slowing and no seizures, and his Dopplers were normal yesterday. Head CT on admission was normal  Patient with altered mental status of unclear etiology, because the possibility of a recent viral syndrome we will empirically cover with acyclovir, and plan an LP tomorrow if his MRI scan shows no contraindication. Hold his antiplatelet agents for now, in case we need to proceed with LP.   He has a nonfocal exam and just is encephalopathic but supple neck. Metabolic parameters sent to rule out connective tissue disease, rule out vasculitis, drawn. Have discussed with the hospitalist and the patient's wife in detail. Very difficult case  Patient slightly better today, and his EEG showed only mild generalized slowing and nothing focal and no seizure activity which would seem to rule out herpes encephalitis. We will continue acyclovir empirically for now because in his improvement possibly could be related to that. He has Covid positive and this might be a Covid encephalopathy. Remains afebrile with normal vital signs and no focal neurologic symptoms. MRI scan is pending.     Signed By: Melanie Masterson MD     December 1, 2021       CC: None  FAX: None

## 2021-12-03 LAB
ANION GAP SERPL CALC-SCNC: 8 MMOL/L (ref 5–15)
BUN SERPL-MCNC: 29 MG/DL (ref 6–20)
BUN/CREAT SERPL: 19 (ref 12–20)
CALCIUM SERPL-MCNC: 9.1 MG/DL (ref 8.5–10.1)
CHLORIDE SERPL-SCNC: 112 MMOL/L (ref 97–108)
CO2 SERPL-SCNC: 22 MMOL/L (ref 21–32)
CREAT SERPL-MCNC: 1.53 MG/DL (ref 0.7–1.3)
ERYTHROCYTE [DISTWIDTH] IN BLOOD BY AUTOMATED COUNT: 13.7 % (ref 11.5–14.5)
GLUCOSE BLD STRIP.AUTO-MCNC: 127 MG/DL (ref 65–117)
GLUCOSE BLD STRIP.AUTO-MCNC: 129 MG/DL (ref 65–117)
GLUCOSE BLD STRIP.AUTO-MCNC: 142 MG/DL (ref 65–117)
GLUCOSE BLD STRIP.AUTO-MCNC: 87 MG/DL (ref 65–117)
GLUCOSE SERPL-MCNC: 145 MG/DL (ref 65–100)
HCT VFR BLD AUTO: 37.7 % (ref 36.6–50.3)
HGB BLD-MCNC: 13.1 G/DL (ref 12.1–17)
MCH RBC QN AUTO: 31.4 PG (ref 26–34)
MCHC RBC AUTO-ENTMCNC: 34.7 G/DL (ref 30–36.5)
MCV RBC AUTO: 90.4 FL (ref 80–99)
NRBC # BLD: 0 K/UL (ref 0–0.01)
NRBC BLD-RTO: 0 PER 100 WBC
PLATELET # BLD AUTO: 262 K/UL (ref 150–400)
PMV BLD AUTO: 10.4 FL (ref 8.9–12.9)
POTASSIUM SERPL-SCNC: 3.7 MMOL/L (ref 3.5–5.1)
RBC # BLD AUTO: 4.17 M/UL (ref 4.1–5.7)
REAGIN AB CSF QL: NON REACTIVE
SERVICE CMNT-IMP: ABNORMAL
SERVICE CMNT-IMP: NORMAL
SODIUM SERPL-SCNC: 142 MMOL/L (ref 136–145)
WBC # BLD AUTO: 8 K/UL (ref 4.1–11.1)
WNV IGG SPEC QL IA: POSITIVE
WNV IGM CSF QL IA: NEGATIVE

## 2021-12-03 PROCEDURE — 85027 COMPLETE CBC AUTOMATED: CPT

## 2021-12-03 PROCEDURE — 74011000250 HC RX REV CODE- 250: Performed by: INTERNAL MEDICINE

## 2021-12-03 PROCEDURE — 80048 BASIC METABOLIC PNL TOTAL CA: CPT

## 2021-12-03 PROCEDURE — 74011250636 HC RX REV CODE- 250/636: Performed by: PSYCHIATRY & NEUROLOGY

## 2021-12-03 PROCEDURE — 36415 COLL VENOUS BLD VENIPUNCTURE: CPT

## 2021-12-03 PROCEDURE — 74011250636 HC RX REV CODE- 250/636: Performed by: INTERNAL MEDICINE

## 2021-12-03 PROCEDURE — 82962 GLUCOSE BLOOD TEST: CPT

## 2021-12-03 PROCEDURE — 99232 SBSQ HOSP IP/OBS MODERATE 35: CPT | Performed by: PSYCHIATRY & NEUROLOGY

## 2021-12-03 PROCEDURE — 65270000029 HC RM PRIVATE

## 2021-12-03 RX ADMIN — METOPROLOL TARTRATE 2.5 MG: 1 INJECTION, SOLUTION INTRAVENOUS at 23:15

## 2021-12-03 RX ADMIN — Medication 10 ML: at 14:00

## 2021-12-03 RX ADMIN — ENOXAPARIN SODIUM 40 MG: 100 INJECTION SUBCUTANEOUS at 21:43

## 2021-12-03 RX ADMIN — ENOXAPARIN SODIUM 40 MG: 100 INJECTION SUBCUTANEOUS at 11:24

## 2021-12-03 RX ADMIN — Medication 10 ML: at 06:09

## 2021-12-03 RX ADMIN — METOPROLOL TARTRATE 2.5 MG: 1 INJECTION, SOLUTION INTRAVENOUS at 18:46

## 2021-12-03 RX ADMIN — LATANOPROST 1 DROP: 50 SOLUTION OPHTHALMIC at 18:50

## 2021-12-03 RX ADMIN — Medication 10 ML: at 23:15

## 2021-12-03 RX ADMIN — DEXTROSE MONOHYDRATE 75 ML/HR: 5 INJECTION, SOLUTION INTRAVENOUS at 23:32

## 2021-12-03 RX ADMIN — METOPROLOL TARTRATE 2.5 MG: 1 INJECTION, SOLUTION INTRAVENOUS at 11:23

## 2021-12-03 NOTE — PROGRESS NOTES
Consult  REFERRED BY:  None    CHIEF COMPLAINT: Altered mental status      Subjective:     Sergey Rivera is a 80 y.o. right-handed  male seen at the request of the hospitalist, for evaluation of new problem of altered mental status that his wife said began on Wednesday the day before Thanksgiving and he became more irritable and just seemed to be off, for no clear reason, and then on Thursday Thanksgiving day started seeing things and saying crazy things off the wall, and could not follow conversations, but had no complaint of headache, focal weakness, fever, sensory loss, visual changes, meningismus, and had no history of trauma, toxin exposure or new medications or any precipitating factors for this. His tox screen is negative. He came to the emergency room on the day of admission because he was so confused, and was found to be in rapid A. fib, and was confused and try to fight with the  in the ER. No unusual depression or stress and no prior history of any psychiatric disease. He has had no focal neurologic deficit, no fever, no complaints of headache, and no other cause of this syndrome. His head CT scan was normal on admission. He has a pacemaker and MRI has been ordered because it is MRI conditional, but they are waiting because they can get a rep late at night, and I told him we need the MRI today to try to get an rep for the pacemaker there so they can do it today. 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 He is very drowsy today, but not as agitated and his sedatives have not been given for a while. He does have a history of A. fib, and has had cardiac ablations in the past, and a GI bleed on anticoagulation in the past, so he is not being anticoagulated.   He has a strong history of heart disease, and is a type II diabetic agents, is on a high-dose statin for cholesterol and takes a baby aspirin every day also. We will hold his antiplatelets in case we needed LP in the morning. He is way too agitated now to attempt 1. Patient has clinically normal white count, and no complaints of headache or meningismus and a completely normal CT of the head. He has no rashes anywhere either. His neck seems supple on examination. His EEG was done and that was only showing mild generalized slowing and not that bad. There was no focal process or seizure activity which would seem to rule out herpes encephalitis. That usually gives you some temporal slowing in the involved temple area. Patient spinal tap was completely normal, with normal white cells and normal protein and sugar and meningitis panel was completely negative. Gram stain was negative. Patient MRI scan of the brain and MRA of the brain were unremarkable, showing just minimal white matter disease.   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, and we will stop the acyclovir, he remains afebrile with normal white count, and see how he does and try to hold sedatives as much as possible    Past Medical History:   Diagnosis Date    Abnormal nuclear cardiac imaging test 5/8/2019    Anemia 6/5/2017    Atrial fibrillation (HCC)     Chronic kidney disease     CKD (chronic kidney disease) 5/8/2019    Coronary atherosclerosis of native coronary artery     Diabetes mellitus, type II ([address]     MCCOLLUM (dyspnea on exertion) 5/8/2019    Glaucoma     Mixed hyperlipidemia     Other dyspnea and respiratory abnormality     Pacemaker     S/P ablation of atrial fibrillation 8/4/2016      Past Surgical History:   Procedure Laterality Date    HX AFIB ABLATION      HX CATARACT REMOVAL Bilateral 2018    HX CORONARY ARTERY BYPASS GRAFT  2001    HX CORONARY STENT PLACEMENT      HX HEART CATHETERIZATION  11/21/2019    Left     HX PTCA      GA INS NEW/RPLCMT PRM PM W/TRANSV ELTRD ATRIAL&VENT N/A 3/29/2019    INSERT PPM DUAL performed by Joseph [facility]varez MD [facility] CARDIAC CATH LAB    [place] REMOVAL SUBCUTANEOUS CARDIAC RHYTHM MONITOR N/A 3/29/2019    LOOP RECORDER REMOVAL performed by Joseph Alvarez MD at [facility] CARDIAC CATH LAB    UPPER GI ENDOSCOPY,BIOPSY  2019          Family History   Problem Relation Age of Onset    Diabetes Mother     Emphysema Father          age 46 - smoker      Social History     Tobacco Use    Smoking status: Former Smoker     Packs/day: 4.00     Years: 20.00     Pack years: 80.00     Types: Cigarettes     Quit date: 1980     Years since quittin.4    Smokeless tobacco: Former User    Tobacco comment: QUIT AT AGE 39   Substance Use Topics    Alcohol use: No     Alcohol/week: 0.0 standard drinks         Current Facility-Administered Medications:     aspirin (ASA) suppository 300 mg, 300 mg, Rectal, DAILY, Matthew ALVARADO MD, 300 mg at 21 1450    enoxaparin (LOVENOX) injection 40 mg, 40 mg, SubCUTAneous, Q12H, Leonardo Hyde MD, 40 mg at 21 1027    LORazepam (ATIVAN) injection 0.5 mg, 0.5 mg, IntraVENous, Q4H PRN, Ani Jones MD    metoprolol (LOPRESSOR) injection 2.5 mg, 2.5 mg, IntraVENous, Q6H, Dayna Lucas MD, 2.5 mg at 21 1606    dextrose 5% infusion, 75 mL/hr, IntraVENous, CONTINUOUS, Dayna Lucas MD, Last Rate: 75 mL/hr at 21 0655, 75 mL/hr at 21 0655    [Held by provider] ziprasidone (GEODON) 10 mg in sterile water (preservative free) 0.5 mL injection, 10 mg, IntraMUSCular, Q6H PRN, Karan Steiner MD, 10 mg at 21 1315    clopidogreL (PLAVIX) tablet 75 mg, 75 mg, Oral, DAILY, Yvon Mishra MD    cyanocobalamin (VITAMIN B12) tablet 1,000 mcg, 1,000 mcg, Oral, DAILY, Yvon Mishra MD    latanoprost (XALATAN) 0.005 % ophthalmic solution 1 Drop, 1 Drop, Both Eyes, DAILY, Yvon Mishra MD, 1 Drop at 21 1034    ranolazine ER (RANEXA) tablet 500 mg, 1 Tablet, Oral, BID, Kami Schroeder MD    rosuvastatin (CRESTOR) tablet 40 mg, 40 mg, Oral, DAILY, Kami Schroeder MD    sodium chloride (NS) flush 5-40 mL, 5-40 mL, IntraVENous, Q8H, Kami Schroeder MD, 10 mL at 12/02/21 1408    sodium chloride (NS) flush 5-40 mL, 5-40 mL, IntraVENous, PRN, Kami Schroeder MD    acetaminophen (TYLENOL) tablet 650 mg, 650 mg, Oral, Q6H PRN **OR** acetaminophen (TYLENOL) suppository 650 mg, 650 mg, Rectal, Q6H PRN, Kami Schroeder MD    polyethylene glycol (MIRALAX) packet 17 g, 17 g, Oral, DAILY PRN, Kami Schroeder MD    ondansetron (ZOFRAN ODT) tablet 4 mg, 4 mg, Oral, Q8H PRN **OR** ondansetron (ZOFRAN) injection 4 mg, 4 mg, IntraVENous, Q6H PRN, Kami Schroeder MD    insulin lispro (HUMALOG) injection, , SubCUTAneous, AC&HS, Kami Schroeder MD, 2 Units at 11/27/21 1317    glucose chewable tablet 16 g, 4 Tablet, Oral, PRN, Kami Schroeder MD    dextrose (D50W) injection syrg 12.5-25 g, 12.5-25 g, IntraVENous, PRN, Kami Schroeder MD    glucagon (GLUCAGEN) injection 1 mg, 1 mg, IntraMUSCular, PRN, Kami Schroeder MD    albuterol-ipratropium (DUO-NEB) 2.5 MG-0.5 MG/3 ML, 3 mL, Nebulization, Q4H PRN, Brea Banks NP    guaiFENesin (ROBITUSSIN) 100 mg/5 mL oral liquid 200 mg, 200 mg, Oral, Q4H PRN, Brea Shepherd NP        Allergies   Allergen Reactions    Ciprofloxacin Other (comments)     Difficulty breathing; 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      MRI Results (most recent):  Results from Hospital Encounter encounter on 11/27/21    MRA BRAIN WO CONT    Narrative  INDICATION: CVA    COMPARISON:  None    TECHNIQUE:  3-D time-of-flight MRA of the brain was performed. Multiplanar  reconstructions were obtained. FINDINGS:    Significant motion artifact. No acute large vessel occlusion, with visualization  of portions of the anterior, middle and posterior cerebral arteries obscured by  motion above the level of the [place].  Cannot assess for aneurysm. Impression  Significant motion artifact. No visualized large vessel occlusion. Results from [place] encounter on 11/27/21    MRA BRAIN WO CONT    Narrative  INDICATION: CVA    COMPARISON:  None    TECHNIQUE:  3-D time-of-flight MRA of the brain was performed. Multiplanar  reconstructions were obtained. FINDINGS:    Significant motion artifact. No acute large vessel occlusion, with visualization  of portions of the anterior, middle and posterior cerebral arteries obscured by  motion above the level of the [place]. Cannot assess for aneurysm. Impression  Significant motion artifact. No visualized large vessel occlusion. Review of Systems:  Review of systems not obtained due to patient factors. Vitals:    12/02/21 1026 12/02/21 1409 12/02/21 1604 12/02/21 1918   BP: 123/73 130/69 135/88 99/67   Pulse: (!) 104 97 81 89   Resp: 20 18 20 18   Temp: 97.7 °F (36.5 °C)  97.8 °F (36.6 °C) 97.7 °F (36.5 °C)   SpO2: 96% 94% 94% 94%     Objective:     I      NEUROLOGICAL EXAM:    Appearance: The patient is well developed, well nourished, provides a incoherent history and is in a confused state, drowsy today, more lethargic. Patient has mittens on. Mental Status:  Basically nonverbal, drowsy and lethargic, and not verbal   Cranial Nerves:    Not testable visual fields. Fundi are poorly seen secondary to lack of cooperation. LISA, EOM's full, no nystagmus, no ptosis. Facial sensation is normal. Corneal reflexes are not tested. Facial movement is symmetric. Hearing is abnormal bilaterally. Palate is midline with normal sternocleidomastoid and trapezius muscles are normal. Tongue is midline. Neck without meningismus or bruits  Temporal arteries are not tender or enlarged  TMJ areas are not tender on palpation   Motor:  5/5 strength in upper and lower proximal and distal muscles. Normal bulk and tone. No fasciculations.   Rapid alternating movement is not testable bilaterally Reflexes:   Deep tendon reflexes 1+/4 and symmetrical.  No babinski or clonus present   Sensory:   Normal to touch, pinprick and vibration and temperature are not testable. DSS is not testable   Gait:  Not testable. Tremor:   No tremor noted. Cerebellar:  Not testable abnormal cerebellar signs present on Romberg and tandem testing and finger-nose-finger exam.   Neurovascular:  Normal heart sounds and regular rhythm, peripheral pulses decreased, and no carotid bruits. No skin lesions or rashes noted anywhere. Assessment:   Active Problems:    Rapid atrial fibrillation ([address] (11/27/2021)      AMS (altered mental status) (11/27/2021)      Acute alteration in mental status (11/28/2021)      Convulsion ([address] (11/28/2021)      Bilateral carotid artery stenosis (11/28/2021)      Cerebral microvascular disease (11/28/2021)      Acute metabolic encephalopathy (09/73/8293)        Plan:     Patient had a CT scan of the head done last night that was normal and showed no lesions and no change from his admission 1. The nurses could not get a hold of his wife yesterday to fill out the MRI screening sheet, so we could not get the MRI as planned at 4:00. She came in today and we got her to sign the screening she can fill it out, so he is on hold for an MRI whenever the pacemaker rep can get there. She also gave verbal permission to do a spinal tap and we did that today. 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 His CSF was sent out, and results are still pending. It does not look like a spinal fluid is going to show much. This may just be a Covid encephalopathy. Patient spinal tap was completely normal, with normal white cells and normal protein and sugar and meningitis panel was completely negative. Gram stain was negative.   Patient MRI scan of the brain and MRA of the brain were unremarkable, showing just minimal white matter disease.   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

## 2021-12-03 NOTE — PROGRESS NOTES
Comprehensive Nutrition Assessment    Type and Reason for Visit: Initial, RD nutrition re-screen/LOS    Nutrition Recommendations/Plan:   · Continue Consistent Carb Diet - RD adjusted grams of CHOs to better meet est nutritional needs from 45g/meal to 75g/meal.  · RD ordered Ensure High Protein po once daily with dinner meal.  · Please document % meals and supplements consumed in flowsheet I/O's under intake. Nutrition Assessment:     12/3: Chart reviewed; med noted for rapid a-fib, COVID-19+, AMS. RD attempted to call pt over the phone but no answer. Noted pt has experienced some confusion but per MD note, improving. Pt is currently receiving a CCD/45 g CHO/meal which is low given est energy needs so will increase to 75 g CHO/meal.     Last Weight Metric  Weight Loss Metrics 11/27/2021 11/26/2021 9/23/2021 3/26/2021 9/17/2020 9/17/2020 7/17/2020   Today's Wt - 240 lb 238 lb 8 oz 256 lb 4.8 oz 259 lb 14.4 oz 265 lb 265 lb   BMI 32.55 kg/m2 32.55 kg/m2 32.35 kg/m2 34.76 kg/m2 35.25 kg/m2 35.94 kg/m2 35.94 kg/m2     Estimated Daily Nutrient Needs:  Energy (kcal): 2210 (BMR 1842 x 1.3AF); Weight Used for Energy Requirements: Current  Protein (g): 109 (1.2 g/kg bw); Weight Used for Protein Requirements: Current  Fluid (ml/day): 2200 ml/day; Method Used for Fluid Requirements: 1 ml/kcal    Nutrition Related Findings:  BM: none documented; Labs: reviewed, stable BG <200 mg/dl; Meds: reviewed    Wounds:    None       Current Nutrition Therapies:  DIET ONE TIME MESSAGE  ADULT DIET Regular; 5 carb choices (75 gm/meal); Low Fat/Low Chol/High Fiber/NICOLE  ADULT ORAL NUTRITION SUPPLEMENT Dinner; Low Calorie/High Protein    Anthropometric Measures:  · Height:  6' (182.9 cm)  · Current Body Wt:  108.9 kg (240 lb 1.3 oz)   · Ideal Body Wt:  178 lbs:  134.9 %   · BMI Category:  Obese class 1 (BMI 30.0-34. 9)       Nutrition Diagnosis:   · Inadequate protein-energy intake related to increased demand for energy/nutrients as evidenced by  (need for additional kcals/protein, added ensure high protein to try)    Nutrition Interventions:   Food and/or Nutrient Delivery: Start oral nutrition supplement, Modify current diet  Nutrition Education and Counseling: No recommendations at this time  Coordination of Nutrition Care: Continue to monitor while inpatient    Goals:  Trend PO intake >50% of meals + consume 240 ml Ensure HP next 3-5 days       Nutrition Monitoring and Evaluation:   Behavioral-Environmental Outcomes: None identified  Food/Nutrient Intake Outcomes: Food and nutrient intake, Supplement intake  Physical Signs/Symptoms Outcomes: Biochemical data, Weight    Discharge Planning:    Continue current diet     Electronically signed by Petar Cramer RD on 12/3/2021 at 1:17 PM

## 2021-12-03 NOTE — PROGRESS NOTES
Hospitalist Progress Note    NAME: Yaima Levine   :  1940   MRN:  [id number]       Assessment / Plan:  Acute metabolic encephalopathy, unknown etiology might be Covid encephalopathy     -Progressive acute encephalopathy since 21  Etiology remains unclear at this time. CT head negative, Serum alcohol <10, Urine drug screening negative. No evidence of ongoing infection   C/w 1:1 supervision for now, Haldol as needed PRN    -Unlikely bacterial meningitis, with normal white count, no fever. Empiric acyclovir started, will DC acyclovir given no evidence of viral encephalitis or meningitis. -EEG was completed and showed only mild slowing and no seizures  -Dopplers with no acute abnormality reported  -MRI and MRA with no acute abnormalities reported  -Status post LP on  with no white blood cells, does not look infected however infectious etiologies blood work including HSV, enterovirus, VDRL, West Nile all pending  -Neurology following, input is appreciated  -Patient is more awake today and following simple commands    COVID-19 disease  -Continue to be on room air  -COVID-19 PCR positive  -Chest x-ray with no evidence of acute cardiopulmonary process  -We will hold on antibiotics  -No indication for dexamethasone remdesivir given patient on room air not hypoxic  -Patient is vaccinated against COVID-19  -Patient's wife at bedside.  I emphasized the necessity of wearing mask and complete PPE given patient positive test.       Paroxysmal Afib:  He is back in RVR likely secondary to underlying infectious process  Challenges with p.o. intake  We started IV Lopressor  We will give 1 dose of Cardizem 5 mg yesterday  Not on AC d/t hx of GI bleed, was recently seen by Dr. Siddhartha Whelan was discussed which he was not interested     Acute on chronic kidney disease:   , Hypervolemic hypernatremia  Continue D5  Lab work is pending for today     CAD s/p CABG  Aspirin, statin  Resume Plavix since patient had LP yesterday     DM:  Hold metformin  Sliding scale with hypoglycemia protocol  Monitor FS    Anticipated discharge December 6  Barriers clinical improvement     Code Status: Full  Surrogate Decision Maker: His wife     DVT Prophylaxis: lovenox  GI Prophylaxis: not indicated     Baseline: Ambulatory     Subjective:     Patient was seen and examined. No acute events overnight. Discussed with RN overnight events. Patient is opening his eyes, following simple commands today              Review of Systems:  Symptom Y/N Comments  Symptom Y/N Comments   Fever/Chills    Chest Pain     Poor Appetite    Edema     Cough    Abdominal Pain     Sputum    Joint Pain     SOB/MCCOLLUM    Pruritis/Rash     Nausea/vomit    Tolerating PT/OT     Diarrhea    Tolerating Diet     Constipation    Other       Could NOT obtain due to:  AMS     Objective:     VITALS:   Last 24hrs VS reviewed since prior progress note. Most recent are:  Patient Vitals for the past 24 hrs:   Temp Pulse Resp BP SpO2   12/03/21 1123 -- 96 -- 114/70 --   12/03/21 0817 98.2 °F (36.8 °C) 65 20 111/72 93 %   12/03/21 0400 -- 70 -- 104/68 --   12/03/21 0324 98.1 °F (36.7 °C) 94 18 111/84 95 %   12/02/21 2338 97.6 °F (36.4 °C) 90 18 (!) 139/91 97 %   12/02/21 1918 97.7 °F (36.5 °C) 89 18 99/67 94 %   12/02/21 1604 97.8 °F (36.6 °C) 81 20 135/88 94 %   12/02/21 1409 -- 97 18 130/69 94 %       Intake/Output Summary (Last 24 hours) at 12/3/2021 1230  Last data filed at 12/3/2021 5681  Gross per 24 hour   Intake --   Output 1080 ml   Net -1080 ml        I had a face to face encounter and independently examined this patient on 12/3/2021, as outlined below:  PHYSICAL EXAM:  General: WD, WN. Alert, cooperative, no acute distress    EENT:  EOMI. Anicteric sclerae. MMM  Resp:  CTA bilaterally, no wheezing or rales. No accessory muscle use  CV:  Regular  rhythm,  No edema  GI:  Soft, Non distended, Non tender.   +Bowel sounds  Neurologic:  Disoriented, following simple commands commands, moving all extremities, no focal deficits  Psych:   Unable to assess  Skin:  No rashes. No jaundice    Reviewed most current lab test results and cultures  YES  Reviewed most current radiology test results   YES  Review and summation of old records today    NO  Reviewed patient's current orders and MAR    YES  PMH/SH reviewed - no change compared to H&P  ________________________________________________________________________  Care Plan discussed with:    Comments   Patient x    Family  x    RN x    Care Manager     Consultant                        Multidiciplinary team rounds were held today with , nursing, pharmacist and clinical coordinator. Patient's plan of care was discussed; medications were reviewed and discharge planning was addressed. ________________________________________________________________________  Total NON critical care TIME: 36   Minutes    Total CRITICAL CARE TIME Spent:   Minutes non procedure based      Comments   >50% of visit spent in counseling and coordination of care     ________________________________________________________________________  Lelia Dexter MD     Procedures: see electronic medical records for all procedures/Xrays and details which were not copied into this note but were reviewed prior to creation of Plan. LABS:  I reviewed today's most current labs and imaging studies.   Pertinent labs include:  Recent Labs     12/03/21 0026 12/02/21 0228 12/01/21  0153   WBC 8.0 6.5 6.6   HGB 13.1 14.5 12.5   HCT 37.7 43.3 36.9    144* 258     Recent Labs     12/03/21  0026 12/02/21  1608 12/01/21 0153    142 148*   K 3.7 3.8 4.0   * 112* 119*   CO2 22 22 20*   * 135* 94   BUN 29* 29* 33*   CREA 1.53* 1.42* 1.70*   CA 9.1 9.3 9.2       Signed: Lelia Dexter MD

## 2021-12-04 LAB
ANION GAP SERPL CALC-SCNC: 8 MMOL/L (ref 5–15)
BUN SERPL-MCNC: 23 MG/DL (ref 6–20)
BUN/CREAT SERPL: 17 (ref 12–20)
CALCIUM SERPL-MCNC: 9.1 MG/DL (ref 8.5–10.1)
CHLORIDE SERPL-SCNC: 108 MMOL/L (ref 97–108)
CO2 SERPL-SCNC: 23 MMOL/L (ref 21–32)
CREAT SERPL-MCNC: 1.36 MG/DL (ref 0.7–1.3)
ERYTHROCYTE [DISTWIDTH] IN BLOOD BY AUTOMATED COUNT: 13.9 % (ref 11.5–14.5)
GLUCOSE BLD STRIP.AUTO-MCNC: 114 MG/DL (ref 65–117)
GLUCOSE BLD STRIP.AUTO-MCNC: 114 MG/DL (ref 65–117)
GLUCOSE BLD STRIP.AUTO-MCNC: 117 MG/DL (ref 65–117)
GLUCOSE BLD STRIP.AUTO-MCNC: 121 MG/DL (ref 65–117)
GLUCOSE SERPL-MCNC: 122 MG/DL (ref 65–100)
HCT VFR BLD AUTO: 38.2 % (ref 36.6–50.3)
HGB BLD-MCNC: 12.8 G/DL (ref 12.1–17)
HSV1 DNA CSF QL NAA+PROBE: NEGATIVE
HSV2 DNA CSF QL NAA+PROBE: NEGATIVE
MCH RBC QN AUTO: 31.1 PG (ref 26–34)
MCHC RBC AUTO-ENTMCNC: 33.5 G/DL (ref 30–36.5)
MCV RBC AUTO: 92.7 FL (ref 80–99)
NRBC # BLD: 0 K/UL (ref 0–0.01)
NRBC BLD-RTO: 0 PER 100 WBC
PLATELET # BLD AUTO: 253 K/UL (ref 150–400)
PMV BLD AUTO: 10.5 FL (ref 8.9–12.9)
POTASSIUM SERPL-SCNC: 3.8 MMOL/L (ref 3.5–5.1)
RBC # BLD AUTO: 4.12 M/UL (ref 4.1–5.7)
SERVICE CMNT-IMP: ABNORMAL
SERVICE CMNT-IMP: NORMAL
SODIUM SERPL-SCNC: 139 MMOL/L (ref 136–145)
WBC # BLD AUTO: 7.3 K/UL (ref 4.1–11.1)

## 2021-12-04 PROCEDURE — 74011250637 HC RX REV CODE- 250/637: Performed by: STUDENT IN AN ORGANIZED HEALTH CARE EDUCATION/TRAINING PROGRAM

## 2021-12-04 PROCEDURE — 80048 BASIC METABOLIC PNL TOTAL CA: CPT

## 2021-12-04 PROCEDURE — 99232 SBSQ HOSP IP/OBS MODERATE 35: CPT | Performed by: PSYCHIATRY & NEUROLOGY

## 2021-12-04 PROCEDURE — 82962 GLUCOSE BLOOD TEST: CPT

## 2021-12-04 PROCEDURE — 74011250636 HC RX REV CODE- 250/636: Performed by: INTERNAL MEDICINE

## 2021-12-04 PROCEDURE — 74011250637 HC RX REV CODE- 250/637: Performed by: PSYCHIATRY & NEUROLOGY

## 2021-12-04 PROCEDURE — 74011250636 HC RX REV CODE- 250/636: Performed by: PSYCHIATRY & NEUROLOGY

## 2021-12-04 PROCEDURE — 74011000250 HC RX REV CODE- 250: Performed by: INTERNAL MEDICINE

## 2021-12-04 PROCEDURE — 36415 COLL VENOUS BLD VENIPUNCTURE: CPT

## 2021-12-04 PROCEDURE — 85027 COMPLETE CBC AUTOMATED: CPT

## 2021-12-04 PROCEDURE — 65270000029 HC RM PRIVATE

## 2021-12-04 RX ADMIN — DEXTROSE MONOHYDRATE 75 ML/HR: 5 INJECTION, SOLUTION INTRAVENOUS at 17:51

## 2021-12-04 RX ADMIN — METOPROLOL TARTRATE 2.5 MG: 1 INJECTION, SOLUTION INTRAVENOUS at 17:26

## 2021-12-04 RX ADMIN — CLOPIDOGREL BISULFATE 75 MG: 75 TABLET ORAL at 10:33

## 2021-12-04 RX ADMIN — Medication 10 ML: at 15:12

## 2021-12-04 RX ADMIN — ENOXAPARIN SODIUM 40 MG: 100 INJECTION SUBCUTANEOUS at 10:33

## 2021-12-04 RX ADMIN — ENOXAPARIN SODIUM 40 MG: 100 INJECTION SUBCUTANEOUS at 21:37

## 2021-12-04 RX ADMIN — RANOLAZINE 500 MG: 500 TABLET, FILM COATED, EXTENDED RELEASE ORAL at 17:48

## 2021-12-04 RX ADMIN — Medication 10 ML: at 06:30

## 2021-12-04 RX ADMIN — METOPROLOL TARTRATE 2.5 MG: 1 INJECTION, SOLUTION INTRAVENOUS at 21:37

## 2021-12-04 RX ADMIN — Medication 10 ML: at 21:38

## 2021-12-04 RX ADMIN — METOPROLOL TARTRATE 2.5 MG: 1 INJECTION, SOLUTION INTRAVENOUS at 04:33

## 2021-12-04 RX ADMIN — LATANOPROST 1 DROP: 50 SOLUTION OPHTHALMIC at 10:35

## 2021-12-04 RX ADMIN — ROSUVASTATIN 40 MG: 20 TABLET, FILM COATED ORAL at 10:33

## 2021-12-04 RX ADMIN — CYANOCOBALAMIN TAB 500 MCG 1000 MCG: 500 TAB at 10:33

## 2021-12-04 RX ADMIN — RANOLAZINE 500 MG: 500 TABLET, FILM COATED, EXTENDED RELEASE ORAL at 10:33

## 2021-12-04 RX ADMIN — ASPIRIN 300 MG: 300 SUPPOSITORY RECTAL at 15:12

## 2021-12-04 NOTE — PROGRESS NOTES
Consult  REFERRED BY:  None    CHIEF COMPLAINT: Altered mental status      Subjective:     Daryl Cloud is a 80 y.o. right-handed  male seen at the request of the hospitalist, for evaluation of new problem of altered mental status that his wife said began on Wednesday the day before Thanksgiving and he became more irritable and just seemed to be off, for no clear reason, and then on Thursday Thanksgiving day started seeing things and saying crazy things off the wall, and could not follow conversations, but had no complaint of headache, focal weakness, fever, sensory loss, visual changes, meningismus, and had no history of trauma, toxin exposure or new medications or any precipitating factors for this. His tox screen is negative. He came to the emergency room on the day of admission because he was so confused, and was found to be in rapid A. fib, and was confused and try to fight with the  in the ER. No unusual depression or stress and no prior history of any psychiatric disease. He has had no focal neurologic deficit, no fever, no complaints of headache, and no other cause of this syndrome. His head CT scan was normal on admission. He has a pacemaker and MRI has been ordered because it is MRI conditional, but they are waiting because they can get a rep late at night, and I told him we need the MRI today to try to get an rep for the pacemaker there so they can do it today. 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 He is very drowsy today, but not as agitated and his sedatives have not been given for a while. He does have a history of A. fib, and has had cardiac ablations in the past, and a GI bleed on anticoagulation in the past, so he is not being anticoagulated.   He has a strong history of heart disease, and is a type II diabetic agents, is on a high-dose statin for cholesterol and takes a baby aspirin every day also. We will hold his antiplatelets in case we needed LP in the morning. He is way too agitated now to attempt 1. Patient has clinically normal white count, and no complaints of headache or meningismus and a completely normal CT of the head. He has no rashes anywhere either. His neck seems supple on examination. His EEG was done and that was only showing mild generalized slowing and not that bad. There was no focal process or seizure activity which would seem to rule out herpes encephalitis. That usually gives you some temporal slowing in the involved temple area. Patient spinal tap was completely normal, with normal white cells and normal protein and sugar and meningitis panel was completely negative. Gram stain was negative. Patient MRI scan of the brain and MRA of the brain were unremarkable, showing just minimal white matter disease. 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   Today his words make even more sense and he even looks more awake and with that though still fairly encephalopathic    Past Medical History:   Diagnosis Date    Abnormal nuclear cardiac imaging test 5/8/2019    Anemia 6/5/2017    Atrial fibrillation (HCC)     Chronic kidney disease     CKD (chronic kidney disease) 5/8/2019    Coronary atherosclerosis of native coronary artery     Diabetes mellitus, type II ([address]     MCCOLLUM (dyspnea on exertion) 5/8/2019    Glaucoma     Mixed hyperlipidemia     Other dyspnea and respiratory abnormality     Pacemaker     S/P ablation of atrial fibrillation 8/4/2016      Past Surgical History:   Procedure Laterality Date    HX AFIB ABLATION      HX CATARACT REMOVAL Bilateral 2018    HX CORONARY ARTERY BYPASS GRAFT  2001    HX CORONARY STENT PLACEMENT      HX HEART CATHETERIZATION  2019    Left     HX PTCA      HI INS NEW/RPLCMT PRM PM W/TRANSV ELTRD ATRIAL&VENT N/A 3/29/2019    INSERT PPM DUAL performed by Joseph [facility] at [facility] CARDIAC CATH LAB    HI REMOVAL SUBCUTANEOUS CARDIAC RHYTHM MONITOR N/A 3/29/2019    LOOP RECORDER REMOVAL performed by Joseph [facility]varez MD at [facility] CARDIAC CATH LAB    UPPER GI ENDOSCOPY,BIOPSY  2019          Family History   Problem Relation Age of Onset    Diabetes Mother     Emphysema Father          age 46 - smoker      Social History     Tobacco Use    Smoking status: Former Smoker     Packs/day: 4.00     Years: 20.00     Pack years: 80.00     Types: Cigarettes     Quit date: 1980     Years since quittin.4    Smokeless tobacco: Former User    Tobacco comment: QUIT AT AGE 39   Substance Use Topics    Alcohol use: No     Alcohol/week: 0.0 standard drinks         Current Facility-Administered Medications:     aspirin (ASA) suppository 300 mg, 300 mg, Rectal, DAILY, Matthew ALVARADO MD, 300 mg at 21 1450    enoxaparin (LOVENOX) injection 40 mg, 40 mg, SubCUTAneous, Q12H, Leonardo Hyde MD, 40 mg at 21 1033    LORazepam (ATIVAN) injection 0.5 mg, 0.5 mg, IntraVENous, Q4H PRN, Ani Jones MD    metoprolol (LOPRESSOR) injection 2.5 mg, 2.5 mg, IntraVENous, Q6H, Ani Jones MD, 2.5 mg at 21 0433    dextrose 5% infusion, 75 mL/hr, IntraVENous, CONTINUOUS, Dayna Lucas MD, Last Rate: 75 mL/hr at 21 2332, 75 mL/hr at 21 2332    [Held by provider] ziprasidone (GEODON) 10 mg in sterile water (preservative free) 0.5 mL injection, 10 mg, IntraMUSCular, Q6H PRN, Karan Stiener MD, 10 mg at 21 1315    clopidogreL (PLAVIX) tablet 75 mg, 75 mg, Oral, DAILY, Yvon Mishra MD, 75 mg at 21 1033   cyanocobalamin (VITAMIN B12) tablet 1,000 mcg, 1,000 mcg, Oral, DAILY, Hanley Essex, MD, 1,000 mcg at 12/04/21 1033    latanoprost (XALATAN) 0.005 % ophthalmic solution 1 Drop, 1 Drop, Both Eyes, DAILY, Hanley Essex, MD, 1 Drop at 12/04/21 1035    ranolazine ER (RANEXA) tablet 500 mg, 1 Tablet, Oral, BID, Hanley Essex, MD, 500 mg at 12/04/21 1033    rosuvastatin (CRESTOR) tablet 40 mg, 40 mg, Oral, DAILY, Hanley Essex, MD, 40 mg at 12/04/21 1033    sodium chloride (NS) flush 5-40 mL, 5-40 mL, IntraVENous, Q8H, Hanley Essex, MD, 10 mL at 12/04/21 0630    sodium chloride (NS) flush 5-40 mL, 5-40 mL, IntraVENous, PRN, Hanley Essex, MD    acetaminophen (TYLENOL) tablet 650 mg, 650 mg, Oral, Q6H PRN **OR** acetaminophen (TYLENOL) suppository 650 mg, 650 mg, Rectal, Q6H PRN, Hanley Essex, MD    polyethylene glycol (MIRALAX) packet 17 g, 17 g, Oral, DAILY PRN, Hanley Essex, MD    ondansetron (ZOFRAN ODT) tablet 4 mg, 4 mg, Oral, Q8H PRN **OR** ondansetron (ZOFRAN) injection 4 mg, 4 mg, IntraVENous, Q6H PRN, Hanley Essex, MD    insulin lispro (HUMALOG) injection, , SubCUTAneous, AC&HS, Hanley Essex, MD, 2 Units at 11/27/21 1317    glucose chewable tablet 16 g, 4 Tablet, Oral, PRN, Hanley Essex, MD    dextrose (D50W) injection syrg 12.5-25 g, 12.5-25 g, IntraVENous, PRN, Hanley Essex, MD    glucagon (GLUCAGEN) injection 1 mg, 1 mg, IntraMUSCular, PRN, Hanley Essex, MD    albuterol-ipratropium (DUO-NEB) 2.5 MG-0.5 MG/3 ML, 3 mL, Nebulization, Q4H PRN, Brea Jimenez NP    guaiFENesin (ROBITUSSIN) 100 mg/5 mL oral liquid 200 mg, 200 mg, Oral, Q4H PRN, Brea Shepherd NP        Allergies   Allergen Reactions    Ciprofloxacin Other (comments)     Difficulty breathing; 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      MRI Results (most recent):  Results from [place] encounter on 11/27/21    MRA BRAIN WO CONT    Narrative  INDICATION: CVA    COMPARISON: None    TECHNIQUE:  3-D time-of-flight MRA of the brain was performed. Multiplanar  reconstructions were obtained. FINDINGS:    Significant motion artifact. No acute large vessel occlusion, with visualization  of portions of the anterior, middle and posterior cerebral arteries obscured by  motion above the level of the [place]. Cannot assess for aneurysm. Impression  Significant motion artifact. No visualized large vessel occlusion. Results from [place] encounter on 11/27/21    MRA BRAIN WO CONT    Narrative  INDICATION: CVA    COMPARISON:  None    TECHNIQUE:  3-D time-of-flight MRA of the brain was performed. Multiplanar  reconstructions were obtained. FINDINGS:    Significant motion artifact. No acute large vessel occlusion, with visualization  of portions of the anterior, middle and posterior cerebral arteries obscured by  motion above the level of the [place]. Cannot assess for aneurysm. Impression  Significant motion artifact. No visualized large vessel occlusion. Review of Systems:  Review of systems not obtained due to patient factors. Vitals:    12/04/21 0348 12/04/21 0424 12/04/21 0854 12/04/21 1052   BP: (!) 118/58 104/63 120/79 (!) 102/59   Pulse: 87 80 93 88   Resp: 20 18 18 18   Temp: 98.5 °F (36.9 °C) 98.1 °F (36.7 °C) 97.8 °F (36.6 °C) 97.6 °F (36.4 °C)   SpO2: 97% 96% 96% 94%   Height:         Objective:     I      NEUROLOGICAL EXAM:    Appearance: The patient is well developed, well nourished, provides a incoherent history and is in a confused state, drowsy today, more lethargic. Patient has mittens on. Mental Status:  Becoming more verbal, drowsy and lethargic, and verbal   Cranial Nerves:    Intact visual fields. Fundi are poorly seen secondary to lack of cooperation. LISA, EOM's full, no nystagmus, no ptosis. Facial sensation is normal. Corneal reflexes are not tested. Facial movement is symmetric. Hearing is abnormal bilaterally.  Palate is midline with normal sternocleidomastoid and trapezius muscles are normal. Tongue is midline. Neck without meningismus or bruits  Temporal arteries are not tender or enlarged  TMJ areas are not tender on palpation   Motor:  4/5 strength in upper and lower proximal and distal muscles. Normal bulk and tone. No fasciculations. Rapid alternating movement is not testable bilaterally   Reflexes:   Deep tendon reflexes 1+/4 and symmetrical.  No babinski or clonus present   Sensory:   Normal to touch, pinprick and vibration and temperature are not testable. DSS is not testable   Gait:  Not testable. Tremor:   No tremor noted. Cerebellar:  Not testable abnormal cerebellar signs present on Romberg and tandem testing and finger-nose-finger exam.   Neurovascular:  Normal heart sounds and regular rhythm, peripheral pulses decreased, and no carotid bruits. No skin lesions or rashes noted anywhere. Assessment:   Active Problems:    Rapid atrial fibrillation ([address] (11/27/2021)      AMS (altered mental status) (11/27/2021)      Acute alteration in mental status (11/28/2021)      Convulsion ([address] (11/28/2021)      Bilateral carotid artery stenosis (11/28/2021)      Cerebral microvascular disease (11/28/2021)      Acute metabolic encephalopathy (36/11/9976)        Plan:     Patient had a repeat CT scan of the head done on December 1 that was normal and showed no lesions and no change from his admission 1. MRI just showed mild atrophy and age-related changes  Patient spinal tap was completely normal, with normal white cells and normal protein and sugar and meningitis panel was completely negative. Gram stain was negative. Patient MRI scan of the brain and MRA of the brain were unremarkable, showing just minimal white matter disease.   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 Today his words make even more sense and he even looks more awake and with that though still fairly encephalopathic  Most likely this represents Covid encephalopathy  I will follow as needed for now, he does not have any obvious neurologic deficit other than his Covid encephalopathy which is starting to clear now he should do well.     Signed By: Roge Fernandes MD     December 4, 2021       CC: None  FAX: None

## 2021-12-04 NOTE — PROGRESS NOTES
Hospitalist Progress Note    NAME: Audrey Reeder   :  1940   MRN:  [id number]       Assessment / Plan:  Acute metabolic encephalopathy, unknown etiology might be Covid encephalopathy     -Progressive acute encephalopathy since 21  Etiology remains unclear at this time. CT head negative, Serum alcohol <10, Urine drug screening negative. No evidence of ongoing infection   C/w 1:1 supervision for now, Haldol as needed PRN    -Unlikely bacterial meningitis, with normal white count, no fever. Empiric acyclovir started, will DC acyclovir given no evidence of viral encephalitis or meningitis. -EEG was completed and showed only mild slowing and no seizures  -Dopplers with no acute abnormality reported  -MRI and MRA with no acute abnormalities reported  -Status post LP on  with no white blood cells, does not look infected however infectious etiologies blood work including HSV, enterovirus, VDRL, West Nile all pending  -Neurology following, input is appreciated  -Patient is more awake today and following simple commands. COVID-19 disease  -Continue to be on room air  -COVID-19 PCR positive  -Chest x-ray with no evidence of acute cardiopulmonary process  -We will hold on antibiotics  -No indication for dexamethasone remdesivir given patient on room air not hypoxic  -Patient is vaccinated against COVID-19  -Patient's wife at bedside.  I emphasized the necessity of wearing mask and complete PPE given patient positive test.       Paroxysmal Afib:  He is back in RVR likely secondary to underlying infectious process  Challenges with p.o. intake  We started IV Lopressor  We will give 1 dose of Cardizem 5 mg yesterday  Not on AC d/t hx of GI bleed, was recently seen by Dr. Dave Cummings was discussed which he was not interested     Acute on chronic kidney disease:   , Hypervolemic hypernatremia  Continue D5  Lab work is pending for today     CAD s/p CABG  Aspirin, statin  Resume Plavix since patient had LP yesterday     DM:  Hold metformin  Sliding scale with hypoglycemia protocol  Monitor FS    Anticipated discharge December 6  Barriers clinical improvement     Code Status: Full  Surrogate Decision Maker: His wife     DVT Prophylaxis: lovenox  GI Prophylaxis: not indicated     Baseline: Ambulatory     Subjective:     Patient was seen and examined. No acute events overnight. Discussed with RN overnight events. Review of Systems:  Symptom Y/N Comments  Symptom Y/N Comments   Fever/Chills    Chest Pain     Poor Appetite    Edema     Cough    Abdominal Pain     Sputum    Joint Pain     SOB/MCCOLLUM    Pruritis/Rash     Nausea/vomit    Tolerating PT/OT     Diarrhea    Tolerating Diet     Constipation    Other       Could NOT obtain due to:  AMS     Objective:     VITALS:   Last 24hrs VS reviewed since prior progress note. Most recent are:  Patient Vitals for the past 24 hrs:   Temp Pulse Resp BP SpO2   12/04/21 1052 97.6 °F (36.4 °C) 88 18 (!) 102/59 94 %   12/04/21 0854 97.8 °F (36.6 °C) 93 18 120/79 96 %   12/04/21 0424 98.1 °F (36.7 °C) 80 18 104/63 96 %   12/04/21 0348 98.5 °F (36.9 °C) 87 20 (!) 118/58 97 %   12/03/21 2324 97.3 °F (36.3 °C) 70 18 120/79 96 %   12/1940 97.5 °F (36.4 °C) 76 18 116/77 100 %   12/03/21 1853 -- -- -- (!) 140/85 --   12/03/21 1609 97.8 °F (36.6 °C) 86 18 109/76 97 %   12/03/21 1123 -- 96 -- 114/70 --       Intake/Output Summary (Last 24 hours) at 12/4/2021 1107  Last data filed at 12/4/2021 0131  Gross per 24 hour   Intake --   Output 800 ml   Net -800 ml        I had a face to face encounter and independently examined this patient on 12/4/2021, as outlined below:  PHYSICAL EXAM:  General: WD, WN. Alert, cooperative, no acute distress    EENT:  EOMI. Anicteric sclerae. MMM  Resp:  CTA bilaterally, no wheezing or rales. No accessory muscle use  CV:  Regular  rhythm,  No edema  GI:  Soft, Non distended, Non tender.   +Bowel sounds  Neurologic: Disoriented, following simple commands commands, moving all extremities, no focal deficits  Psych:   Unable to assess  Skin:  No rashes. No jaundice    Reviewed most current lab test results and cultures  YES  Reviewed most current radiology test results   YES  Review and summation of old records today    NO  Reviewed patient's current orders and MAR    YES  PMH/SH reviewed - no change compared to H&P  ________________________________________________________________________  Care Plan discussed with:    Comments   Patient x    Family  x    RN x    Care Manager     Consultant                        Multidiciplinary team rounds were held today with , nursing, pharmacist and clinical coordinator. Patient's plan of care was discussed; medications were reviewed and discharge planning was addressed. ________________________________________________________________________  Total NON critical care TIME: 36   Minutes    Total CRITICAL CARE TIME Spent:   Minutes non procedure based      Comments   >50% of visit spent in counseling and coordination of care     ________________________________________________________________________  Sergey Jacinto MD     Procedures: see electronic medical records for all procedures/Xrays and details which were not copied into this note but were reviewed prior to creation of Plan. LABS:  I reviewed today's most current labs and imaging studies.   Pertinent labs include:  Recent Labs     12/04/21 0122 12/03/21 0026 12/02/21  0228   WBC 7.3 8.0 6.5   HGB 12.8 13.1 14.5   HCT 38.2 37.7 43.3    262 144*     Recent Labs     12/04/21 0122 12/03/21 0026 12/02/21  1608    142 142   K 3.8 3.7 3.8    112* 112*   CO2 23 22 22   * 145* 135*   BUN 23* 29* 29*   CREA 1.36* 1.53* 1.42*   CA 9.1 9.1 9.3       Signed: Sergey Jacinto MD

## 2021-12-04 NOTE — PROGRESS NOTES
Consult  REFERRED BY:  None    CHIEF COMPLAINT: Altered mental status      Subjective:     Melissa Gomez is a 80 y.o. right-handed  male seen at the request of the hospitalist, for evaluation of new problem of altered mental status that his wife said began on Wednesday the day before Thanksgiving and he became more irritable and just seemed to be off, for no clear reason, and then on Thursday Thanksgiving day started seeing things and saying crazy things off the wall, and could not follow conversations, but had no complaint of headache, focal weakness, fever, sensory loss, visual changes, meningismus, and had no history of trauma, toxin exposure or new medications or any precipitating factors for this. His tox screen is negative. He came to the emergency room on the day of admission because he was so confused, and was found to be in rapid A. fib, and was confused and try to fight with the  in the ER. No unusual depression or stress and no prior history of any psychiatric disease. He has had no focal neurologic deficit, no fever, no complaints of headache, and no other cause of this syndrome. His head CT scan was normal on admission. He has a pacemaker and MRI has been ordered because it is MRI conditional, but they are waiting because they can get a rep late at night, and I told him we need the MRI today to try to get an rep for the pacemaker there so they can do it today. 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 He is very drowsy today, but not as agitated and his sedatives have not been given for a while. He does have a history of A. fib, and has had cardiac ablations in the past, and a GI bleed on anticoagulation in the past, so he is not being anticoagulated.   He has a strong history of heart disease, and is a type II diabetic agents, is on a high-dose statin for cholesterol and takes a baby aspirin every day also. We will hold his antiplatelets in case we needed LP in the morning. He is way too agitated now to attempt 1. Patient has clinically normal white count, and no complaints of headache or meningismus and a completely normal CT of the head. He has no rashes anywhere either. His neck seems supple on examination. His EEG was done and that was only showing mild generalized slowing and not that bad. There was no focal process or seizure activity which would seem to rule out herpes encephalitis. That usually gives you some temporal slowing in the involved temple area. Patient spinal tap was completely normal, with normal white cells and normal protein and sugar and meningitis panel was completely negative. Gram stain was negative. Patient MRI scan of the brain and MRA of the brain were unremarkable, showing just minimal white matter disease. 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     Past Medical History:   Diagnosis Date    Abnormal nuclear cardiac imaging test 5/8/2019    Anemia 6/5/2017    Atrial fibrillation (HCC)     Chronic kidney disease     CKD (chronic kidney disease) 5/8/2019    Coronary atherosclerosis of native coronary artery     Diabetes mellitus, type II ([address]     MCCOLLUM (dyspnea on exertion) 5/8/2019    Glaucoma     Mixed hyperlipidemia     Other dyspnea and respiratory abnormality     Pacemaker     S/P ablation of atrial fibrillation 8/4/2016      Past Surgical History:   Procedure Laterality Date    HX AFIB ABLATION      HX CATARACT REMOVAL Bilateral 2018    HX CORONARY ARTERY BYPASS GRAFT  2001    HX CORONARY STENT PLACEMENT      HX HEART CATHETERIZATION  2019    Left     HX PTCA      KS INS NEW/RPLCMT PRM PM W/TRANSV ELTRD ATRIAL&VENT N/A 3/29/2019    INSERT PPM DUAL performed by [facility] at [facility] CARD[facility]AC [facility]    KS REMOVAL SUBCUTANEOUS CARDIAC RHYTHM MONITOR N/A 3/29/2019    LOOP RECORDER REMOVAL performed by Peri Mora MD at [facility] CARDIAC CATH LAB    UPPER GI ENDOSCOPY,BIOPSY  2019          Family History   Problem Relation Age of Onset    Diabetes Mother     Emphysema Father          age 46 - smoker      Social History     Tobacco Use    Smoking status: Former Smoker     Packs/day: 4.00     Years: 20.00     Pack years: 80.00     Types: Cigarettes     Quit date: 1980     Years since quittin.4    Smokeless tobacco: Former User    Tobacco comment: QUIT AT AGE 39   Substance Use Topics    Alcohol use: No     Alcohol/week: 0.0 standard drinks         Current Facility-Administered Medications:     aspirin (ASA) suppository 300 mg, 300 mg, Rectal, DAILY, Neelam ALVARADO MD, 300 mg at 21 1450    enoxaparin (LOVENOX) injection 40 mg, 40 mg, SubCUTAneous, Q12H, Jessica Yen MD, 40 mg at 21 1124    LORazepam (ATIVAN) injection 0.5 mg, 0.5 mg, IntraVENous, Q4H PRN, Lissy Caceres MD    metoprolol (LOPRESSOR) injection 2.5 mg, 2.5 mg, IntraVENous, Q6H, Dayna Lucas MD, 2.5 mg at 21 1846    dextrose 5% infusion, 75 mL/hr, IntraVENous, CONTINUOUS, Dayna Lucas MD, Last Rate: 75 mL/hr at 21 2349, 75 mL/hr at 21 2349    [Held by provider] ziprasidone (GEODON) 10 mg in sterile water (preservative free) 0.5 mL injection, 10 mg, IntraMUSCular, Q6H PRN, Russel Ballesteros MD, 10 mg at 21 1315    clopidogreL (PLAVIX) tablet 75 mg, 75 mg, Oral, DAILY, Tommie Mercado MD    cyanocobalamin (VITAMIN B12) tablet 1,000 mcg, 1,000 mcg, Oral, DAILY, Tommie Mecrado MD    latanoprost (XALATAN) 0.005 % ophthalmic solution 1 Drop, 1 Drop, Both Eyes, DAILY, Louis Anthony MD, 1 Drop at 12/03/21 1850    ranolazine ER (RANEXA) tablet 500 mg, 1 Tablet, Oral, BID, Louis Anthony MD    rosuvastatin (CRESTOR) tablet 40 mg, 40 mg, Oral, DAILY, Louis Anthony MD    sodium chloride (NS) flush 5-40 mL, 5-40 mL, IntraVENous, Q8H, Louis Anthony MD, 10 mL at 12/03/21 1400    sodium chloride (NS) flush 5-40 mL, 5-40 mL, IntraVENous, PRN, Louis Anthony MD    acetaminophen (TYLENOL) tablet 650 mg, 650 mg, Oral, Q6H PRN **OR** acetaminophen (TYLENOL) suppository 650 mg, 650 mg, Rectal, Q6H PRN, Louis Anthony MD    polyethylene glycol (MIRALAX) packet 17 g, 17 g, Oral, DAILY PRN, Louis Anthony MD    ondansetron (ZOFRAN ODT) tablet 4 mg, 4 mg, Oral, Q8H PRN **OR** ondansetron (ZOFRAN) injection 4 mg, 4 mg, IntraVENous, Q6H PRN, Louis Anthony MD    insulin lispro (HUMALOG) injection, , SubCUTAneous, AC&HS, Louis Anthony MD, 2 Units at 11/27/21 1317    glucose chewable tablet 16 g, 4 Tablet, Oral, PRN, Louis Anthony MD    dextrose (D50W) injection syrg 12.5-25 g, 12.5-25 g, IntraVENous, PRN, Louis Anthony MD    glucagon (GLUCAGEN) injection 1 mg, 1 mg, IntraMUSCular, PRN, Louis Anthony MD    albuterol-ipratropium (DUO-NEB) 2.5 MG-0.5 MG/3 ML, 3 mL, Nebulization, Q4H PRN, Brea Mcgraw NP    guaiFENesin (ROBITUSSIN) 100 mg/5 mL oral liquid 200 mg, 200 mg, Oral, Q4H PRN, Brea Shepherd NP        Allergies   Allergen Reactions    Ciprofloxacin Other (comments)     Difficulty breathing; 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      MRI Results (most recent):  Results from Hospital Encounter encounter on 11/27/21    MRA BRAIN WO CONT    Narrative  INDICATION: CVA    COMPARISON:  None    TECHNIQUE:  3-D time-of-flight MRA of the brain was performed. Multiplanar  reconstructions were obtained. FINDINGS:    Significant motion artifact.  No acute large vessel occlusion, with visualization  of portions of the anterior, middle and posterior cerebral arteries obscured by  motion above the level of the [place]. Cannot assess for aneurysm. Impression  Significant motion artifact. No visualized large vessel occlusion. Results from [facility] encounter on 11/27/21    MRA BRAIN WO CONT    Narrative  INDICATION: CVA    COMPARISON:  None    TECHNIQUE:  3-D time-of-flight MRA of the brain was performed. Multiplanar  reconstructions were obtained. FINDINGS:    Significant motion artifact. No acute large vessel occlusion, with visualization  of portions of the anterior, middle and posterior cerebral arteries obscured by  motion above the level of the [place]. Cannot assess for aneurysm. Impression  Significant motion artifact. No visualized large vessel occlusion. Review of Systems:  Review of systems not obtained due to patient factors. Vitals:    12/03/21 1313 12/03/21 1609 12/03/21 1853 12/1940   BP:  109/76 (!) 140/85 116/77   Pulse:  86  76   Resp:  18  18   Temp:  97.8 °F (36.6 °C)  97.5 °F (36.4 °C)   SpO2:  97%  100%   Height: 6' (1.829 m)        Objective:     I      NEUROLOGICAL EXAM:    Appearance: The patient is well developed, well nourished, provides a incoherent history and is in a confused state, drowsy today, more lethargic. Patient has mittens on. Mental Status:  Becoming more verbal, drowsy and lethargic, and verbal   Cranial Nerves:    Intact visual fields. Fundi are poorly seen secondary to lack of cooperation. LISA, EOM's full, no nystagmus, no ptosis. Facial sensation is normal. Corneal reflexes are not tested. Facial movement is symmetric. Hearing is abnormal bilaterally. Palate is midline with normal sternocleidomastoid and trapezius muscles are normal. Tongue is midline.   Neck without meningismus or bruits  Temporal arteries are not tender or enlarged  TMJ areas are not tender on palpation   Motor:  4/5 strength in upper and lower proximal and distal muscles. Normal bulk and tone. No fasciculations. Rapid alternating movement is not testable bilaterally   Reflexes:   Deep tendon reflexes 1+/4 and symmetrical.  No babinski or clonus present   Sensory:   Normal to touch, pinprick and vibration and temperature are not testable. DSS is not testable   Gait:  Not testable. Tremor:   No tremor noted. Cerebellar:  Not testable abnormal cerebellar signs present on Romberg and tandem testing and finger-nose-finger exam.   Neurovascular:  Normal heart sounds and regular rhythm, peripheral pulses decreased, and no carotid bruits. No skin lesions or rashes noted anywhere. Assessment:   Active Problems:    Rapid atrial fibrillation ([address] (11/27/2021)      AMS (altered mental status) (11/27/2021)      Acute alteration in mental status (11/28/2021)      Convulsion ([address] (11/28/2021)      Bilateral carotid artery stenosis (11/28/2021)      Cerebral microvascular disease (11/28/2021)      Acute metabolic encephalopathy (85/43/5170)        Plan:     Patient had a repeat CT scan of the head done on December 1 that was normal and showed no lesions and no change from his admission 1. MRI just showed mild atrophy and age-related changes  Patient spinal tap was completely normal, with normal white cells and normal protein and sugar and meningitis panel was completely negative. Gram stain was negative. Patient MRI scan of the brain and MRA of the brain were unremarkable, showing just minimal white matter disease.   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, and we will stop the acyclovir, he remains afebrile with normal white count, and see how he does and try to hold sedatives as much as possible  Head CT on admission was normal  Patient is exam is still nonfocal, and he is speaking and following commands to a certain extent  Most likely this represents Covid encephalopathy  I will follow as needed for now, he does not have any obvious neurologic deficit other than his Covid encephalopathy which is starting to clear now he should do well.     Signed By: Sonia White MD     December 3, 2021       CC: None  FAX: None

## 2021-12-05 LAB
ERYTHROCYTE [DISTWIDTH] IN BLOOD BY AUTOMATED COUNT: 13.9 % (ref 11.5–14.5)
GLUCOSE BLD STRIP.AUTO-MCNC: 117 MG/DL (ref 65–117)
GLUCOSE BLD STRIP.AUTO-MCNC: 125 MG/DL (ref 65–117)
GLUCOSE BLD STRIP.AUTO-MCNC: 147 MG/DL (ref 65–117)
HCT VFR BLD AUTO: 37.9 % (ref 36.6–50.3)
HGB BLD-MCNC: 12.9 G/DL (ref 12.1–17)
MCH RBC QN AUTO: 31.5 PG (ref 26–34)
MCHC RBC AUTO-ENTMCNC: 34 G/DL (ref 30–36.5)
MCV RBC AUTO: 92.4 FL (ref 80–99)
NRBC # BLD: 0 K/UL (ref 0–0.01)
NRBC BLD-RTO: 0 PER 100 WBC
PLATELET # BLD AUTO: 217 K/UL (ref 150–400)
PMV BLD AUTO: 11 FL (ref 8.9–12.9)
RBC # BLD AUTO: 4.1 M/UL (ref 4.1–5.7)
SERVICE CMNT-IMP: ABNORMAL
SERVICE CMNT-IMP: ABNORMAL
SERVICE CMNT-IMP: NORMAL
WBC # BLD AUTO: 6.8 K/UL (ref 4.1–11.1)

## 2021-12-05 PROCEDURE — 65270000029 HC RM PRIVATE

## 2021-12-05 PROCEDURE — 74011250636 HC RX REV CODE- 250/636: Performed by: INTERNAL MEDICINE

## 2021-12-05 PROCEDURE — 36415 COLL VENOUS BLD VENIPUNCTURE: CPT

## 2021-12-05 PROCEDURE — 74011250637 HC RX REV CODE- 250/637: Performed by: STUDENT IN AN ORGANIZED HEALTH CARE EDUCATION/TRAINING PROGRAM

## 2021-12-05 PROCEDURE — 85027 COMPLETE CBC AUTOMATED: CPT

## 2021-12-05 PROCEDURE — 74011250637 HC RX REV CODE- 250/637: Performed by: PSYCHIATRY & NEUROLOGY

## 2021-12-05 PROCEDURE — 74011636637 HC RX REV CODE- 636/637: Performed by: STUDENT IN AN ORGANIZED HEALTH CARE EDUCATION/TRAINING PROGRAM

## 2021-12-05 PROCEDURE — 74011000250 HC RX REV CODE- 250: Performed by: INTERNAL MEDICINE

## 2021-12-05 PROCEDURE — 74011250636 HC RX REV CODE- 250/636: Performed by: PSYCHIATRY & NEUROLOGY

## 2021-12-05 PROCEDURE — 82962 GLUCOSE BLOOD TEST: CPT

## 2021-12-05 RX ADMIN — ENOXAPARIN SODIUM 40 MG: 100 INJECTION SUBCUTANEOUS at 21:32

## 2021-12-05 RX ADMIN — METOPROLOL TARTRATE 2.5 MG: 1 INJECTION, SOLUTION INTRAVENOUS at 21:32

## 2021-12-05 RX ADMIN — LATANOPROST 1 DROP: 50 SOLUTION OPHTHALMIC at 09:00

## 2021-12-05 RX ADMIN — RANOLAZINE 500 MG: 500 TABLET, FILM COATED, EXTENDED RELEASE ORAL at 18:00

## 2021-12-05 RX ADMIN — RANOLAZINE 500 MG: 500 TABLET, FILM COATED, EXTENDED RELEASE ORAL at 09:05

## 2021-12-05 RX ADMIN — METOPROLOL TARTRATE 2.5 MG: 1 INJECTION, SOLUTION INTRAVENOUS at 09:05

## 2021-12-05 RX ADMIN — Medication 10 ML: at 21:32

## 2021-12-05 RX ADMIN — LORAZEPAM 0.5 MG: 2 INJECTION INTRAMUSCULAR; INTRAVENOUS at 14:24

## 2021-12-05 RX ADMIN — CLOPIDOGREL BISULFATE 75 MG: 75 TABLET ORAL at 09:05

## 2021-12-05 RX ADMIN — Medication 10 ML: at 14:00

## 2021-12-05 RX ADMIN — ASPIRIN 300 MG: 300 SUPPOSITORY RECTAL at 09:00

## 2021-12-05 RX ADMIN — METOPROLOL TARTRATE 2.5 MG: 1 INJECTION, SOLUTION INTRAVENOUS at 16:30

## 2021-12-05 RX ADMIN — Medication 2 UNITS: at 11:30

## 2021-12-05 RX ADMIN — DEXTROSE MONOHYDRATE 75 ML/HR: 5 INJECTION, SOLUTION INTRAVENOUS at 16:33

## 2021-12-05 RX ADMIN — CYANOCOBALAMIN TAB 500 MCG 1000 MCG: 500 TAB at 09:05

## 2021-12-05 RX ADMIN — METOPROLOL TARTRATE 2.5 MG: 1 INJECTION, SOLUTION INTRAVENOUS at 04:55

## 2021-12-05 RX ADMIN — ENOXAPARIN SODIUM 40 MG: 100 INJECTION SUBCUTANEOUS at 09:04

## 2021-12-05 RX ADMIN — ROSUVASTATIN 40 MG: 20 TABLET, FILM COATED ORAL at 09:05

## 2021-12-05 NOTE — PROGRESS NOTES
Problem: Non-Violent Restraints  Goal: No harm/injury to patient while restraints in use  Outcome: Progressing Towards Goal  Goal: Patient's dignity will be maintained  Outcome: Progressing Towards Goal

## 2021-12-05 NOTE — PROGRESS NOTES
Bedside shift change report given to Shelley Chaparro (oncoming nurse) by Nazanin Wood (offgoing nurse). Report included the following information SBAR.

## 2021-12-05 NOTE — PROGRESS NOTES
Hospitalist Progress Note    NAME: Bryant Ennis   :  1940   MRN:  [id number]       Assessment / Plan:  Acute metabolic encephalopathy, unknown etiology might be Covid encephalopathy     -Progressive acute encephalopathy since 21  Etiology remains unclear at this time. CT head negative, Serum alcohol <10, Urine drug screening negative. No evidence of ongoing infection   C/w 1:1 supervision for now, Haldol as needed PRN    -Unlikely bacterial meningitis, with normal white count, no fever. Empiric acyclovir started, will DC acyclovir given no evidence of viral encephalitis or meningitis. -EEG was completed and showed only mild slowing and no seizures  -Dopplers with no acute abnormality reported  -MRI and MRA with no acute abnormalities reported  -Status post LP on  with no white blood cells, does not look infected however infectious etiologies blood work including HSV, enterovirus, VDRL, West Nile all pending  -Neurology following, input is appreciated  -Continue to be more awake than yesterday and following commands  -Continue following mentation very closely  -We will ask PT OT to work with him tomorrow    COVID-19 disease  -Continue to be on room air  -COVID-19 PCR positive  -Chest x-ray with no evidence of acute cardiopulmonary process  -We will hold on antibiotics  -No indication for dexamethasone remdesivir given patient on room air not hypoxic  -Patient is vaccinated against COVID-19  -Patient's wife at bedside.  I emphasized the necessity of wearing mask and complete PPE given patient positive test.       Paroxysmal Afib:  He is back in RVR likely secondary to underlying infectious process  Challenges with p.o. intake  We started IV Lopressor  We will give 1 dose of Cardizem 5 mg yesterday  Not on AC d/t hx of GI bleed, was recently seen by Dr. Angela Tran was discussed which he was not interested     Acute on chronic kidney disease:   , Hypervolemic hypernatremia  Continue D5  Lab work is pending for today     CAD s/p CABG  Aspirin, statin  Resume Plavix since patient had LP yesterday     DM:  Hold metformin  Sliding scale with hypoglycemia protocol  Monitor FS    Anticipated discharge December 6  Barriers clinical improvement     Code Status: Full  Surrogate Decision Maker: His wife     DVT Prophylaxis: lovenox  GI Prophylaxis: not indicated     Baseline: Ambulatory     Subjective:     Patient was seen and examined. No acute events overnight. Discussed with RN overnight events. Following only simple commands. Review of Systems:  Symptom Y/N Comments  Symptom Y/N Comments   Fever/Chills    Chest Pain     Poor Appetite    Edema     Cough    Abdominal Pain     Sputum    Joint Pain     SOB/MCCOLLUM    Pruritis/Rash     Nausea/vomit    Tolerating PT/OT     Diarrhea    Tolerating Diet     Constipation    Other       Could NOT obtain due to:  AMS     Objective:     VITALS:   Last 24hrs VS reviewed since prior progress note. Most recent are:  Patient Vitals for the past 24 hrs:   Temp Pulse Resp BP SpO2   12/05/21 0215 98.2 °F (36.8 °C) 93 17 109/62 91 %   12/04/21 2347 97.4 °F (36.3 °C) 80 18 114/73 95 %   12/04/21 2339 98.4 °F (36.9 °C) 76 18 128/78 96 %   12/04/21 2021 98.6 °F (37 °C) (!) 103 18 (!) 136/94 95 %   12/04/21 1727 98.5 °F (36.9 °C) (!) 102 20 103/70 94 %       Intake/Output Summary (Last 24 hours) at 12/5/2021 1132  Last data filed at 12/4/2021 1600  Gross per 24 hour   Intake --   Output 500 ml   Net -500 ml        I had a face to face encounter and independently examined this patient on 12/5/2021, as outlined below:  PHYSICAL EXAM:  General: WD, WN. Alert, cooperative, no acute distress    EENT:  EOMI. Anicteric sclerae. MMM  Resp:  CTA bilaterally, no wheezing or rales. No accessory muscle use  CV:  Regular  rhythm,  No edema  GI:  Soft, Non distended, Non tender.   +Bowel sounds  Neurologic:  Disoriented, following simple commands commands, moving all extremities, no focal deficits  Psych:   Unable to assess  Skin:  No rashes. No jaundice    Reviewed most current lab test results and cultures  YES  Reviewed most current radiology test results   YES  Review and summation of old records today    NO  Reviewed patient's current orders and MAR    YES  PMH/SH reviewed - no change compared to H&P  ________________________________________________________________________  Care Plan discussed with:    Comments   Patient x    Family  x    RN x    Care Manager     Consultant                        Multidiciplinary team rounds were held today with , nursing, pharmacist and clinical coordinator. Patient's plan of care was discussed; medications were reviewed and discharge planning was addressed. ________________________________________________________________________  Total NON critical care TIME: 36   Minutes    Total CRITICAL CARE TIME Spent:   Minutes non procedure based      Comments   >50% of visit spent in counseling and coordination of care     ________________________________________________________________________  Vickie Ying MD     Procedures: see electronic medical records for all procedures/Xrays and details which were not copied into this note but were reviewed prior to creation of Plan. LABS:  I reviewed today's most current labs and imaging studies.   Pertinent labs include:  Recent Labs     12/05/21 0311 12/04/21 0122 12/03/21  0026   WBC 6.8 7.3 8.0   HGB 12.9 12.8 13.1   HCT 37.9 38.2 37.7    253 262     Recent Labs     12/04/21 0122 12/03/21  0026 12/02/21  1608    142 142   K 3.8 3.7 3.8    112* 112*   CO2 23 22 22   * 145* 135*   BUN 23* 29* 29*   CREA 1.36* 1.53* 1.42*   CA 9.1 9.1 9.3       Signed: Vickie Ying MD

## 2021-12-05 NOTE — PROGRESS NOTES
Pt resting in bed today, incoherent verbalizations. Asking for scissors so he can cut his IV tubing. Patient redirected occasionally for trying to get out of bed without assistance. Pt is currently ordered for bedrest. When patient is redirected and encouraged to sit back down for his safety, he curses at staff and is verbally aggressive. Patient postured at staff once. Patient biting at his mitts, and pulling at his IV line with his arm. Patient was swinging at his IV pole in an attempt to knock it over. PRN Ativan was effective with helping patient with agitation. Since receiving ativan, patient has been calm and compliant. Report given to TIN Ayon.

## 2021-12-06 LAB
B BURGDOR DNA SPEC QL NAA+PROBE: NEGATIVE
ENTEROVIRUS RT-PCR, 139168: NEGATIVE
GLUCOSE BLD STRIP.AUTO-MCNC: 110 MG/DL (ref 65–117)
GLUCOSE BLD STRIP.AUTO-MCNC: 130 MG/DL (ref 65–117)
GLUCOSE BLD STRIP.AUTO-MCNC: 132 MG/DL (ref 65–117)
GLUCOSE BLD STRIP.AUTO-MCNC: 135 MG/DL (ref 65–117)
SERVICE CMNT-IMP: ABNORMAL
SERVICE CMNT-IMP: NORMAL
SPECIMEN SOURCE: NORMAL
SPECIMEN SOURCE: NORMAL

## 2021-12-06 PROCEDURE — 97165 OT EVAL LOW COMPLEX 30 MIN: CPT

## 2021-12-06 PROCEDURE — 97530 THERAPEUTIC ACTIVITIES: CPT

## 2021-12-06 PROCEDURE — 97535 SELF CARE MNGMENT TRAINING: CPT

## 2021-12-06 PROCEDURE — 65270000029 HC RM PRIVATE

## 2021-12-06 PROCEDURE — 74011250636 HC RX REV CODE- 250/636: Performed by: PSYCHIATRY & NEUROLOGY

## 2021-12-06 PROCEDURE — 74011250637 HC RX REV CODE- 250/637: Performed by: STUDENT IN AN ORGANIZED HEALTH CARE EDUCATION/TRAINING PROGRAM

## 2021-12-06 PROCEDURE — 74011000250 HC RX REV CODE- 250: Performed by: INTERNAL MEDICINE

## 2021-12-06 PROCEDURE — 74011250636 HC RX REV CODE- 250/636: Performed by: INTERNAL MEDICINE

## 2021-12-06 PROCEDURE — 82962 GLUCOSE BLOOD TEST: CPT

## 2021-12-06 PROCEDURE — 97161 PT EVAL LOW COMPLEX 20 MIN: CPT

## 2021-12-06 RX ADMIN — ROSUVASTATIN 40 MG: 20 TABLET, FILM COATED ORAL at 10:07

## 2021-12-06 RX ADMIN — LORAZEPAM 0.5 MG: 2 INJECTION INTRAMUSCULAR; INTRAVENOUS at 13:03

## 2021-12-06 RX ADMIN — Medication 10 ML: at 22:32

## 2021-12-06 RX ADMIN — ENOXAPARIN SODIUM 40 MG: 100 INJECTION SUBCUTANEOUS at 22:31

## 2021-12-06 RX ADMIN — Medication 10 ML: at 05:00

## 2021-12-06 RX ADMIN — Medication 10 ML: at 14:00

## 2021-12-06 RX ADMIN — METOPROLOL TARTRATE 2.5 MG: 1 INJECTION, SOLUTION INTRAVENOUS at 22:32

## 2021-12-06 RX ADMIN — LATANOPROST 1 DROP: 50 SOLUTION OPHTHALMIC at 09:53

## 2021-12-06 RX ADMIN — RANOLAZINE 500 MG: 500 TABLET, FILM COATED, EXTENDED RELEASE ORAL at 10:07

## 2021-12-06 RX ADMIN — CLOPIDOGREL BISULFATE 75 MG: 75 TABLET ORAL at 10:09

## 2021-12-06 RX ADMIN — METOPROLOL TARTRATE 2.5 MG: 1 INJECTION, SOLUTION INTRAVENOUS at 04:59

## 2021-12-06 RX ADMIN — METOPROLOL TARTRATE 2.5 MG: 1 INJECTION, SOLUTION INTRAVENOUS at 17:54

## 2021-12-06 RX ADMIN — METOPROLOL TARTRATE 2.5 MG: 1 INJECTION, SOLUTION INTRAVENOUS at 09:53

## 2021-12-06 RX ADMIN — ENOXAPARIN SODIUM 40 MG: 100 INJECTION SUBCUTANEOUS at 09:53

## 2021-12-06 RX ADMIN — CYANOCOBALAMIN TAB 500 MCG 1000 MCG: 500 TAB at 10:09

## 2021-12-06 NOTE — PROGRESS NOTES
Assessments and VS completed, medications administered,chg treatement provided to patient , pt noted to be agitated during shift, prn ativan administered and effective. Attempted to administer asa suppository, pt combative. Unable to give pt medication.

## 2021-12-06 NOTE — PROGRESS NOTES
Problem: Mobility Impaired (Adult and Pediatric)  Goal: *Acute Goals and Plan of Care (Insert Text)  Description: FUNCTIONAL STATUS PRIOR TO ADMISSION: Patient remains confused and may be unreliable, but reports he walks 40 minutes each day    HOME SUPPORT PRIOR TO ADMISSION: Patient lives with his wife, unsure if he needs any assist with ADLs. Physical Therapy Goals  Initiated 12/6/2021  1. Patient will move from supine to sit and sit to supine  in bed with supervision/set-up within 7 day(s). 2.  Patient will transfer from bed to chair and chair to bed with minimal assistance/contact guard assist using the least restrictive device within 7 day(s). 3.  Patient will perform sit to stand with minimal assistance/contact guard assist within 7 day(s). 4.  Patient will ambulate with minimal assistance/contact guard assist for 50 feet with the least restrictive device within 7 day(s). Outcome: Not Met   PHYSICAL THERAPY EVALUATION  Patient: Samir Cartagena (87 y.o. male)  Date: 12/6/2021  Primary Diagnosis: Rapid atrial fibrillation ([address] [I48.91]  AMS (altered mental status) [R41.82]        Precautions: Other (comment), Bed Alarm (COVID +, restraints)    ASSESSMENT  Based on the objective data described below, the patient presents with confusion and slow cognitive processing, decreased tolerance of activity, and impaired functional mobility following admission for Covid and AMS. Patient received in bed, has mitts on hands for safety but lying quietly. Patient often mumbled during session and required multiple requests and assistance  for any functional movement. Patient required mod assist x 2 to come to sit edge of bed, then able to balance unsupported. Was able to briefly stand with min assist x 2 (hand held) and encouraged side stepping to [facility], but patient sat back down. Returned to supine and left with mitts on and bed alarm, also has telesitter in room. Sats remained above 90% on RA.   Patient appears to be able to move well but will continue to assess as his mentation improves, would be hopeful that he can discharge home with HHPT but if he remains confused and below baseline he may need rehab in SNF. Current Level of Function Impacting Discharge (mobility/balance): min assist x 2 to stand, did not ambulate    Functional Outcome Measure: The patient scored 20/100 on the Barthel  outcome measure which is indicative of dependent functional status      Other factors to consider for discharge: AMS, falls risk     Patient will benefit from skilled therapy intervention to address the above noted impairments. PLAN :  Recommendations and Planned Interventions: bed mobility training, transfer training, gait training, therapeutic exercises, patient and family training/education, and therapeutic activities      Frequency/Duration: Patient will be followed by physical therapy:  3 times a week to address goals. Recommendation for discharge: (in order for the patient to meet his/her long term goals)  To be determined: HHPT vs rehab in SNF depending on cognitive recovery. This discharge recommendation:  Has been made in collaboration with the attending provider and/or case management    IF patient discharges home will need the following DME: to be determined (TBD)         SUBJECTIVE:   Patient stated What's my wife's name? Anita Hashimoto    OBJECTIVE DATA SUMMARY:   HISTORY:    Past Medical History:   Diagnosis Date    Abnormal nuclear cardiac imaging test 5/8/2019    Anemia 6/5/2017    Atrial fibrillation (HCC)     Chronic kidney disease     CKD (chronic kidney disease) 5/8/2019    Coronary atherosclerosis of native coronary artery     Diabetes mellitus, type II (HCC)     MCCOLLUM (dyspnea on exertion) 5/8/2019    Glaucoma     Mixed hyperlipidemia     Other dyspnea and respiratory abnormality     Pacemaker     S/P ablation of atrial fibrillation 8/4/2016     Past Surgical History:   Procedure Laterality Date    HX AFIB ABLATION      HX CATARACT REMOVAL Bilateral 2018    HX CORONARY ARTERY BYPASS GRAFT  2001    HX CORONARY STENT PLACEMENT      HX HEART CATHETERIZATION  11/21/2019    Left     HX PTCA      MS INS NEW/RPLCMT PRM PM W/TRANSV ELTRD ATRIAL&VENT N/A 3/29/2019    INSERT PPM DUAL performed by Greyson Garcia MD at [facility] CARDIAC CATH LAB    MS REMOVAL SUBCUTANEOUS CARDIAC RHYTHM MONITOR N/A 3/29/2019    LOOP RECORDER REMOVAL performed by Greyson Garcia MD at [facility] CARDIAC CATH LAB    UPPER GI ENDOSCOPY,BIOPSY  7/26/2019            Personal factors and/or comorbidities impacting plan of care: CAD    Home Situation  Living Alone: No  Support Systems: Spouse/Significant Other    EXAMINATION/PRESENTATION/DECISION MAKING:   Critical Behavior:  Neurologic State: Restless, Eyes open to voice, Confused  Orientation Level: Oriented to person, Disoriented to place, Disoriented to situation, Disoriented to time  Cognition: Impaired decision making, Poor safety awareness, Decreased command following  Safety/Judgement: Lack of insight into deficits, Decreased awareness of need for safety, Decreased awareness of need for assistance, Decreased awareness of environment  Hearing: Auditory  Auditory Impairment: None  Range Of Motion:  AROM: Generally decreased, functional           PROM: Within functional limits           Strength:    Strength: Generally decreased, functional                    Tone & Sensation:   Tone: Normal              Sensation: Intact               Coordination:  Coordination: Generally decreased, functional  Vision:      Functional Mobility:  Bed Mobility:  Rolling: Minimum assistance; Assist x2  Supine to Sit: Moderate assistance; Assist x2  Sit to Supine: Minimum assistance; Assist x2  Scooting: Maximum assistance; Assist x2  Transfers:  Sit to Stand: Minimum assistance; Assist x2  Stand to Sit: Minimum assistance; Assist x2                       Balance:   Sitting: Intact;  High guard  Standing: Impaired; With support  Standing - Static: Good; Constant support  Standing - Dynamic : Fair; Constant support  Ambulation/Gait Training:                                                         Stairs:              Functional Measure:  Barthel Index:    Bathin  Bladder: 0  Bowels: 0  Groomin  Dressin  Feedin  Mobility: 0  Stairs: 0  Toilet Use: 5  Transfer (Bed to Chair and Back): 5  Total: 20/100       The Barthel ADL Index: Guidelines  1. The index should be used as a record of what a patient does, not as a record of what a patient could do. 2. The main aim is to establish degree of independence from any help, physical or verbal, however minor and for whatever reason. 3. The need for supervision renders the patient not independent. 4. A patient's performance should be established using the best available evidence. Asking the patient, friends/relatives and nurses are the usual sources, but direct observation and common sense are also important. However direct testing is not needed. 5. Usually the patient's performance over the preceding 24-48 hours is important, but occasionally longer periods will be relevant. 6. Middle categories imply that the patient supplies over 50 per cent of the effort. 7. Use of aids to be independent is allowed. Score Interpretation (from [address] [address])    Independent   60-79 Minimally independent   40-59 Partially dependent   20-39 Very dependent   <20 Totally dependent     -Aleah Warren., Barthel, D.W. (1965). Functional evaluation: the Barthel Index. [facility] ([address] (1997). The Barthel activities of daily living index: self-reporting versus actual performance in the old (> or = 75 years). Journal of [address] [place] [address] 45(7), 14 [facility], JULISES, Hopewell Vamshi., [facility]. ().  Measuring the change in disability after inpatient rehabilitation; comparison of the responsiveness of the Barthel Index and Functional [place] Measure. Journal of Neurology, Neurosurgery, and Psychiatry, 66(4), 060-170. SARA Butler, VERO Jernigan, & Donald Stratton M.A. (2004) Assessment of post-stroke quality of life in cost-effectiveness studies: The usefulness of the Barthel Index and the EuroQoL-5D. Quality of Life Research, 15, 695-13           Physical Therapy Evaluation Charge Determination   History Examination Presentation Decision-Making   MEDIUM  Complexity : 1-2 comorbidities / personal factors will impact the outcome/ POC  LOW Complexity : 1-2 Standardized tests and measures addressing body structure, function, activity limitation and / or participation in recreation  LOW Complexity : Stable, uncomplicated  LOW Complexity : FOTO score of       Based on the above components, the patient evaluation is determined to be of the following complexity level: LOW     Pain Rating:      Activity Tolerance:   Fair and SpO2 stable on RA    After treatment patient left in no apparent distress:   Supine in bed, Call bell within reach, Bed / chair alarm activated, Side rails x 3, and telesitter    COMMUNICATION/EDUCATION:   The patients plan of care was discussed with: Occupational therapist and Registered nurse. Patient is unable to participate in goal setting and plan of care.     Thank you for this referral.  Stefan Rodriguez, PT   Time Calculation: 33 mins

## 2021-12-06 NOTE — PROGRESS NOTES
Problem: Self Care Deficits Care Plan (Adult)  Goal: *Acute Goals and Plan of Care (Insert Text)  Description:   FUNCTIONAL STATUS PRIOR TO ADMISSION: Patient was independent and active without use of DME. Pt is confused and poor historian. Pt reporting during session he was active and enjoys lifting weights. Per EMR pt was independent. HOME SUPPORT: The patient lived alone with spouse. Occupational Therapy Goals  Initiated 12/6/2021  1. Patient will perform grooming with minimal assistance/contact guard assist within 7 day(s). 2.  Patient will perform lower body dressing with minimal assistance/contact guard assist within 7 day(s). 3.  Patient will perform bathing with minimal assistance/contact guard assist within 7 day(s). 4.  Patient will perform toilet transfers with minimal assistance/contact guard assist within 7 day(s). 5.  Patient will perform all aspects of toileting with minimal assistance/contact guard assist within 7 day(s). 6.  Patient will participate in upper extremity therapeutic exercise/activities with minimal assistance/contact guard assist for 7 minutes within 7 day(s). 7.  Patient will utilize energy conservation techniques during functional activities with verbal cues within 7 day(s). Outcome: Progressing Towards Goal     OCCUPATIONAL THERAPY EVALUATION  Patient: Jennett Gosselin (89 y.o. male)  Date: 12/6/2021  Primary Diagnosis: Rapid atrial fibrillation ([address] [I48.91]  AMS (altered mental status) [R41.82]        Precautions: Other (comment), Bed Alarm (COVID +, restraints)    ASSESSMENT  Based on the objective data described below, the patient presents with confusion and impaired cognition, decreased coordination, generalized weakness, and impaired balance impacting pts ability to complete ADLs with decreased assist. On this date, pt is very poor historian Aox1 however with improved command following.  Pt rc'd supine in bed, on RA with O2 noted at 97% agreeable to session. Throughout session pt with inconsistent command following requiring multimodal cueing and facilitation. Requiring up to max A for grooming and bed mob for these reasons, however noted pt to have minimally decreased strength and stable O2 on RA throughout session. At conclusion of session pt performed brief STS and scooting > HOB with min A-max A. At conclusion of session pt assist back to bed, O2 levels stable, telesitter at bedside and mitts re-donned to [address]. Recommend HHOT vs SNF pending pt progress and improvement in cognition. Current Level of Function Impacting Discharge (ADLs/self-care): up to max A for ADLs 2/2 cognition    Functional Outcome Measure: The patient scored Total: 20/100 on the Barthel Index outcome measure which is indicative of being very dependent in basic self-care. Other factors to consider for discharge: below PLOF, confused     Patient will benefit from skilled therapy intervention to address the above noted impairments. PLAN :  Recommendations and Planned Interventions: self care training, functional mobility training, therapeutic exercise, balance training, therapeutic activities, endurance activities, patient education, home safety training, and family training/education    Frequency/Duration: Patient will be followed by occupational therapy 3 times a week to address goals. Recommendation for discharge: (in order for the patient to meet his/her long term goals)  To be determined: HHOT vs SNF pending progress    This discharge recommendation:  Has been made in collaboration with the attending provider and/or case management    IF patient discharges home will need the following DME: TBD       SUBJECTIVE:   Patient stated do you know the name of my wife.     OBJECTIVE DATA SUMMARY:   HISTORY:   Past Medical History:   Diagnosis Date    Abnormal nuclear cardiac imaging test 5/8/2019    Anemia 6/5/2017    Atrial fibrillation (HCC)     Chronic kidney disease  CKD (chronic kidney disease) 5/8/2019    Coronary atherosclerosis of native coronary artery     Diabetes mellitus, type II ([facility] [address] [facility]     MCCOLLUM (dyspnea on exertion) 5/8/2019    Glaucoma     Mixed hyperlipidemia     Other dyspnea and respiratory abnormality     Pacemaker     S/P ablation of atrial fibrillation 8/4/2016     Past Surgical History:   Procedure Laterality Date    HX AFIB ABLATION      HX CATARACT REMOVAL Bilateral 2018    HX CORONARY ARTERY BYPASS GRAFT  2001    HX CORONARY STENT PLACEMENT      HX HEART CATHETERIZATION  11/21/2019    Left     HX PTCA      MO INS NEW/RPLCMT PRM PM W/TRANSV ELTRD ATRIAL&VENT N/A 3/29/2019    INSERT PPM DUAL performed by [facility] CARDIAC CATH LAB    MO REMOVAL SUBCUTANEOUS CARDIAC RHYTHM MONITOR N/A 3/29/2019    LOOP RECORDER REMOVAL performed by April Cavazos MD at [facility] CARDIAC CATH LAB    UPPER GI ENDOSCOPY,BIOPSY  7/26/2019            Expanded or extensive additional review of patient history:     Home Situation  Living Alone: No  Support Systems: Spouse/Significant Other    Hand dominance: Right    EXAMINATION OF PERFORMANCE DEFICITS:  Cognitive/Behavioral Status:  Neurologic State: Restless; Eyes open to voice; Confused  Orientation Level: Oriented to person; Disoriented to place; Disoriented to situation; Disoriented to time  Cognition: Impaired decision making; Poor safety awareness; Decreased command following  Perception:  (Unable to assess)  Perseveration:  (Unable to assess)  Safety/Judgement: Lack of insight into deficits; Decreased awareness of need for safety; Decreased awareness of need for assistance; Decreased awareness of environment    Skin: intact    Edema: no edema noted    Hearing:   Auditory  Auditory Impairment: None    Vision/Perceptual:       Unable to assess as pt is confused and unable to follow commands consistently                              Range of Motion:    AROM: Generally decreased, functional  PROM: Within functional limits                      Strength:    Strength: Generally decreased, functional                Coordination:  Coordination: Generally decreased, functional  Fine Motor Skills-Upper: Left Intact; Right Intact    Gross Motor Skills-Upper: Left Intact; Right Intact    Tone & Sensation:    Tone: Normal  Sensation: Intact                      Balance:  Sitting: Intact; High guard  Standing: Impaired; With support  Standing - Static: Good; Constant support  Standing - Dynamic : Fair; Constant support    Functional Mobility and Transfers for ADLs:  Bed Mobility:  Rolling: Minimum assistance; Assist x2  Supine to Sit: Moderate assistance; Assist x2  Sit to Supine: Minimum assistance; Assist x2  Scooting: Maximum assistance; Assist x2    Transfers:  Sit to Stand: Minimum assistance; Assist x2  Stand to Sit: Minimum assistance; Assist x2    ADL Assessment:  Feeding: Maximum assistance    Oral Facial Hygiene/Grooming: Maximum assistance    Bathing: Maximum assistance    Upper Body Dressing: Maximum assistance    Lower Body Dressing: Maximum assistance    Toileting: Maximum assistance                ADL Intervention and task modifications:       Grooming  Grooming Assistance: Maximum assistance  Position Performed: Seated edge of bed  Washing Face: Maximum assistance                   Lower Body Dressing Assistance  Socks: Maximum assistance         Cognitive Retraining  Safety/Judgement: Lack of insight into deficits; Decreased awareness of need for safety; Decreased awareness of need for assistance; Decreased awareness of environment   Functional Measure:    Barthel Index:  Bathin  Bladder: 0  Bowels: 0  Groomin  Dressin  Feedin  Mobility: 0  Stairs: 0  Toilet Use: 5  Transfer (Bed to Chair and Back): 5  Total: 20/100      The Barthel ADL Index: Guidelines  1. The index should be used as a record of what a patient does, not as a record of what a patient could do.   2. The main aim is to establish degree of independence from any help, physical or verbal, however minor and for whatever reason. 3. The need for supervision renders the patient not independent. 4. A patient's performance should be established using the best available evidence. Asking the patient, friends/relatives and nurses are the usual sources, but direct observation and common sense are also important. However direct testing is not needed. 5. Usually the patient's performance over the preceding 24-48 hours is important, but occasionally longer periods will be relevant. 6. Middle categories imply that the patient supplies over 50 per cent of the effort. 7. Use of aids to be independent is allowed. Score Interpretation (from [address] [facility] [address])    Independent   60-79 Minimally independent   40-59 Partially dependent   20-39 Very dependent   <20 Totally dependent     -Aleah Warren., Barthel, DCheriseW. (1965). Functional evaluation: the Barthel Index. [address] [facility] ([address] (1997). The Barthel activities of daily living index: self-reporting versus actual performance in the old (> or = 75 years). Journal of [address] [place] [address] 45(7), 14 [facility], J.J.M.F, Anibal Cabrera., Haily Cargo. (1999). Measuring the change in disability after inpatient rehabilitation; comparison of the responsiveness of the Barthel Index and Functional [place] Measure. Journal of Neurology, Neurosurgery, and Psychiatry, 66(4), 423-696. Allyn Gaspar, [place]CHRISTIANO, PRISCILLA Jernigan.TANYA, & Dajuan Gonzalez, MCheriseA. (2004) Assessment of post-stroke quality of life in cost-effectiveness studies: The usefulness of the Barthel Index and the EuroQoL-5D.  Quality of Life Research, 15, 528-12     Occupational Therapy Evaluation Charge Determination   History Examination Decision-Making   LOW Complexity : Brief history review  MEDIUM Complexity : 3-5 performance deficits relating to physical, cognitive , or psychosocial skils that result in activity limitations and / or participation restrictions LOW Complexity : No comorbidities that affect functional and no verbal or physical assistance needed to complete eval tasks       Based on the above components, the patient evaluation is determined to be of the following complexity level: LOW   Pain Rating:  Pt did not endorse pain however pt is poor historian     Activity Tolerance:   Fair    After treatment patient left in no apparent distress:    Supine in bed, Call bell within reach, Bed / chair alarm activated, Side rails x 3, Restraints, and telesitter at bedside    COMMUNICATION/EDUCATION:   The patients plan of care was discussed with: Physical therapist, Occupational therapist, and Registered nurse. Patient/family have participated as able in goal setting and plan of care. This patients plan of care is appropriate for delegation to [facility].     Thank you for this referral.  Dayday Kovacs OT  Time Calculation: 34 mins

## 2021-12-06 NOTE — PROGRESS NOTES
Hospitalist Progress Note    NAME: Fabian Khalil   :  1940   MRN:  [id number]       Assessment / Plan:  Acute metabolic encephalopathy, unknown etiology most likely secondary to Covid encephalopathy     -Progressive acute encephalopathy since 23  Etiology remains unclear at this time. CT head negative, Serum alcohol <10, Urine drug screening negative. No evidence of ongoing infection   C/w 1:1 supervision for now, Haldol as needed PRN    -Unlikely bacterial meningitis, with normal white count, no fever. Empiric acyclovir started, will DC acyclovir given no evidence of viral encephalitis or meningitis. -EEG was completed and showed only mild slowing and no seizures  -Dopplers with no acute abnormality reported  -MRI and MRA with no acute abnormalities reported  -Status post LP on  with no white blood cells, does not look infected however infectious etiologies blood work including HSV, enterovirus, VDRL, West Nile all pending  -Neurology following, input is appreciated  -Patient is more awake today and able to have full conversation however he is not oriented    COVID-19 disease  -Continue to be on room air  -COVID-19 PCR positive  -Chest x-ray with no evidence of acute cardiopulmonary process  -We will hold on antibiotics  -No indication for dexamethasone remdesivir given patient on room air not hypoxic  -Patient is vaccinated against COVID-19  -Patient's wife at bedside.  I emphasized the necessity of wearing mask and complete PPE given patient positive test.       Paroxysmal Afib:  He is back in RVR likely secondary to underlying infectious process  Challenges with p.o. intake  We started IV Lopressor  We will give 1 dose of Cardizem 5 mg yesterday  Not on AC d/t hx of GI bleed, was recently seen by Dr. Duc Kyle was discussed which he was not interested     Acute on chronic kidney disease:   , Hypervolemic hypernatremia  Continue D5  Lab work is pending for today     CAD s/p CABG  Aspirin, statin  Resume Plavix since patient had LP yesterday     DM:  Hold metformin  Sliding scale with hypoglycemia protocol  Monitor FS    Anticipated discharge December 8  Barriers clinical improvement     Code Status: Full  Surrogate Decision Maker: His wife     DVT Prophylaxis: lovenox  GI Prophylaxis: not indicated     Baseline: Ambulatory     Subjective:     Patient was seen and examined. No acute events overnight. Discussed with RN overnight events. All patient's questions were answered. \"doing well\"      Review of Systems:  Symptom Y/N Comments  Symptom Y/N Comments   Fever/Chills n   Chest Pain n    Poor Appetite    Edema     Cough n   Abdominal Pain n    Sputum    Joint Pain     SOB/MCCOLLUM n   Pruritis/Rash     Nausea/vomit n   Tolerating PT/OT     Diarrhea    Tolerating Diet y    Constipation    Other       Could NOT obtain due to:          Objective:     VITALS:   Last 24hrs VS reviewed since prior progress note. Most recent are:  Patient Vitals for the past 24 hrs:   Temp Pulse Resp BP SpO2   12/06/21 1120 -- -- -- -- 92 %   12/06/21 0953 -- 77 20 122/78 97 %   12/06/21 0950 -- -- 19 -- --   12/06/21 0439 98.7 °F (37.1 °C) 81 18 108/68 95 %   12/05/21 2324 97.9 °F (36.6 °C) 80 17 133/80 94 %   12/05/21 1918 97.4 °F (36.3 °C) 83 17 125/80 98 %   12/05/21 1150 97.4 °F (36.3 °C) 79 18 127/81 94 %       Intake/Output Summary (Last 24 hours) at 12/6/2021 1146  Last data filed at 12/5/2021 1159  Gross per 24 hour   Intake --   Output 125 ml   Net -125 ml        I had a face to face encounter and independently examined this patient on 12/6/2021, as outlined below:  PHYSICAL EXAM:  General: WD, WN. Alert, cooperative, no acute distress    EENT:  EOMI. Anicteric sclerae. MMM  Resp:  CTA bilaterally, no wheezing or rales. No accessory muscle use  CV:  Regular  rhythm,  No edema  GI:  Soft, Non distended, Non tender.   +Bowel sounds  Neurologic:  Alert but not oriented, able to have full conversation, moving all extremities, no focal deficits  Psych:   Poor insight, not anxious or agitated or depressed  Skin:  No rashes. No jaundice    Reviewed most current lab test results and cultures  YES  Reviewed most current radiology test results   YES  Review and summation of old records today    NO  Reviewed patient's current orders and MAR    YES  PMH/SH reviewed - no change compared to H&P  ________________________________________________________________________  Care Plan discussed with:    Comments   Patient x    Family  x    RN x    Care Manager     Consultant                        Multidiciplinary team rounds were held today with , nursing, pharmacist and clinical coordinator. Patient's plan of care was discussed; medications were reviewed and discharge planning was addressed. ________________________________________________________________________  Total NON critical care TIME: 36   Minutes    Total CRITICAL CARE TIME Spent:   Minutes non procedure based      Comments   >50% of visit spent in counseling and coordination of care     ________________________________________________________________________  Arielle Lujan MD     Procedures: see electronic medical records for all procedures/Xrays and details which were not copied into this note but were reviewed prior to creation of Plan. LABS:  I reviewed today's most current labs and imaging studies.   Pertinent labs include:  Recent Labs     12/05/21 0311 12/04/21 0122   WBC 6.8 7.3   HGB 12.9 12.8   HCT 37.9 38.2    253     Recent Labs     12/04/21  0122      K 3.8      CO2 23   *   BUN 23*   CREA 1.36*   CA 9.1       Signed: Arielle Lujan MD

## 2021-12-07 LAB
GLUCOSE BLD STRIP.AUTO-MCNC: 139 MG/DL (ref 65–117)
GLUCOSE BLD STRIP.AUTO-MCNC: 143 MG/DL (ref 65–117)
GLUCOSE BLD STRIP.AUTO-MCNC: 146 MG/DL (ref 65–117)
GLUCOSE BLD STRIP.AUTO-MCNC: 97 MG/DL (ref 65–117)
SERVICE CMNT-IMP: ABNORMAL
SERVICE CMNT-IMP: NORMAL

## 2021-12-07 PROCEDURE — 82962 GLUCOSE BLOOD TEST: CPT

## 2021-12-07 PROCEDURE — 74011250637 HC RX REV CODE- 250/637: Performed by: STUDENT IN AN ORGANIZED HEALTH CARE EDUCATION/TRAINING PROGRAM

## 2021-12-07 PROCEDURE — 74011250636 HC RX REV CODE- 250/636: Performed by: PSYCHIATRY & NEUROLOGY

## 2021-12-07 PROCEDURE — 74011000250 HC RX REV CODE- 250: Performed by: INTERNAL MEDICINE

## 2021-12-07 PROCEDURE — 74011250636 HC RX REV CODE- 250/636: Performed by: INTERNAL MEDICINE

## 2021-12-07 PROCEDURE — 74011250637 HC RX REV CODE- 250/637: Performed by: PSYCHIATRY & NEUROLOGY

## 2021-12-07 PROCEDURE — 65270000029 HC RM PRIVATE

## 2021-12-07 PROCEDURE — 74011636637 HC RX REV CODE- 636/637: Performed by: STUDENT IN AN ORGANIZED HEALTH CARE EDUCATION/TRAINING PROGRAM

## 2021-12-07 RX ORDER — ASPIRIN 300 MG/1
300 SUPPOSITORY RECTAL DAILY
Status: DISCONTINUED | OUTPATIENT
Start: 2021-12-07 | End: 2021-12-08

## 2021-12-07 RX ADMIN — LORAZEPAM 0.5 MG: 2 INJECTION INTRAMUSCULAR; INTRAVENOUS at 09:25

## 2021-12-07 RX ADMIN — RANOLAZINE 500 MG: 500 TABLET, FILM COATED, EXTENDED RELEASE ORAL at 08:25

## 2021-12-07 RX ADMIN — ENOXAPARIN SODIUM 40 MG: 100 INJECTION SUBCUTANEOUS at 08:25

## 2021-12-07 RX ADMIN — LORAZEPAM 0.5 MG: 2 INJECTION INTRAMUSCULAR; INTRAVENOUS at 14:47

## 2021-12-07 RX ADMIN — RANOLAZINE 500 MG: 500 TABLET, FILM COATED, EXTENDED RELEASE ORAL at 18:01

## 2021-12-07 RX ADMIN — CLOPIDOGREL BISULFATE 75 MG: 75 TABLET ORAL at 08:25

## 2021-12-07 RX ADMIN — METOPROLOL TARTRATE 2.5 MG: 1 INJECTION, SOLUTION INTRAVENOUS at 21:52

## 2021-12-07 RX ADMIN — LORAZEPAM 0.5 MG: 2 INJECTION INTRAMUSCULAR; INTRAVENOUS at 21:52

## 2021-12-07 RX ADMIN — Medication 10 ML: at 05:07

## 2021-12-07 RX ADMIN — DEXTROSE MONOHYDRATE 75 ML/HR: 5 INJECTION, SOLUTION INTRAVENOUS at 18:15

## 2021-12-07 RX ADMIN — CYANOCOBALAMIN TAB 500 MCG 1000 MCG: 500 TAB at 08:25

## 2021-12-07 RX ADMIN — LATANOPROST 1 DROP: 50 SOLUTION OPHTHALMIC at 09:00

## 2021-12-07 RX ADMIN — ROSUVASTATIN 40 MG: 20 TABLET, FILM COATED ORAL at 08:25

## 2021-12-07 RX ADMIN — METOPROLOL TARTRATE 2.5 MG: 1 INJECTION, SOLUTION INTRAVENOUS at 05:05

## 2021-12-07 RX ADMIN — Medication 2 UNITS: at 14:44

## 2021-12-07 RX ADMIN — METOPROLOL TARTRATE 2.5 MG: 1 INJECTION, SOLUTION INTRAVENOUS at 09:28

## 2021-12-07 RX ADMIN — ASPIRIN 300 MG: 300 SUPPOSITORY RECTAL at 17:54

## 2021-12-07 RX ADMIN — DEXTROSE MONOHYDRATE 75 ML/HR: 5 INJECTION, SOLUTION INTRAVENOUS at 03:20

## 2021-12-07 RX ADMIN — METOPROLOL TARTRATE 2.5 MG: 1 INJECTION, SOLUTION INTRAVENOUS at 18:01

## 2021-12-07 RX ADMIN — ENOXAPARIN SODIUM 40 MG: 100 INJECTION SUBCUTANEOUS at 22:15

## 2021-12-07 RX ADMIN — Medication 2 UNITS: at 17:53

## 2021-12-07 RX ADMIN — Medication 10 ML: at 14:45

## 2021-12-07 NOTE — PROGRESS NOTES
Transition of Care Plan:     RUR:   12% \"low risk\"  Disposition: SNF  Follow up appointments: Has no PCP  DME needed: None  Transportation at [address] or means to access home:   No     IM Medicare Letter: Needed at discharge  Is patient a BCPI-A Bundle: No                 If yes, was Bundle Letter given?:  N/A  Is patient a  and connected with the [address] [place] [address] Road  If yes, was [facility] transfer form completed and [facility] notified? N/A  Caregiver Contact: Wife- Mayad Rucker, [phone]  Discharge Caregiver contacted prior to discharge? 3:44pm  Per wife's request, CM sent a referral to [facility] via [facility]. [facility] has accepted pt, but will not have a bed available until Thursday.      Per wife's request, CM will send a referral to [facility]. CM will continue to follow.     Jose Laughlin  Ext [phone]

## 2021-12-07 NOTE — PROGRESS NOTES
Problem: Non-Violent Restraints  Goal: Removal from restraints as soon as assessed to be safe  Outcome: Progressing Towards Goal  Goal: No harm/injury to patient while restraints in use  Outcome: Progressing Towards Goal  Goal: Patient's dignity will be maintained  Outcome: Progressing Towards Goal  Goal: Patient Interventions  Outcome: Progressing Towards Goal     Problem: Falls - Risk of  Goal: *Absence of Falls  Description: Document Yeyo Scott Fall Risk and appropriate interventions in the flowsheet. Outcome: Progressing Towards Goal  Note: Fall Risk Interventions:  Mobility Interventions: Bed/chair exit alarm, Communicate number of staff needed for ambulation/transfer, OT consult for ADLs, Patient to call before getting OOB, PT Consult for mobility concerns, PT Consult for assist device competence, Strengthening exercises (ROM-active/passive), Utilize walker, cane, or other assistive device, Utilize gait belt for transfers/ambulation    Mentation Interventions: Adequate sleep, hydration, pain control, Bed/chair exit alarm, Family/sitter at bedside, Increase mobility, More frequent rounding, Reorient patient, Room close to nurse's station, Toileting rounds, Update white board    Medication Interventions: Bed/chair exit alarm, Patient to call before getting OOB, Teach patient to arise slowly    Elimination Interventions: Bed/chair exit alarm, Call light in reach, Patient to call for help with toileting needs, Stay With Me (per policy), Toilet paper/wipes in reach, Toileting schedule/hourly rounds, Urinal in reach              Problem: Patient Education: Go to Patient Education Activity  Goal: Patient/Family Education  Outcome: Progressing Towards Goal     Problem: Risk for Spread of Infection  Goal: Prevent transmission of infectious organism to others  Description: Prevent the transmission of infectious organisms to other patients, staff members, and visitors.   Outcome: Progressing Towards Goal     Problem: Patient Education:  Go to Education Activity  Goal: Patient/Family Education  Outcome: Progressing Towards Goal     Problem: Pressure Injury - Risk of  Goal: *Prevention of pressure injury  Description: Document Alexys Scale and appropriate interventions in the flowsheet.   Outcome: Progressing Towards Goal  Note: Pressure Injury Interventions:  Sensory Interventions: Assess changes in LOC, Assess need for specialty bed, Check visual cues for pain, Discuss PT/OT consult with provider, Float heels, Keep linens dry and wrinkle-free, Maintain/enhance activity level, Minimize linen layers, Pressure redistribution bed/mattress (bed type)    Moisture Interventions: Absorbent underpads, Apply protective barrier, creams and emollients, Assess need for specialty bed, Check for incontinence Q2 hours and as needed, Internal/External urinary devices, Maintain skin hydration (lotion/cream), Minimize layers, Moisture barrier, Offer toileting Q_hr    Activity Interventions: Assess need for specialty bed, Increase time out of bed, Pressure redistribution bed/mattress(bed type), PT/OT evaluation    Mobility Interventions: Assess need for specialty bed, Float heels, HOB 30 degrees or less, Pressure redistribution bed/mattress (bed type), PT/OT evaluation    Nutrition Interventions: Document food/fluid/supplement intake, Offer support with meals,snacks and hydration, Discuss nutritional consult with provider    Friction and Shear Interventions: Apply protective barrier, creams and emollients, HOB 30 degrees or less, Lift sheet, Lift team/patient mobility team, Minimize layers                Problem: Patient Education: Go to Patient Education Activity  Goal: Patient/Family Education  Outcome: Progressing Towards Goal     Problem: Airway Clearance - Ineffective  Goal: Achieve or maintain patent airway  Outcome: Progressing Towards Goal     Problem: Gas Exchange - Impaired  Goal: Absence of hypoxia  Outcome: Progressing Towards Goal  Goal: Promote optimal lung function  Outcome: Progressing Towards Goal     Problem: Breathing Pattern - Ineffective  Goal: Ability to achieve and maintain a regular respiratory rate  Outcome: Progressing Towards Goal     Problem:  Body Temperature -  Risk of, Imbalanced  Goal: Ability to maintain a body temperature within defined limits  Outcome: Progressing Towards Goal  Goal: Will regain or maintain usual level of consciousness  Outcome: Progressing Towards Goal  Goal: Complications related to the disease process, condition or treatment will be avoided or minimized  Outcome: Progressing Towards Goal     Problem: Isolation Precautions - Risk of Spread of Infection  Goal: Prevent transmission of infectious organism to others  Outcome: Progressing Towards Goal     Problem: Nutrition Deficits  Goal: Optimize nutrtional status  Outcome: Progressing Towards Goal     Problem: Risk for Fluid Volume Deficit  Goal: Maintain normal heart rhythm  Outcome: Progressing Towards Goal  Goal: Maintain absence of muscle cramping  Outcome: Progressing Towards Goal  Goal: Maintain normal serum potassium, sodium, calcium, phosphorus, and pH  Outcome: Progressing Towards Goal     Problem: Loneliness or Risk for Loneliness  Goal: Demonstrate positive use of time alone when socialization is not possible  Outcome: Progressing Towards Goal     Problem: Fatigue  Goal: Verbalize increase energy and improved vitality  Outcome: Progressing Towards Goal     Problem: Patient Education: Go to Patient Education Activity  Goal: Patient/Family Education  Outcome: Progressing Towards Goal

## 2021-12-07 NOTE — PROGRESS NOTES
Hospitalist Progress Note    NAME: David Warren   :  1940   MRN:  [id number]       Assessment / Plan:  Acute metabolic encephalopathy, unknown etiology most likely secondary to Covid encephalopathy     -Progressive acute encephalopathy since 23  Etiology remains unclear at this time. CT head negative, Serum alcohol <10, Urine drug screening negative. No evidence of ongoing infection   C/w 1:1 supervision for now, Haldol as needed PRN    -Unlikely bacterial meningitis, with normal white count, no fever. Empiric acyclovir started, will DC acyclovir given no evidence of viral encephalitis or meningitis. -EEG was completed and showed only mild slowing and no seizures  -Dopplers with no acute abnormality reported  -MRI and MRA with no acute abnormalities reported  -Status post LP on  with no white blood cells, does not look infected however infectious etiologies blood work including HSV, enterovirus, VDRL, West Nile all pending  -Neurology following, input is appreciated  -Continue to be more awake, he was able to work with PT OT yesterday. Able to have conversation today. COVID-19 disease  -Continue to be on room air  -COVID-19 PCR positive  -Chest x-ray with no evidence of acute cardiopulmonary process  -We will hold on antibiotics  -No indication for dexamethasone remdesivir given patient on room air not hypoxic  -Patient is vaccinated against COVID-19  -Patient's wife at bedside.  I emphasized the necessity of wearing mask and complete PPE given patient positive test.       Paroxysmal Afib:  He is back in RVR likely secondary to underlying infectious process  Challenges with p.o. intake  We started IV Lopressor  We will give 1 dose of Cardizem 5 mg yesterday  Not on AC d/t hx of GI bleed, was recently seen by Dr. Katerina Briones was discussed which he was not interested     Acute on chronic kidney disease:   , Hypervolemic hypernatremia  Resolved     CAD s/p CABG  Aspirin, statin  Resume Plavix since patient had LP yesterday     DM:  Hold metformin  Sliding scale with hypoglycemia protocol  Monitor FS    Anticipated discharge December 8  Barriers clinical improvement     Code Status: Full  Surrogate Decision Maker: His wife     DVT Prophylaxis: lovenox  GI Prophylaxis: not indicated     Baseline: Ambulatory     Subjective:     Patient was seen and examined. No acute events overnight. Discussed with RN overnight events. All patient's questions were answered. \"Feeling much better but I am still sleepy\"      Review of Systems:  Symptom Y/N Comments  Symptom Y/N Comments   Fever/Chills n   Chest Pain n    Poor Appetite    Edema     Cough n   Abdominal Pain n    Sputum    Joint Pain     SOB/MCCOLLUM n   Pruritis/Rash     Nausea/vomit n   Tolerating PT/OT     Diarrhea    Tolerating Diet y    Constipation    Other       Could NOT obtain due to:          Objective:     VITALS:   Last 24hrs VS reviewed since prior progress note. Most recent are:  Patient Vitals for the past 24 hrs:   Temp Pulse Resp BP SpO2   12/07/21 1137 99 °F (37.2 °C) 90 18 114/66 97 %   12/07/21 0743 98.7 °F (37.1 °C) 87 17 121/71 94 %   12/07/21 0505 -- 84 -- 123/69 --   12/07/21 0323 98 °F (36.7 °C) 94 18 130/82 95 %   12/06/21 2231 98.6 °F (37 °C) 97 16 136/77 95 %   12/06/21 2030 98.1 °F (36.7 °C) 67 18 117/63 97 %   12/06/21 1754 -- 70 -- 127/72 --   12/06/21 1551 98.2 °F (36.8 °C) 91 16 94/60 97 %       Intake/Output Summary (Last 24 hours) at 12/7/2021 1250  Last data filed at 12/7/2021 7499  Gross per 24 hour   Intake 516.25 ml   Output 250 ml   Net 266.25 ml        I had a face to face encounter and independently examined this patient on 12/7/2021, as outlined below:  PHYSICAL EXAM:  General: WD, WN. Alert, cooperative, no acute distress    EENT:  EOMI. Anicteric sclerae. MMM  Resp:  CTA bilaterally, no wheezing or rales.   No accessory muscle use  CV:  Regular  rhythm,  No edema  GI:  Soft, Non distended, Non tender. +Bowel sounds  Neurologic:  Alert but not oriented, able to have full conversation, moving all extremities, no focal deficits  Psych:   Poor insight, not anxious or agitated or depressed  Skin:  No rashes. No jaundice    Reviewed most current lab test results and cultures  YES  Reviewed most current radiology test results   YES  Review and summation of old records today    NO  Reviewed patient's current orders and MAR    YES  PMH/SH reviewed - no change compared to H&P  ________________________________________________________________________  Care Plan discussed with:    Comments   Patient x    Family  x    RN x    Care Manager     Consultant                        Multidiciplinary team rounds were held today with , nursing, pharmacist and clinical coordinator. Patient's plan of care was discussed; medications were reviewed and discharge planning was addressed. ________________________________________________________________________  Total NON critical care TIME: 36   Minutes    Total CRITICAL CARE TIME Spent:   Minutes non procedure based      Comments   >50% of visit spent in counseling and coordination of care     ________________________________________________________________________  Allyn Choi MD     Procedures: see electronic medical records for all procedures/Xrays and details which were not copied into this note but were reviewed prior to creation of Plan. LABS:  I reviewed today's most current labs and imaging studies. Pertinent labs include:  Recent Labs     12/05/21  0311   WBC 6.8   HGB 12.9   HCT 37.9        No results for input(s): NA, K, CL, CO2, GLU, BUN, CREA, CA, MG, PHOS, ALB, TBIL, TBILI, ALT, INR, INREXT, INREXT in the last 72 hours.     No lab exists for component: SGOT    Signed: Allyn Choi MD

## 2021-12-08 LAB
BACTERIA SPEC CULT: NORMAL
BACTERIA SPEC CULT: NORMAL
GLUCOSE BLD STRIP.AUTO-MCNC: 113 MG/DL (ref 65–117)
GLUCOSE BLD STRIP.AUTO-MCNC: 136 MG/DL (ref 65–117)
GLUCOSE BLD STRIP.AUTO-MCNC: 139 MG/DL (ref 65–117)
GLUCOSE BLD STRIP.AUTO-MCNC: 159 MG/DL (ref 65–117)
GRAM STN SPEC: NORMAL
GRAM STN SPEC: NORMAL
SERVICE CMNT-IMP: ABNORMAL
SERVICE CMNT-IMP: NORMAL
SERVICE CMNT-IMP: NORMAL

## 2021-12-08 PROCEDURE — 74011636637 HC RX REV CODE- 636/637: Performed by: STUDENT IN AN ORGANIZED HEALTH CARE EDUCATION/TRAINING PROGRAM

## 2021-12-08 PROCEDURE — 74011250636 HC RX REV CODE- 250/636: Performed by: INTERNAL MEDICINE

## 2021-12-08 PROCEDURE — 97530 THERAPEUTIC ACTIVITIES: CPT | Performed by: OCCUPATIONAL THERAPIST

## 2021-12-08 PROCEDURE — 74011000250 HC RX REV CODE- 250: Performed by: INTERNAL MEDICINE

## 2021-12-08 PROCEDURE — 74011250636 HC RX REV CODE- 250/636: Performed by: PSYCHIATRY & NEUROLOGY

## 2021-12-08 PROCEDURE — 74011250637 HC RX REV CODE- 250/637: Performed by: STUDENT IN AN ORGANIZED HEALTH CARE EDUCATION/TRAINING PROGRAM

## 2021-12-08 PROCEDURE — 82962 GLUCOSE BLOOD TEST: CPT

## 2021-12-08 PROCEDURE — 65270000029 HC RM PRIVATE

## 2021-12-08 RX ORDER — GUAIFENESIN 100 MG/5ML
81 LIQUID (ML) ORAL DAILY
Status: DISCONTINUED | OUTPATIENT
Start: 2021-12-08 | End: 2021-12-20 | Stop reason: HOSPADM

## 2021-12-08 RX ADMIN — ENOXAPARIN SODIUM 40 MG: 100 INJECTION SUBCUTANEOUS at 21:10

## 2021-12-08 RX ADMIN — Medication 10 ML: at 13:37

## 2021-12-08 RX ADMIN — METOPROLOL TARTRATE 2.5 MG: 1 INJECTION, SOLUTION INTRAVENOUS at 04:36

## 2021-12-08 RX ADMIN — METOPROLOL TARTRATE 2.5 MG: 1 INJECTION, SOLUTION INTRAVENOUS at 17:40

## 2021-12-08 RX ADMIN — Medication 2 UNITS: at 13:35

## 2021-12-08 RX ADMIN — LORAZEPAM 0.5 MG: 2 INJECTION INTRAMUSCULAR; INTRAVENOUS at 17:39

## 2021-12-08 RX ADMIN — ENOXAPARIN SODIUM 40 MG: 100 INJECTION SUBCUTANEOUS at 10:00

## 2021-12-08 RX ADMIN — METOPROLOL TARTRATE 2.5 MG: 1 INJECTION, SOLUTION INTRAVENOUS at 21:56

## 2021-12-08 RX ADMIN — LORAZEPAM 0.5 MG: 2 INJECTION INTRAMUSCULAR; INTRAVENOUS at 05:49

## 2021-12-08 RX ADMIN — LORAZEPAM 0.5 MG: 2 INJECTION INTRAMUSCULAR; INTRAVENOUS at 22:21

## 2021-12-08 RX ADMIN — DEXTROSE MONOHYDRATE 75 ML/HR: 5 INJECTION, SOLUTION INTRAVENOUS at 09:49

## 2021-12-08 RX ADMIN — RANOLAZINE 500 MG: 500 TABLET, FILM COATED, EXTENDED RELEASE ORAL at 17:39

## 2021-12-08 RX ADMIN — ROSUVASTATIN 40 MG: 20 TABLET, FILM COATED ORAL at 09:52

## 2021-12-08 RX ADMIN — CLOPIDOGREL BISULFATE 75 MG: 75 TABLET ORAL at 09:52

## 2021-12-08 RX ADMIN — LATANOPROST 1 DROP: 50 SOLUTION OPHTHALMIC at 09:53

## 2021-12-08 RX ADMIN — RANOLAZINE 500 MG: 500 TABLET, FILM COATED, EXTENDED RELEASE ORAL at 09:52

## 2021-12-08 RX ADMIN — Medication 10 ML: at 05:33

## 2021-12-08 RX ADMIN — CYANOCOBALAMIN TAB 500 MCG 1000 MCG: 500 TAB at 09:52

## 2021-12-08 RX ADMIN — METOPROLOL TARTRATE 2.5 MG: 1 INJECTION, SOLUTION INTRAVENOUS at 10:01

## 2021-12-08 RX ADMIN — LORAZEPAM 0.5 MG: 2 INJECTION INTRAMUSCULAR; INTRAVENOUS at 13:43

## 2021-12-08 NOTE — PROGRESS NOTES
Restlessness and increased agitation noted. Ativan 0.5 mg given via IV. Patient  Relaxes at present.

## 2021-12-08 NOTE — PROGRESS NOTES
Hospitalist Progress Note    NAME: Marianna Goodman   :  1940   MRN:  [id number]       Assessment / Plan:  Acute metabolic encephalopathy, unknown etiology most likely secondary to Covid encephalopathy     -Progressive acute encephalopathy since 23  Etiology remains unclear at this time. CT head negative, Serum alcohol <10, Urine drug screening negative. No evidence of ongoing infection   C/w 1:1 supervision for now, Haldol as needed PRN    -Unlikely bacterial meningitis, with normal white count, no fever. Empiric acyclovir discontinued as no evidence of viral encephalitis or meningitis. -EEG was completed and showed only mild slowing and no seizures  -Dopplers with no acute abnormality reported  -MRI and MRA with no acute abnormalities reported  -Status post LP on  with no white blood cells, does not look infected however infectious etiologies blood work including HSV, enterovirus, VDRL, West Nile all pending  -Neurology following, input is appreciated  -Confused.  Has bilateral hand mittens and telesitter in room      COVID-19 disease  -Continue to be on room air  -COVID-19 PCR positive  -Chest x-ray with no evidence of acute cardiopulmonary process  -We will hold on antibiotics  -No indication for dexamethasone remdesivir given patient on room air not hypoxic  -Patient is vaccinated against COVID-19       Paroxysmal Afib:  He is back in RVR likely secondary to underlying infectious process  Challenges with p.o. intake  We started IV Lopressor  We will give 1 dose of Cardizem 5 mg yesterday  Not on AC d/t hx of GI bleed, was recently seen by Dr. Justo Jc was discussed which he was not interested     Acute on chronic kidney disease:   , Hypervolemic hypernatremia  Resolved     CAD s/p CABG  Aspirin, statin  Resume Plavix since patient had LP yesterday     DM:  Hold metformin  Sliding scale with hypoglycemia protocol  Monitor FS    Anticipated discharge 12:9  Barriers clinical improvement/requirement of telesitter and mittens     Code Status: Full  Surrogate Decision Maker: His wife     DVT Prophylaxis: lovenox  GI Prophylaxis: not indicated     Baseline: Ambulatory     Subjective:     Patient was seen and examined. No acute events overnight. Discussed with RN overnight events. All patient's questions were answered. Confused. telesitter in room. Bilateral hand mittents      Review of Systems:  Symptom Y/N Comments  Symptom Y/N Comments   Fever/Chills n   Chest Pain n    Poor Appetite    Edema     Cough n   Abdominal Pain n    Sputum    Joint Pain     SOB/MCCOLLUM n   Pruritis/Rash     Nausea/vomit n   Tolerating PT/OT     Diarrhea    Tolerating Diet y    Constipation    Other       Could NOT obtain due to:          Objective:     VITALS:   Last 24hrs VS reviewed since prior progress note. Most recent are:  Patient Vitals for the past 24 hrs:   Temp Pulse Resp BP SpO2   12/08/21 0847 97.4 °F (36.3 °C) 83 -- 106/72 95 %   12/08/21 0322 99.7 °F (37.6 °C) 95 18 107/67 97 %   12/07/21 2256 99.8 °F (37.7 °C) 84 18 134/65 97 %   12/07/21 2152 -- 87 -- 135/71 --   12/07/21 1930 99 °F (37.2 °C) 85 18 108/81 97 %   12/07/21 1801 -- 86 -- 139/84 --   12/07/21 1525 97.3 °F (36.3 °C) 78 20 (!) 93/59 91 %     No intake or output data in the 24 hours ending 12/08/21 1207     I had a face to face encounter and independently examined this patient on 12/8/2021, as outlined below:  PHYSICAL EXAM:  General: WD, WN. Alert, cooperative, no acute distress    EENT:  EOMI. Anicteric sclerae. MMM  Resp:  CTA bilaterally, no wheezing or rales. No accessory muscle use  CV:  Regular  rhythm,  No edema  GI:  Soft, Non distended, Non tender. +Bowel sounds  Neurologic:  Alert but not oriented, able to have full conversation, moving all extremities, no focal deficits  Psych:   Poor insight, not anxious or agitated or depressed  Skin:  No rashes.   No jaundice    Reviewed most current lab test results and cultures YES  Reviewed most current radiology test results   YES  Review and summation of old records today    NO  Reviewed patient's current orders and MAR    YES  PMH/SH reviewed - no change compared to H&P  ________________________________________________________________________  Care Plan discussed with:    Comments   Patient x    Family  x    RN x    Care Manager     Consultant                        Multidiciplinary team rounds were held today with , nursing, pharmacist and clinical coordinator. Patient's plan of care was discussed; medications were reviewed and discharge planning was addressed. ________________________________________________________________________  Total NON critical care TIME: 36   Minutes    Total CRITICAL CARE TIME Spent:   Minutes non procedure based      Comments   >50% of visit spent in counseling and coordination of care     ________________________________________________________________________  Emma Rodriguez MD     Procedures: see electronic medical records for all procedures/Xrays and details which were not copied into this note but were reviewed prior to creation of Plan. LABS:  I reviewed today's most current labs and imaging studies. Pertinent labs include:  No results for input(s): WBC, HGB, HCT, PLT, HGBEXT, HCTEXT, PLTEXT, HGBEXT, HCTEXT, PLTEXT in the last 72 hours. No results for input(s): NA, K, CL, CO2, GLU, BUN, CREA, CA, MG, PHOS, ALB, TBIL, TBILI, ALT, INR, INREXT, INREXT in the last 72 hours.     No lab exists for component: SGOT    Signed: Emma Rodriguez MD

## 2021-12-08 NOTE — PROGRESS NOTES
Problem: Self Care Deficits Care Plan (Adult)  Goal: *Acute Goals and Plan of Care (Insert Text)  Description:   FUNCTIONAL STATUS PRIOR TO ADMISSION: Patient was independent and active without use of DME. Pt is confused and poor historian. Pt reporting during session he was active and enjoys lifting weights. Per EMR pt was independent. HOME SUPPORT: The patient lived alone with spouse. Occupational Therapy Goals  Initiated 12/6/2021  1. Patient will perform grooming with minimal assistance/contact guard assist within 7 day(s). 2.  Patient will perform lower body dressing with minimal assistance/contact guard assist within 7 day(s). 3.  Patient will perform bathing with minimal assistance/contact guard assist within 7 day(s). 4.  Patient will perform toilet transfers with minimal assistance/contact guard assist within 7 day(s). 5.  Patient will perform all aspects of toileting with minimal assistance/contact guard assist within 7 day(s). 6.  Patient will participate in upper extremity therapeutic exercise/activities with minimal assistance/contact guard assist for 7 minutes within 7 day(s). 7.  Patient will utilize energy conservation techniques during functional activities with verbal cues within 7 day(s). Outcome: Not Progressing Towards Goal   OCCUPATIONAL THERAPY TREATMENT  Patient: Audrey Reeder (11 y.o. male)  Date: 12/8/2021  Diagnosis: Rapid atrial fibrillation ([address] [I48.91]  AMS (altered mental status) [R41.82]   <principal problem not specified>       Precautions: Other (comment), Bed Alarm (COVID +, restraints)  Chart, occupational therapy assessment, plan of care, and goals were reviewed. ASSESSMENT  Patient continues with skilled OT services and is not progressing towards goals. Pt was mumbling throughout tx session and was only partially understood-when asked a question, pt mumbled but not on the subject presented. Wife reports that she can understand him at times.   Pt kept his eyes closed throughout and did not open spontaneously nor on command. He is not oriented and could not state his name when asked. Pt did not follow commands and needed total assistance for repositioning at bed level. Encouraged wife to only feed pt when he is alert, aware, and sitting upright at bed level for safety. Current Level of Function Impacting Discharge (ADLs): total assistance; no command following    Other factors to consider for discharge: was independent prior to admission         PLAN :  Patient continues to benefit from skilled intervention to address the above impairments. Continue treatment per established plan of care to address goals. Recommend with staff: encourage upright in supported sitting at bed level        Recommendation for discharge: (in order for the patient to meet his/her long term goals)  Therapy up to 5 days/week in SNF setting    This discharge recommendation:  Has  been discussed the attending provider and/or case management    IF patient discharges home will need the following DME: unable to safely discharge home at this time. SUBJECTIVE:   Patient mumbled most of session and difficult to understand. OBJECTIVE DATA SUMMARY:   Cognitive/Behavioral Status:  Neurologic State: Confused (seems to generally alert, but keeps eyes closed, despite commands to open)  Orientation Level: Disoriented X4 (does not give name when asked)  Cognition: Impaired decision making; No command following; Poor safety awareness  Perception:  (unable to accurately assess)     Safety/Judgement: Decreased awareness of environment; Lack of insight into deficits (poor awareness (per nsg, had ativan at 630 this a.m.))    Functional Mobility and Transfers for ADLs:  Bed Mobility:  Rolling:  (unable to follow commands for all activities)  Scooting:  Total assistance (unable to follow commands to participate)         Balance:   Decreased awareness of body position at bed level    ADL Intervention:  Feeding  Feeding Assistance:  (unable to feed self--wife present, encouraged upright positioning )  Food to Mouth:  (wife reports total assistance)     Mitts left on for protection as pt is unable to follow commands, has AMS-impaired cognition,  and nurse reports he pulls on his lines. Cognitive Retraining  Orientation Retraining:  (not able to reorient at this time due to confusion/AMS)  Attention to Task:  (poor to no attention)  Safety/Judgement: Decreased awareness of environment; Lack of insight into deficits (poor awareness (per nsg, had ativan at 630 this a.m.))      Pain:  Pt did not report pain    Activity Tolerance:   Pt with AMS/encephalopathy and not participating in functional/purposeful activities at this time. After treatment patient left in no apparent distress:   Supine in bed, Call bell within reach, Caregiver / family present, and Side rails x 3  Nurse informed of pt's state  COMMUNICATION/COLLABORATION:   The patients plan of care was discussed with: Registered nurse.      Lien Deutsch OTR/L  Time Calculation: 14 mins

## 2021-12-08 NOTE — PROGRESS NOTES
Discussed with OT and pt is not appropriate for PT, not following commands and very lethargic. Will defer and continue to follow.

## 2021-12-08 NOTE — INTERDISCIPLINARY ROUNDS
Interdisciplinary Rounds were completed on 12/08/21 for this patient. Rounds included nursing, clinical care leader, pharmacy, and case management. Plan of care discussed. See clinical pathway and/or care plan for interventions and desired outcomes.

## 2021-12-09 LAB
ANION GAP SERPL CALC-SCNC: 6 MMOL/L (ref 5–15)
BASOPHILS # BLD: 0 K/UL (ref 0–0.1)
BASOPHILS NFR BLD: 0 % (ref 0–1)
BUN SERPL-MCNC: 17 MG/DL (ref 6–20)
BUN/CREAT SERPL: 10 (ref 12–20)
CALCIUM SERPL-MCNC: 9.5 MG/DL (ref 8.5–10.1)
CHLORIDE SERPL-SCNC: 106 MMOL/L (ref 97–108)
CO2 SERPL-SCNC: 26 MMOL/L (ref 21–32)
CREAT SERPL-MCNC: 1.69 MG/DL (ref 0.7–1.3)
DIFFERENTIAL METHOD BLD: ABNORMAL
EOSINOPHIL # BLD: 0.1 K/UL (ref 0–0.4)
EOSINOPHIL NFR BLD: 1 % (ref 0–7)
ERYTHROCYTE [DISTWIDTH] IN BLOOD BY AUTOMATED COUNT: 15 % (ref 11.5–14.5)
GLUCOSE BLD STRIP.AUTO-MCNC: 127 MG/DL (ref 65–117)
GLUCOSE BLD STRIP.AUTO-MCNC: 135 MG/DL (ref 65–117)
GLUCOSE BLD STRIP.AUTO-MCNC: 145 MG/DL (ref 65–117)
GLUCOSE BLD STRIP.AUTO-MCNC: 156 MG/DL (ref 65–117)
GLUCOSE SERPL-MCNC: 126 MG/DL (ref 65–100)
HCT VFR BLD AUTO: 38.4 % (ref 36.6–50.3)
HGB BLD-MCNC: 13 G/DL (ref 12.1–17)
IMM GRANULOCYTES # BLD AUTO: 0.1 K/UL (ref 0–0.04)
IMM GRANULOCYTES NFR BLD AUTO: 1 % (ref 0–0.5)
LYMPHOCYTES # BLD: 0.8 K/UL (ref 0.8–3.5)
LYMPHOCYTES NFR BLD: 9 % (ref 12–49)
MCH RBC QN AUTO: 31.3 PG (ref 26–34)
MCHC RBC AUTO-ENTMCNC: 33.9 G/DL (ref 30–36.5)
MCV RBC AUTO: 92.5 FL (ref 80–99)
MONOCYTES # BLD: 0.9 K/UL (ref 0–1)
MONOCYTES NFR BLD: 10 % (ref 5–13)
NEUTS SEG # BLD: 7 K/UL (ref 1.8–8)
NEUTS SEG NFR BLD: 79 % (ref 32–75)
NRBC # BLD: 0 K/UL (ref 0–0.01)
NRBC BLD-RTO: 0 PER 100 WBC
PLATELET # BLD AUTO: 217 K/UL (ref 150–400)
PMV BLD AUTO: 11.2 FL (ref 8.9–12.9)
POTASSIUM SERPL-SCNC: 3.7 MMOL/L (ref 3.5–5.1)
RBC # BLD AUTO: 4.15 M/UL (ref 4.1–5.7)
SERVICE CMNT-IMP: ABNORMAL
SODIUM SERPL-SCNC: 138 MMOL/L (ref 136–145)
WBC # BLD AUTO: 8.9 K/UL (ref 4.1–11.1)

## 2021-12-09 PROCEDURE — 74011636637 HC RX REV CODE- 636/637: Performed by: STUDENT IN AN ORGANIZED HEALTH CARE EDUCATION/TRAINING PROGRAM

## 2021-12-09 PROCEDURE — 85025 COMPLETE CBC W/AUTO DIFF WBC: CPT

## 2021-12-09 PROCEDURE — 74011250637 HC RX REV CODE- 250/637: Performed by: STUDENT IN AN ORGANIZED HEALTH CARE EDUCATION/TRAINING PROGRAM

## 2021-12-09 PROCEDURE — 74011250636 HC RX REV CODE- 250/636: Performed by: STUDENT IN AN ORGANIZED HEALTH CARE EDUCATION/TRAINING PROGRAM

## 2021-12-09 PROCEDURE — 65270000029 HC RM PRIVATE

## 2021-12-09 PROCEDURE — 36415 COLL VENOUS BLD VENIPUNCTURE: CPT

## 2021-12-09 PROCEDURE — 74011250636 HC RX REV CODE- 250/636: Performed by: INTERNAL MEDICINE

## 2021-12-09 PROCEDURE — 74011000250 HC RX REV CODE- 250: Performed by: INTERNAL MEDICINE

## 2021-12-09 PROCEDURE — 82962 GLUCOSE BLOOD TEST: CPT

## 2021-12-09 PROCEDURE — 74011250636 HC RX REV CODE- 250/636: Performed by: PSYCHIATRY & NEUROLOGY

## 2021-12-09 PROCEDURE — 74011000258 HC RX REV CODE- 258: Performed by: INTERNAL MEDICINE

## 2021-12-09 PROCEDURE — 80048 BASIC METABOLIC PNL TOTAL CA: CPT

## 2021-12-09 RX ORDER — LORAZEPAM 2 MG/ML
0.5 INJECTION INTRAMUSCULAR
Status: DISCONTINUED | OUTPATIENT
Start: 2021-12-09 | End: 2021-12-20 | Stop reason: HOSPADM

## 2021-12-09 RX ORDER — QUETIAPINE FUMARATE 25 MG/1
25 TABLET, FILM COATED ORAL
Status: DISCONTINUED | OUTPATIENT
Start: 2021-12-09 | End: 2021-12-15

## 2021-12-09 RX ADMIN — LORAZEPAM 0.5 MG: 2 INJECTION INTRAMUSCULAR; INTRAVENOUS at 04:46

## 2021-12-09 RX ADMIN — ENOXAPARIN SODIUM 40 MG: 100 INJECTION SUBCUTANEOUS at 09:37

## 2021-12-09 RX ADMIN — METOPROLOL TARTRATE 2.5 MG: 1 INJECTION, SOLUTION INTRAVENOUS at 10:00

## 2021-12-09 RX ADMIN — RANOLAZINE 500 MG: 500 TABLET, FILM COATED, EXTENDED RELEASE ORAL at 09:41

## 2021-12-09 RX ADMIN — ENOXAPARIN SODIUM 40 MG: 100 INJECTION SUBCUTANEOUS at 21:40

## 2021-12-09 RX ADMIN — ROSUVASTATIN 40 MG: 20 TABLET, FILM COATED ORAL at 09:40

## 2021-12-09 RX ADMIN — Medication 10 ML: at 21:42

## 2021-12-09 RX ADMIN — LORAZEPAM 0.5 MG: 2 INJECTION INTRAMUSCULAR; INTRAVENOUS at 21:40

## 2021-12-09 RX ADMIN — Medication 2 UNITS: at 14:18

## 2021-12-09 RX ADMIN — METOPROLOL TARTRATE 2.5 MG: 1 INJECTION, SOLUTION INTRAVENOUS at 04:48

## 2021-12-09 RX ADMIN — DEXTROSE MONOHYDRATE 75 ML/HR: 5 INJECTION, SOLUTION INTRAVENOUS at 09:33

## 2021-12-09 RX ADMIN — Medication 10 ML: at 14:19

## 2021-12-09 RX ADMIN — Medication 2 UNITS: at 14:17

## 2021-12-09 RX ADMIN — LORAZEPAM 0.5 MG: 2 INJECTION INTRAMUSCULAR; INTRAVENOUS at 09:31

## 2021-12-09 RX ADMIN — METOPROLOL TARTRATE 2.5 MG: 1 INJECTION, SOLUTION INTRAVENOUS at 21:41

## 2021-12-09 RX ADMIN — ASPIRIN 81 MG CHEWABLE TABLET 81 MG: 81 TABLET CHEWABLE at 09:40

## 2021-12-09 RX ADMIN — CLOPIDOGREL BISULFATE 75 MG: 75 TABLET ORAL at 09:41

## 2021-12-09 RX ADMIN — LATANOPROST 1 DROP: 50 SOLUTION OPHTHALMIC at 09:36

## 2021-12-09 RX ADMIN — DEXTROSE MONOHYDRATE 75 ML/HR: 5 INJECTION, SOLUTION INTRAVENOUS at 14:16

## 2021-12-09 RX ADMIN — CYANOCOBALAMIN TAB 500 MCG 1000 MCG: 500 TAB at 09:40

## 2021-12-09 NOTE — PROGRESS NOTES
Patient very agitated, would not let nurse do fingerstick and given heparin shot, pushing her away with mitts hands, Ativan given x 1, will continue to monitor.

## 2021-12-09 NOTE — PROGRESS NOTES
Hospitalist Progress Note    NAME: Nevaeh Dodge   :  1940   MRN:  [id number]       Assessment / Plan:  Acute metabolic encephalopathy, unknown etiology most likely secondary to Covid encephalopathy     -Progressive acute encephalopathy since 23  Etiology remains unclear at this time. CT head negative, Serum alcohol <10, Urine drug screening negative. No evidence of ongoing infection   C/w 1:1 supervision for now, Haldol as needed PRN    -Unlikely bacterial meningitis, with normal white count, no fever. Empiric acyclovir discontinued as no evidence of viral encephalitis or meningitis. -EEG was completed and showed only mild slowing and no seizures  -Dopplers with no acute abnormality reported  -MRI and MRA with no acute abnormalities reported  -Status post LP on  with no white blood cells, does not look infected however infectious etiologies blood work including HSV, enterovirus, VDRL, West Nile all pending  -Neurology following, input is appreciated  -Remains confused and agitated. Requiring hand mittens and telemetry sitter  -Reduce Ativan frequency to every 8 hours ours as needed.   Start Seroquel at night      COVID-19 disease  -Continue to be on room air  -COVID-19 PCR positive  -Chest x-ray with no evidence of acute cardiopulmonary process  -No indication for dexamethasone remdesivir given patient on room air not hypoxic  -Patient is vaccinated against COVID-19       Paroxysmal Afib:  Challenges with p.o. intake  Continue IV metoprolol  Not on AC d/t hx of GI bleed, was recently seen by Dr. Rowan Flair was discussed which he was not interested     Acute on chronic kidney disease:   Baseline creatinine 1.8-2  Creatinine at baseline     CAD s/p CABG  Aspirin, statin  Continue Plavix     DM:  Hold metformin  Sliding scale with hypoglycemia protocol  Monitor FS    Updated patient's wife at bedside    Anticipated discharge 12:9  Barriers clinical improvement/requirement of telesitter and mittens     Code Status: Full  Surrogate Decision Maker: His wife     DVT Prophylaxis: lovenox  GI Prophylaxis: not indicated     Baseline: Ambulatory     Subjective:     Patient was seen and examined. No acute events overnight. Discussed with RN overnight events. All patient's questions were answered. Remains confused and agitated. Requiring hand mittens and telemetry sitter      Review of Systems:  Symptom Y/N Comments  Symptom Y/N Comments   Fever/Chills n   Chest Pain n    Poor Appetite    Edema     Cough n   Abdominal Pain n    Sputum    Joint Pain     SOB/MCCOLLUM n   Pruritis/Rash     Nausea/vomit n   Tolerating PT/OT     Diarrhea    Tolerating Diet y    Constipation    Other       Could NOT obtain due to:          Objective:     VITALS:   Last 24hrs VS reviewed since prior progress note. Most recent are:  Patient Vitals for the past 24 hrs:   Temp Pulse Resp BP SpO2   12/09/21 1149 97.5 °F (36.4 °C) 75 20 110/76 94 %   12/09/21 0850 98.7 °F (37.1 °C) 84 20 94/66 97 %   12/09/21 0447 -- 89 20 (!) 150/67 97 %   12/08/21 2309 97.9 °F (36.6 °C) 89 18 -- 97 %   12/08/21 2110 98.4 °F (36.9 °C) 86 20 116/73 96 %   12/08/21 1541 97.6 °F (36.4 °C) -- -- -- 95 %       Intake/Output Summary (Last 24 hours) at 12/9/2021 1341  Last data filed at 12/9/2021 0124  Gross per 24 hour   Intake 300 ml   Output --   Net 300 ml        I had a face to face encounter and independently examined this patient on 12/9/2021, as outlined below:  PHYSICAL EXAM:  General: WD, WN. Alert, agitated, no acute distress    EENT:  EOMI. Anicteric sclerae. MMM  Resp:  CTA bilaterally, no wheezing or rales. No accessory muscle use  CV:  Regular  rhythm,  No edema  GI:  Soft, Non distended, Non tender. +Bowel sounds  Neurologic:  Alert but not oriented, able to have full conversation, moving all extremities, no focal deficits  Psych:   Poor insight, agitated  Skin:  No rashes.   No jaundice    Reviewed most current lab test results and cultures  YES  Reviewed most current radiology test results   YES  Review and summation of old records today    NO  Reviewed patient's current orders and MAR    YES  PMH/SH reviewed - no change compared to H&P  ________________________________________________________________________  Care Plan discussed with:    Comments   Patient x    Family  x    RN x    Care Manager     Consultant                        Multidiciplinary team rounds were held today with , nursing, pharmacist and clinical coordinator. Patient's plan of care was discussed; medications were reviewed and discharge planning was addressed. ________________________________________________________________________  Total NON critical care TIME: 36   Minutes    Total CRITICAL CARE TIME Spent:   Minutes non procedure based      Comments   >50% of visit spent in counseling and coordination of care     ________________________________________________________________________  Luis A Cortez MD     Procedures: see electronic medical records for all procedures/Xrays and details which were not copied into this note but were reviewed prior to creation of Plan. LABS:  I reviewed today's most current labs and imaging studies.   Pertinent labs include:  Recent Labs     12/09/21  0511   WBC 8.9   HGB 13.0   HCT 38.4        Recent Labs     12/09/21  0511      K 3.7      CO2 26   *   BUN 17   CREA 1.69*   CA 9.5       Signed: Luis A Cortez MD

## 2021-12-09 NOTE — PROGRESS NOTES
Occupational Therapy    Chart reviewed. Pt remains agitated with arthur mittens and hypotensive (85/56) this AM. Will defer on this date, and continue to follow. Cont to recommend SNF at [place].     Lisa Burks OT

## 2021-12-09 NOTE — PROGRESS NOTES
Chart reviewed and observed pt being agitated and restless in bed with bilateral mittens. He continues to not be appropriate for PT at this time. Continue to recommend SNF at discharge.

## 2021-12-09 NOTE — PROGRESS NOTES
Comprehensive Nutrition Assessment    Type and Reason for Visit: Reassess    Nutrition Recommendations/Plan:  Continue current diet  Continue ensure high protein daily   Document %PO intake of meals in flowsheets   Bowel regimen - No document BM since admission? Nutrition Assessment:     Chart reviewed; medically noted for COVID-19 and AMS. PMH DM. Confusion and agitation continues per notes; requiring mittens. No documented PO Intake over the last week and no new weight. Receiving ensure high protein daily for supplementation. BG under control and lytes WNL. Staff in patient's room when viewing through room window this afternoon. Continue current nutrition plan of care. Encourage intake of meals/supplements and document %PO intake in flowsheets. Estimated Daily Nutrient Needs:  Energy (kcal): 2096 kcal (BMR 1843 x 1. 3AF -300kcal); Weight Used for Energy Requirements: Current  Protein (g): 87-109g (0.8-1.0 g/kg bw); Weight Used for Protein Requirements: Current  Fluid (ml/day): 2100 mL; Method Used for Fluid Requirements: 1 ml/kcal    Nutrition Related Findings:   [phone]  Plavix, Vitamin B12, Humalog, Lopressor, Ranexa, Rosuvastatin, PRN miralax    Wounds:    None       Current Nutrition Therapies:  DIET ONE TIME MESSAGE  ADULT DIET Regular; 5 carb choices (75 gm/meal); Low Fat/Low Chol/High Fiber/NICOLE  ADULT ORAL NUTRITION SUPPLEMENT Dinner; Low Calorie/High Protein    Anthropometric Measures:  · Height:  6' (182.9 cm)  · Current Body Wt:  108.9 kg (240 lb 1.3 oz)   · BMI Category:  Obese class 1 (BMI 30.0-34. 9)       Nutrition Diagnosis:   No nutrition diagnosis at this time     Nutrition Interventions:   Food and/or Nutrient Delivery: Continue current diet, Continue oral nutrition supplement  Nutrition Education and Counseling: No recommendations at this time  Coordination of Nutrition Care: No recommendation at this time    Goals:  PO intake >50% of meals/supplements next 3-5 days Nutrition Monitoring and Evaluation:   Behavioral-Environmental Outcomes: None identified  Food/Nutrient Intake Outcomes: Food and nutrient intake, Supplement intake  Physical Signs/Symptoms Outcomes: Biochemical data, Weight    Discharge Planning:    Continue current diet     Electronically signed by Gifty Silvestre RD on 12/9/2021 at 3:25 PM    Contact: ext [phone]

## 2021-12-10 LAB
GLUCOSE BLD STRIP.AUTO-MCNC: 115 MG/DL (ref 65–117)
GLUCOSE BLD STRIP.AUTO-MCNC: 126 MG/DL (ref 65–117)
GLUCOSE BLD STRIP.AUTO-MCNC: 143 MG/DL (ref 65–117)
GLUCOSE BLD STRIP.AUTO-MCNC: 98 MG/DL (ref 65–117)
SERVICE CMNT-IMP: ABNORMAL
SERVICE CMNT-IMP: ABNORMAL
SERVICE CMNT-IMP: NORMAL
SERVICE CMNT-IMP: NORMAL

## 2021-12-10 PROCEDURE — 74011000250 HC RX REV CODE- 250: Performed by: INTERNAL MEDICINE

## 2021-12-10 PROCEDURE — 82962 GLUCOSE BLOOD TEST: CPT

## 2021-12-10 PROCEDURE — 65270000029 HC RM PRIVATE

## 2021-12-10 PROCEDURE — 74011250636 HC RX REV CODE- 250/636: Performed by: PSYCHIATRY & NEUROLOGY

## 2021-12-10 PROCEDURE — 74011636637 HC RX REV CODE- 636/637: Performed by: STUDENT IN AN ORGANIZED HEALTH CARE EDUCATION/TRAINING PROGRAM

## 2021-12-10 PROCEDURE — 74011250637 HC RX REV CODE- 250/637: Performed by: STUDENT IN AN ORGANIZED HEALTH CARE EDUCATION/TRAINING PROGRAM

## 2021-12-10 RX ADMIN — ENOXAPARIN SODIUM 40 MG: 100 INJECTION SUBCUTANEOUS at 08:31

## 2021-12-10 RX ADMIN — QUETIAPINE FUMARATE 25 MG: 25 TABLET ORAL at 22:23

## 2021-12-10 RX ADMIN — ENOXAPARIN SODIUM 40 MG: 100 INJECTION SUBCUTANEOUS at 22:23

## 2021-12-10 RX ADMIN — Medication 10 ML: at 22:23

## 2021-12-10 RX ADMIN — LATANOPROST 1 DROP: 50 SOLUTION OPHTHALMIC at 08:42

## 2021-12-10 RX ADMIN — METOPROLOL TARTRATE 2.5 MG: 1 INJECTION, SOLUTION INTRAVENOUS at 04:01

## 2021-12-10 RX ADMIN — Medication 2 UNITS: at 07:30

## 2021-12-10 RX ADMIN — METOPROLOL TARTRATE 2.5 MG: 1 INJECTION, SOLUTION INTRAVENOUS at 16:52

## 2021-12-10 RX ADMIN — METOPROLOL TARTRATE 2.5 MG: 1 INJECTION, SOLUTION INTRAVENOUS at 22:23

## 2021-12-10 NOTE — PROGRESS NOTES
Occupational Therapy    Patient is more agitated and confused on this date. Per nursing pt restless, with mittens and orders for roll belt in place. Pt is inappropriate for therapy at this time. Will defer on this date. Cont to recommend SNF at [place].      Tre Khalil, OT

## 2021-12-10 NOTE — PROGRESS NOTES
Patient is more alert and more agitated this shift. Patient climbing out of bed and flipping over on his abdomen, cannot be redirected. Obtained order for roll belt, applied to patient in addition to mitts.

## 2021-12-10 NOTE — PROGRESS NOTES
Physical Therapy    Patient is more agitated and confused on this date. Per nursing pt restless, with mittens and orders for roll belt in place. Pt is inappropriate for therapy at this time. Will defer on this date. Cont to recommend SNF at [place].      Meri Baker, PT

## 2021-12-10 NOTE — PROGRESS NOTES
Problem: Non-Violent Restraints  Goal: No harm/injury to patient while restraints in use  Outcome: Progressing Towards Goal  Goal: Patient's dignity will be maintained  Outcome: Progressing Towards Goal  Goal: Patient Interventions  Outcome: Progressing Towards Goal     Problem: Falls - Risk of  Goal: *Absence of Falls  Description: Document Peng Fall Risk and appropriate interventions in the flowsheet. Outcome: Progressing Towards Goal  Note: Fall Risk Interventions:  Mobility Interventions: Bed/chair exit alarm    Mentation Interventions: Adequate sleep, hydration, pain control    Medication Interventions: Bed/chair exit alarm    Elimination Interventions: Bed/chair exit alarm              Problem: Risk for Spread of Infection  Goal: Prevent transmission of infectious organism to others  Description: Prevent the transmission of infectious organisms to other patients, staff members, and visitors. Outcome: Progressing Towards Goal     Problem: Patient Education:  Go to Education Activity  Goal: Patient/Family Education  Outcome: Progressing Towards Goal     Problem: Pressure Injury - Risk of  Goal: *Prevention of pressure injury  Description: Document Alexys Scale and appropriate interventions in the flowsheet.   Outcome: Progressing Towards Goal  Note: Pressure Injury Interventions:  Sensory Interventions: Assess changes in LOC    Moisture Interventions: Absorbent underpads    Activity Interventions: Assess need for specialty bed    Mobility Interventions: Assess need for specialty bed    Nutrition Interventions: Offer support with meals,snacks and hydration    Friction and Shear Interventions: Apply protective barrier, creams and emollients                Problem: Patient Education: Go to Patient Education Activity  Goal: Patient/Family Education  Outcome: Progressing Towards Goal     Problem: Airway Clearance - Ineffective  Goal: Achieve or maintain patent airway  Outcome: Progressing Towards Goal Problem: Gas Exchange - Impaired  Goal: Absence of hypoxia  Outcome: Progressing Towards Goal  Goal: Promote optimal lung function  Outcome: Progressing Towards Goal     Problem: Breathing Pattern - Ineffective  Goal: Ability to achieve and maintain a regular respiratory rate  Outcome: Progressing Towards Goal     Problem:  Body Temperature -  Risk of, Imbalanced  Goal: Ability to maintain a body temperature within defined limits  Outcome: Progressing Towards Goal  Goal: Will regain or maintain usual level of consciousness  Outcome: Progressing Towards Goal  Goal: Complications related to the disease process, condition or treatment will be avoided or minimized  Outcome: Progressing Towards Goal     Problem: Isolation Precautions - Risk of Spread of Infection  Goal: Prevent transmission of infectious organism to others  Outcome: Progressing Towards Goal     Problem: Nutrition Deficits  Goal: Optimize nutrtional status  Outcome: Progressing Towards Goal     Problem: Risk for Fluid Volume Deficit  Goal: Maintain normal heart rhythm  Outcome: Progressing Towards Goal  Goal: Maintain absence of muscle cramping  Outcome: Progressing Towards Goal  Goal: Maintain normal serum potassium, sodium, calcium, phosphorus, and pH  Outcome: Progressing Towards Goal     Problem: Loneliness or Risk for Loneliness  Goal: Demonstrate positive use of time alone when socialization is not possible  Outcome: Progressing Towards Goal     Problem: Fatigue  Goal: Verbalize increase energy and improved vitality  Outcome: Progressing Towards Goal     Problem: Patient Education: Go to Patient Education Activity  Goal: Patient/Family Education  Outcome: Progressing Towards Goal     Problem: Patient Education: Go to Patient Education Activity  Goal: Patient/Family Education  Outcome: Progressing Towards Goal     Problem: Patient Education: Go to Patient Education Activity  Goal: Patient/Family Education  Outcome: Progressing Towards Goal Problem: Non-Violent Restraints  Goal: Removal from restraints as soon as assessed to be safe  Outcome: Not Progressing Towards Goal     Problem: Patient Education: Go to Patient Education Activity  Goal: Patient/Family Education  Outcome: Not Progressing Towards Goal

## 2021-12-10 NOTE — PROGRESS NOTES
Transition of Care Plan:     RUR:   14% \"low risk\"  [facility]  Follow up appointments: Has no PCP  DME needed: None  Transportation at [address] or means to access home:   No     IM Medicare Letter: Needed at discharge  Is patient a BCPI-A Bundle: No                 If yes, was Bundle Letter given?:  N/A  Is patient a  and connected with the [address] [place] [address] Road  If yes, was [facility] transfer form completed and [facility] notified? N/A  [address] [place] [address], [phone]  Discharge Caregiver contacted prior to discharge?     Patient sill in restraints and has a tele sitter. Patient needs to be restraint & sitter free for 24hr's prior to SNF admission.   CM will continue to follow.     Amalia Glynn  Ext [phone]

## 2021-12-10 NOTE — PROGRESS NOTES
Hospitalist Progress Note    NAME: Nevaeh Dodge   :  1940   MRN:  [id number]       Assessment / Plan:  Acute metabolic encephalopathy, unknown etiology most likely secondary to Covid encephalopathy     -Progressive acute encephalopathy since 23  Etiology remains unclear at this time. CT head negative, Serum alcohol <10, Urine drug screening negative. No evidence of ongoing infection   C/w 1:1 supervision for now, Haldol as needed PRN     -Unlikely bacterial meningitis, with normal white count, no fever. Empiric acyclovir discontinued as no evidence of viral encephalitis or meningitis. -EEG was completed and showed only mild slowing and no seizures  -Dopplers with no acute abnormality reported  -MRI and MRA with no acute abnormalities reported  -Status post LP on  with no white blood cells, does not look infected however infectious etiologies blood work including HSV, enterovirus, VDRL, West Nile all pending  -Neurology following, input is appreciated  -Remains confused and agitated. Requiring hand mittens and telemetry sitter  -Reduce Ativan frequency to every 8 hours ours as needed.   Start Seroquel at night        COVID-19 disease  -Continue to be on room air  -COVID-19 PCR positive  -Chest x-ray with no evidence of acute cardiopulmonary process  -No indication for dexamethasone remdesivir given patient on room air not hypoxic  -Patient is vaccinated against COVID-19        Paroxysmal Afib:  Challenges with p.o. intake  Continue IV metoprolol  Not on AC d/t hx of GI bleed, was recently seen by Dr. Rowan Flair was discussed which he was not interested     Acute on chronic kidney disease:   Baseline creatinine 1.8-2  Creatinine at baseline     CAD s/p CABG  Aspirin, statin  Continue Plavix     DM:  Hold metformin  Sliding scale with hypoglycemia protocol  Monitor FS     Updated patient's wife at bedside     Anticipated discharge 12:9  Barriers clinical improvement/requirement of telesitter and mittens     Code Status: Full  Surrogate Decision Maker: His wife     DVT Prophylaxis: lovenox  GI Prophylaxis: not indicated     Baseline: Ambulatory     Subjective:     Chief Complaint / Reason for Physician Visit  Remains somnolent, arousable, confused    Discussed with RN events overnight. Review of Systems:  Symptom Y/N Comments  Symptom Y/N Comments   Fever/Chills    Chest Pain     Poor Appetite    Edema     Cough    Abdominal Pain     Sputum    Joint Pain     SOB/MCCOLLUM    Pruritis/Rash     Nausea/vomit    Tolerating PT/OT     Diarrhea    Tolerating Diet     Constipation    Other       Could NOT obtain due to: ams     Objective:     VITALS:   Last 24hrs VS reviewed since prior progress note. Most recent are:  Patient Vitals for the past 24 hrs:   Temp Pulse Resp BP SpO2   12/10/21 1313 -- -- -- 95/69 --   12/10/21 1200 97.4 °F (36.3 °C) 100 18 95/62 94 %   12/10/21 0836 97.5 °F (36.4 °C) 80 18 124/83 --   12/10/21 0359 98.8 °F (37.1 °C) 82 20 133/72 --   12/10/21 0145 98.6 °F (37 °C) 81 -- 120/80 --   12/09/21 2141 -- 87 -- 120/84 --   12/09/21 2113 98 °F (36.7 °C) 87 18 110/70 96 %   12/09/21 1512 97.7 °F (36.5 °C) 86 20 97/70 96 %     No intake or output data in the 24 hours ending 12/10/21 1336     I had a face to face encounter and independently examined this patient on 12/10/2021, as outlined below:  PHYSICAL EXAM:  General: Somnolent, arousable, agitated  EENT:  EOMI. Anicteric sclerae. MMM  Resp:  CTA bilaterally, no wheezing or rales. No accessory muscle use  CV:  Regular  rhythm,  No edema  GI:  Soft, Non distended, Non tender. +Bowel sounds  Neurologic:  Awake, confused, moves all extremities, no obvious focal deficits   Psych:   deferred  Skin:  No rashes.   No jaundice    Reviewed most current lab test results and cultures  YES  Reviewed most current radiology test results   YES  Review and summation of old records today    NO  Reviewed patient's current orders and [facility] YES  PMH/SH reviewed - no change compared to H&P  ________________________________________________________________________  Care Plan discussed with:    Comments   Patient x    Family      RN x    Care Manager     Consultant                       x Multidiciplinary team rounds were held today with , nursing, pharmacist and clinical coordinator. Patient's plan of care was discussed; medications were reviewed and discharge planning was addressed. ________________________________________________________________________  Total NON critical care TIME:  35   Minutes    Total CRITICAL CARE TIME Spent:   Minutes non procedure based      Comments   >50% of visit spent in counseling and coordination of care x    ________________________________________________________________________  Tj Cisneros DO     Procedures: see electronic medical records for all procedures/Xrays and details which were not copied into this note but were reviewed prior to creation of Plan. LABS:  I reviewed today's most current labs and imaging studies.   Pertinent labs include:  Recent Labs     12/09/21  0511   WBC 8.9   HGB 13.0   HCT 38.4        Recent Labs     12/09/21  0511      K 3.7      CO2 26   *   BUN 17   CREA 1.69*   CA 9.5       Signed: Tj Cisneros DO

## 2021-12-11 LAB
GLUCOSE BLD STRIP.AUTO-MCNC: 124 MG/DL (ref 65–117)
GLUCOSE BLD STRIP.AUTO-MCNC: 129 MG/DL (ref 65–117)
GLUCOSE BLD STRIP.AUTO-MCNC: 132 MG/DL (ref 65–117)
GLUCOSE BLD STRIP.AUTO-MCNC: 137 MG/DL (ref 65–117)
SERVICE CMNT-IMP: ABNORMAL

## 2021-12-11 PROCEDURE — 74011250637 HC RX REV CODE- 250/637: Performed by: STUDENT IN AN ORGANIZED HEALTH CARE EDUCATION/TRAINING PROGRAM

## 2021-12-11 PROCEDURE — 74011000250 HC RX REV CODE- 250: Performed by: INTERNAL MEDICINE

## 2021-12-11 PROCEDURE — 74011250636 HC RX REV CODE- 250/636: Performed by: PSYCHIATRY & NEUROLOGY

## 2021-12-11 PROCEDURE — 74011250636 HC RX REV CODE- 250/636: Performed by: INTERNAL MEDICINE

## 2021-12-11 PROCEDURE — 65270000029 HC RM PRIVATE

## 2021-12-11 PROCEDURE — 82962 GLUCOSE BLOOD TEST: CPT

## 2021-12-11 RX ADMIN — ROSUVASTATIN 40 MG: 20 TABLET, FILM COATED ORAL at 10:37

## 2021-12-11 RX ADMIN — ENOXAPARIN SODIUM 40 MG: 100 INJECTION SUBCUTANEOUS at 10:38

## 2021-12-11 RX ADMIN — RANOLAZINE 500 MG: 500 TABLET, FILM COATED, EXTENDED RELEASE ORAL at 10:37

## 2021-12-11 RX ADMIN — CYANOCOBALAMIN TAB 500 MCG 1000 MCG: 500 TAB at 10:38

## 2021-12-11 RX ADMIN — ENOXAPARIN SODIUM 40 MG: 100 INJECTION SUBCUTANEOUS at 21:02

## 2021-12-11 RX ADMIN — RANOLAZINE 500 MG: 500 TABLET, FILM COATED, EXTENDED RELEASE ORAL at 17:10

## 2021-12-11 RX ADMIN — LATANOPROST 1 DROP: 50 SOLUTION OPHTHALMIC at 10:39

## 2021-12-11 RX ADMIN — ASPIRIN 81 MG CHEWABLE TABLET 81 MG: 81 TABLET CHEWABLE at 10:37

## 2021-12-11 RX ADMIN — Medication 10 ML: at 21:02

## 2021-12-11 RX ADMIN — QUETIAPINE FUMARATE 25 MG: 25 TABLET ORAL at 21:02

## 2021-12-11 RX ADMIN — CLOPIDOGREL BISULFATE 75 MG: 75 TABLET ORAL at 10:38

## 2021-12-11 RX ADMIN — METOPROLOL TARTRATE 2.5 MG: 1 INJECTION, SOLUTION INTRAVENOUS at 21:01

## 2021-12-11 RX ADMIN — DEXTROSE MONOHYDRATE 75 ML/HR: 5 INJECTION, SOLUTION INTRAVENOUS at 14:24

## 2021-12-11 RX ADMIN — Medication 10 ML: at 14:00

## 2021-12-11 RX ADMIN — Medication 10 ML: at 06:49

## 2021-12-11 NOTE — PROGRESS NOTES
Hospitalist Progress Note    NAME: Agustin Shown   :  1940   MRN:  [id number]       Assessment / Plan:  Acute metabolic encephalopathy, unknown etiology most likely secondary to Covid encephalopathy     -Progressive acute encephalopathy since 23  Etiology remains unclear at this time. CT head negative, Serum alcohol <10, Urine drug screening negative. No evidence of ongoing infection   C/w 1:1 supervision for now, Haldol as needed PRN     -Unlikely bacterial meningitis, with normal white count, no fever. Empiric acyclovir discontinued as no evidence of viral encephalitis or meningitis. -EEG was completed and showed only mild slowing and no seizures  -Dopplers with no acute abnormality reported  -MRI and MRA with no acute abnormalities reported  -Status post LP on  with no white blood cells, does not look infected however infectious etiologies blood work including HSV, enterovirus, VDRL, West Nile all pending  -Neurology following, input is appreciated  -Remains confused and agitated. Requiring hand mittens and telemetry sitter. -Continue Seroquel at night.   Avoid benzo use if possible        COVID-19 disease  -Continue to be on room air  -COVID-19 PCR positive  -Chest x-ray with no evidence of acute cardiopulmonary process  -No indication for dexamethasone or remdesivir given patient is not hypoxic c  -Patient is vaccinated against COVID-19        Paroxysmal Afib:  Challenges with p.o. intake  Continue IV metoprolol  Not on AC d/t hx of GI bleed, was recently seen by Dr. Nettie Moran was discussed which he was not interested     Acute on chronic kidney disease:   Baseline creatinine 1.8-2  Creatinine at baseline     CAD s/p CABG  Aspirin, statin  Continue Plavix     DM:  Hold metformin  Sliding scale with hypoglycemia protocol  Monitor FS     Updated patient's wife at bedside     Anticipated discharge 12:9  Barriers clinical improvement/requirement of telesitter and brennan     Code Status: Full  Surrogate Decision Maker: His wife     DVT Prophylaxis: lovenox  GI Prophylaxis: not indicated     Baseline: Ambulatory     Subjective:     Chief Complaint / Reason for Physician Visit  Per patient's wife, patient was much more alert and conversant yesterday in the afternoon. Had able to carry on conversation with his wife. Confused and agitated this morning    Discussed with RN events overnight. Review of Systems:  Symptom Y/N Comments  Symptom Y/N Comments   Fever/Chills    Chest Pain     Poor Appetite    Edema     Cough    Abdominal Pain     Sputum    Joint Pain     SOB/MCCOLLUM    Pruritis/Rash     Nausea/vomit    Tolerating PT/OT     Diarrhea    Tolerating Diet     Constipation    Other       Could NOT obtain due to: ams     Objective:     VITALS:   Last 24hrs VS reviewed since prior progress note. Most recent are:  Patient Vitals for the past 24 hrs:   Temp Pulse Resp BP SpO2   12/11/21 0747 98.1 °F (36.7 °C) (!) 110 20 124/81 97 %   12/11/21 0332 98.4 °F (36.9 °C) 92 -- 98/67 94 %   12/11/21 0035 98.3 °F (36.8 °C) 94 20 135/79 95 %   12/10/21 2013 98.5 °F (36.9 °C) 96 20 127/60 93 %   12/10/21 1600 97.7 °F (36.5 °C) -- -- 121/75 --   12/10/21 1313 -- -- -- 95/69 --   12/10/21 1200 97.4 °F (36.3 °C) 100 18 95/62 94 %     No intake or output data in the 24 hours ending 12/11/21 7319     I had a face to face encounter and independently examined this patient on 12/11/2021, as outlined below:  PHYSICAL EXAM:  General: Somnolent, arousable, agitated  EENT:  EOMI. Anicteric sclerae. MMM  Resp:  CTA bilaterally, no wheezing or rales. No accessory muscle use  CV:  Regular  rhythm,  No edema  GI:  Soft, Non distended, Non tender. +Bowel sounds  Neurologic:  Awake, confused, moves all extremities, no obvious focal deficits   Psych:   deferred  Skin:  No rashes.   No jaundice    Reviewed most current lab test results and cultures  YES  Reviewed most current radiology test results YES  Review and summation of old records today    NO  Reviewed patient's current orders and MAR    YES  PMH/SH reviewed - no change compared to H&P  ________________________________________________________________________  Care Plan discussed with:    Comments   Patient x    Family      RN x    Care Manager     Consultant                       x Multidiciplinary team rounds were held today with , nursing, pharmacist and clinical coordinator. Patient's plan of care was discussed; medications were reviewed and discharge planning was addressed. ________________________________________________________________________  Total NON critical care TIME:  35   Minutes    Total CRITICAL CARE TIME Spent:   Minutes non procedure based      Comments   >50% of visit spent in counseling and coordination of care x    ________________________________________________________________________  Shun Rhodes MD     Procedures: see electronic medical records for all procedures/Xrays and details which were not copied into this note but were reviewed prior to creation of Plan. LABS:  I reviewed today's most current labs and imaging studies.   Pertinent labs include:  Recent Labs     12/09/21  0511   WBC 8.9   HGB 13.0   HCT 38.4        Recent Labs     12/09/21  0511      K 3.7      CO2 26   *   BUN 17   CREA 1.69*   CA 9.5       Signed: Shun Rhodes MD

## 2021-12-11 NOTE — PROGRESS NOTES
End of Shift Note    Bedside shift change report given to [facility] (oncoming nurse) by Holly Ramey RN (offgoing nurse). Report included the following information SBAR, Kardex, Intake/Output, MAR and Recent Results    Shift worked:  0832-2918     Shift summary and any significant changes:     No changes. Pt placed on male purewick for incontinence. PICC team called for new IV access. Concerns for physician to address:  No concerns     Zone phone for oncoming shift:   [phone]       Activity:  Activity Level: Bed Rest  Number times ambulated in hallways past shift: 0  Number of times OOB to chair past shift: 0    Cardiac:   Cardiac Monitoring: Yes      Cardiac Rhythm: Atrial Fib    Access:   Current line(s): PIV     Genitourinary:   Urinary status: voiding, incontinent and external catheter    Respiratory:   O2 Device: None (Room air)  Chronic home O2 use?: NO  Incentive spirometer at bedside: NO     GI:  Last Bowel Movement Date:  (unknown)  Current diet:  DIET ONE TIME MESSAGE  ADULT DIET Regular; 5 carb choices (75 gm/meal); Low Fat/Low Chol/High Fiber/NICOLE  ADULT ORAL NUTRITION SUPPLEMENT Dinner; Low Calorie/High Protein  Passing flatus: YES  Tolerating current diet: YES       Pain Management:   Patient states pain is manageable on current regimen: YES    Skin:  Alexys Score: 11  Interventions: internal/external urinary devices and nutritional support     Patient Safety:  Fall Score:  Total Score: 3  Interventions: bed/chair alarm, gripper socks, pt to call before getting OOB and tele sitter   High Fall Risk: Yes    Length of Stay:  Expected LOS: 3d 12h  Actual LOS: [address], RN

## 2021-12-12 LAB
ANION GAP SERPL CALC-SCNC: 9 MMOL/L (ref 5–15)
BUN SERPL-MCNC: 23 MG/DL (ref 6–20)
BUN/CREAT SERPL: 14 (ref 12–20)
CALCIUM SERPL-MCNC: 8.9 MG/DL (ref 8.5–10.1)
CHLORIDE SERPL-SCNC: 99 MMOL/L (ref 97–108)
CO2 SERPL-SCNC: 23 MMOL/L (ref 21–32)
CREAT SERPL-MCNC: 1.61 MG/DL (ref 0.7–1.3)
GLUCOSE BLD STRIP.AUTO-MCNC: 131 MG/DL (ref 65–117)
GLUCOSE BLD STRIP.AUTO-MCNC: 136 MG/DL (ref 65–117)
GLUCOSE BLD STRIP.AUTO-MCNC: 141 MG/DL (ref 65–117)
GLUCOSE BLD STRIP.AUTO-MCNC: 144 MG/DL (ref 65–117)
GLUCOSE SERPL-MCNC: 313 MG/DL (ref 65–100)
POTASSIUM SERPL-SCNC: 3.3 MMOL/L (ref 3.5–5.1)
SERVICE CMNT-IMP: ABNORMAL
SODIUM SERPL-SCNC: 131 MMOL/L (ref 136–145)

## 2021-12-12 PROCEDURE — 74011000250 HC RX REV CODE- 250: Performed by: INTERNAL MEDICINE

## 2021-12-12 PROCEDURE — 65270000029 HC RM PRIVATE

## 2021-12-12 PROCEDURE — 80048 BASIC METABOLIC PNL TOTAL CA: CPT

## 2021-12-12 PROCEDURE — 74011250636 HC RX REV CODE- 250/636: Performed by: INTERNAL MEDICINE

## 2021-12-12 PROCEDURE — 74011250637 HC RX REV CODE- 250/637: Performed by: STUDENT IN AN ORGANIZED HEALTH CARE EDUCATION/TRAINING PROGRAM

## 2021-12-12 PROCEDURE — 74011250636 HC RX REV CODE- 250/636: Performed by: NURSE PRACTITIONER

## 2021-12-12 PROCEDURE — 74011636637 HC RX REV CODE- 636/637: Performed by: STUDENT IN AN ORGANIZED HEALTH CARE EDUCATION/TRAINING PROGRAM

## 2021-12-12 PROCEDURE — 74011250636 HC RX REV CODE- 250/636: Performed by: STUDENT IN AN ORGANIZED HEALTH CARE EDUCATION/TRAINING PROGRAM

## 2021-12-12 PROCEDURE — 36415 COLL VENOUS BLD VENIPUNCTURE: CPT

## 2021-12-12 PROCEDURE — 74011250636 HC RX REV CODE- 250/636: Performed by: PSYCHIATRY & NEUROLOGY

## 2021-12-12 PROCEDURE — 82962 GLUCOSE BLOOD TEST: CPT

## 2021-12-12 RX ORDER — HALOPERIDOL 5 MG/ML
3 INJECTION INTRAMUSCULAR ONCE
Status: COMPLETED | OUTPATIENT
Start: 2021-12-12 | End: 2021-12-12

## 2021-12-12 RX ORDER — HALOPERIDOL 5 MG/ML
3 INJECTION INTRAMUSCULAR
Status: DISCONTINUED | OUTPATIENT
Start: 2021-12-12 | End: 2021-12-20 | Stop reason: HOSPADM

## 2021-12-12 RX ADMIN — HALOPERIDOL LACTATE 3 MG: 5 INJECTION, SOLUTION INTRAMUSCULAR at 16:27

## 2021-12-12 RX ADMIN — Medication 2 UNITS: at 17:18

## 2021-12-12 RX ADMIN — ENOXAPARIN SODIUM 40 MG: 100 INJECTION SUBCUTANEOUS at 09:23

## 2021-12-12 RX ADMIN — METOPROLOL TARTRATE 2.5 MG: 1 INJECTION, SOLUTION INTRAVENOUS at 03:49

## 2021-12-12 RX ADMIN — METOPROLOL TARTRATE 2.5 MG: 1 INJECTION, SOLUTION INTRAVENOUS at 21:27

## 2021-12-12 RX ADMIN — HALOPERIDOL LACTATE 3 MG: 5 INJECTION, SOLUTION INTRAMUSCULAR at 09:23

## 2021-12-12 RX ADMIN — Medication 10 ML: at 06:42

## 2021-12-12 RX ADMIN — Medication 10 ML: at 14:00

## 2021-12-12 RX ADMIN — DEXTROSE MONOHYDRATE 75 ML/HR: 5 INJECTION, SOLUTION INTRAVENOUS at 02:54

## 2021-12-12 RX ADMIN — Medication 10 ML: at 22:00

## 2021-12-12 RX ADMIN — Medication 2 UNITS: at 09:22

## 2021-12-12 NOTE — PROGRESS NOTES
Hospitalist Progress Note    NAME: Supriya Mendosa   :  1940   MRN:  [id number]       Assessment / Plan:  Acute metabolic encephalopathy, unknown etiology most likely secondary to Covid encephalopathy     -Progressive acute encephalopathy since 23  Etiology remains unclear at this time. CT head negative, Serum alcohol <10, Urine drug screening negative. No evidence of ongoing infection   C/w 1:1 supervision for now, Haldol as needed PRN. Continue Seroquel at night     -Unlikely bacterial meningitis, with normal white count, no fever. Empiric acyclovir discontinued as no evidence of viral encephalitis or meningitis. -EEG was completed and showed only mild slowing and no seizures  -Dopplers with no acute abnormality reported  -MRI and MRA with no acute abnormalities reported  -Status post LP on  with no white blood cells, does not look infected however infectious etiologies blood work including HSV, enterovirus, VDRL, West Nile all pending  -Neurology following, input is appreciated  -Remains confused and agitated. Requiring hand mittens and telemetry sitter. -Continue Seroquel at night.   Avoid benzos.        COVID-19 disease  -Continue to be on room air  -COVID-19 PCR positive  -Chest x-ray with no evidence of acute cardiopulmonary process  -No indication for dexamethasone or remdesivir given patient is not hypoxic c  -Patient is vaccinated against COVID-19        Paroxysmal Afib:  Challenges with p.o. intake  Continue IV metoprolol  Not on AC d/t hx of GI bleed, was recently seen by Dr. Kathy Olivas was discussed which he was not interested     Acute on chronic kidney disease:   Baseline creatinine 1.8-2  Creatinine at baseline     CAD s/p CABG  Aspirin, statin  Continue Plavix     DM:  Hold metformin  Sliding scale with hypoglycemia protocol  Monitor FS       Anticipated discharge 12:15  Barriers clinical improvement/requirement of telesitter and mittens     Code Status: Full  Surrogate Decision Maker: His wife     DVT Prophylaxis: lovenox  GI Prophylaxis: not indicated     Baseline: Ambulatory     Subjective:     Chief Complaint / Reason for Physician Visit  Remains confused and agitated    Discussed with RN events overnight. Review of Systems:  Symptom Y/N Comments  Symptom Y/N Comments   Fever/Chills    Chest Pain     Poor Appetite    Edema     Cough    Abdominal Pain     Sputum    Joint Pain     SOB/MCCOLLUM    Pruritis/Rash     Nausea/vomit    Tolerating PT/OT     Diarrhea    Tolerating Diet     Constipation    Other       Could NOT obtain due to: ams     Objective:     VITALS:   Last 24hrs VS reviewed since prior progress note. Most recent are:  Patient Vitals for the past 24 hrs:   Temp Pulse Resp BP SpO2   12/12/21 0726 -- 90 20 (!) 109/59 --   12/12/21 0349 -- (!) 107 20 (!) 115/59 --   12/11/21 2339 97.9 °F (36.6 °C) 95 22 (!) 154/80 96 %   12/11/21 2056 98.2 °F (36.8 °C) 87 20 (!) 155/96 93 %   12/11/21 1540 97.1 °F (36.2 °C) 100 -- 110/70 92 %   12/11/21 1122 98.2 °F (36.8 °C) 95 19 110/78 91 %     No intake or output data in the 24 hours ending 12/12/21 0842     I had a face to face encounter and independently examined this patient on 12/12/2021, as outlined below:  PHYSICAL EXAM:  General: Somnolent, arousable, agitated  EENT:  EOMI. Anicteric sclerae. MMM  Resp:  CTA bilaterally, no wheezing or rales. No accessory muscle use  CV:  Regular  rhythm,  No edema  GI:  Soft, Non distended, Non tender. +Bowel sounds  Neurologic:  Awake, confused, moves all extremities, no obvious focal deficits   Psych:   deferred  Skin:  No rashes.   No jaundice    Reviewed most current lab test results and cultures  YES  Reviewed most current radiology test results   YES  Review and summation of old records today    NO  Reviewed patient's current orders and MAR    YES  PMH/SH reviewed - no change compared to H&P  ________________________________________________________________________  Care Plan discussed with:    Comments   Patient x    Family      RN x    Care Manager     Consultant                       x Multidiciplinary team rounds were held today with , nursing, pharmacist and clinical coordinator. Patient's plan of care was discussed; medications were reviewed and discharge planning was addressed. ________________________________________________________________________  Total NON critical care TIME:  35   Minutes    Total CRITICAL CARE TIME Spent:   Minutes non procedure based      Comments   >50% of visit spent in counseling and coordination of care x    ________________________________________________________________________  Faviola Thomas MD     Procedures: see electronic medical records for all procedures/Xrays and details which were not copied into this note but were reviewed prior to creation of Plan. LABS:  I reviewed today's most current labs and imaging studies. Pertinent labs include:  No results for input(s): WBC, HGB, HCT, PLT, HGBEXT, HCTEXT, PLTEXT, HGBEXT, HCTEXT, PLTEXT in the last 72 hours. No results for input(s): NA, K, CL, CO2, GLU, BUN, CREA, CA, MG, PHOS, ALB, TBIL, TBILI, ALT, INR, INREXT, INREXT in the last 72 hours.     No lab exists for component: SGOT    Signed: Faviola Thomas MD

## 2021-12-12 NOTE — PROGRESS NOTES
Received notification from bedside RN about patient with regards to: increasing agitation, needs medication to control symptoms  VS: /59, , RR 20, O2 sat 96%     Intervention given: Haldol 3 mg IM x 1 dose ordered

## 2021-12-12 NOTE — PROGRESS NOTES
End of Shift Note    Bedside shift change report given to [address] [facility] (oncoming nurse) by Cary Walsh RN (offgoing nurse). Report included the following information SBAR, Kardex, Intake/Output, MAR and Recent Results    Shift worked:  7368-1245     Shift summary and any significant changes:     Increased agitation at 4am. Haldol ordered. Needs PRN regimen for agitation. Refused AM labs     Concerns for physician to address:  PRN regimen for agitation     Zone phone for oncoming shift:   .          Cary Walsh, RN

## 2021-12-13 LAB
GLUCOSE BLD STRIP.AUTO-MCNC: 136 MG/DL (ref 65–117)
GLUCOSE BLD STRIP.AUTO-MCNC: 150 MG/DL (ref 65–117)
GLUCOSE BLD STRIP.AUTO-MCNC: 156 MG/DL (ref 65–117)
GLUCOSE BLD STRIP.AUTO-MCNC: 163 MG/DL (ref 65–117)
GLUCOSE BLD STRIP.AUTO-MCNC: 181 MG/DL (ref 65–117)
SERVICE CMNT-IMP: ABNORMAL

## 2021-12-13 PROCEDURE — 74011636637 HC RX REV CODE- 636/637: Performed by: STUDENT IN AN ORGANIZED HEALTH CARE EDUCATION/TRAINING PROGRAM

## 2021-12-13 PROCEDURE — 74011250636 HC RX REV CODE- 250/636: Performed by: STUDENT IN AN ORGANIZED HEALTH CARE EDUCATION/TRAINING PROGRAM

## 2021-12-13 PROCEDURE — 82962 GLUCOSE BLOOD TEST: CPT

## 2021-12-13 PROCEDURE — 65270000029 HC RM PRIVATE

## 2021-12-13 PROCEDURE — 74011000250 HC RX REV CODE- 250: Performed by: INTERNAL MEDICINE

## 2021-12-13 PROCEDURE — 74011250637 HC RX REV CODE- 250/637: Performed by: STUDENT IN AN ORGANIZED HEALTH CARE EDUCATION/TRAINING PROGRAM

## 2021-12-13 PROCEDURE — 74011250636 HC RX REV CODE- 250/636: Performed by: INTERNAL MEDICINE

## 2021-12-13 PROCEDURE — 74011250636 HC RX REV CODE- 250/636: Performed by: PSYCHIATRY & NEUROLOGY

## 2021-12-13 RX ORDER — POTASSIUM CHLORIDE 7.45 MG/ML
10 INJECTION INTRAVENOUS
Status: COMPLETED | OUTPATIENT
Start: 2021-12-13 | End: 2021-12-13

## 2021-12-13 RX ORDER — LORAZEPAM 2 MG/ML
0.5 INJECTION INTRAMUSCULAR
Status: COMPLETED | OUTPATIENT
Start: 2021-12-13 | End: 2021-12-13

## 2021-12-13 RX ADMIN — METOPROLOL TARTRATE 2.5 MG: 1 INJECTION, SOLUTION INTRAVENOUS at 16:33

## 2021-12-13 RX ADMIN — Medication 2 UNITS: at 12:10

## 2021-12-13 RX ADMIN — Medication 10 ML: at 06:38

## 2021-12-13 RX ADMIN — QUETIAPINE FUMARATE 25 MG: 25 TABLET ORAL at 21:12

## 2021-12-13 RX ADMIN — Medication 10 ML: at 14:12

## 2021-12-13 RX ADMIN — POTASSIUM CHLORIDE 10 MEQ: 10 INJECTION, SOLUTION INTRAVENOUS at 12:10

## 2021-12-13 RX ADMIN — POTASSIUM CHLORIDE 10 MEQ: 10 INJECTION, SOLUTION INTRAVENOUS at 14:11

## 2021-12-13 RX ADMIN — Medication 10 ML: at 21:12

## 2021-12-13 RX ADMIN — Medication 2 UNITS: at 16:33

## 2021-12-13 RX ADMIN — DEXTROSE MONOHYDRATE 75 ML/HR: 5 INJECTION, SOLUTION INTRAVENOUS at 04:55

## 2021-12-13 RX ADMIN — POTASSIUM CHLORIDE 10 MEQ: 10 INJECTION, SOLUTION INTRAVENOUS at 16:33

## 2021-12-13 RX ADMIN — ROSUVASTATIN 40 MG: 20 TABLET, FILM COATED ORAL at 09:34

## 2021-12-13 RX ADMIN — METOPROLOL TARTRATE 2.5 MG: 1 INJECTION, SOLUTION INTRAVENOUS at 21:12

## 2021-12-13 RX ADMIN — HALOPERIDOL LACTATE 3 MG: 5 INJECTION, SOLUTION INTRAMUSCULAR at 10:01

## 2021-12-13 RX ADMIN — METOPROLOL TARTRATE 2.5 MG: 1 INJECTION, SOLUTION INTRAVENOUS at 09:34

## 2021-12-13 RX ADMIN — METOPROLOL TARTRATE 2.5 MG: 1 INJECTION, SOLUTION INTRAVENOUS at 04:52

## 2021-12-13 RX ADMIN — LORAZEPAM 0.5 MG: 2 INJECTION INTRAMUSCULAR; INTRAVENOUS at 12:37

## 2021-12-13 RX ADMIN — ENOXAPARIN SODIUM 40 MG: 100 INJECTION SUBCUTANEOUS at 21:12

## 2021-12-13 NOTE — PROGRESS NOTES
Occupational Therapy    Chart reviewed and spoke with nursing on this date. Pt remains agitated,unable to follow commands, and inappropriate for OT services at this time. Pt has not been appropriate for ~ 1 week and will discharge from therapy services.  Can be re-consulted if there is improvements in mentation where he can actively participate/follow commands in OT eval.     Krzysztof Odell, OT

## 2021-12-13 NOTE — PROGRESS NOTES
Discussed with nursing, pt continues to be agitated and  inappropriate for therapy. Pt has not been appropriate for ~ 1 week, will discharge from services.  Can be re-consulted if there is improvements in mentation where he can actively participate in PT eval.

## 2021-12-13 NOTE — PROGRESS NOTES
Transition of Care Plan:     RUR:   14% \"mod risk\"  Disposition: SNF placement  Follow up appointments: PCP (needs new pt appt) & specialists as indicate  DME needed: None  Transportation at [address] or means to access home:   No     IM Medicare Letter: Needed at discharge  Is patient a BCPI-A Bundle: No                 If yes, was Bundle Letter given?:  N/A  Is patient a  and connected with the [address] [place] [address] Road  If yes, was [employer] transfer form completed and [facility] notified? N/A  Caregiver Contact: Wife- Mayda Clay, [phone]  Discharge Caregiver contacted prior to discharge? CM reviewed pt's chart. [address] accepted pt & are following. Will need to be restraint free 24 hours prior to discharge. Has not been able to participate with PT/OT for 1 week d/t agitation; therapy services signed off & will need to be re-consulted if mentation improves.     Elaine Smith MSW  Care Management

## 2021-12-13 NOTE — PROGRESS NOTES
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 When there was acute altered mental status/confusion and shortness of breath. Per chart review patient was alert and oriented x4 in the ED upon arrival at OSH but later became confused and agitated. -Progressive acute encephalopathy since 21  -Etiology remains unclear at this time but high suspicion for COVID encephalopathy. COVID PCR +ve  -CT head negative, Serum alcohol <10, Urine drug screening negative. -EEG was completed and showed only mild slowing and no seizures  -Carotid dopplers with no significant stenosis   -MRI and MRA with no acute abnormalities reported  -S/p LP on : No WBCs, no RBC, slightly elevated protein, normal glucose. CSF analysis also negative for crypto, Lyme, VDRL, WNV IgM, and enterovirus. Meningitis panel was also negative. Hsv Ab +ve but PCR is negative. -Reviewed RN's note from last night about concern for possible active WNV infection. PATIENT DOES NOT HAVE active WNV infection. WNV IgG +ve (with negative IgM) means past infection vs false +ve /cross reactivity. IgG antibody for WNV persist for many years following symptomatic or asymptomatic infection. 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 I have explained to patient's wife/Ms Sohail Brumfield that the patient does not have WNV encephalitis.    -Neurology was on board, appreciate input  -C/w 1:1 supervision for now  -Continue Seroquel at night  -Per wife patient was lucid this morning and carried out on normal conversation with his wife.   Currently agitated and confused but more alert than yesterday.        COVID-19 disease  -Continue to be on room air  -COVID-19 PCR positive  -Chest x-ray with no evidence of acute cardiopulmonary process  -No indication for dexamethasone or remdesivir given patient is not hypoxic   -Patient is vaccinated against COVID-19        Paroxysmal Afib:  Challenges with p.o. intake  Continue IV metoprolol  Not on AC d/t hx of GI bleed, was recently seen by Dr. Angela Tran was discussed which he was not interested     Acute on chronic kidney disease:   Baseline creatinine 1.8-2  Creatinine at baseline     CAD s/p CABG  Aspirin, statin  Continue Plavix     DM:  Hold metformin  Sliding scale with hypoglycemia protocol  Monitor FS       Anticipated discharge 12:15  Barriers clinical improvement/requirement of telesitter and mittens     Code Status: Full  Surrogate Decision Maker: His wife     DVT Prophylaxis: lovenox  GI Prophylaxis: not indicated     Baseline: Ambulatory     Subjective:     Chief Complaint / Reason for Physician Visit  Was lucid this morning per wife and was able to carry out a meaningful conversation with his wife. Waxing and waning confusion. He is more alert when I saw him this morning but confused. Has erythematous papular rash on the back   Discussed with RN events overnight. Review of Systems:  Symptom Y/N Comments  Symptom Y/N Comments   Fever/Chills    Chest Pain     Poor Appetite    Edema     Cough    Abdominal Pain     Sputum    Joint Pain     SOB/MCCOLLUM    Pruritis/Rash     Nausea/vomit    Tolerating PT/OT     Diarrhea    Tolerating Diet     Constipation    Other       Could NOT obtain due to: ams     Objective:     VITALS:   Last 24hrs VS reviewed since prior progress note.  Most recent are:  Patient Vitals for the past 24 hrs:   Temp Pulse Resp BP SpO2   12/13/21 1215 97.9 °F (36.6 °C) (!) 103 18 133/73 92 %   12/13/21 0744 97.7 °F (36.5 °C) (!) 105 19 121/82 92 %   12/13/21 0444 -- -- -- (!) 116/57 --   12/12/21 2101 98 °F (36.7 °C) 80 18 110/80 92 %   12/12/21 1443 97 °F (36.1 °C) 90 18 100/75 94 %     No intake or output data in the 24 hours ending 12/13/21 1428     I had a face to face encounter and independently examined this patient on 12/13/2021, as outlined below:  PHYSICAL EXAM:  General: Somnolent, arousable, agitated  EENT:  EOMI. Anicteric sclerae. MMM  Resp:  CTA bilaterally, no wheezing or rales. No accessory muscle use  CV:  Regular  rhythm,  No edema  GI:  Soft, Non distended, Non tender. +Bowel sounds  Neurologic:  Awake, confused, moves all extremities, no obvious focal deficits   Psych:   deferred  Skin:  Erythematous papular rashes on the back. No jaundice    Reviewed most current lab test results and cultures  YES  Reviewed most current radiology test results   YES  Review and summation of old records today    NO  Reviewed patient's current orders and MAR    YES  PMH/SH reviewed - no change compared to H&P  ________________________________________________________________________  Care Plan discussed with:    Comments   Patient x    Family      RN x    Care Manager     Consultant                       x Multidiciplinary team rounds were held today with , nursing, pharmacist and clinical coordinator. Patient's plan of care was discussed; medications were reviewed and discharge planning was addressed. ________________________________________________________________________  Total NON critical care TIME:  35   Minutes    Total CRITICAL CARE TIME Spent:   Minutes non procedure based      Comments   >50% of visit spent in counseling and coordination of care x    ________________________________________________________________________  Laverna Lesch, MD     Procedures: see electronic medical records for all procedures/Xrays and details which were not copied into this note but were reviewed prior to creation of Plan. LABS:  I reviewed today's most current labs and imaging studies. Pertinent labs include:  No results for input(s): WBC, HGB, HCT, PLT, HGBEXT, HCTEXT, PLTEXT, HGBEXT, HCTEXT, PLTEXT in the last 72 hours.   Recent Labs     12/12/21  1625   *   K 3.3* CL 99   CO2 23   *   BUN 23*   CREA 1.61*   CA 8.9       Signed: Mejia Bain MD

## 2021-12-13 NOTE — ROUTINE PROCESS
End of Shift Note    Bedside shift change report given to Stephany Fitzpatrick (oncoming nurse) by Lella Apgar, RN (offgoing nurse).   Report included the following information SBAR, Kardex, Intake/Output, MAR and Recent Results    Shift worked:  2130-7515     Shift summary and any significant changes:     See previous note     Concerns for physician to address:  See previous note     Zone phone for oncoming shift:   [phone]           Lella Apgar, RN

## 2021-12-14 LAB
ANION GAP SERPL CALC-SCNC: 8 MMOL/L (ref 5–15)
BASOPHILS # BLD: 0 K/UL (ref 0–0.1)
BASOPHILS NFR BLD: 0 % (ref 0–1)
BUN SERPL-MCNC: 23 MG/DL (ref 6–20)
BUN/CREAT SERPL: 14 (ref 12–20)
CALCIUM SERPL-MCNC: 10.1 MG/DL (ref 8.5–10.1)
CHLORIDE SERPL-SCNC: 104 MMOL/L (ref 97–108)
CO2 SERPL-SCNC: 25 MMOL/L (ref 21–32)
CREAT SERPL-MCNC: 1.65 MG/DL (ref 0.7–1.3)
DIFFERENTIAL METHOD BLD: ABNORMAL
EOSINOPHIL # BLD: 0.1 K/UL (ref 0–0.4)
EOSINOPHIL NFR BLD: 1 % (ref 0–7)
ERYTHROCYTE [DISTWIDTH] IN BLOOD BY AUTOMATED COUNT: 15.2 % (ref 11.5–14.5)
GLUCOSE BLD STRIP.AUTO-MCNC: 109 MG/DL (ref 65–117)
GLUCOSE BLD STRIP.AUTO-MCNC: 114 MG/DL (ref 65–117)
GLUCOSE BLD STRIP.AUTO-MCNC: 119 MG/DL (ref 65–117)
GLUCOSE BLD STRIP.AUTO-MCNC: 133 MG/DL (ref 65–117)
GLUCOSE SERPL-MCNC: 120 MG/DL (ref 65–100)
HCT VFR BLD AUTO: 39.2 % (ref 36.6–50.3)
HGB BLD-MCNC: 13.3 G/DL (ref 12.1–17)
IMM GRANULOCYTES # BLD AUTO: 0.1 K/UL (ref 0–0.04)
IMM GRANULOCYTES NFR BLD AUTO: 1 % (ref 0–0.5)
LYMPHOCYTES # BLD: 1 K/UL (ref 0.8–3.5)
LYMPHOCYTES NFR BLD: 11 % (ref 12–49)
MCH RBC QN AUTO: 30.9 PG (ref 26–34)
MCHC RBC AUTO-ENTMCNC: 33.9 G/DL (ref 30–36.5)
MCV RBC AUTO: 91.2 FL (ref 80–99)
MONOCYTES # BLD: 0.8 K/UL (ref 0–1)
MONOCYTES NFR BLD: 9 % (ref 5–13)
NEUTS SEG # BLD: 7 K/UL (ref 1.8–8)
NEUTS SEG NFR BLD: 78 % (ref 32–75)
NRBC # BLD: 0 K/UL (ref 0–0.01)
NRBC BLD-RTO: 0 PER 100 WBC
PLATELET # BLD AUTO: 253 K/UL (ref 150–400)
PMV BLD AUTO: 11.1 FL (ref 8.9–12.9)
POTASSIUM SERPL-SCNC: 3.5 MMOL/L (ref 3.5–5.1)
RBC # BLD AUTO: 4.3 M/UL (ref 4.1–5.7)
SERVICE CMNT-IMP: ABNORMAL
SERVICE CMNT-IMP: ABNORMAL
SERVICE CMNT-IMP: NORMAL
SERVICE CMNT-IMP: NORMAL
SODIUM SERPL-SCNC: 137 MMOL/L (ref 136–145)
WBC # BLD AUTO: 8.9 K/UL (ref 4.1–11.1)

## 2021-12-14 PROCEDURE — 65270000029 HC RM PRIVATE

## 2021-12-14 PROCEDURE — 74011250636 HC RX REV CODE- 250/636: Performed by: PSYCHIATRY & NEUROLOGY

## 2021-12-14 PROCEDURE — 80048 BASIC METABOLIC PNL TOTAL CA: CPT

## 2021-12-14 PROCEDURE — 82962 GLUCOSE BLOOD TEST: CPT

## 2021-12-14 PROCEDURE — 2709999900 HC NON-CHARGEABLE SUPPLY

## 2021-12-14 PROCEDURE — 85025 COMPLETE CBC W/AUTO DIFF WBC: CPT

## 2021-12-14 PROCEDURE — 74011000250 HC RX REV CODE- 250: Performed by: STUDENT IN AN ORGANIZED HEALTH CARE EDUCATION/TRAINING PROGRAM

## 2021-12-14 PROCEDURE — 74011250637 HC RX REV CODE- 250/637: Performed by: STUDENT IN AN ORGANIZED HEALTH CARE EDUCATION/TRAINING PROGRAM

## 2021-12-14 PROCEDURE — 74011000250 HC RX REV CODE- 250: Performed by: INTERNAL MEDICINE

## 2021-12-14 PROCEDURE — 36415 COLL VENOUS BLD VENIPUNCTURE: CPT

## 2021-12-14 RX ORDER — SODIUM CHLORIDE 9 MG/ML
75 INJECTION, SOLUTION INTRAVENOUS CONTINUOUS
Status: DISCONTINUED | OUTPATIENT
Start: 2021-12-14 | End: 2021-12-17

## 2021-12-14 RX ADMIN — Medication 10 ML: at 05:33

## 2021-12-14 RX ADMIN — METOPROLOL TARTRATE 2.5 MG: 1 INJECTION, SOLUTION INTRAVENOUS at 17:56

## 2021-12-14 RX ADMIN — METOPROLOL TARTRATE 2.5 MG: 1 INJECTION, SOLUTION INTRAVENOUS at 04:06

## 2021-12-14 RX ADMIN — CLOPIDOGREL BISULFATE 75 MG: 75 TABLET ORAL at 09:34

## 2021-12-14 RX ADMIN — Medication 10 ML: at 23:14

## 2021-12-14 RX ADMIN — ASPIRIN 81 MG CHEWABLE TABLET 81 MG: 81 TABLET CHEWABLE at 09:33

## 2021-12-14 RX ADMIN — METOPROLOL TARTRATE 2.5 MG: 1 INJECTION, SOLUTION INTRAVENOUS at 23:01

## 2021-12-14 RX ADMIN — LATANOPROST 1 DROP: 50 SOLUTION OPHTHALMIC at 09:34

## 2021-12-14 RX ADMIN — RANOLAZINE 500 MG: 500 TABLET, FILM COATED, EXTENDED RELEASE ORAL at 09:33

## 2021-12-14 RX ADMIN — ENOXAPARIN SODIUM 40 MG: 100 INJECTION SUBCUTANEOUS at 23:01

## 2021-12-14 RX ADMIN — QUETIAPINE FUMARATE 25 MG: 25 TABLET ORAL at 23:02

## 2021-12-14 RX ADMIN — ENOXAPARIN SODIUM 40 MG: 100 INJECTION SUBCUTANEOUS at 09:33

## 2021-12-14 RX ADMIN — CYANOCOBALAMIN TAB 500 MCG 1000 MCG: 500 TAB at 09:33

## 2021-12-14 RX ADMIN — ROSUVASTATIN 40 MG: 20 TABLET, FILM COATED ORAL at 09:33

## 2021-12-14 NOTE — PROGRESS NOTES
Problem: Non-Violent Restraints  Goal: Removal from restraints as soon as assessed to be safe  Outcome: Progressing Towards Goal  Goal: No harm/injury to patient while restraints in use  Outcome: Progressing Towards Goal  Goal: Patient's dignity will be maintained  Outcome: Progressing Towards Goal  Goal: Patient Interventions  Outcome: Progressing Towards Goal     Problem: Falls - Risk of  Goal: *Absence of Falls  Description: Document Jodie Roman Fall Risk and appropriate interventions in the flowsheet. Outcome: Progressing Towards Goal  Note: Fall Risk Interventions:  Mobility Interventions: Bed/chair exit alarm, Communicate number of staff needed for ambulation/transfer, OT consult for ADLs, Patient to call before getting OOB, PT Consult for mobility concerns, PT Consult for assist device competence, Strengthening exercises (ROM-active/passive), Utilize walker, cane, or other assistive device, Utilize gait belt for transfers/ambulation    Mentation Interventions: Adequate sleep, hydration, pain control, Bed/chair exit alarm, Family/sitter at bedside, More frequent rounding, Reorient patient, Room close to nurse's station, Toileting rounds, Update white board    Medication Interventions: Bed/chair exit alarm, Patient to call before getting OOB, Teach patient to arise slowly    Elimination Interventions: Bed/chair exit alarm, Call light in reach, Patient to call for help with toileting needs, Stay With Me (per policy), Toilet paper/wipes in reach, Toileting schedule/hourly rounds, Urinal in reach              Problem: Patient Education: Go to Patient Education Activity  Goal: Patient/Family Education  Outcome: Progressing Towards Goal     Problem: Risk for Spread of Infection  Goal: Prevent transmission of infectious organism to others  Description: Prevent the transmission of infectious organisms to other patients, staff members, and visitors.   Outcome: Progressing Towards Goal     Problem: Patient Education:  Go to Education Activity  Goal: Patient/Family Education  Outcome: Progressing Towards Goal     Problem: Pressure Injury - Risk of  Goal: *Prevention of pressure injury  Description: Document Alexys Scale and appropriate interventions in the flowsheet.   Outcome: Progressing Towards Goal  Note: Pressure Injury Interventions:  Sensory Interventions: Assess changes in LOC, Assess need for specialty bed, Check visual cues for pain, Discuss PT/OT consult with provider, Float heels, Keep linens dry and wrinkle-free, Maintain/enhance activity level, Minimize linen layers, Pressure redistribution bed/mattress (bed type)    Moisture Interventions: Absorbent underpads, Apply protective barrier, creams and emollients, Assess need for specialty bed, Check for incontinence Q2 hours and as needed, Internal/External urinary devices, Maintain skin hydration (lotion/cream), Minimize layers, Moisture barrier, Offer toileting Q_hr    Activity Interventions: Assess need for specialty bed, Increase time out of bed, Pressure redistribution bed/mattress(bed type), PT/OT evaluation    Mobility Interventions: Assess need for specialty bed, Float heels, HOB 30 degrees or less, Pressure redistribution bed/mattress (bed type), PT/OT evaluation    Nutrition Interventions: Document food/fluid/supplement intake, Offer support with meals,snacks and hydration, Discuss nutritional consult with provider    Friction and Shear Interventions: Apply protective barrier, creams and emollients, HOB 30 degrees or less, Lift sheet, Lift team/patient mobility team, Minimize layers                Problem: Patient Education: Go to Patient Education Activity  Goal: Patient/Family Education  Outcome: Progressing Towards Goal     Problem: Airway Clearance - Ineffective  Goal: Achieve or maintain patent airway  Outcome: Progressing Towards Goal     Problem: Gas Exchange - Impaired  Goal: Absence of hypoxia  Outcome: Progressing Towards Goal  Goal: Promote optimal lung function  Outcome: Progressing Towards Goal     Problem: Breathing Pattern - Ineffective  Goal: Ability to achieve and maintain a regular respiratory rate  Outcome: Progressing Towards Goal     Problem:  Body Temperature -  Risk of, Imbalanced  Goal: Ability to maintain a body temperature within defined limits  Outcome: Progressing Towards Goal  Goal: Will regain or maintain usual level of consciousness  Outcome: Progressing Towards Goal  Goal: Complications related to the disease process, condition or treatment will be avoided or minimized  Outcome: Progressing Towards Goal     Problem: Isolation Precautions - Risk of Spread of Infection  Goal: Prevent transmission of infectious organism to others  Outcome: Progressing Towards Goal     Problem: Nutrition Deficits  Goal: Optimize nutrtional status  Outcome: Progressing Towards Goal     Problem: Risk for Fluid Volume Deficit  Goal: Maintain normal heart rhythm  Outcome: Progressing Towards Goal  Goal: Maintain absence of muscle cramping  Outcome: Progressing Towards Goal  Goal: Maintain normal serum potassium, sodium, calcium, phosphorus, and pH  Outcome: Progressing Towards Goal     Problem: Loneliness or Risk for Loneliness  Goal: Demonstrate positive use of time alone when socialization is not possible  Outcome: Progressing Towards Goal     Problem: Fatigue  Goal: Verbalize increase energy and improved vitality  Outcome: Progressing Towards Goal     Problem: Patient Education: Go to Patient Education Activity  Goal: Patient/Family Education  Outcome: Progressing Towards Goal

## 2021-12-14 NOTE — PROGRESS NOTES
Problem: Non-Violent Restraints  Goal: No harm/injury to patient while restraints in use  Outcome: Progressing Towards Goal  Goal: Patient's dignity will be maintained  Outcome: Progressing Towards Goal  Goal: Patient Interventions  Outcome: Progressing Towards Goal     Problem: Violent Restraints  Goal: No harm/injury to patient while restraints in use  Outcome: Progressing Towards Goal  Goal: Patient's dignity will be maintained  Outcome: Progressing Towards Goal     Problem: Patient Education: Go to Patient Education Activity  Goal: Patient/Family Education  Outcome: Progressing Towards Goal     Problem: Risk for Spread of Infection  Goal: Prevent transmission of infectious organism to others  Description: Prevent the transmission of infectious organisms to other patients, staff members, and visitors. Outcome: Progressing Towards Goal     Problem: Patient Education:  Go to Education Activity  Goal: Patient/Family Education  Outcome: Progressing Towards Goal     Problem: Patient Education: Go to Patient Education Activity  Goal: Patient/Family Education  Outcome: Progressing Towards Goal     Problem: Airway Clearance - Ineffective  Goal: Achieve or maintain patent airway  Outcome: Progressing Towards Goal     Problem: Gas Exchange - Impaired  Goal: Absence of hypoxia  Outcome: Progressing Towards Goal  Goal: Promote optimal lung function  Outcome: Progressing Towards Goal     Problem: Breathing Pattern - Ineffective  Goal: Ability to achieve and maintain a regular respiratory rate  Outcome: Progressing Towards Goal     Problem:  Body Temperature -  Risk of, Imbalanced  Goal: Ability to maintain a body temperature within defined limits  Outcome: Progressing Towards Goal  Goal: Will regain or maintain usual level of consciousness  Outcome: Progressing Towards Goal  Goal: Complications related to the disease process, condition or treatment will be avoided or minimized  Outcome: Progressing Towards Goal     Problem: Isolation Precautions - Risk of Spread of Infection  Goal: Prevent transmission of infectious organism to others  Outcome: Progressing Towards Goal     Problem: Nutrition Deficits  Goal: Optimize nutrtional status  Outcome: Progressing Towards Goal     Problem: Risk for Fluid Volume Deficit  Goal: Maintain normal heart rhythm  Outcome: Progressing Towards Goal  Goal: Maintain absence of muscle cramping  Outcome: Progressing Towards Goal  Goal: Maintain normal serum potassium, sodium, calcium, phosphorus, and pH  Outcome: Progressing Towards Goal     Problem: Loneliness or Risk for Loneliness  Goal: Demonstrate positive use of time alone when socialization is not possible  Outcome: Progressing Towards Goal     Problem: Fatigue  Goal: Verbalize increase energy and improved vitality  Outcome: Progressing Towards Goal     Problem: Patient Education: Go to Patient Education Activity  Goal: Patient/Family Education  Outcome: Progressing Towards Goal     Problem: Patient Education: Go to Patient Education Activity  Goal: Patient/Family Education  Outcome: Progressing Towards Goal     Problem: Patient Education: Go to Patient Education Activity  Goal: Patient/Family Education  Outcome: Progressing Towards Goal

## 2021-12-14 NOTE — PROGRESS NOTES
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 When there was acute altered mental status/confusion and shortness of breath. Per chart review patient was alert and oriented x4 in the ED upon arrival at OSH but later became confused and agitated. -Progressive acute encephalopathy since 23  -Etiology remains unclear at this time but high suspicion for COVID encephalopathy. COVID PCR +ve  -CT head negative, Serum alcohol <10, Urine drug screening negative. -EEG was completed and showed only mild slowing and no seizures  -Carotid dopplers with no significant stenosis   -MRI and MRA with no acute abnormalities reported  -S/p LP on : No WBCs, no RBC, slightly elevated protein, normal glucose. CSF analysis also negative for crypto, Lyme, VDRL, WNV IgM, and enterovirus. Meningitis panel was also negative. Hsv Ab +ve but PCR is negative. -WNV IgG +ve (with negative IgM) suggesting either past infection or false +ve /cross reactivity.   -Neurology was on board, appreciate input  -Remains confused and agitated  -C/w 1:1 supervision for now   -Continue Seroquel at night. Will increase dose if no response.      COVID-19 disease  -Continue to be on room air  -COVID-19 PCR positive  -Chest x-ray with no evidence of acute cardiopulmonary process  -No indication for dexamethasone or remdesivir given patient is not hypoxic   -Patient is vaccinated against COVID-19     Paroxysmal AF  Challenges with p.o. intake  On IV metoprolol. BP low. We will put holding parameters for metoprolol. Will start IV fluid.   Not on AC d/t hx of GI bleed, was recently seen by Dr. Kasia Dudley was discussed which he was not interested     Acute on chronic kidney disease:   Baseline creatinine 1.8-2  Creatinine at baseline     CAD s/p CABG  Aspirin, statin  Continue Plavix     DM:  Hold metformin  Sliding scale with hypoglycemia protocol  Monitor FS       Anticipated discharge 12/17  Barriers clinical improvement/requirement of telesitter and mittens     Code Status: Full  Surrogate Decision Maker: His wife     DVT Prophylaxis: lovenox  GI Prophylaxis: not indicated     Baseline: Ambulatory     Subjective:     Chief Complaint / Reason for Physician Visit  Remains agitated. Requiring hand mittens and lap belt. Review of Systems:  Symptom Y/N Comments  Symptom Y/N Comments   Fever/Chills    Chest Pain     Poor Appetite    Edema     Cough    Abdominal Pain     Sputum    Joint Pain     SOB/MCCOLLUM    Pruritis/Rash     Nausea/vomit    Tolerating PT/OT     Diarrhea    Tolerating Diet     Constipation    Other       Could NOT obtain due to: ams     Objective:     VITALS:   Last 24hrs VS reviewed since prior progress note. Most recent are:  Patient Vitals for the past 24 hrs:   Temp Pulse Resp BP SpO2   12/14/21 0406 -- 62 -- 136/85 --   12/14/21 0318 98.3 °F (36.8 °C) 96 17 114/68 96 %   12/13/21 2318 -- -- -- 108/78 --   12/13/21 2300 97.9 °F (36.6 °C) 80 18 (!) 89/67 96 %   12/13/21 2257 -- 81 -- (!) 85/61 97 %   12/13/21 2112 -- (!) 106 -- (!) 111/95 --   12/13/21 1949 97.9 °F (36.6 °C) 94 16 111/75 95 %   12/13/21 1443 98 °F (36.7 °C) (!) 101 18 101/79 94 %   12/13/21 1215 97.9 °F (36.6 °C) (!) 103 18 133/73 92 %       Intake/Output Summary (Last 24 hours) at 12/14/2021 0805  Last data filed at 12/14/2021 8400  Gross per 24 hour   Intake 1117.5 ml   Output 375 ml   Net 742.5 ml        I had a face to face encounter and independently examined this patient on 12/14/2021, as outlined below:  PHYSICAL EXAM:  General: Somnolent, arousable, agitated  EENT:  EOMI. Anicteric sclerae. MMM  Resp:  CTA bilaterally, no wheezing or rales. No accessory muscle use  CV:  Regular  rhythm,  No edema  GI:  Soft, Non distended, Non tender.   +Bowel sounds  Neurologic: Awake, confused, moves all extremities, no obvious focal deficits   Psych:   deferred  Skin:  Erythematous papular rashes on the back. No jaundice    Reviewed most current lab test results and cultures  YES  Reviewed most current radiology test results   YES  Review and summation of old records today    NO  Reviewed patient's current orders and MAR    YES  PMH/SH reviewed - no change compared to H&P  ________________________________________________________________________  Care Plan discussed with:    Comments   Patient x    Family      RN x    Care Manager     Consultant                       x Multidiciplinary team rounds were held today with , nursing, pharmacist and clinical coordinator. Patient's plan of care was discussed; medications were reviewed and discharge planning was addressed. ________________________________________________________________________  Total NON critical care TIME:  35   Minutes    Total CRITICAL CARE TIME Spent:   Minutes non procedure based      Comments   >50% of visit spent in counseling and coordination of care x    ________________________________________________________________________  Rosalind Eason MD     Procedures: see electronic medical records for all procedures/Xrays and details which were not copied into this note but were reviewed prior to creation of Plan. LABS:  I reviewed today's most current labs and imaging studies.   Pertinent labs include:  Recent Labs     12/14/21  0444   WBC 8.9   HGB 13.3   HCT 39.2        Recent Labs     12/14/21  0444 12/12/21  1625    131*   K 3.5 3.3*    99   CO2 25 23   * 313*   BUN 23* 23*   CREA 1.65* 1.61*   CA 10.1 8.9       Signed: Rosalind Eason MD

## 2021-12-14 NOTE — PROGRESS NOTES
End of Shift Note    Bedside shift change report given to Vikram Parmar (oncoming nurse) by Linda Youssef (offgoing nurse). Report included the following information SBAR and Kardex    Shift worked:  4520-1566     Shift summary and any significant changes:     Patient remained confused and agitated throughout the shift. He continued to pull at lines and attempt to get out of bed. He was unable to be redirected. Concerns for physician to address:  See above     Zone phone for oncoming shift:          Activity:  Activity Level: Bed Rest  Number times ambulated in hallways past shift: 0  Number of times OOB to chair past shift: 0    Cardiac:   Cardiac Monitoring: Yes      Cardiac Rhythm: Atrial Fib    Access:   Current line(s): PIV     Genitourinary:   Urinary status: voiding, incontinent and external catheter    Respiratory:   O2 Device: None (Room air)  Chronic home O2 use?: NO  Incentive spirometer at bedside: YES     GI:  Last Bowel Movement Date: 12/12/21  Current diet:  DIET ONE TIME MESSAGE  ADULT DIET Regular; 5 carb choices (75 gm/meal); Low Fat/Low Chol/High Fiber/NICOLE  ADULT ORAL NUTRITION SUPPLEMENT Dinner; Low Calorie/High Protein  Passing flatus: YES  Tolerating current diet: YES       Pain Management:   Patient states pain is manageable on current regimen: YES    Skin:  Alexys Score: 12  Interventions: speciality bed, increase time out of bed, PT/OT consult, internal/external urinary devices and nutritional support     Patient Safety:  Fall Score:  Total Score: 3  Interventions: bed/chair alarm, assistive device (walker, cane, etc), gripper socks, pt to call before getting OOB, stay with me (per policy) and tele sitter   High Fall Risk: Yes    Length of Stay:  Expected LOS: 3d 12h  Actual LOS: [address]

## 2021-12-14 NOTE — PROGRESS NOTES
Comprehensive Nutrition Assessment    Type and Reason for Visit: Reassess    Nutrition Recommendations/Plan:  Continue current diet and supplement  Document %PO intake of meals in flowsheets     Nutrition Assessment:     Chart reviewed; medically noted for COVID encephalopathy and DM. Continues with AMS and agitation per notes. No new weight. No documented PO intake per flowsheets. Receiving ensure high protein daily for supplementation. BG under control. Continue current nutrition plan of care. Document %PO intake of meals in flowsheets. Estimated Daily Nutrient Needs:  Energy (kcal): 2096 kcal (BMR 1843 x 1. 3AF -300kcal); Weight Used for Energy Requirements: Current  Protein (g): 87-109g (0.8-1.0 g/kg bw); Weight Used for Protein Requirements: Current  Fluid (ml/day): 2100 mL; Method Used for Fluid Requirements: 1 ml/kcal    Nutrition Related Findings:  [phone]  BM 12/12  Plavix, Vitamin B12, Humalog, Lopressor, Seroquel, Ranexa, Rosuvastatin       Wounds:    None       Current Nutrition Therapies:  DIET ONE TIME MESSAGE  ADULT DIET Regular; 5 carb choices (75 gm/meal); Low Fat/Low Chol/High Fiber/NICOLE  ADULT ORAL NUTRITION SUPPLEMENT Dinner; Low Calorie/High Protein    Anthropometric Measures:  · Height:  6' (182.9 cm)  · Current Body Wt:  108.9 kg (240 lb 1.3 oz)    · BMI Category:  Obese class 1 (BMI 30.0-34. 9)       Nutrition Diagnosis:   No nutrition diagnosis at this time     Nutrition Interventions:   Food and/or Nutrient Delivery: Continue current diet, Continue oral nutrition supplement  Nutrition Education and Counseling: No recommendations at this time  Coordination of Nutrition Care: No recommendation at this time    Goals:  PO intake >70% of meals/ONS next 3-5 days       Nutrition Monitoring and Evaluation:   Behavioral-Environmental Outcomes: None identified  Food/Nutrient Intake Outcomes: Food and nutrient intake, Supplement intake  Physical Signs/Symptoms Outcomes: Biochemical data, Weight    Discharge Planning:    Continue current diet, Continue oral nutrition supplement     Electronically signed by Bianca Wilkins RD on 12/14/2021 at 11:51 AM    Contact: ext [phone]

## 2021-12-15 LAB
GLUCOSE BLD STRIP.AUTO-MCNC: 110 MG/DL (ref 65–117)
GLUCOSE BLD STRIP.AUTO-MCNC: 113 MG/DL (ref 65–117)
GLUCOSE BLD STRIP.AUTO-MCNC: 127 MG/DL (ref 65–117)
GLUCOSE BLD STRIP.AUTO-MCNC: 97 MG/DL (ref 65–117)
SERVICE CMNT-IMP: ABNORMAL
SERVICE CMNT-IMP: NORMAL

## 2021-12-15 PROCEDURE — 74011250637 HC RX REV CODE- 250/637: Performed by: STUDENT IN AN ORGANIZED HEALTH CARE EDUCATION/TRAINING PROGRAM

## 2021-12-15 PROCEDURE — 65270000029 HC RM PRIVATE

## 2021-12-15 PROCEDURE — 74011250637 HC RX REV CODE- 250/637: Performed by: INTERNAL MEDICINE

## 2021-12-15 PROCEDURE — 74011250636 HC RX REV CODE- 250/636: Performed by: STUDENT IN AN ORGANIZED HEALTH CARE EDUCATION/TRAINING PROGRAM

## 2021-12-15 PROCEDURE — 74011250636 HC RX REV CODE- 250/636: Performed by: PSYCHIATRY & NEUROLOGY

## 2021-12-15 PROCEDURE — 82962 GLUCOSE BLOOD TEST: CPT

## 2021-12-15 PROCEDURE — 74011000250 HC RX REV CODE- 250: Performed by: STUDENT IN AN ORGANIZED HEALTH CARE EDUCATION/TRAINING PROGRAM

## 2021-12-15 RX ORDER — QUETIAPINE FUMARATE 25 MG/1
25 TABLET, FILM COATED ORAL 2 TIMES DAILY
Status: DISCONTINUED | OUTPATIENT
Start: 2021-12-15 | End: 2021-12-17

## 2021-12-15 RX ADMIN — ENOXAPARIN SODIUM 40 MG: 100 INJECTION SUBCUTANEOUS at 20:26

## 2021-12-15 RX ADMIN — SODIUM CHLORIDE 75 ML/HR: 9 INJECTION, SOLUTION INTRAVENOUS at 20:32

## 2021-12-15 RX ADMIN — QUETIAPINE FUMARATE 25 MG: 25 TABLET ORAL at 18:13

## 2021-12-15 RX ADMIN — ENOXAPARIN SODIUM 40 MG: 100 INJECTION SUBCUTANEOUS at 10:21

## 2021-12-15 RX ADMIN — CYANOCOBALAMIN TAB 500 MCG 1000 MCG: 500 TAB at 10:21

## 2021-12-15 RX ADMIN — LATANOPROST 1 DROP: 50 SOLUTION OPHTHALMIC at 10:21

## 2021-12-15 RX ADMIN — ROSUVASTATIN 40 MG: 20 TABLET, FILM COATED ORAL at 10:21

## 2021-12-15 RX ADMIN — CLOPIDOGREL BISULFATE 75 MG: 75 TABLET ORAL at 10:21

## 2021-12-15 RX ADMIN — METOPROLOL TARTRATE 2.5 MG: 1 INJECTION, SOLUTION INTRAVENOUS at 18:13

## 2021-12-15 RX ADMIN — RANOLAZINE 500 MG: 500 TABLET, FILM COATED, EXTENDED RELEASE ORAL at 18:13

## 2021-12-15 RX ADMIN — Medication 10 ML: at 21:53

## 2021-12-15 RX ADMIN — METOPROLOL TARTRATE 2.5 MG: 1 INJECTION, SOLUTION INTRAVENOUS at 10:21

## 2021-12-15 RX ADMIN — METOPROLOL TARTRATE 2.5 MG: 1 INJECTION, SOLUTION INTRAVENOUS at 03:44

## 2021-12-15 RX ADMIN — RANOLAZINE 500 MG: 500 TABLET, FILM COATED, EXTENDED RELEASE ORAL at 10:21

## 2021-12-15 RX ADMIN — QUETIAPINE FUMARATE 25 MG: 25 TABLET ORAL at 10:27

## 2021-12-15 RX ADMIN — ASPIRIN 81 MG CHEWABLE TABLET 81 MG: 81 TABLET CHEWABLE at 10:21

## 2021-12-15 NOTE — PROGRESS NOTES
Problem: Non-Violent Restraints  Goal: No harm/injury to patient while restraints in use  Outcome: Progressing Towards Goal  Goal: Patient's dignity will be maintained  Outcome: Progressing Towards Goal  Goal: Patient Interventions  Outcome: Progressing Towards Goal     Problem: Violent Restraints  Goal: No harm/injury to patient while restraints in use  Outcome: Progressing Towards Goal  Goal: Patient's dignity will be maintained  Outcome: Progressing Towards Goal     Problem: Patient Education: Go to Patient Education Activity  Goal: Patient/Family Education  Outcome: Progressing Towards Goal     Problem: Risk for Spread of Infection  Goal: Prevent transmission of infectious organism to others  Description: Prevent the transmission of infectious organisms to other patients, staff members, and visitors. Outcome: Progressing Towards Goal     Problem: Patient Education:  Go to Education Activity  Goal: Patient/Family Education  Outcome: Progressing Towards Goal     Problem: Pressure Injury - Risk of  Goal: *Prevention of pressure injury  Description: Document Alexys Scale and appropriate interventions in the flowsheet. Outcome: Progressing Towards Goal     Problem: Patient Education: Go to Patient Education Activity  Goal: Patient/Family Education  Outcome: Progressing Towards Goal     Problem: Airway Clearance - Ineffective  Goal: Achieve or maintain patent airway  Outcome: Progressing Towards Goal     Problem: Gas Exchange - Impaired  Goal: Absence of hypoxia  Outcome: Progressing Towards Goal  Goal: Promote optimal lung function  Outcome: Progressing Towards Goal     Problem: Breathing Pattern - Ineffective  Goal: Ability to achieve and maintain a regular respiratory rate  Outcome: Progressing Towards Goal     Problem:  Body Temperature -  Risk of, Imbalanced  Goal: Ability to maintain a body temperature within defined limits  Outcome: Progressing Towards Goal  Goal: Will regain or maintain usual level of consciousness  Outcome: Progressing Towards Goal  Goal: Complications related to the disease process, condition or treatment will be avoided or minimized  Outcome: Progressing Towards Goal     Problem: Isolation Precautions - Risk of Spread of Infection  Goal: Prevent transmission of infectious organism to others  Outcome: Progressing Towards Goal     Problem: Nutrition Deficits  Goal: Optimize nutrtional status  Outcome: Progressing Towards Goal     Problem: Risk for Fluid Volume Deficit  Goal: Maintain normal heart rhythm  Outcome: Progressing Towards Goal  Goal: Maintain absence of muscle cramping  Outcome: Progressing Towards Goal  Goal: Maintain normal serum potassium, sodium, calcium, phosphorus, and pH  Outcome: Progressing Towards Goal     Problem: Loneliness or Risk for Loneliness  Goal: Demonstrate positive use of time alone when socialization is not possible  Outcome: Progressing Towards Goal     Problem: Fatigue  Goal: Verbalize increase energy and improved vitality  Outcome: Progressing Towards Goal     Problem: Patient Education: Go to Patient Education Activity  Goal: Patient/Family Education  Outcome: Progressing Towards Goal     Problem: Patient Education: Go to Patient Education Activity  Goal: Patient/Family Education  Outcome: Progressing Towards Goal     Problem: Patient Education: Go to Patient Education Activity  Goal: Patient/Family Education  Outcome: Progressing Towards Goal

## 2021-12-15 NOTE — PROGRESS NOTES
Hospitalist Progress Note    NAME: Park Gagnon   :  1940   MRN:  [id number]       Assessment / Plan:  Acute metabolic encephalopathy POA, unknown etiology   most likely secondary to Covid encephalopathy  -Transferred from OSH. When there was acute altered mental status/confusion and shortness of breath. Per chart review patient was alert and oriented x4 in the ED upon arrival at OSH but later became confused and agitated. -Progressive acute encephalopathy since 21  -Etiology remains unclear at this time but high suspicion for COVID encephalopathy. COVID PCR +ve  -CT head negative, Serum alcohol <10, Urine drug screening negative. -EEG was completed and showed only mild slowing and no seizures  -Carotid dopplers with no significant stenosis   -MRI and MRA with no acute abnormalities re  -S/p LP on : No WBCs, no RBC, slightly elevated protein, normal glucose. CSF analysis also negative for crypto, Lyme, VDRL, WNV IgM, HSV and enterovirus. Meningitis panel was also negative. -WNV IgG +ve (with negative IgM) suggesting either past infection or false +ve /cross reactivity.   -Neurology was on board, appreciate input  -Remains confused and agitated  -C/w 1:1 supervision for now   -Continue Seroquel at night, add daytime dose  - SNF at discharge     COVID-19 disease  -Continue to be on room air  -COVID-19 PCR positive  -Chest x-ray with no evidence of acute cardiopulmonary process  -No indication for dexamethasone or remdesivir given patient is not hypoxic   -Patient is vaccinated against COVID-19     Paroxysmal AF  Challenges with p.o. intake  On IV metoprolol. BP low. We will put holding parameters for metoprolol. Will start IV fluid.   Not on AC d/t hx of GI bleed, was recently seen by Dr. Melinda Mora was discussed which he was not interested     Acute on chronic kidney disease:   Baseline creatinine 1.8-2  Creatinine at baseline     CAD s/p CABG  Aspirin, statin  Continue Plavix     DM:  Hold metformin  Sliding scale with hypoglycemia protocol  Monitor FS       Anticipated discharge 12/17  Barriers clinical improvement/requirement of telesitter and mittens     Code Status: Full  Surrogate Decision Maker: His wife     DVT Prophylaxis: lovenox  GI Prophylaxis: not indicated     Baseline: Ambulatory     Subjective:     Chief Complaint / Reason for Physician Visit  Alert, talking, oriented x 2  Requiring hand mittens, tele sitter in room  Denied complaints    Review of Systems:  Symptom Y/N Comments  Symptom Y/N Comments   Fever/Chills n   Chest Pain n    Poor Appetite    Edema     Cough n   Abdominal Pain n    Sputum    Joint Pain     SOB/MCCOLLUM n   Pruritis/Rash     Nausea/vomit n   Tolerating PT/OT     Diarrhea n   Tolerating Diet n    Constipation    Other       Could NOT obtain due to:      Objective:     VITALS:   Last 24hrs VS reviewed since prior progress note. Most recent are:  Patient Vitals for the past 24 hrs:   Temp Pulse Resp BP SpO2   12/15/21 1104 98.5 °F (36.9 °C) 75 20 95/71 94 %   12/15/21 0806 98.1 °F (36.7 °C) (!) 110 20 114/74 96 %   12/15/21 0342 97.4 °F (36.3 °C) 87 20 122/69 --   12/14/21 2301 97.8 °F (36.6 °C) 86 20 138/71 96 %   12/14/21 1917 97.9 °F (36.6 °C) 86 16 109/69 95 %   12/14/21 1619 97.9 °F (36.6 °C) 74 16 106/65 --     No intake or output data in the 24 hours ending 12/15/21 1401     I had a face to face encounter and independently examined this patient on 12/15/2021, as outlined below:  PHYSICAL EXAM:  General: Somnolent, arousable,talking a bit, not agitated  EENT:  Anicteric sclerae. MMM  Resp:  CTA bilaterally, no wheezing or rales. No accessory muscle use  CV:  Regular  rhythm,  No edema  GI:  Soft, Non distended, Non tender.   +Bowel sounds  Neurologic:  Awake, moves all extremities, no obvious focal deficits                           Told me his name, knew year was 2021                          Place \" that place where people live\"  Psych: deferred  Skin:  No jaundice    Reviewed most current lab test results and cultures  YES  Reviewed most current radiology test results   YES  Review and summation of old records today    NO  Reviewed patient's current orders and MAR    YES  PMH/SH reviewed - no change compared to H&P  ________________________________________________________________________  Care Plan discussed with:    Comments   Patient x    Family      RN x    Care Manager     Consultant                       x Multidiciplinary team rounds were held today with , nursing, pharmacist and clinical coordinator. Patient's plan of care was discussed; medications were reviewed and discharge planning was addressed. ________________________________________________________________________  Total NON critical care TIME:  35   Minutes    Total CRITICAL CARE TIME Spent:   Minutes non procedure based      Comments   >50% of visit spent in counseling and coordination of care x    ________________________________________________________________________  Poornima Roberson MD     Procedures: see electronic medical records for all procedures/Xrays and details which were not copied into this note but were reviewed prior to creation of Plan. LABS:  I reviewed today's most current labs and imaging studies.   Pertinent labs include:  Recent Labs     12/14/21  0444   WBC 8.9   HGB 13.3   HCT 39.2        Recent Labs     12/14/21  0444 12/12/21  1625    131*   K 3.5 3.3*    99   CO2 25 23   * 313*   BUN 23* 23*   CREA 1.65* 1.61*   CA 10.1 8.9       Signed: Poornima Roberson MD

## 2021-12-15 NOTE — PROGRESS NOTES
Spiritual Care Assessment/Progress Note  [facility]      NAME: Jennett Gosselin      MRN: [id number]  AGE: 80 y.o. SEX: male  Pentecostalism Affiliation: No Protestant   Language: English     12/15/2021     Total Time (in minutes): 14     Spiritual Assessment begun in [facility] 2 MED TELE through conversation with:         []Patient        [x] Family    [] Friend(s)        Reason for Consult: Initial/Spiritual assessment, patient floor     Spiritual beliefs: (Please include comment if needed)     [] Identifies with a victorina tradition:         [] Supported by a victorina community:            [x] Claims no spiritual orientation:           [] Seeking spiritual identity:                [] Adheres to an individual form of spirituality:           [] Not able to assess:                           Identified resources for coping:      [x] Prayer                               [] Music                  [] Guided Imagery     [x] Family/friends                 [] Pet visits     [] Devotional reading                         [] Unknown     [] Other:                                           Interventions offered during this visit: (See comments for more details)    Patient Interventions: Initial visit     Family/Friend(s):  Affirmation of emotions/emotional suffering,Affirmation of victorina,Catharsis/review of pertinent events in supportive environment,Iconic (affirming the presence of God/Higher Power),Normalization of emotional/spiritual concerns,Prayer (assurance of),Initial Assessment,Pentecostalism beliefs/image of God discussed     Plan of Care:     [x] Support spiritual and/or cultural needs    [] Support AMD and/or advance care planning process      [] Support grieving process   [] Coordinate Rites and/or Rituals    [] Coordination with community clergy   [] No spiritual needs identified at this time   [] Detailed Plan of Care below (See Comments)  [] Make referral to Music Therapy  [] Make referral to Pet Therapy     [] Make referral to Addiction services  [] Make referral to [facility]  [] Make referral to Spiritual Care Partner  [] No future visits requested        [x] Contact Spiritual Care for further referrals     Comments:   Reviewed chart prior to visit on [facility] for spiritual assessment. No family/friends present. Chart indicated patient had some agitation today;  did not call into room. Telephoned patient's wife and offered supportive listening as she shared she is hanging on by a thread at times. She and her  have no family in the area, but are very supportive through phone calls. Mrs. Liliana Sagastume shared her neighbors have been wonderful. ...even neighbors she did not know before patient got ill. They do not consider themselves very spiritual, but Mrs. Liliana Sagastume indicated she believes in the man upstairs. She shared she has a neighbor who speaks to her about spiritual things and she appreciates it. She expressed appreciation for the people who are praying for her and her . Offered assurance of prayer and advised of ongoing availability of pastoral support.        SANDY Paula, [facility], Staff [address] [place] [address] Paging Service  [phone]

## 2021-12-16 LAB
ANION GAP SERPL CALC-SCNC: 9 MMOL/L (ref 5–15)
BASOPHILS # BLD: 0 K/UL (ref 0–0.1)
BASOPHILS NFR BLD: 0 % (ref 0–1)
BUN SERPL-MCNC: 31 MG/DL (ref 6–20)
BUN/CREAT SERPL: 18 (ref 12–20)
CALCIUM SERPL-MCNC: 9 MG/DL (ref 8.5–10.1)
CHLORIDE SERPL-SCNC: 109 MMOL/L (ref 97–108)
CO2 SERPL-SCNC: 22 MMOL/L (ref 21–32)
CREAT SERPL-MCNC: 1.7 MG/DL (ref 0.7–1.3)
DIFFERENTIAL METHOD BLD: ABNORMAL
EOSINOPHIL # BLD: 0.1 K/UL (ref 0–0.4)
EOSINOPHIL NFR BLD: 1 % (ref 0–7)
ERYTHROCYTE [DISTWIDTH] IN BLOOD BY AUTOMATED COUNT: 15.4 % (ref 11.5–14.5)
GLUCOSE BLD STRIP.AUTO-MCNC: 100 MG/DL (ref 65–117)
GLUCOSE BLD STRIP.AUTO-MCNC: 108 MG/DL (ref 65–117)
GLUCOSE BLD STRIP.AUTO-MCNC: 136 MG/DL (ref 65–117)
GLUCOSE BLD STRIP.AUTO-MCNC: 137 MG/DL (ref 65–117)
GLUCOSE SERPL-MCNC: 100 MG/DL (ref 65–100)
HCT VFR BLD AUTO: 38.7 % (ref 36.6–50.3)
HGB BLD-MCNC: 13 G/DL (ref 12.1–17)
IMM GRANULOCYTES # BLD AUTO: 0.1 K/UL (ref 0–0.04)
IMM GRANULOCYTES NFR BLD AUTO: 1 % (ref 0–0.5)
LYMPHOCYTES # BLD: 1 K/UL (ref 0.8–3.5)
LYMPHOCYTES NFR BLD: 14 % (ref 12–49)
MCH RBC QN AUTO: 31 PG (ref 26–34)
MCHC RBC AUTO-ENTMCNC: 33.6 G/DL (ref 30–36.5)
MCV RBC AUTO: 92.4 FL (ref 80–99)
MONOCYTES # BLD: 0.7 K/UL (ref 0–1)
MONOCYTES NFR BLD: 10 % (ref 5–13)
NEUTS SEG # BLD: 5.5 K/UL (ref 1.8–8)
NEUTS SEG NFR BLD: 74 % (ref 32–75)
NRBC # BLD: 0 K/UL (ref 0–0.01)
NRBC BLD-RTO: 0 PER 100 WBC
PLATELET # BLD AUTO: 237 K/UL (ref 150–400)
PMV BLD AUTO: 10.7 FL (ref 8.9–12.9)
POTASSIUM SERPL-SCNC: 3.9 MMOL/L (ref 3.5–5.1)
RBC # BLD AUTO: 4.19 M/UL (ref 4.1–5.7)
SERVICE CMNT-IMP: ABNORMAL
SERVICE CMNT-IMP: ABNORMAL
SERVICE CMNT-IMP: NORMAL
SERVICE CMNT-IMP: NORMAL
SODIUM SERPL-SCNC: 140 MMOL/L (ref 136–145)
WBC # BLD AUTO: 7.5 K/UL (ref 4.1–11.1)

## 2021-12-16 PROCEDURE — 74011250637 HC RX REV CODE- 250/637: Performed by: INTERNAL MEDICINE

## 2021-12-16 PROCEDURE — 36415 COLL VENOUS BLD VENIPUNCTURE: CPT

## 2021-12-16 PROCEDURE — 80048 BASIC METABOLIC PNL TOTAL CA: CPT

## 2021-12-16 PROCEDURE — 85025 COMPLETE CBC W/AUTO DIFF WBC: CPT

## 2021-12-16 PROCEDURE — 74011250637 HC RX REV CODE- 250/637: Performed by: STUDENT IN AN ORGANIZED HEALTH CARE EDUCATION/TRAINING PROGRAM

## 2021-12-16 PROCEDURE — 74011000250 HC RX REV CODE- 250: Performed by: STUDENT IN AN ORGANIZED HEALTH CARE EDUCATION/TRAINING PROGRAM

## 2021-12-16 PROCEDURE — 82962 GLUCOSE BLOOD TEST: CPT

## 2021-12-16 PROCEDURE — 65270000029 HC RM PRIVATE

## 2021-12-16 PROCEDURE — 74011250636 HC RX REV CODE- 250/636: Performed by: PSYCHIATRY & NEUROLOGY

## 2021-12-16 RX ADMIN — RANOLAZINE 500 MG: 500 TABLET, FILM COATED, EXTENDED RELEASE ORAL at 17:34

## 2021-12-16 RX ADMIN — METOPROLOL TARTRATE 2.5 MG: 1 INJECTION, SOLUTION INTRAVENOUS at 17:35

## 2021-12-16 RX ADMIN — LATANOPROST 1 DROP: 50 SOLUTION OPHTHALMIC at 09:10

## 2021-12-16 RX ADMIN — ENOXAPARIN SODIUM 40 MG: 100 INJECTION SUBCUTANEOUS at 21:31

## 2021-12-16 RX ADMIN — CLOPIDOGREL BISULFATE 75 MG: 75 TABLET ORAL at 09:08

## 2021-12-16 RX ADMIN — Medication 10 ML: at 05:24

## 2021-12-16 RX ADMIN — Medication 10 ML: at 21:31

## 2021-12-16 RX ADMIN — CYANOCOBALAMIN TAB 500 MCG 1000 MCG: 500 TAB at 09:09

## 2021-12-16 RX ADMIN — RANOLAZINE 500 MG: 500 TABLET, FILM COATED, EXTENDED RELEASE ORAL at 09:08

## 2021-12-16 RX ADMIN — ASPIRIN 81 MG CHEWABLE TABLET 81 MG: 81 TABLET CHEWABLE at 09:08

## 2021-12-16 RX ADMIN — METOPROLOL TARTRATE 2.5 MG: 1 INJECTION, SOLUTION INTRAVENOUS at 09:08

## 2021-12-16 RX ADMIN — QUETIAPINE FUMARATE 25 MG: 25 TABLET ORAL at 17:34

## 2021-12-16 RX ADMIN — QUETIAPINE FUMARATE 25 MG: 25 TABLET ORAL at 09:08

## 2021-12-16 RX ADMIN — ENOXAPARIN SODIUM 40 MG: 100 INJECTION SUBCUTANEOUS at 09:08

## 2021-12-16 RX ADMIN — ROSUVASTATIN 40 MG: 20 TABLET, FILM COATED ORAL at 09:09

## 2021-12-16 NOTE — PROGRESS NOTES
Problem: Non-Violent Restraints  Goal: Removal from restraints as soon as assessed to be safe  Outcome: Progressing Towards Goal     Problem: Falls - Risk of  Goal: *Absence of Falls  Description: Document Arthur Solorzano Fall Risk and appropriate interventions in the flowsheet. Outcome: Progressing Towards Goal  Note: Fall Risk Interventions:  Mobility Interventions: Bed/chair exit alarm,Assess mobility with egress test    Mentation Interventions: Bed/chair exit alarm,Reorient patient    Medication Interventions: Bed/chair exit alarm    Elimination Interventions: Bed/chair exit alarm,Call light in reach,Toileting schedule/hourly rounds              Problem: Patient Education: Go to Patient Education Activity  Goal: Patient/Family Education  Outcome: Progressing Towards Goal     Problem: Risk for Spread of Infection  Goal: Prevent transmission of infectious organism to others  Description: Prevent the transmission of infectious organisms to other patients, staff members, and visitors.   Outcome: Progressing Towards Goal

## 2021-12-16 NOTE — PROGRESS NOTES
Transition of Care Plan:     RUR:   14% \"mod risk\"  Disposition: SNF placement- [place] [address]  Follow up appointments: PCP (needs new pt appt) & specialists as indicate  DME needed: None  Transportation at [address] or means to access home:   No     IM Medicare Letter: Needed at discharge  Is patient a BCPI-A Bundle: No                 If yes, was Bundle Letter given?:  N/A  Is patient a [place] and connected with the [address] [place] [address] Road  If yes, was [place] transfer form completed and [facility] notified? N/A  Caregiver Contact: Wife- Mayda Doll, [phone]  Discharge Caregiver contacted prior to discharge? CM reviewed pt's chart. Discharge plan discussed during IDR. Pt remains in restraints. CM also received call from pt's wife Mitch Wesley, [phone]) who requested support for a notary service for pt to sign documents that will allow pt's POA with his finances. CM informed wife that notary services are not available & pt is not oriented to sign documents at this time. Wife believes pt is \"lucid & oriented\" but understands a notary is still not available. Will continue to follow.     Abhishek Hawkins, MSW  Care Management

## 2021-12-16 NOTE — PROGRESS NOTES
Hospitalist Progress Note    NAME: Dwayne Eng   :  1940   MRN:  [id number]       Assessment / Plan:  Acute metabolic encephalopathy POA, unknown etiology   most likely secondary to Covid encephalopathy OA  -Transferred from OSH. When there was acute altered mental status/confusion and shortness of breath. Per chart review patient was alert and oriented x4 in the ED upon arrival at OSH but later became confused and agitated. - Progressive acute encephalopathy since 23  - Etiology remains unclear at this time but high suspicion for COVID encephalopathy. - COVID PCR +ve  -CT head negative, Serum alcohol <10, Urine drug screening negative. - EEG was completed and showed only mild slowing and no seizures  - Carotid dopplers with no significant stenosis   - MRI and MRA with no acute abnormalities re  - S/p LP on : No WBCs, no RBC, slightly elevated protein, normal glucose. CSF analysis also negative for crypto, Lyme, VDRL, WNV IgM, HSV and enterovirus. Meningitis panel was also negative. - WNV IgG +ve (with negative IgM) suggesting either past infection or false +ve /cross reactivity.   - Neurology was on board, appreciate input  - Remains confused and agitated  - C/w 1:1 supervision for now   - Continue Seroquel BID    - SNF at discharge     COVID-19 disease  -Continues to be on room air  -COVID-19 PCR positive  -Chest x-ray with no evidence of acute cardiopulmonary process  -No indication for dexamethasone or remdesivir given patient is not hypoxic   -Patient is vaccinated against COVID-19  - 20 days since admit, longer since onset of symptoms  - Stop droplet plus  isolation     Paroxysmal AF  -Challenges with p.o. intake  -On IV metoprolol. BP low. We will put holding parameters for metoprolol.   Will start IV fluid.  -Not on AC d/t hx of GI bleed, was recently seen by Dr. Kaushal Montenegro was discussed which he was not interested     Acute on chronic kidney disease:   -Baseline creatinine 1.8-2  -Creatinine at baseline     CAD s/p CABG  -Aspirin, statin  -Continue Plavix     DM:  -Hold metformin  -Sliding scale with hypoglycemia protocol  -Monitor FS     Code Status: Full  Surrogate Decision Maker: His wife     DVT Prophylaxis: lovenox  GI Prophylaxis: not indicated     Baseline: Ambulatory     Subjective:     Chief Complaint / Reason for Physician Visit  Alert, talking, oriented x 2  Requiring hand mittens  Wife at bedside, pt talking and feeding self  Knew his name, wedding anniversary,year   Still thought he was in [email or web address] [address]  Wife at bedside notes significant improvement from   However not back to baseline  Denied complaints    Review of Systems:  Symptom Y/N Comments  Symptom Y/N Comments   Fever/Chills n   Chest Pain n    Poor Appetite    Edema     Cough n   Abdominal Pain n    Sputum    Joint Pain     SOB/MCCOLLUM n   Pruritis/Rash     Nausea/vomit n   Tolerating PT/OT     Diarrhea n   Tolerating Diet n    Constipation    Other       Could NOT obtain due to:      Objective:     VITALS:   Last 24hrs VS reviewed since prior progress note. Most recent are:  Patient Vitals for the past 24 hrs:   Temp Pulse Resp BP SpO2   12/16/21 1132 97.4 °F (36.3 °C) 74 18 104/64 95 %   12/16/21 0745 98.4 °F (36.9 °C) 77 18 102/66 93 %   12/16/21 0334 97.9 °F (36.6 °C) 85 18 (!) 98/58 95 %   12/15/21 2359 98.2 °F (36.8 °C) 82 20 (!) 107/58 97 %   12/15/21 2154 -- -- -- 113/60 --   12/15/21 2043 98.2 °F (36.8 °C) 88 20 (!) 98/51 96 %   12/15/21 1626 97.4 °F (36.3 °C) 100 24 116/70 97 %       Intake/Output Summary (Last 24 hours) at 12/16/2021 1229  Last data filed at 12/16/2021 1032  Gross per 24 hour   Intake 710 ml   Output 250 ml   Net 460 ml        I had a face to face encounter and independently examined this patient on 12/16/2021, as outlined below:  PHYSICAL EXAM:  General: Somnolent, arousable,talking a bit, not agitated  EENT:  Anicteric sclerae.  MMM  Resp:  CTA bilaterally, no wheezing or rales. No accessory muscle use  CV:  Regular  rhythm,  No edema  GI:  Soft, Non distended, Non tender. +Bowel sounds  Neurologic:  Awake, moves all extremities, no obvious focal deficits                           Feeding self, still confused  Psych:   deferred  Skin:  No jaundice    Reviewed most current lab test results and cultures  YES  Reviewed most current radiology test results   YES  Review and summation of old records today    NO  Reviewed patient's current orders and MAR    YES  PMH/SH reviewed - no change compared to H&P  ________________________________________________________________________  Care Plan discussed with:    Comments   Patient x    Family  x Wife   RN x    Care Manager     Consultant                       x Multidiciplinary team rounds were held today with , nursing, pharmacist and clinical coordinator. Patient's plan of care was discussed; medications were reviewed and discharge planning was addressed. ________________________________________________________________________      Comments   >50% of visit spent in counseling and coordination of care x    ________________________________________________________________________  Michelle Isaacs MD     Procedures: see electronic medical records for all procedures/Xrays and details which were not copied into this note but were reviewed prior to creation of Plan. LABS:  I reviewed today's most current labs and imaging studies.   Pertinent labs include:  Recent Labs     12/16/21 0153 12/14/21 0444   WBC 7.5 8.9   HGB 13.0 13.3   HCT 38.7 39.2    253     Recent Labs     12/16/21 0153 12/14/21  0444    137   K 3.9 3.5   * 104   CO2 22 25    120*   BUN 31* 23*   CREA 1.70* 1.65*   CA 9.0 10.1       Signed: Michelle Isaacs MD

## 2021-12-16 NOTE — PROGRESS NOTES
Problem: Non-Violent Restraints  Goal: Removal from restraints as soon as assessed to be safe  Outcome: Progressing Towards Goal  Goal: No harm/injury to patient while restraints in use  Outcome: Progressing Towards Goal  Goal: Patient's dignity will be maintained  Outcome: Progressing Towards Goal  Goal: Patient Interventions  Outcome: Progressing Towards Goal     Problem: Violent Restraints  Goal: No harm/injury to patient while restraints in use  Outcome: Progressing Towards Goal  Goal: Patient's dignity will be maintained  Outcome: Progressing Towards Goal     Problem: Falls - Risk of  Goal: *Absence of Falls  Description: Document Josep Benedict Fall Risk and appropriate interventions in the flowsheet. Outcome: Progressing Towards Goal  Note: Fall Risk Interventions:  Mobility Interventions: Bed/chair exit alarm,Assess mobility with egress test    Mentation Interventions: Bed/chair exit alarm,Reorient patient    Medication Interventions: Bed/chair exit alarm    Elimination Interventions: Bed/chair exit alarm,Call light in reach,Toileting schedule/hourly rounds              Problem: Patient Education: Go to Patient Education Activity  Goal: Patient/Family Education  Outcome: Progressing Towards Goal     Problem: Risk for Spread of Infection  Goal: Prevent transmission of infectious organism to others  Description: Prevent the transmission of infectious organisms to other patients, staff members, and visitors.   Outcome: Progressing Towards Goal     Problem: Patient Education:  Go to Education Activity  Goal: Patient/Family Education  Outcome: Progressing Towards Goal     Problem: Patient Education: Go to Patient Education Activity  Goal: Patient/Family Education  Outcome: Progressing Towards Goal     Problem: Airway Clearance - Ineffective  Goal: Achieve or maintain patent airway  Outcome: Progressing Towards Goal     Problem: Gas Exchange - Impaired  Goal: Absence of hypoxia  Outcome: Progressing Towards Goal  Goal: Promote optimal lung function  Outcome: Progressing Towards Goal     Problem: Breathing Pattern - Ineffective  Goal: Ability to achieve and maintain a regular respiratory rate  Outcome: Progressing Towards Goal     Problem:  Body Temperature -  Risk of, Imbalanced  Goal: Ability to maintain a body temperature within defined limits  Outcome: Progressing Towards Goal  Goal: Will regain or maintain usual level of consciousness  Outcome: Progressing Towards Goal  Goal: Complications related to the disease process, condition or treatment will be avoided or minimized  Outcome: Progressing Towards Goal     Problem: Isolation Precautions - Risk of Spread of Infection  Goal: Prevent transmission of infectious organism to others  Outcome: Progressing Towards Goal     Problem: Nutrition Deficits  Goal: Optimize nutrtional status  Outcome: Progressing Towards Goal     Problem: Risk for Fluid Volume Deficit  Goal: Maintain normal heart rhythm  Outcome: Progressing Towards Goal  Goal: Maintain absence of muscle cramping  Outcome: Progressing Towards Goal  Goal: Maintain normal serum potassium, sodium, calcium, phosphorus, and pH  Outcome: Progressing Towards Goal     Problem: Loneliness or Risk for Loneliness  Goal: Demonstrate positive use of time alone when socialization is not possible  Outcome: Progressing Towards Goal     Problem: Fatigue  Goal: Verbalize increase energy and improved vitality  Outcome: Progressing Towards Goal     Problem: Patient Education: Go to Patient Education Activity  Goal: Patient/Family Education  Outcome: Progressing Towards Goal     Problem: Patient Education: Go to Patient Education Activity  Goal: Patient/Family Education  Outcome: Progressing Towards Goal     Problem: Patient Education: Go to Patient Education Activity  Goal: Patient/Family Education  Outcome: Progressing Towards Goal

## 2021-12-17 LAB
GLUCOSE BLD STRIP.AUTO-MCNC: 106 MG/DL (ref 65–117)
GLUCOSE BLD STRIP.AUTO-MCNC: 145 MG/DL (ref 65–117)
GLUCOSE BLD STRIP.AUTO-MCNC: 172 MG/DL (ref 65–117)
GLUCOSE BLD STRIP.AUTO-MCNC: 91 MG/DL (ref 65–117)
SERVICE CMNT-IMP: ABNORMAL
SERVICE CMNT-IMP: ABNORMAL
SERVICE CMNT-IMP: NORMAL
SERVICE CMNT-IMP: NORMAL

## 2021-12-17 PROCEDURE — 74011250637 HC RX REV CODE- 250/637: Performed by: STUDENT IN AN ORGANIZED HEALTH CARE EDUCATION/TRAINING PROGRAM

## 2021-12-17 PROCEDURE — 97164 PT RE-EVAL EST PLAN CARE: CPT

## 2021-12-17 PROCEDURE — 74011250637 HC RX REV CODE- 250/637: Performed by: INTERNAL MEDICINE

## 2021-12-17 PROCEDURE — 74011000250 HC RX REV CODE- 250: Performed by: STUDENT IN AN ORGANIZED HEALTH CARE EDUCATION/TRAINING PROGRAM

## 2021-12-17 PROCEDURE — 97530 THERAPEUTIC ACTIVITIES: CPT

## 2021-12-17 PROCEDURE — 65270000029 HC RM PRIVATE

## 2021-12-17 PROCEDURE — 82962 GLUCOSE BLOOD TEST: CPT

## 2021-12-17 PROCEDURE — 74011250636 HC RX REV CODE- 250/636: Performed by: PSYCHIATRY & NEUROLOGY

## 2021-12-17 PROCEDURE — 74011636637 HC RX REV CODE- 636/637: Performed by: STUDENT IN AN ORGANIZED HEALTH CARE EDUCATION/TRAINING PROGRAM

## 2021-12-17 RX ORDER — QUETIAPINE FUMARATE 25 MG/1
50 TABLET, FILM COATED ORAL 2 TIMES DAILY
Status: DISCONTINUED | OUTPATIENT
Start: 2021-12-17 | End: 2021-12-20 | Stop reason: HOSPADM

## 2021-12-17 RX ORDER — DOCUSATE SODIUM 100 MG/1
100 CAPSULE, LIQUID FILLED ORAL
Status: DISCONTINUED | OUTPATIENT
Start: 2021-12-17 | End: 2021-12-20 | Stop reason: HOSPADM

## 2021-12-17 RX ADMIN — RANOLAZINE 500 MG: 500 TABLET, FILM COATED, EXTENDED RELEASE ORAL at 17:12

## 2021-12-17 RX ADMIN — ENOXAPARIN SODIUM 40 MG: 100 INJECTION SUBCUTANEOUS at 21:00

## 2021-12-17 RX ADMIN — ENOXAPARIN SODIUM 40 MG: 100 INJECTION SUBCUTANEOUS at 09:46

## 2021-12-17 RX ADMIN — METOPROLOL TARTRATE 2.5 MG: 1 INJECTION, SOLUTION INTRAVENOUS at 12:58

## 2021-12-17 RX ADMIN — POLYETHYLENE GLYCOL 3350 17 G: 17 POWDER, FOR SOLUTION ORAL at 17:18

## 2021-12-17 RX ADMIN — Medication 10 ML: at 17:13

## 2021-12-17 RX ADMIN — DOCUSATE SODIUM 100 MG: 100 CAPSULE ORAL at 17:18

## 2021-12-17 RX ADMIN — CLOPIDOGREL BISULFATE 75 MG: 75 TABLET ORAL at 09:46

## 2021-12-17 RX ADMIN — Medication 10 ML: at 05:38

## 2021-12-17 RX ADMIN — QUETIAPINE FUMARATE 50 MG: 25 TABLET ORAL at 17:12

## 2021-12-17 RX ADMIN — Medication 2 UNITS: at 17:15

## 2021-12-17 RX ADMIN — ROSUVASTATIN 40 MG: 20 TABLET, FILM COATED ORAL at 09:46

## 2021-12-17 RX ADMIN — Medication 2 UNITS: at 09:15

## 2021-12-17 RX ADMIN — ASPIRIN 81 MG CHEWABLE TABLET 81 MG: 81 TABLET CHEWABLE at 09:46

## 2021-12-17 RX ADMIN — Medication 10 ML: at 22:27

## 2021-12-17 RX ADMIN — LATANOPROST 1 DROP: 50 SOLUTION OPHTHALMIC at 09:48

## 2021-12-17 RX ADMIN — QUETIAPINE FUMARATE 25 MG: 25 TABLET ORAL at 09:46

## 2021-12-17 RX ADMIN — CYANOCOBALAMIN TAB 500 MCG 1000 MCG: 500 TAB at 09:46

## 2021-12-17 RX ADMIN — RANOLAZINE 500 MG: 500 TABLET, FILM COATED, EXTENDED RELEASE ORAL at 09:46

## 2021-12-17 NOTE — PROGRESS NOTES
Transition of Care Plan:     RUR:   14% \"mod risk\"  [place] [address]  Follow up appointments: PCP (needs new pt appt) & specialists as indicate  DME needed: None  Transportation at [address] or means to access home:   No     IM Medicare Letter: Needed at discharge  Is patient a BCPI-A Bundle: No                 If yes, was Bundle Letter given?:  N/A  Is patient a  and connected with the [address] [place] [address] Road  If yes, was [employer] transfer form completed and [facility] notified? N/A  Caregiver Contact: Wife- Mayda Hoff, [phone]  Discharge Caregiver contacted prior to discharge? CM reviewed chart. Discharge plan discussed during IDR. Pt is not medically stable for d/c at this time. Plan for restraints & tele sitter to be discontinued. CM will continue to follow for discharge planning while patient is admitted on current unit. Please contact this CM with questions or issues related to discharge.     Robb Moralez MSW  Care Management

## 2021-12-17 NOTE — PROGRESS NOTES
Problem: Mobility Impaired (Adult and Pediatric)  Goal: *Acute Goals and Plan of Care (Insert Text)  Description: FUNCTIONAL STATUS PRIOR TO ADMISSION: Wife in room and reports patient ambulated at least 40 minute each day with his dog, lifted weights and was very active. HOME SUPPORT PRIOR TO ADMISSION: Patient lived with his wife, indep with ADLs    Physical Therapy Goals  Initiated 12/6/2021  1. Patient will move from supine to sit and sit to supine  in bed with min assist  within 7 day(s). 2.  Patient will transfer from bed to chair and chair to bed with minimal assistance/contact guard assist using the least restrictive device within 7 day(s). 3.  Patient will perform sit to stand with minimal assistance/contact guard assist within 7 day(s). 4.  Patient will ambulate with minimal assistance/contact guard assist for 50 feet with the least restrictive device within 7 day(s). Outcome: Not Met   PHYSICAL THERAPY REEVALUATION  Patient: Linda Wilson (19 y.o. male)  Date: 12/17/2021  Primary Diagnosis: Rapid atrial fibrillation ([address] [I48.91]  AMS (altered mental status) [R41.82]        Precautions: Other (comment),Bed Alarm (COVID +, restraints)      ASSESSMENT  Based on the objective data described below, the patient presents with drowsiness, confusion, decreased strength and impaired functional mobility following admission for Covid, now off Droplet Plus precautions. Patient received in bed asleep but awakens to voice and agreeable to participate, wife also present in room. Patient was able to follow simple commands and required mod assist x 2 to come to sit edge of bed,  initially with  constant support due to posterior lean, but improved with time. Patient able to stand x two trials with min assist x 2, fatigues quickly and sits back down. Attempted sidestep to [facility] but required manual input to right foot to move laterally.   Patient returned to supine with min assist and left with call bell in reach and bed alarm set, wife still present. Patient remains well below baseline and will benefit from rehab in SNF. Current Level of Function Impacting Discharge (mobility/balance): mod assist x 2 for bed mobility, min assist x 2 to stand    Functional Outcome Measure: The patient scored 5/100 on the Barthel  outcome measure which is indicative of dependent functional level     Other factors to consider for discharge: high falls risk, well below baseline (was very active prior to admission)     Patient will benefit from skilled therapy intervention to address the above noted impairments. PLAN :  Recommendations and Planned Interventions: bed mobility training, transfer training, gait training, therapeutic exercises, patient and family training/education, and therapeutic activities      Frequency/Duration: Patient will be followed by physical therapy:  3 times a week to address goals.     Recommendation for discharge: (in order for the patient to meet his/her long term goals)  Therapy up to 5 days/week in SNF setting    This discharge recommendation:  Has been made in collaboration with the attending provider and/or case management    Equipment recommendations for successful discharge (if) home: to be determined         SUBJECTIVE:   Patient stated I will walk tomorrow    OBJECTIVE DATA SUMMARY:   HISTORY:    Past Medical History:   Diagnosis Date    Abnormal nuclear cardiac imaging test 5/8/2019    Anemia 6/5/2017    Atrial fibrillation ([address]     Chronic kidney disease     CKD (chronic kidney disease) 5/8/2019    Coronary atherosclerosis of native coronary artery     Diabetes mellitus, type II ([address]     MCCOLLUM (dyspnea on exertion) 5/8/2019    Glaucoma     Mixed hyperlipidemia     Other dyspnea and respiratory abnormality     Pacemaker     S/P ablation of atrial fibrillation 8/4/2016     Past Surgical History:   Procedure Laterality Date    HX AFIB ABLATION      HX CATARACT REMOVAL Bilateral 2018 HX CORONARY ARTERY BYPASS GRAFT  2001    HX CORONARY STENT PLACEMENT      HX HEART CATHETERIZATION  11/21/2019    Left     HX PTCA      AZ INS NEW/RPLCMT PRM PM W/TRANSV ELTRD ATRIAL&VENT N/A 3/29/2019    INSERT PPM DUAL performed by Sade Hickey [facility] at [facility] CARDIAC C[facility] LAB    AZ REMOVAL SUBCUTANEOUS CARDIAC RHYTHM MONITOR N/A 3/29/2019    LOOP RECORDER REMOVAL performed by Sade Hickey MD at [facility] CARDIAC CATH LAB    UPPER GI ENDOSCOPY,BIOPSY  7/26/2019          Hospital course since last seen and reason for reevaluation: Therapy had signed off  due to agitation and confusion, which have both improved and patient is now appropriate for skilled rehab    Personal factors and/or comorbidities impacting plan of care: CAD    Home Situation  Living Alone: No  Support Systems: Spouse/Significant Other    EXAMINATION/PRESENTATION/DECISION MAKING:   Critical Behavior:  Neurologic State: Confused  Orientation Level: Disoriented to place,Disoriented to situation,Disoriented to time,Oriented to person  Cognition: Impaired decision making  Safety/Judgement: Decreased awareness of environment,Lack of insight into deficits (poor awareness (per nsg, had ativan at 630 this a.m.))  Hearing: Auditory  Auditory Impairment: None  Range Of Motion:  AROM: Within functional limits                       Strength:    Strength: Generally decreased, functional                    Tone & Sensation:   Tone: Normal                              Coordination:  Coordination: Generally decreased, functional  Vision:      Functional Mobility:  Bed Mobility:  Rolling: Moderate assistance;Assist x2  Supine to Sit: Moderate assistance;Assist x2  Sit to Supine: Minimum assistance;Assist x2  Scooting:  Moderate assistance  Transfers:  Sit to Stand: Minimum assistance;Assist x2  Stand to Sit: Minimum assistance;Assist x2                       Balance:   Sitting: Impaired  Sitting - Static: Fair (occasional)  Sitting - Dynamic: Poor (constant support)  Standing: Impaired  Standing - Static: Constant support;Poor  Standing - Dynamic : Constant support;Poor  Ambulation/Gait Training:              Gait Description (WDL):  (attempted one small sidestep but needed assist to move right)                                          Stairs:                Functional Measure:  Barthel Index:    Bathin  Bladder: 0  Bowels: 0  Groomin  Dressin  Feedin  Mobility: 0  Stairs: 0  Toilet Use: 0  Transfer (Bed to Chair and Back): 0  Total: 5/100       The Barthel ADL Index: Guidelines  1. The index should be used as a record of what a patient does, not as a record of what a patient could do. 2. The main aim is to establish degree of independence from any help, physical or verbal, however minor and for whatever reason. 3. The need for supervision renders the patient not independent. 4. A patient's performance should be established using the best available evidence. Asking the patient, friends/relatives and nurses are the usual sources, but direct observation and common sense are also important. However direct testing is not needed. 5. Usually the patient's performance over the preceding 24-48 hours is important, but occasionally longer periods will be relevant. 6. Middle categories imply that the patient supplies over 50 per cent of the effort. 7. Use of aids to be independent is allowed. Score Interpretation (from [address] [facility])    Independent   60-79 Minimally independent   40-59 Partially dependent   20-39 Very dependent   <20 Totally dependent     -Aleah Warren., Barthel, D.W. (1965). Functional evaluation: the Barthel Index. [address] [facility] ([address] (). The Barthel activities of daily living index: self-reporting versus actual performance in the old (> or = 75 years). Journal of [address] [place] [address] 45(7), [facility], J.ODILIA, Ld Teixeira., Angela Vyas. (1999).  Measuring the change in disability after inpatient rehabilitation; comparison of the responsiveness of the Barthel Index and Functional Foard Measure. Journal of Neurology, Neurosurgery, and Psychiatry, 66(4), 449-008. SARA Butler, VERO Jernigan, & Donald Stratton M.A. (2004) Assessment of post-stroke quality of life in cost-effectiveness studies: The usefulness of the Barthel Index and the EuroQoL-5D. Quality of Life Research, 13, 427-43             Pain Rating:      Activity Tolerance:   Fair and requires rest breaks    After treatment patient left in no apparent distress:   Supine in bed, Call bell within reach, Bed / chair alarm activated, Caregiver / family present, and Side rails x 3    COMMUNICATION/EDUCATION:   The patients plan of care was discussed with: Occupational therapist and Registered nurse. Patient is unable to participate in goal setting and plan of care.     Thank you for this referral.  Stefan Rodriguez, PT   Time Calculation: 24 mins

## 2021-12-17 NOTE — PROGRESS NOTES
Comprehensive Nutrition Assessment    Type and Reason for Visit: Reassess    Nutrition Recommendations/Plan:  Continue consistent carb/cardiac diet  Discontinue ensure high protein     Nutrition Assessment:     Chart reviewed; patient sleeping at time of visit. Spoke to wife at bedside. She reports improved PO intake this week. Eating well the last few days and able to feed himself. Patient does not like the ensure high protein shake so will discontinue. No nutrition questions or concerns at this time. Encouraged PO intake of meals. Estimated Daily Nutrient Needs:  Energy (kcal): 2096 kcal (BMR 1843 x 1. 3AF -300kcal); Weight Used for Energy Requirements: Current  Protein (g): 87-109g (0.8-1.0 g/kg bw); Weight Used for Protein Requirements: Current  Fluid (ml/day): 2100 mL; Method Used for Fluid Requirements: 1 ml/kcal    Nutrition Related Findings:    [phone]  BM 12/12  Plavix, Vitamin B12, Humalog, Lopressor, Seroquel, Renvela, Rosuvastatin       Wounds:    None       Current Nutrition Therapies:  DIET ONE TIME MESSAGE  ADULT DIET Regular; 5 carb choices (75 gm/meal); Low Fat/Low Chol/High Fiber/NICOLE  ADULT ORAL NUTRITION SUPPLEMENT Dinner; Low Calorie/High Protein    Anthropometric Measures:  · Height:  6' (182.9 cm)  · Current Body Wt:  108.9 kg (240 lb 1.3 oz)   · BMI Category:  Obese class 1 (BMI 30.0-34. 9)       Nutrition Diagnosis:   No nutrition diagnosis at this time     Nutrition Interventions:   Food and/or Nutrient Delivery: Continue current diet,Discontinue oral nutrition supplement  Nutrition Education and Counseling: No recommendations at this time  Coordination of Nutrition Care: No recommendation at this time    Goals:  PO intake >70% of meals next 5-7 days       Nutrition Monitoring and Evaluation:   Behavioral-Environmental Outcomes: None identified  Food/Nutrient Intake Outcomes: Food and nutrient intake  Physical Signs/Symptoms Outcomes: Biochemical data,Weight    Discharge Planning:    Continue current diet     Electronically signed by Enedelia Saab RD on 12/17/2021 at 3:05 PM    Contact: ext [phone]

## 2021-12-17 NOTE — PROGRESS NOTES
Problem: Non-Violent Restraints  Goal: Removal from restraints as soon as assessed to be safe  Outcome: Progressing Towards Goal     Problem: Violent Restraints  Goal: Removal from restraints as soon as assessed to be safe  Outcome: Progressing Towards Goal     Problem: Falls - Risk of  Goal: *Absence of Falls  Description: Document [place] Flow Fall Risk and appropriate interventions in the flowsheet. Outcome: Progressing Towards Goal  Note: Fall Risk Interventions:  Mobility Interventions: Bed/chair exit alarm    Mentation Interventions: Bed/chair exit alarm    Medication Interventions: Bed/chair exit alarm    Elimination Interventions: Call light in reach,Bed/chair exit alarm              Problem: Patient Education: Go to Patient Education Activity  Goal: Patient/Family Education  Outcome: Progressing Towards Goal     Problem: Risk for Spread of Infection  Goal: Prevent transmission of infectious organism to others  Description: Prevent the transmission of infectious organisms to other patients, staff members, and visitors.   Outcome: Progressing Towards Goal

## 2021-12-17 NOTE — PROGRESS NOTES
Occupational Therapy    Chart reviewed and orders acknowledged. Spoke with PT who just concluded PT evaluation, reporting pt is very fatigued following session and just returned to bed, noted to be asleep. Will defer on this date and re attempt OT evaluation tomorrow.      Graham Dykes, OT

## 2021-12-18 LAB
GLUCOSE BLD STRIP.AUTO-MCNC: 110 MG/DL (ref 65–117)
GLUCOSE BLD STRIP.AUTO-MCNC: 120 MG/DL (ref 65–117)
GLUCOSE BLD STRIP.AUTO-MCNC: 144 MG/DL (ref 65–117)
GLUCOSE BLD STRIP.AUTO-MCNC: 152 MG/DL (ref 65–117)
SERVICE CMNT-IMP: ABNORMAL
SERVICE CMNT-IMP: NORMAL

## 2021-12-18 PROCEDURE — 97535 SELF CARE MNGMENT TRAINING: CPT

## 2021-12-18 PROCEDURE — 74011636637 HC RX REV CODE- 636/637: Performed by: STUDENT IN AN ORGANIZED HEALTH CARE EDUCATION/TRAINING PROGRAM

## 2021-12-18 PROCEDURE — 97168 OT RE-EVAL EST PLAN CARE: CPT

## 2021-12-18 PROCEDURE — 74011250637 HC RX REV CODE- 250/637: Performed by: INTERNAL MEDICINE

## 2021-12-18 PROCEDURE — 65270000029 HC RM PRIVATE

## 2021-12-18 PROCEDURE — 74011250636 HC RX REV CODE- 250/636: Performed by: PSYCHIATRY & NEUROLOGY

## 2021-12-18 PROCEDURE — 74011250637 HC RX REV CODE- 250/637: Performed by: STUDENT IN AN ORGANIZED HEALTH CARE EDUCATION/TRAINING PROGRAM

## 2021-12-18 PROCEDURE — 82962 GLUCOSE BLOOD TEST: CPT

## 2021-12-18 PROCEDURE — 74011250636 HC RX REV CODE- 250/636: Performed by: STUDENT IN AN ORGANIZED HEALTH CARE EDUCATION/TRAINING PROGRAM

## 2021-12-18 RX ADMIN — Medication 5 ML: at 21:16

## 2021-12-18 RX ADMIN — QUETIAPINE FUMARATE 50 MG: 25 TABLET ORAL at 18:14

## 2021-12-18 RX ADMIN — ROSUVASTATIN 40 MG: 20 TABLET, FILM COATED ORAL at 08:50

## 2021-12-18 RX ADMIN — Medication 10 ML: at 06:43

## 2021-12-18 RX ADMIN — ENOXAPARIN SODIUM 40 MG: 100 INJECTION SUBCUTANEOUS at 08:50

## 2021-12-18 RX ADMIN — LATANOPROST 1 DROP: 50 SOLUTION OPHTHALMIC at 08:49

## 2021-12-18 RX ADMIN — HALOPERIDOL LACTATE 3 MG: 5 INJECTION, SOLUTION INTRAMUSCULAR at 04:49

## 2021-12-18 RX ADMIN — ENOXAPARIN SODIUM 40 MG: 100 INJECTION SUBCUTANEOUS at 21:02

## 2021-12-18 RX ADMIN — QUETIAPINE FUMARATE 50 MG: 25 TABLET ORAL at 08:50

## 2021-12-18 RX ADMIN — CLOPIDOGREL BISULFATE 75 MG: 75 TABLET ORAL at 08:50

## 2021-12-18 RX ADMIN — RANOLAZINE 500 MG: 500 TABLET, FILM COATED, EXTENDED RELEASE ORAL at 08:50

## 2021-12-18 RX ADMIN — Medication 2 UNITS: at 16:30

## 2021-12-18 RX ADMIN — CYANOCOBALAMIN TAB 500 MCG 1000 MCG: 500 TAB at 08:50

## 2021-12-18 RX ADMIN — ASPIRIN 81 MG CHEWABLE TABLET 81 MG: 81 TABLET CHEWABLE at 08:50

## 2021-12-18 RX ADMIN — RANOLAZINE 500 MG: 500 TABLET, FILM COATED, EXTENDED RELEASE ORAL at 18:14

## 2021-12-18 NOTE — PROGRESS NOTES
Bedside and Verbal shift change report given to Keenan Collins (oncoming nurse) by Vidhi Pantoja (offgoing nurse). Report included the following information SBAR, Kardex, Intake/Output, MAR and Recent Results.

## 2021-12-18 NOTE — PROGRESS NOTES
Problem: Non-Violent Restraints  Goal: Removal from restraints as soon as assessed to be safe  Outcome: Progressing Towards Goal  Goal: No harm/injury to patient while restraints in use  Outcome: Progressing Towards Goal  Goal: Patient's dignity will be maintained  Outcome: Progressing Towards Goal  Goal: Patient Interventions  Outcome: Progressing Towards Goal     Problem: Violent Restraints  Goal: Removal from restraints as soon as assessed to be safe  Outcome: Progressing Towards Goal  Goal: No harm/injury to patient while restraints in use  Outcome: Progressing Towards Goal  Goal: Patient's dignity will be maintained  Outcome: Progressing Towards Goal     Problem: Patient Education: Go to Patient Education Activity  Goal: Patient/Family Education  Outcome: Progressing Towards Goal     Problem: Patient Education:  Go to Education Activity  Goal: Patient/Family Education  Outcome: Progressing Towards Goal     Problem: Patient Education: Go to Patient Education Activity  Goal: Patient/Family Education  Outcome: Progressing Towards Goal     Problem: Airway Clearance - Ineffective  Goal: Achieve or maintain patent airway  Outcome: Progressing Towards Goal     Problem: Gas Exchange - Impaired  Goal: Absence of hypoxia  Outcome: Progressing Towards Goal  Goal: Promote optimal lung function  Outcome: Progressing Towards Goal     Problem: Breathing Pattern - Ineffective  Goal: Ability to achieve and maintain a regular respiratory rate  Outcome: Progressing Towards Goal     Problem:  Body Temperature -  Risk of, Imbalanced  Goal: Ability to maintain a body temperature within defined limits  Outcome: Progressing Towards Goal  Goal: Will regain or maintain usual level of consciousness  Outcome: Progressing Towards Goal  Goal: Complications related to the disease process, condition or treatment will be avoided or minimized  Outcome: Progressing Towards Goal     Problem: Nutrition Deficits  Goal: Optimize nutrtional status  Outcome: Progressing Towards Goal     Problem: Risk for Fluid Volume Deficit  Goal: Maintain normal heart rhythm  Outcome: Progressing Towards Goal  Goal: Maintain absence of muscle cramping  Outcome: Progressing Towards Goal  Goal: Maintain normal serum potassium, sodium, calcium, phosphorus, and pH  Outcome: Progressing Towards Goal     Problem: Loneliness or Risk for Loneliness  Goal: Demonstrate positive use of time alone when socialization is not possible  Outcome: Progressing Towards Goal     Problem: Fatigue  Goal: Verbalize increase energy and improved vitality  Outcome: Progressing Towards Goal     Problem: Patient Education: Go to Patient Education Activity  Goal: Patient/Family Education  Outcome: Progressing Towards Goal     Problem: Patient Education: Go to Patient Education Activity  Goal: Patient/Family Education  Outcome: Progressing Towards Goal     Problem: Patient Education: Go to Patient Education Activity  Goal: Patient/Family Education  Outcome: Progressing Towards Goal

## 2021-12-18 NOTE — PROGRESS NOTES
Problem: Self Care Deficits Care Plan (Adult)  Goal: *Acute Goals and Plan of Care (Insert Text)  Description:   FUNCTIONAL STATUS PRIOR TO ADMISSION: Patient was independent and active without use of DME. Pt is confused and poor historian. Pt reporting during session he was active and enjoys lifting weights. Per EMR pt was independent. HOME SUPPORT: The patient lived alone with spouse. Occupational Therapy Goals  Revised 12/18/2021  1. Patient will perform grooming in standing with supervision/set-up within 7 day(s). 2.  Patient will perform upper body dressing with supervision/set-up within 7 day(s). 3.  Patient will perform lower body dressing with supervision/set-up within 7 day(s). 4.  Patient will perform toilet transfers with supervision/set-up within 7 day(s). 5.  Patient will perform all aspects of toileting with supervision/set-up within 7 day(s). 6.  Patient will participate in upper extremity therapeutic exercise/activities with supervision/set-up for 5 minutes within 7 day(s). 7.  Patient will utilize energy conservation techniques during functional activities with verbal cues within 7 day(s). Initiated 12/6/2021  1. Patient will perform grooming with minimal assistance/contact guard assist within 7 day(s). 2.  Patient will perform lower body dressing with minimal assistance/contact guard assist within 7 day(s). 3.  Patient will perform bathing with minimal assistance/contact guard assist within 7 day(s). 4.  Patient will perform toilet transfers with minimal assistance/contact guard assist within 7 day(s). 5.  Patient will perform all aspects of toileting with minimal assistance/contact guard assist within 7 day(s). 6.  Patient will participate in upper extremity therapeutic exercise/activities with minimal assistance/contact guard assist for 7 minutes within 7 day(s).     7.  Patient will utilize energy conservation techniques during functional activities with verbal cues within 7 day(s). Outcome: Progressing Towards Goal   OCCUPATIONAL THERAPY RE-EVALUATION  Patient: Elena Alcaraz (43 y.o. male)  Date: 12/18/2021  Diagnosis: Rapid atrial fibrillation (HCC) [I48.91]  AMS (altered mental status) [R41.82] <principal problem not specified>       Precautions: Other (comment),Bed Alarm (COVID +, restraints)  Chart, occupational therapy assessment, plan of care, and goals were reviewed. ASSESSMENT  Based on the objective data described below, patient presents with general weakness, mild confusion, decreased insight, decreased attention, and decreased activity tolerance. Patient admitted on 11/27 with covid PNA but is now off droplet plus precautions. Patient received semisupine in bed with wife present in room and opened eyes to voice. Patient agreeable for therapy and oriented to self/location this session. Bed unable to lower or flatten completely which limited patient's ability to safely come EOB and completed all tasks at bed level this session. Patient completed grooming tasks with set-up/supervision and tolerated well. Patient provided wipes for UB bathing and noted to perseverate on L side of body. Patient required min assist to wash RUE. Patient agreeable to attempt rolling R<>L and completed with stand-by assist this session while pulling on bed rails. Patient also able to pull on bed rails to scoot himself up in bed with min assist. Patient required redirection to stay on task several times but remained pleasant and agreeable for all tasks. At end of session, patient was able to grasp water bottle and take small sips with set-up/supervision. Patient was left with bed in chair position, wife present in room, and all needs met. Patient would benefit from skilled OT services during acute hospital stay. Anticipate patient would benefit from SNF rehab prior to returning home, pending progress.      Current Level of Function Impacting Discharge (ADLs): set-up/supervision to max assist for self-care, stand-by assist for rolling in bed    Other factors to consider for discharge: fall risk, confused, decreased insight/attention          PLAN :  Recommendations and Planned Interventions: self care training, functional mobility training, therapeutic exercise, balance training, therapeutic activities, endurance activities, patient education, home safety training and family training/education    Frequency/Duration: Patient will be followed by occupational therapy 3 times a week to address goals. Recommendation for discharge: (in order for the patient to meet his/her long term goals)  Therapy up to 5 days/week in SNF setting    This discharge recommendation:  Has been made in collaboration with the attending provider and/or case management    Equipment recommendations for successful discharge (if) home: TBD in SNF rehab       SUBJECTIVE:   Patient stated I lost 52 pounds recently.     OBJECTIVE DATA SUMMARY:   Hospital course since last seen and reason for reevaluation: Patient more alert&oriented with less agitation. Patient able to follow commands and participate in therapy. Cognitive/Behavioral Status:  Neurologic State: Alert  Orientation Level: Oriented to person;Oriented to place; Disoriented to situation;Disoriented to time  Cognition: Follows commands; Impaired decision making  Perception: Appears intact  Perseveration: Perseverates during conversation  Safety/Judgement: Awareness of environment;Decreased awareness of need for assistance;Decreased awareness of need for safety;Decreased insight into deficits    Hearing:   Auditory  Auditory Impairment: None    Vision/Perceptual:    Acuity: Within Defined Limits    Corrective Lenses: Glasses    Range of Motion:  AROM: Within functional limits  PROM: Within functional limits    Strength:  Strength: Generally decreased, functional    Coordination:  Coordination: Generally decreased, functional  Fine Motor Skills-Upper: Left Intact; Right Intact    Gross Motor Skills-Upper: Left Intact; Right Intact    Tone & Sensation:  Tone: Normal  Sensation: Intact    Functional Mobility and Transfers for ADLs:  Bed Mobility:  Rolling: Stand-by assistance; Adaptive equipment (bed rails)  Scooting: Minimum assistance; Adaptive equipment (bed rails)    Transfers:  Unable to attempt EOB transfers 2/2 bed malfunctioning     Balance:  Sitting:  (unable to assess 2/2 bed malfunctioning)    ADL Assessment:  Feeding: Setup;Supervision  Oral Facial Hygiene/Grooming: Setup;Supervision (bed level)  Bathing: Moderate assistance  Upper Body Dressing: Minimum assistance  Lower Body Dressing: Maximum assistance  Toileting: Maximum assistance    ADL Intervention and task modifications:  Feeding  Drink to Mouth: Set-up; Supervision  Cues: Verbal cues provided    Grooming  Position Performed: Long sitting on bed  Washing Face: Set-up; Supervision  Washing Hands: Set-up; Supervision  Brushing Teeth: Set-up; Supervision  Cues: Verbal cues provided    Upper Body Bathing  Bathing Assistance: Minimum assistance  Position Performed: Long sitting on bed  Cues: Physical assistance; Tactile cues provided;Verbal cues provided    Cognitive Retraining  Safety/Judgement: Awareness of environment;Decreased awareness of need for assistance;Decreased awareness of need for safety;Decreased insight into deficits    Functional Measure:    Barthel Index:  Bathin  Bladder: 0  Bowels: 0  Groomin  Dressin  Feedin  Mobility: 0  Stairs: 0  Toilet Use: 0  Transfer (Bed to Chair and Back): 0  Total: 10/100      The Barthel ADL Index: Guidelines  1. The index should be used as a record of what a patient does, not as a record of what a patient could do. 2. The main aim is to establish degree of independence from any help, physical or verbal, however minor and for whatever reason. 3. The need for supervision renders the patient not independent.   4. A patient's performance should be established using the best available evidence. Asking the patient, friends/relatives and nurses are the usual sources, but direct observation and common sense are also important. However direct testing is not needed. 5. Usually the patient's performance over the preceding 24-48 hours is important, but occasionally longer periods will be relevant. 6. Middle categories imply that the patient supplies over 50 per cent of the effort. 7. Use of aids to be independent is allowed. Score Interpretation (from [address] [facility] 83)    Independent   60-79 Minimally independent   40-59 Partially dependent   20-39 Very dependent   <20 Totally dependent     -Aleah Warren., Barthel, D.W. (1965). Functional evaluation: the Barthel Index. [address] ([address] [place] [address] (1997). The Barthel activities of daily living index: self-reporting versus actual performance in the old (> or = 75 years). Journal of [address] [place] [address] 45(7), [address] [facility], MIGUEL, Kristel Livingston., Kaylee Nieto. (1999). Measuring the change in disability after inpatient rehabilitation; comparison of the responsiveness of the Barthel Index and Functional [place] Measure. Journal of Neurology, Neurosurgery, and Psychiatry, 66(4), 583-366. Dorie Toney, N.J.A, VERO Jernigan, & Soraida Mendoza, M.A. (2004) Assessment of post-stroke quality of life in cost-effectiveness studies: The usefulness of the Barthel Index and the EuroQoL-5D. Quality of Life Research, 13, 427-43     Pain:  Patient did not c/o pain during session. Activity Tolerance:   Good    After treatment patient left in no apparent distress:   Supine in bed, Call bell within reach, Caregiver / family present, and Side rails x 3    COMMUNICATION/COLLABORATION:   The patients plan of care was discussed with: Physical therapist and Registered nurse.      ARBEN Perales/L  Time Calculation: 31 mins

## 2021-12-18 NOTE — PROGRESS NOTES
Last night patient appeared to be anxious and agitated. Patient stated, \"Call the  I got my office keys and got to get out of here. \" attempted to orient patient as did wife. Patient given Prn haldol as ordered by MD. Medication effective, patient appeared to be resting and less anxious. Restraints remained off patient through night and patient did not attempt to pull out IV or get out of bed. Wife at bedside.

## 2021-12-18 NOTE — PROGRESS NOTES
Problem: Pressure Injury - Risk of  Goal: *Prevention of pressure injury  Description: Document Aleyxs Scale and appropriate interventions in the flowsheet. Outcome: Progressing Towards Goal  Note: Pressure Injury Interventions:  Sensory Interventions: Assess need for specialty bed    Moisture Interventions: Absorbent underpads,Minimize layers    Activity Interventions: PT/OT evaluation    Mobility Interventions: HOB 30 degrees or less    Nutrition Interventions: Document food/fluid/supplement intake    Friction and Shear Interventions: Apply protective barrier, creams and emollients,HOB 30 degrees or less                Problem: Airway Clearance - Ineffective  Goal: Achieve or maintain patent airway  Outcome: Progressing Towards Goal     Problem: Nutrition Deficits  Goal: Optimize nutrtional status  Outcome: Progressing Towards Goal     Problem: Risk for Fluid Volume Deficit  Goal: Maintain normal heart rhythm  Outcome: Progressing Towards Goal     Problem: Risk for Spread of Infection  Goal: Prevent transmission of infectious organism to others  Description: Prevent the transmission of infectious organisms to other patients, staff members, and visitors.   Outcome: Resolved/Met     Problem: Isolation Precautions - Risk of Spread of Infection  Goal: Prevent transmission of infectious organism to others  Outcome: Resolved/Met

## 2021-12-18 NOTE — PROGRESS NOTES
Hospitalist Progress Note    NAME: Jerrod Almanza   :  1940   MRN:  [id number]       Assessment / Plan:    Acute metabolic encephalopathy POA, unknown etiology   Covid encephalopathy POA  - Transferred from OSH with acute AMS and shortness of breath. Alert and oriented x4 in the ED upon arrival at OSH then became confused/agitated. - Progressive acute encephalopathy since 21  - Etiology unclear, ? high suspicion for COVID encephalopathy. Superimposed hospital delirium  - COVID PCR +ve  - CT head negative, Serum alcohol <10, Urine drug screening negative. - EEG Showed only mild slowing and no seizures  - Carotid dopplers with no significant stenosis ICA right < 50%  - MRI brain          Study is slightly degraded by motion artifact. Multiple sequences were repeated.          Ventricles are midline without hydrocephalus. Mild chronic T2 hyperintensity in the supratentorial brain. No acute infarction. Major intracranial vascular flow-voids are patent. Right mastoid effusion. MRA brain:   Significant motion artifact. No acute large vessel occlusion   Visualization of portions of the anterior, middle and posterior cerebral arteries     obscured by motion above the level of the [place]. Cannot assess for aneurysm. - Lumbar puncture on            0 WBCs   0 to 1 RBC   Total protein 48   Glucose 61.     Cytology no malignant cells   Culture with few WBC, NOS, culture no growth   Cryptococcal antigen negative   Pathogen PCR negative   HSV PCR negative    Lyme PCR in CSF negative      WNV IgM negative, positive IgG + suggesting either past infection or false + /cross reactivity.   - Neurology was on board, appreciate input  - Remains confused, calmer, wife notes definitively improved   Oriented x 2   Feeding self, talking, following commands  - Continue Seroquel 50 mg BID(increased from 25 mg BID on )  - Off restraints > 24 hours  - Now off tele sitter, see how he does overnight   - SNF at discharge, spoke with wife at bedside, ? Monday if off tele sitter in AM     COVID-19 disease  - Vaccinated against COVID-19  -Continues to be on room air  -COVID-19 PCR positive  -Chest x-ray with no evidence of acute cardiopulmonary process  -No indication for dexamethasone or remdesivir given patient is not hypoxic   - 20 days since admit, longer since onset of symptoms  - Stopped droplet plus isolation 12/16     Paroxysmal AF  -Challenges with p.o. intake  -On IV metoprolol. BP low. We will put holding parameters for metoprolol. Will start IV fluid.  -Not on AC d/t hx of GI bleed, was recently seen by Dr. Simpson Clink was discussed which he was not interested     Acute on chronic kidney disease:   -Baseline creatinine 1.8-2  -Creatinine at baseline     CAD s/p CABG  -Aspirin, statin  -Continue Plavix     DM type 2  -Hold metformin  -Sliding scale with hypoglycemia protocol  -Monitor FS     Code Status: Full  Surrogate Decision Maker: His wife     DVT Prophylaxis: lovenox  GI Prophylaxis: not indicated    Subjective:     Chief Complaint / Reason for Physician Visit  Alert, talking, oriented x 2    Now off restraints > 24 hours, off tele sitter this afternoon  Denied complaints  Spoke with wife at bedside    Review of Systems:  Symptom Y/N Comments  Symptom Y/N Comments   Fever/Chills n   Chest Pain n    Poor Appetite    Edema     Cough n   Abdominal Pain n    Sputum    Joint Pain     SOB/MCCOLLUM n   Pruritis/Rash     Nausea/vomit n   Tolerating PT/OT     Diarrhea n   Tolerating Diet n    Constipation    Other       Could NOT obtain due to:      Objective:     VITALS:   Last 24hrs VS reviewed since prior progress note.  Most recent are:  Patient Vitals for the past 24 hrs:   Temp Pulse Resp BP SpO2   12/18/21 1518 97.4 °F (36.3 °C) 92 18 104/70 92 %   12/18/21 1238 97.5 °F (36.4 °C) 80 18 (!) 99/50 95 %   12/18/21 0801 98.7 °F (37.1 °C) 66 18 111/64 98 %   12/18/21 0309 97.8 °F (36.6 °C) 78 20 115/65 98 %   12/17/21 2029 98.1 °F (36.7 °C) 84 19 (!) 103/59 98 %       Intake/Output Summary (Last 24 hours) at 12/18/2021 1527  Last data filed at 12/17/2021 2029  Gross per 24 hour   Intake 120 ml   Output --   Net 120 ml        I had a face to face encounter and independently examined this patient on 12/18/2021, as outlined below:  PHYSICAL EXAM:  General: Somnolent, arousable,talking a bit, not agitated  EENT:  Anicteric sclerae. MMM  Resp:  CTA bilaterally, no wheezing or rales. No accessory muscle use  CV:  Regular  rhythm,  No edema  GI:  Soft, Non distended, Non tender. +Bowel sounds  Neurologic:  Awake, moves all extremities, no obvious focal deficits                           Feeding self, still confused, oriented x 2(not location), recognized wife   Psych:   deferred  Skin:  No jaundice    Reviewed most current lab test results and cultures  YES  Reviewed most current radiology test results   YES  Review and summation of old records today    NO  Reviewed patient's current orders and MAR    YES  PMH/SH reviewed - no change compared to H&P  ________________________________________________________________________  Care Plan discussed with:    Comments   Patient x    Family  x Wife   RN x    Care Manager     Consultant                       x Multidiciplinary team rounds were held today with , nursing, pharmacist and clinical coordinator. Patient's plan of care was discussed; medications were reviewed and discharge planning was addressed.      ________________________________________________________________________      Comments   >50% of visit spent in counseling and coordination of care x    ________________________________________________________________________  Geovanni Avendaño MD     Procedures: see electronic medical records for all procedures/Xrays and details which were not copied into this note but were reviewed prior to creation of Plan. LABS:  I reviewed today's most current labs and imaging studies.   Pertinent labs include:  Recent Labs     12/16/21 0153   WBC 7.5   HGB 13.0   HCT 38.7        Recent Labs     12/16/21 0153      K 3.9   *   CO2 22      BUN 31*   CREA 1.70*   CA 9.0       Signed: Rossy Lopez MD

## 2021-12-19 LAB
ANION GAP SERPL CALC-SCNC: 9 MMOL/L (ref 5–15)
BASOPHILS # BLD: 0 K/UL (ref 0–0.1)
BASOPHILS NFR BLD: 1 % (ref 0–1)
BUN SERPL-MCNC: 19 MG/DL (ref 6–20)
BUN/CREAT SERPL: 12 (ref 12–20)
CALCIUM SERPL-MCNC: 9.3 MG/DL (ref 8.5–10.1)
CHLORIDE SERPL-SCNC: 106 MMOL/L (ref 97–108)
CO2 SERPL-SCNC: 22 MMOL/L (ref 21–32)
CREAT SERPL-MCNC: 1.53 MG/DL (ref 0.7–1.3)
DIFFERENTIAL METHOD BLD: ABNORMAL
EOSINOPHIL # BLD: 0.1 K/UL (ref 0–0.4)
EOSINOPHIL NFR BLD: 2 % (ref 0–7)
ERYTHROCYTE [DISTWIDTH] IN BLOOD BY AUTOMATED COUNT: 15.6 % (ref 11.5–14.5)
GLUCOSE BLD STRIP.AUTO-MCNC: 118 MG/DL (ref 65–117)
GLUCOSE BLD STRIP.AUTO-MCNC: 141 MG/DL (ref 65–117)
GLUCOSE BLD STRIP.AUTO-MCNC: 165 MG/DL (ref 65–117)
GLUCOSE BLD STRIP.AUTO-MCNC: 212 MG/DL (ref 65–117)
GLUCOSE SERPL-MCNC: 120 MG/DL (ref 65–100)
HCT VFR BLD AUTO: 36.5 % (ref 36.6–50.3)
HGB BLD-MCNC: 12.2 G/DL (ref 12.1–17)
IMM GRANULOCYTES # BLD AUTO: 0.1 K/UL (ref 0–0.04)
IMM GRANULOCYTES NFR BLD AUTO: 1 % (ref 0–0.5)
LYMPHOCYTES # BLD: 1 K/UL (ref 0.8–3.5)
LYMPHOCYTES NFR BLD: 14 % (ref 12–49)
MCH RBC QN AUTO: 31.2 PG (ref 26–34)
MCHC RBC AUTO-ENTMCNC: 33.4 G/DL (ref 30–36.5)
MCV RBC AUTO: 93.4 FL (ref 80–99)
MONOCYTES # BLD: 0.7 K/UL (ref 0–1)
MONOCYTES NFR BLD: 9 % (ref 5–13)
NEUTS SEG # BLD: 5.4 K/UL (ref 1.8–8)
NEUTS SEG NFR BLD: 73 % (ref 32–75)
NRBC # BLD: 0 K/UL (ref 0–0.01)
NRBC BLD-RTO: 0 PER 100 WBC
PLATELET # BLD AUTO: 215 K/UL (ref 150–400)
PMV BLD AUTO: 10.9 FL (ref 8.9–12.9)
POTASSIUM SERPL-SCNC: 3.6 MMOL/L (ref 3.5–5.1)
RBC # BLD AUTO: 3.91 M/UL (ref 4.1–5.7)
SERVICE CMNT-IMP: ABNORMAL
SODIUM SERPL-SCNC: 137 MMOL/L (ref 136–145)
WBC # BLD AUTO: 7.3 K/UL (ref 4.1–11.1)

## 2021-12-19 PROCEDURE — 80048 BASIC METABOLIC PNL TOTAL CA: CPT

## 2021-12-19 PROCEDURE — 82962 GLUCOSE BLOOD TEST: CPT

## 2021-12-19 PROCEDURE — 65270000029 HC RM PRIVATE

## 2021-12-19 PROCEDURE — 74011250637 HC RX REV CODE- 250/637: Performed by: STUDENT IN AN ORGANIZED HEALTH CARE EDUCATION/TRAINING PROGRAM

## 2021-12-19 PROCEDURE — 74011250637 HC RX REV CODE- 250/637: Performed by: INTERNAL MEDICINE

## 2021-12-19 PROCEDURE — 36415 COLL VENOUS BLD VENIPUNCTURE: CPT

## 2021-12-19 PROCEDURE — 74011000250 HC RX REV CODE- 250: Performed by: STUDENT IN AN ORGANIZED HEALTH CARE EDUCATION/TRAINING PROGRAM

## 2021-12-19 PROCEDURE — 85025 COMPLETE CBC W/AUTO DIFF WBC: CPT

## 2021-12-19 PROCEDURE — 74011636637 HC RX REV CODE- 636/637: Performed by: STUDENT IN AN ORGANIZED HEALTH CARE EDUCATION/TRAINING PROGRAM

## 2021-12-19 PROCEDURE — 74011250636 HC RX REV CODE- 250/636: Performed by: PSYCHIATRY & NEUROLOGY

## 2021-12-19 RX ORDER — QUETIAPINE FUMARATE 50 MG/1
25 TABLET, FILM COATED ORAL 2 TIMES DAILY
Qty: 7 TABLET | Refills: 0 | Status: SHIPPED | OUTPATIENT
Start: 2021-12-19 | End: 2021-12-26

## 2021-12-19 RX ORDER — IPRATROPIUM BROMIDE AND ALBUTEROL SULFATE 2.5; .5 MG/3ML; MG/3ML
3 SOLUTION RESPIRATORY (INHALATION)
Qty: 30 NEBULE | Refills: 2 | Status: SHIPPED
Start: 2021-12-19 | End: 2022-03-29 | Stop reason: ALTCHOICE

## 2021-12-19 RX ADMIN — ENOXAPARIN SODIUM 40 MG: 100 INJECTION SUBCUTANEOUS at 08:09

## 2021-12-19 RX ADMIN — QUETIAPINE FUMARATE 50 MG: 25 TABLET ORAL at 18:05

## 2021-12-19 RX ADMIN — CYANOCOBALAMIN TAB 500 MCG 1000 MCG: 500 TAB at 08:09

## 2021-12-19 RX ADMIN — ASPIRIN 81 MG CHEWABLE TABLET 81 MG: 81 TABLET CHEWABLE at 08:09

## 2021-12-19 RX ADMIN — Medication 2 UNITS: at 07:30

## 2021-12-19 RX ADMIN — RANOLAZINE 500 MG: 500 TABLET, FILM COATED, EXTENDED RELEASE ORAL at 08:09

## 2021-12-19 RX ADMIN — RANOLAZINE 500 MG: 500 TABLET, FILM COATED, EXTENDED RELEASE ORAL at 18:05

## 2021-12-19 RX ADMIN — LATANOPROST 1 DROP: 50 SOLUTION OPHTHALMIC at 08:08

## 2021-12-19 RX ADMIN — ENOXAPARIN SODIUM 40 MG: 100 INJECTION SUBCUTANEOUS at 21:31

## 2021-12-19 RX ADMIN — ROSUVASTATIN 40 MG: 20 TABLET, FILM COATED ORAL at 08:09

## 2021-12-19 RX ADMIN — METOPROLOL TARTRATE 2.5 MG: 1 INJECTION, SOLUTION INTRAVENOUS at 04:47

## 2021-12-19 RX ADMIN — CLOPIDOGREL BISULFATE 75 MG: 75 TABLET ORAL at 08:09

## 2021-12-19 RX ADMIN — Medication 10 ML: at 22:30

## 2021-12-19 RX ADMIN — QUETIAPINE FUMARATE 50 MG: 25 TABLET ORAL at 08:09

## 2021-12-19 RX ADMIN — Medication 5 ML: at 06:58

## 2021-12-19 NOTE — PROGRESS NOTES
Hospitalist Progress Note    NAME: Jerrod Almanza   :  1940   MRN:  [id number]       Assessment / Plan:    Acute metabolic encephalopathy POA, unknown etiology   Covid encephalopathy POA  - Transferred from OSH with acute AMS and shortness of breath. Alert and oriented x4 in the ED upon arrival at OSH then became confused/agitated. - Progressive acute encephalopathy since 21  - Etiology unclear, ? high suspicion for COVID encephalopathy. Superimposed hospital delirium  - COVID PCR +ve  - CT head negative, Serum alcohol <10, Urine drug screening negative. - EEG Showed only mild slowing and no seizures  - Carotid dopplers with no significant stenosis ICA right < 50%  - MRI brain          Study is slightly degraded by motion artifact. Multiple sequences were repeated.          Ventricles are midline without hydrocephalus. Mild chronic T2 hyperintensity in the supratentorial brain. No acute infarction. Major intracranial vascular flow-voids are patent. Right mastoid effusion. MRA brain:   Significant motion artifact. No acute large vessel occlusion   Visualization of portions of the anterior, middle and posterior cerebral arteries     obscured by motion above the level of the [place]. Cannot assess for aneurysm. - Lumbar puncture on            0 WBCs   0 to 1 RBC   Total protein 48   Glucose 61.     Cytology no malignant cells   Culture with few WBC, NOS, culture no growth   Cryptococcal antigen negative   Pathogen PCR negative   HSV PCR negative    Lyme PCR in CSF negative      WNV IgM negative, positive IgG + suggesting either past infection or false + /cross reactivity.   - Neurology was on board, appreciate input  - Remains confused, calmer, wife notes definitively improved   Oriented x 2   Feeding self, talking, following commands  - Continue Seroquel 50 mg BID(increased from 25 mg BID on )  - Off restraints > 48 hours  - Now off tele sitter > 24 hours  - [facility] hopefully in AM     COVID-19 disease  - Vaccinated against COVID-19  -Continues to be on room air  -COVID-19 PCR positive  -Chest x-ray with no evidence of acute cardiopulmonary process  -No indication for dexamethasone or remdesivir given patient is not hypoxic   - 20 days since admit, longer since onset of symptoms  - Stopped droplet plus isolation 12/16     Paroxysmal AF  -Challenges with p.o. intake  -On IV metoprolol. BP low. We will put holding parameters for metoprolol. Will start IV fluid.  -Not on AC d/t hx of GI bleed, was recently seen by Dr. Evelyn Louis was discussed which he was not interested     Acute on chronic kidney disease:   -Baseline creatinine 1.8-2  -Creatinine at baseline     CAD s/p CABG  -Aspirin, statin  -Continue Plavix     DM type 2  -Hold metformin  -Sliding scale with hypoglycemia protocol  -Monitor FS     Code Status: Full  Surrogate Decision Maker: His wife     DVT Prophylaxis: lovenox  GI Prophylaxis: not indicated    Subjective:     Chief Complaint / Reason for Physician Visit  Alert, talking, oriented x 2    Now off restraints > 48 hours, off  sitter > 24 hours, remains calm  Denied complaints    Review of Systems:  Symptom Y/N Comments  Symptom Y/N Comments   Fever/Chills n   Chest Pain n    Poor Appetite    Edema     Cough n   Abdominal Pain n    Sputum    Joint Pain     SOB/MCCOLLUM n   Pruritis/Rash     Nausea/vomit n   Tolerating PT/OT     Diarrhea n   Tolerating Diet n    Constipation    Other       Could NOT obtain due to:      Objective:     VITALS:   Last 24hrs VS reviewed since prior progress note.  Most recent are:  Patient Vitals for the past 24 hrs:   Temp Pulse Resp BP SpO2   12/19/21 1432 98.3 °F (36.8 °C) 80 18 (!) 104/59 96 %   12/19/21 1212 98 °F (36.7 °C) 86 18 (!) 102/50 94 %   12/19/21 0443 97.7 °F (36.5 °C) 99 18 (!) 127/95 95 %   12/18/21 2112 98.6 °F (37 °C) 89 18 (!) 86/54 --     No intake or output data in the 24 hours ending 12/19/21 1605     I had a face to face encounter and independently examined this patient on 12/19/2021, as outlined below:  PHYSICAL EXAM:  General: Somnolent, arousable,talking a bit, not agitated  EENT:  Anicteric sclerae. MMM  Resp:  CTA bilaterally, no wheezing or rales. No accessory muscle use  CV:  Regular  rhythm,  No edema  GI:  Soft, Non distended, Non tender. +Bowel sounds  Neurologic:  Awake, moves all extremities, no obvious focal deficits                           Feeding self, still confused, oriented x 2(not location), recognized wife   Psych:   deferred  Skin:  No jaundice    Reviewed most current lab test results and cultures  YES  Reviewed most current radiology test results   YES  Review and summation of old records today    NO  Reviewed patient's current orders and MAR    YES  PMH/ reviewed - no change compared to H&P  ________________________________________________________________________  Care Plan discussed with:    Comments   Patient x    Family      RN x    Care Manager     Consultant                       x Multidiciplinary team rounds were held today with , nursing, pharmacist and clinical coordinator. Patient's plan of care was discussed; medications were reviewed and discharge planning was addressed. ________________________________________________________________________      Comments   >50% of visit spent in counseling and coordination of care x    ________________________________________________________________________  Keila Michaud MD     Procedures: see electronic medical records for all procedures/Xrays and details which were not copied into this note but were reviewed prior to creation of Plan. LABS:  I reviewed today's most current labs and imaging studies.   Pertinent labs include:  Recent Labs     12/19/21  0455   WBC 7.3   HGB 12.2   HCT 36.5*        Recent Labs     12/19/21  0455      K 3.6      CO2 22 *   BUN 19   CREA 1.53*   CA 9.3       Signed: Candace Mathis MD

## 2021-12-19 NOTE — DISCHARGE INSTRUCTIONS
HOSPITALIST DISCHARGE INSTRUCTIONS    NAME: Maya Veliz   :  1940   MRN:  [id number]     Date/Time:  2021 4:15 PM    ADMIT DATE: 2021   DISCHARGE DATE: 2021     Attending Physician: Tay Kahn MD    DISCHARGE DIAGNOSIS:    Acute metabolic encephalopathy POA, unknown etiology   Covid encephalopathy POA  - Transferred from OSH with acute AMS and shortness of breath. Alert and oriented x4 in the ED upon arrival at OSH then became confused/agitated. - Progressive acute encephalopathy since 21  - Etiology unclear, ? high suspicion for COVID encephalopathy. Superimposed hospital delirium  - COVID PCR +ve  - CT head negative, Serum alcohol <10, Urine drug screening negative. - EEG Showed only mild slowing and no seizures  - Carotid dopplers with no significant stenosis ICA right < 50%  - MRI brain          Study is slightly degraded by motion artifact. Multiple sequences were repeated.          Ventricles are midline without hydrocephalus. Mild chronic T2 hyperintensity in the supratentorial brain. No acute infarction. Major intracranial vascular flow-voids are patent. Right mastoid effusion. MRA brain:              Significant motion artifact. No acute large vessel occlusion              Visualization of portions of the anterior, middle and posterior cerebral arteries                           obscured by motion above the level of the [place]. Cannot assess for aneurysm. - Lumbar puncture on               0 WBCs              0 to 1 RBC              Total protein 48              Glucose 61.                Cytology no malignant cells              Culture with few WBC, NOS, culture no growth              Cryptococcal antigen negative              Pathogen PCR negative              HSV PCR negative               Lyme PCR in CSF negative                 WNV IgM negative, positive IgG + suggesting either past infection or false + /cross reactivity.   - Neurology was on board, appreciate input  - Remains confused, calmer, wife notes definitively improved              Oriented x 2              Feeding self, talking, following commands  - Continue Seroquel 50 mg BID(increased from 25 mg BID on 12/18)  - Off restraints > 48 hours  - Now off tele sitter > 24 hours  - SNF hopefully in AM     COVID-19 disease  - Vaccinated against COVID-19  -Continues to be on room air  -COVID-19 PCR positive  -Chest x-ray with no evidence of acute cardiopulmonary process  -No indication for dexamethasone or remdesivir given patient is not hypoxic   - 20 days since admit, longer since onset of symptoms  - Stopped droplet plus isolation 12/16     Paroxysmal AF  -Challenges with p.o. intake  -On IV metoprolol. BP low. We will put holding parameters for metoprolol. Will start IV fluid.  -Not on AC d/t hx of GI bleed, was recently seen by Dr. Qiu Phi was discussed which he was not interested     Acute on chronic kidney disease:   -Baseline creatinine 1.8-2  -Creatinine at baseline     CAD s/p CABG  -Aspirin, statin  -Continue Plavix     Medications: Per above medication reconciliation. Pain Management: per above medications    Recommended diet: Diabetic Diet    Recommended activity: Activity as tolerated    Wound care: None    Indwelling devices:  None    Supplemental Oxygen: None    Required Lab work: Per [facility] routine    Glucose management:  Accucheck ACHS with sliding scale per [facility] protocol    Code status: Full    Seroquel x 7 days, tapering off, can slow taper as needed    Outside physician follow up:     Follow-up Information     Follow up With Specialties Details Why Contact [facility]    [address] [place] [address]   [address] [phone]    Magalis Carmichael MD Neurology Schedule an appointment as soon as possible for a visit in 4 weeks follow up encephalopathy [address]  [facility]  [phone]                 Skilled nursing facility/ SNF MD responsible for above on discharge. Information obtained by :  I understand that if any problems occur once I am at home I am to contact my physician. I understand and acknowledge receipt of the instructions indicated above.                                                                                                                                            Physician's or R.N.'s Signature                                                                  Date/Time                                                                                                                                              Patient or Repres

## 2021-12-19 NOTE — PROGRESS NOTES
Hospitalist Discharge Note    NAME: Melida Lomeli   :  1940   MRN:  [id number]       Admit date: 2021    Discharge date: 21    PCP: None    Discharge Diagnoses:    Acute metabolic encephalopathy POA    Probable Covid encephalopathy POA     COVID-19 disease     Paroxysmal AF    Acute kidney injury POA      Chronic kidney disease stage 3 POA Baseline creatinine 1.6 to 1.7     CAD s/p CABG     DM type 2     Code Status: Full      Discharge Medications:  Current Discharge Medication List      START taking these medications    Details   albuterol-ipratropium (DUO-NEB) 2.5 mg-0.5 mg/3 ml nebu 3 mL by Nebulization route every four (4) hours as needed for Wheezing, Shortness of Breath or Respiratory Distress. Qty: 30 Nebule, Refills: 2      QUEtiapine (SEROquel) 50 mg tablet Take 0.5 Tablets by mouth two (2) times a day for 7 days. Qty: 7 Tablet, Refills: 0         CONTINUE these medications which have NOT CHANGED    Details   ranolazine ER (RANEXA) 500 mg SR tablet TAKE 1 TABLET BY MOUTH TWICE A DAY  Qty: 180 Tablet, Refills: 3      rosuvastatin (CRESTOR) 40 mg tablet TAKE 1 TABLET BY MOUTH EVERY DAY  Qty: 30 Tablet, Refills: 5      latanoprost (XALATAN) 0.005 % ophthalmic solution Administer 1 Drop to both eyes daily. clopidogrel (PLAVIX) 75 mg tab Take 1 Tab by mouth daily. Qty: 30 Tab, Refills: 11      glucose blood VI test strips (ACCU-CHEK YVES PLUS TEST STRP) strip Use to check Blood glucose 3 times daily (Diagnosis code: E11.21)  Qty: 100 Strip, Refills: 11    Associated Diagnoses: Type II diabetes mellitus with nephropathy (HCC)      aspirin delayed-release 81 mg tablet Take 1 Tab by mouth daily. Qty: 90 Tab, Refills: 1      nitroglycerin (NITROSTAT) 0.4 mg SL tablet 1 Tab by SubLINGual route every five (5) minutes as needed for Chest Pain. Qty: 25 Tab, Refills: 1      cholecalciferol (VITAMIN D3) 1,000 unit cap Take 1,000 Units by mouth daily.       cyanocobalamin 1,000 mcg tablet Take 1,000 mcg by mouth daily. STOP taking these medications       metoprolol tartrate (LOPRESSOR) 25 mg tablet Comments:   Reason for Stopping:         metFORMIN ER (GLUCOPHAGE XR) 500 mg tablet Comments:   Reason for Stopping: Follow-up Information     Follow up With Specialties Details Why Contact Info    [address] [place] [address]  [phone]    Ama Bailey MD Neurology Schedule an appointment as soon as possible for a visit in 4 weeks follow up [facility]  [phone]            Time spent on discharge:   I spent greater than 30 minutes on discharge, seeing and examining the patient, reconciling home meds and new meds, coordinating care with case management, doing the discharge papers and the D/C summary    Discharge disposition: SNF    Discharge Condition: Stable    Summary of admission H+P(copied from Dr Ilda Simmons Note):     CHIEF COMPLAINT: Shortness of breath     HISTORY OF PRESENT ILLNESS:     Mat Saldana is a 80 y.o. male with past medical history of CAD status post CABG, CKD, A. fib status post ablation status post pacemaker was transferred from outside hospital with acute change in mental status. He presented to OSH yesterday with shortness of breath, had A. fib RVR on presentation which was controlled with metoprolol. However he got very confused in the ED, became violent, was trying to hit a . Had a CT scan done head which was negative, and was transferred to our center for further evaluation. He had received Geodon Haldol prior to being sent to our center. On my evaluation he was sleepy, unable to give any history. He is on room air saturating well.   No reported chest pain, belly pain, change in bladder or bowel habits, lower extreme edema or tenderness.     We were asked to admit for work up and evaluation of the above problems.           Past Medical History:   Diagnosis Date    Abnormal nuclear cardiac imaging test 5/8/2019    Anemia 6/5/2017    Atrial fibrillation (HCC)      Chronic kidney disease      CKD (chronic kidney disease) 5/8/2019    Coronary atherosclerosis of native coronary artery      Diabetes mellitus, type II ([address]      MCCOLLUM (dyspnea on exertion) 5/8/2019    Glaucoma      Mixed hyperlipidemia      Other dyspnea and respiratory abnormality      Pacemaker      S/P ablation of atrial fibrillation      Admit head CT read by radiology FINDINGS:  The ventricles and sulci are normal in size, shape and configuration. . There is  no significant white matter disease. There is no intracranial hemorrhage,  extra-axial collection, or mass effect. The basilar cisterns are open. No CT  evidence of acute infarct. The bone windows demonstrate no abnormalities. The visualized portions of the  paranasal sinuses and mastoid air cells are clear. IMPRESSION  No acute process or change compared to the prior exam.    KUB 12/1 read by radiology results:  A frontal view of the abdomen shows normal small bowel gas pattern. There is  mild diffuse colorectal stool without significant colorectal distention. There  are no significant calcifications. There is no apparent organomegaly. There is no metallic device or other metallic finding. IMPRESSION  Unremarkable KUB. MRI brain read by radiology FINDINGS:    Study is slightly degraded by motion artifact. Multiple sequences were repeated. Ventricles are midline without hydrocephalus. Mild chronic T2 hyperintensity in  the supratentorial brain. No acute infarction. Major intracranial vascular  flow-voids are patent. Right mastoid effusion  IMPRESSION  Motion artifact. Mild chronic white matter disease with no acute infarction. MRA head read by radiology FINDINGS:  Significant motion artifact.  No acute large vessel occlusion, with visualization  of portions of the anterior, middle and posterior cerebral arteries obscured by  motion above the level of the [place]. Cannot assess for aneurysm. IMPRESSION  Significant motion artifact. No visualized large vessel occlusion. Carotid doppler bilaterally   Right Carotid  The right CCA is patent. There is mild stenosis in the right ICA (<50%) and at the proximal segment. The right ICA has mild and calcific plaque. The right ECA is patent. The right vertebral is antegrade. The right subclavian is normal. Difficult study due to constant patient movement. Left Carotid  The left CCA is patent. There is minimal stenosis in the left ICA and at the proximal segment . There is intimal thickening present in the left ICA. The left ICA has homogeneous plaque. The left ECA is patent. The left vertebral is antegrade. The left subclavian is normal.     Hospital course:     Acute metabolic encephalopathy POA, unknown etiology   Covid encephalopathy POA  - Transferred from OSH with acute AMS and shortness of breath. Alert and oriented x4 in the ED upon arrival at OSH then became confused/agitated. - Progressive acute encephalopathy since 11/24/21  - Etiology unclear, ? high suspicion for COVID encephalopathy. Superimposed hospital delirium  - COVID PCR +ve  - CT head negative, Serum alcohol <10, Urine drug screening negative. - EEG Showed only mild slowing and no seizures  - Carotid dopplers with no significant stenosis ICA right < 50%  - MRI brain          Study is slightly degraded by motion artifact. Multiple sequences were repeated.          Ventricles are midline without hydrocephalus. Mild chronic T2 hyperintensity in the supratentorial brain. No acute infarction. Major intracranial vascular flow-voids are patent. Right mastoid effusion. MRA brain:   Significant motion artifact.     No acute large vessel occlusion   Visualization of portions of the anterior, middle and posterior cerebral arteries     obscured by motion above the level of the [place]. Cannot assess for aneurysm. - Lumbar puncture on 12/1           0 WBCs   0 to 1 RBC   Total protein 48   Glucose 61. Cytology no malignant cells   Culture with few WBC, NOS, culture no growth   Cryptococcal antigen negative   Pathogen PCR negative   HSV PCR negative    Lyme PCR in CSF negative      WNV IgM negative, positive IgG + suggesting either past infection or false + /cross reactivity.   - Neurology was on board, appreciate input  - agitation requiring sitter and restraints early part of admit  - Remains confused, much calmer, wife notes definitively improved   Oriented x 2   Feeding self, talking, following commands   No longer agitated or combative  - Continue Seroquel 50 mg BID(increased from 25 mg BID on 12/18)  - Off restraints since 12/17  - Now off tele sitter and sitter since 12./18  - D/C SNF     COVID-19 disease  - Vaccinated against COVID-19  -Continues to be on room air  -COVID-19 PCR positive  -Chest x-ray with no evidence of acute cardiopulmonary process  -No indication for dexamethasone or remdesivir given patient is not hypoxic   - 20 days since admit, longer since onset of symptoms  - Stopped droplet plus isolation 12/16     Paroxysmal AF  -Challenges with p.o. intake  -On IV metoprolol. BP low. We will put holding parameters for metoprolol.   Will start IV fluid.  -Not on AC d/t hx of GI bleed, was recently seen by Dr. Duc Kyle was discussed which he was not interested     Acute on chronic kidney disease:   -Baseline creatinine 1.8-2  -Creatinine at baseline     CAD s/p CABG  -Aspirin, statin  -Continue Plavix     DM type 2  -Hold metformin  -Sliding scale with hypoglycemia protocol  -Monitor FS     Code Status: Full  Surrogate Decision Maker: His wife     DVT Prophylaxis: lovenox  GI Prophylaxis: not indicated    Subjective:     Chief Complaint / Reason for Physician Visit  Alert, talking, oriented x 2    Now off restraints > 48 hours, off  sitter > 24 hours, remains calm  Denied complaints    Review of Systems:  Symptom Y/N Comments  Symptom Y/N Comments   Fever/Chills n   Chest Pain n    Poor Appetite    Edema     Cough n   Abdominal Pain n    Sputum    Joint Pain     SOB/MCCOLLUM n   Pruritis/Rash     Nausea/vomit n   Tolerating PT/OT     Diarrhea n   Tolerating Diet n    Constipation    Other       Could NOT obtain due to:      Objective:     VITALS:   Last 24hrs VS reviewed since prior progress note. Most recent are:  Patient Vitals for the past 24 hrs:   Temp Pulse Resp BP SpO2   12/19/21 1432 98.3 °F (36.8 °C) 80 18 (!) 104/59 96 %   12/19/21 1212 98 °F (36.7 °C) 86 18 (!) 102/50 94 %   12/19/21 0443 97.7 °F (36.5 °C) 99 18 (!) 127/95 95 %   12/18/21 2112 98.6 °F (37 °C) 89 18 (!) 86/54 --     No intake or output data in the 24 hours ending 12/19/21 1617     I had a face to face encounter and independently examined this patient on 12/19/2021, as outlined below:  PHYSICAL EXAM:  General: Somnolent, arousable,talking a bit, not agitated  EENT:  Anicteric sclerae. MMM  Resp:  CTA bilaterally, no wheezing or rales. No accessory muscle use  CV:  Regular  rhythm,  No edema  GI:  Soft, Non distended, Non tender.   +Bowel sounds  Neurologic:  Awake, moves all extremities, no obvious focal deficits                           Feeding self, still confused, oriented x 2(not location), recognized wife   Psych:   deferred  Skin:  No jaundice    Reviewed most current lab test results and cultures  YES  Reviewed most current radiology test results   YES  Review and summation of old records today    NO  Reviewed patient's current orders and MAR    YES  PMH/SH reviewed - no change compared to H&P  ________________________________________________________________________  Care Plan discussed with:    Comments   Patient x    Family      RN x    Care Manager     Consultant x Multidiciplinary team rounds were held today with , nursing, pharmacist and clinical coordinator. Patient's plan of care was discussed; medications were reviewed and discharge planning was addressed. ________________________________________________________________________      Comments   >50% of visit spent in counseling and coordination of care x    ________________________________________________________________________  Reginald Moran MD     Procedures: see electronic medical records for all procedures/Xrays and details which were not copied into this note but were reviewed prior to creation of Plan. LABS:  I reviewed today's most current labs and imaging studies.   Pertinent labs include:  Recent Labs     12/19/21  0455   WBC 7.3   HGB 12.2   HCT 36.5*        Recent Labs     12/19/21  0455      K 3.6      CO2 22   *   BUN 19   CREA 1.53*   CA 9.3       Signed: Regniald Moran MD

## 2021-12-20 VITALS
BODY MASS INDEX: 32.55 KG/M2 | SYSTOLIC BLOOD PRESSURE: 111 MMHG | OXYGEN SATURATION: 95 % | HEART RATE: 82 BPM | TEMPERATURE: 98.4 F | RESPIRATION RATE: 16 BRPM | DIASTOLIC BLOOD PRESSURE: 58 MMHG | HEIGHT: 72 IN

## 2021-12-20 LAB
GLUCOSE BLD STRIP.AUTO-MCNC: 132 MG/DL (ref 65–117)
GLUCOSE BLD STRIP.AUTO-MCNC: 145 MG/DL (ref 65–117)
GLUCOSE BLD STRIP.AUTO-MCNC: 154 MG/DL (ref 65–117)
SERVICE CMNT-IMP: ABNORMAL

## 2021-12-20 PROCEDURE — 74011636637 HC RX REV CODE- 636/637: Performed by: STUDENT IN AN ORGANIZED HEALTH CARE EDUCATION/TRAINING PROGRAM

## 2021-12-20 PROCEDURE — 74011250637 HC RX REV CODE- 250/637: Performed by: STUDENT IN AN ORGANIZED HEALTH CARE EDUCATION/TRAINING PROGRAM

## 2021-12-20 PROCEDURE — 74011250636 HC RX REV CODE- 250/636: Performed by: PSYCHIATRY & NEUROLOGY

## 2021-12-20 PROCEDURE — 82962 GLUCOSE BLOOD TEST: CPT

## 2021-12-20 RX ORDER — METOPROLOL TARTRATE 25 MG/1
25 TABLET, FILM COATED ORAL EVERY 12 HOURS
Status: DISCONTINUED | OUTPATIENT
Start: 2021-12-20 | End: 2021-12-20 | Stop reason: HOSPADM

## 2021-12-20 RX ADMIN — LATANOPROST 1 DROP: 50 SOLUTION OPHTHALMIC at 09:38

## 2021-12-20 RX ADMIN — CLOPIDOGREL BISULFATE 75 MG: 75 TABLET ORAL at 09:36

## 2021-12-20 RX ADMIN — Medication 2 UNITS: at 11:55

## 2021-12-20 RX ADMIN — RANOLAZINE 500 MG: 500 TABLET, FILM COATED, EXTENDED RELEASE ORAL at 09:36

## 2021-12-20 RX ADMIN — Medication 10 ML: at 13:47

## 2021-12-20 RX ADMIN — CYANOCOBALAMIN TAB 500 MCG 1000 MCG: 500 TAB at 09:36

## 2021-12-20 RX ADMIN — ENOXAPARIN SODIUM 40 MG: 100 INJECTION SUBCUTANEOUS at 09:34

## 2021-12-20 RX ADMIN — Medication 10 ML: at 06:28

## 2021-12-20 RX ADMIN — ROSUVASTATIN 40 MG: 20 TABLET, FILM COATED ORAL at 09:36

## 2021-12-20 RX ADMIN — ASPIRIN 81 MG CHEWABLE TABLET 81 MG: 81 TABLET CHEWABLE at 09:34

## 2021-12-20 NOTE — PROGRESS NOTES
Problem: Falls - Risk of  Goal: *Absence of Falls  Description: Document Luke Robert Fall Risk and appropriate interventions in the flowsheet. Outcome: Progressing Towards Goal  Note: Fall Risk Interventions:  Mobility Interventions: Bed/chair exit alarm    Mentation Interventions: Adequate sleep, hydration, pain control    Medication Interventions: Bed/chair exit alarm    Elimination Interventions: Bed/chair exit alarm,Call light in reach              Problem: Pressure Injury - Risk of  Goal: *Prevention of pressure injury  Description: Document Alexys Scale and appropriate interventions in the flowsheet.   Outcome: Progressing Towards Goal  Note: Pressure Injury Interventions:  Sensory Interventions: Assess changes in LOC    Moisture Interventions: Minimize layers    Activity Interventions: Pressure redistribution bed/mattress(bed type)    Mobility Interventions: Pressure redistribution bed/mattress (bed type)    Nutrition Interventions: Document food/fluid/supplement intake    Friction and Shear Interventions: Minimize layers

## 2021-12-20 NOTE — PROGRESS NOTES
Hospital to SNF SBAR Handoff - Fabian Khalil                                                                        80 y.o.   male    [address]   Room: 2132/01    MRM 2 MED TELE  Unit Phone# :  [phone]  MRM 2 MED TELE  [address]  [place] [address]  Dept: [phone]  Loc: [phone]                    SITUATION     Admitted:  11/27/2021         Attending Provider:  Dacia Ambrose MD       Consultations:  IP CONSULT TO NEUROLOGY  IP CONSULT TO CARDIOLOGY    PCP:  None   None    Treatment Team: Attending Provider: Dacia Ambrose MD; Utilization Review: Augustine Melara RN; Care Manager: Collette Morris; Care Manager: Wing Lerma; Primary Nurse: Lizbet Fontanez RN    Admitting Dx:  Rapid atrial fibrillation [facility]) [I48.91]  AMS (altered mental status) [R41.82]       Principal Problem: <principal problem not specified>    * No surgery found * of      BY: * Surgery not found *             ON: * No surgery found *                  Code Status: Full Code                Advance Directives:   Advance Care Planning 11/21/2019   Patient's Healthcare Decision Maker is: -   Primary Decision Maker Name -   Primary Decision Maker Phone Number -   Primary Decision Maker Relationship to Patient -   Secondary Decision Maker Name -   Secondary Decision [facility] Phone Number -   Secondary Decision Maker Relationship to Patient -   Confirm Advance Directive None   Patient Would Like to Complete Advance Directive -   Does the patient have other document types -    (Send w/patient)   Not Received       Isolation:  There are currently no Active Isolations       MDRO: No current active infections    Pain Medications given:  None        Special Equipment needed: no  Type of equipment:       BACKGROUND     Allergies:   Allergies   Allergen Reactions    Ciprofloxacin Other (comments)     Difficulty breathing; 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       Past Medical History:   Diagnosis Date    Abnormal nuclear cardiac imaging test 5/8/2019    Anemia 6/5/2017    Atrial fibrillation (HCC)     Chronic kidney disease     CKD (chronic kidney disease) 5/8/2019    Coronary atherosclerosis of native coronary artery     Diabetes mellitus, type II (HCC)     MCCOLLUM (dyspnea on exertion) 5/8/2019    Glaucoma     Mixed hyperlipidemia     Other dyspnea and respiratory abnormality     Pacemaker     S/P ablation of atrial fibrillation 8/4/2016       Past Surgical History:   Procedure Laterality Date    HX AFIB ABLATION      HX CATARACT REMOVAL Bilateral 2018    HX CORONARY ARTERY BYPASS GRAFT  2001    HX CORONARY STENT PLACEMENT      HX HEART CATHETERIZATION  11/21/2019    Left     HX PTCA      ID INS NEW/RPLCMT PRM PM W/TRANSV ELTRD ATRIAL&VENT N/A 3/29/2019    INSERT PPM DUAL performed by Anthony Green MD at \[facility]"" CARDIAC CATH LAB    ID REMOVAL SUBCUTANEOUS CARDIAC RHYTHM MONITOR N/A 3/29/2019    LOOP RECORDER REMOVAL performed by Anthony Green MD at [facility] CARDIAC CATH LAB    UPPER GI ENDOSCOPY,BIOPSY  7/26/2019            Medications Prior to Admission   Medication Sig    ranolazine ER (RANEXA) 500 mg SR tablet TAKE 1 TABLET BY MOUTH TWICE A DAY    metoprolol tartrate (LOPRESSOR) 25 mg tablet TAKE 1 TABLET BY MOUTH TWICE A DAY    rosuvastatin (CRESTOR) 40 mg tablet TAKE 1 TABLET BY MOUTH EVERY DAY    latanoprost (XALATAN) 0.005 % ophthalmic solution Administer 1 Drop to both eyes daily.  metFORMIN ER (GLUCOPHAGE XR) 500 mg tablet Take 1 Tab by mouth two (2) times a day. Please restart Metformin on 11/23/19 (Patient not taking: Reported on 9/23/2021)    clopidogrel (PLAVIX) 75 mg tab Take 1 Tab by mouth daily.  glucose blood VI test strips (ACCU-CHEK YVES PLUS TEST STRP) strip Use to check Blood glucose 3 times daily (Diagnosis code: E11.21)    aspirin delayed-release 81 mg tablet Take 1 Tab by mouth daily.  (Patient not taking: Reported on 9/23/2021)    nitroglycerin (NITROSTAT) 0.4 mg SL tablet 1 Tab by SubLINGual route every five (5) minutes as needed for Chest Pain.  cholecalciferol (VITAMIN D3) 1,000 unit cap Take 1,000 Units by mouth daily.  cyanocobalamin 1,000 mcg tablet Take 1,000 mcg by mouth daily. Hard scripts included in transfer packet no    Vaccinations: There is no immunization history on file for this patient. The Charlson CoMorbitiy Index tool is an evidenced based tool that has more automatic generated information. The tool looks at many different items such as the age of the patient, how many times they were admitted in the last calendar year, current length of stay in the hospital and their diagnosis. All of these items are pulled automatically from information documented in the chart from various places and will generate a score that predicts whether a patient is at low (less than 13), medium (13-20) or high (21 or greater) risk of being readmitted. ASSESSMENT                Temp: 98.4 °F (36.9 °C) (12/20/21 1442) Pulse (Heart Rate): 82 (12/20/21 1442)     Resp Rate: 16 (12/20/21 1442)           BP: (!) 111/58 (12/20/21 1442)     O2 Sat (%): 95 % (12/20/21 1442)          Height: 6' (182.9 cm) (12/14/21 1149)       If above not within 1 hour of discharge:    BP:_____  P:____  R:____ T:_____ O2 Sat: ___%  O2: ______    Active Orders   Diet    ADULT DIET Regular; 5 carb choices (75 gm/meal);  Low Fat/Low Chol/High Fiber/NICOLE         Orientation: only aware of  place, person and situation     Active Behaviors: History of restraints                                   Active Lines/Drains:  (Peg Tube / Helms / CL or S/L?): no    Urinary Status: Incontinent,Incontinent briefs,Has not voided     Last BM: Last Bowel Movement Date: 12/19/21     Skin Integrity: Intact,Tattoos (comment),Scars (comment)             Mobility: Slightly limited   Weight Bearing Status: WBAT (Weight Bearing as Tolerated) Lab Results   Component Value Date/Time    Glucose 120 (H) 12/19/2021 04:55 AM    Hemoglobin A1c 7.1 (H) 07/31/2018 08:52 AM    INR 1.1 11/18/2019 08:54 AM    INR 1.1 07/25/2019 09:50 AM    HGB 12.2 12/19/2021 04:55 AM    HGB 13.0 12/16/2021 01:53 AM        RECOMMENDATION     See After Visit Summary (AVS) for:  · Discharge instructions  · After [address]   · Special equipment needed (entered pre-discharge by Care Management)  · Medication Reconciliation    · Follow up Appointment(s)         Report given/sent by:  Adriana Lamas RN                    Verbal report given to: Toni Morgan RN         Estimated discharge time:  12/20/2021 at [phone]

## 2021-12-20 NOTE — PROGRESS NOTES
# for report: [phone], room 122-B    Transition of Care Plan to SNF/Rehab    SNF/Rehab Transition:  Patient has been accepted to [address] [place] [address] and meets criteria for admission. Patient will transported by [facility] and expected to leave at 4:15 PM.    Communication to Patient/Family:  Met with patient and wife, Patrick Canseco, (identified care giver) and they are agreeable to the transition plan. Communication to SNF/Rehab:  Bedside RN, Chidi Hussein, has been notified to update the transition plan to the facility and call report (phone number [phone]). Discharge information has been updated on the AVS.     Discharge instructions available via [employer] Please include all hard scripts for controlled substances, med rec and dc summary, and AVS in packet. Reviewed and confirmed with facility, [address] [place] [address], they can manage the patient care needs for the following:     Martha Harden with (X) only those applicable:    Medication:  [x]  Medications will be available at the facility  []  IV Antibiotics  []  Controlled Substance - hard copy to be sent with patient   []  Weekly Labs   Documents:  [] Hard RX  [x] MAR  [x] Kardex  [x] AVS  []Transfer Summary  [x]Discharge   Equipment:  []  CPAP/BiPAP  []  Wound Vacuum  []  Helms or Urinary Device  []  PICC/Central Line  []  Nebulizer  []  Ventilator   Treatment:  []Isolation (for MRSA, VRE, etc.)  []Surgical Drain Management  []Tracheostomy Care  []Dressing Changes  []Dialysis with transportation and chair time  []PEG Care  []Oxygen  []Daily Weights for Heart Failure   Dietary:  []Any diet limitations  []Tube Feedings   []Total Parenteral Management (TPN)   Eligible for Medicaid Long Term Services and Supports  Yes:  [] Eligible for medical assistance or will become eligible within 180 days and UAI completed. [] Provider/Patient and/or support system has requested screening.   [] UAI copy provided to patient or responsible party  [] UAI unavailable at discharge will send once processed to SNF provider. [] UAI unavailable at discharged mailed to patient  No:   [x] Private pay and is not financially eligible for Medicaid within the next 180 days. [] Reside out-of-state.   [] A residents of a state owned/operated facility that is licensed  by [facility] or [facility]  [] Enrollment in [facility] hospice services  []  Medical [facility]  [] Patient /Family declines to have screening completed or provide financial information for screening     Financial Resources:  Medicaid    [] Initiated and application pending   [] Full coverage     Advanced Care Plan:  [x]Surrogate Decision Maker of Care: [facility], [phone]  []POA  [x]Communicated Code Status:FULL    Other       BENNY Schroeder  Care Management

## 2021-12-20 NOTE — PROGRESS NOTES
End of Shift Note    Bedside shift change report given to 1 Loulou Muniz  (oncoming nurse) by Rocio Francis (offgoing nurse). Report included the following information SBAR and Kardex    Shift worked:  7060-1965     Shift summary and any significant changes:    BP runs soft. Metropolol held per holding parameters. Concerns for physician to address: See above. Zone phone for oncoming shift:   [phone]       Activity:  Activity Level: Bed Rest  Number times ambulated in hallways past shift: 0  Number of times OOB to chair past shift: 0    Cardiac:   Cardiac Monitoring: Yes      Cardiac Rhythm: Atrial Fib    Access:   Current line(s): PIV     Genitourinary:   Urinary status: incontinent    Respiratory:   O2 Device: None (Room air)  Chronic home O2 use?: NO  Incentive spirometer at bedside: N/A     GI:  Last Bowel Movement Date: 12/19/21  Current diet:  ADULT DIET Regular; 5 carb choices (75 gm/meal); Low Fat/Low Chol/High Fiber/NICOLE  Passing flatus: YES  Tolerating current diet: YES       Pain Management:   Patient states pain is manageable on current regimen: YES    Skin:  Alexys Score: 17  Interventions: turn team and PT/OT consult    Patient Safety:  Fall Score:  Total Score: 4  Interventions: bed/chair alarm  High Fall Risk: Yes    Length of Stay:  Expected LOS: 3d 12h  Actual LOS: [address]

## 2021-12-20 NOTE — PROGRESS NOTES
End of Shift Note    Bedside shift change report given to Angel Rios RN (oncoming nurse) by Adriana Lamas RN (offgoing nurse).   Report included the following information SBAR, Kardex, Intake/Output and MAR 31-May-2021 18:19

## 2021-12-20 NOTE — PROGRESS NOTES
# for report: [phone], room 122-B    Transition of Care Plan:     RUR:   14% \"mod risk\"  [facility]  Follow up appointments: PCP (needs new pt appt) & specialists as indicated  DME needed: None  Transportation at [address]; [facility] 4- 4:15 PM  Keys or means to access home:   No     IM Medicare Letter: Reviewed & signed  Is patient a BCPI-A Bundle: No                 If yes, was Bundle Letter given?:  N/A  Is patient a  and connected with the [address] [place] [address] Road  If yes, was [facility] transfer form completed and VA notified? N/A  Caregiver Contact: Wife- Mayda Meyer, [phone]  Discharge Caregiver contacted prior to discharge?     CM acknowledged discharge. Update 2:24 PM  CM met with pt & his wife at bedside to review [facility] plan for discharge [facility]. Wife expressed concerns but verbalized understanding & is agreeable to discharge. Per accepting facility. CM requested 4 PM transport- [facility] responded ETA 4:00 - 4:15 PM.     CM reviewed 2nd IMM letter with pt/ pt's wife. Medicare pt has received, reviewed, and signed 2nd IM letter informing them of their right to appeal the discharge. Signed copy has been placed on pt bedside chart. No further needs. Update 12:40 PM  CM received update from [facility] admissions reporting they are unable to accept pt d/t last dose of Haldol administered 12/18 at 5 AM; reporting pt does not have a diagnoses for haldol or seroquel. CM followed up with [facility] admissions [facility], [phone]) for updates. Reports she will review & call back. Aware that pt is ready for discharge. Will continue to follow. Update 11:00 AM  Discharge plan discussed during IDR. CM followed up on SNF referral sent to [facility] admissions [facility], [phone]) for updates on bed availability. Carisa Louis reports she will discuss with director & report updates to CM.  CM to delay transport, requested 1 PM.    Initial note-  CM reviewed pt's chart; noted that discharge order went in 12/19 after 4 PM. CM contacted [address] [facility] admissions ([phone]) to confirm facility can accept pt today but once again received no answer; CM instructed to call back after 9:30 AM. Pt requested [facility] transport for 12 PM; will continue to follow. Care Management Interventions  PCP Verified by CM:  Yes (no PCP- per wife, pt relies on Cardio)  Palliative Care Criteria Met (RRAT>21 & CHF Dx)?: No  Palliative Consult Recommended?: No  Mode of Transport at Discharge: [facility] ([facility])  [facility] Transport Time of Discharge: [address] (CM Consult): SNF  Partner SNF: Yes  Discharge Durable Medical Equipment: No  Physical Therapy Consult: Yes  Occupational Therapy Consult: Yes  Speech Therapy Consult: No  Support Systems: Spouse/Significant Other,Child(maty),Other Family Member(s)  Confirm Follow Up Transport: Self (family prn)  The Plan for Transition of Care is Related to the Following Treatment Goals : SNF placement- [facility]  The Patient and/or Patient Representative was Provided with a Choice of Provider and Agrees with the Discharge Plan?: Yes  Name of the Patient Representative Who was Provided with a Choice of Provider and Agrees with the Discharge Plan: Wife- Keny Watters  Discharge Location  Discharge Placement: Skilled nursing facility      BENNY Peterson  Care Management

## 2021-12-21 ENCOUNTER — PATIENT OUTREACH (OUTPATIENT)
Dept: CASE MANAGEMENT | Age: 81
End: 2021-12-21

## 2021-12-21 NOTE — PROGRESS NOTES
Transition of care outreach postponed for 14 days due to patient's discharge to SNF.   D/C Christine 12/20/21 f/u 14d

## 2021-12-27 ENCOUNTER — HOSPITAL ENCOUNTER (EMERGENCY)
Age: 81
Discharge: HOME | End: 2021-12-27
Attending: EMERGENCY MEDICINE
Payer: MEDICARE

## 2021-12-27 VITALS
SYSTOLIC BLOOD PRESSURE: 120 MMHG | TEMPERATURE: 97.4 F | RESPIRATION RATE: 18 BRPM | HEART RATE: 86 BPM | OXYGEN SATURATION: 95 % | DIASTOLIC BLOOD PRESSURE: 68 MMHG

## 2021-12-27 DIAGNOSIS — R46.89 BEHAVIORAL CHANGE: Primary | ICD-10-CM

## 2021-12-27 LAB
ALBUMIN SERPL-MCNC: 3.1 G/DL (ref 3.5–5)
ALBUMIN/GLOB SERPL: 0.7 {RATIO} (ref 1.1–2.2)
ALP SERPL-CCNC: 104 U/L (ref 45–117)
ALT SERPL-CCNC: 21 U/L (ref 12–78)
ANION GAP SERPL CALC-SCNC: 8 MMOL/L (ref 5–15)
AST SERPL-CCNC: 16 U/L (ref 15–37)
BASOPHILS # BLD: 0.1 K/UL (ref 0–0.1)
BASOPHILS NFR BLD: 1 % (ref 0–1)
BILIRUB SERPL-MCNC: 0.6 MG/DL (ref 0.2–1)
BUN SERPL-MCNC: 25 MG/DL (ref 6–20)
BUN/CREAT SERPL: 12 (ref 12–20)
CALCIUM SERPL-MCNC: 9.5 MG/DL (ref 8.5–10.1)
CHLORIDE SERPL-SCNC: 106 MMOL/L (ref 97–108)
CO2 SERPL-SCNC: 22 MMOL/L (ref 21–32)
COMMENT, HOLDF: NORMAL
CREAT SERPL-MCNC: 2.04 MG/DL (ref 0.7–1.3)
DIFFERENTIAL METHOD BLD: ABNORMAL
EOSINOPHIL # BLD: 0.1 K/UL (ref 0–0.4)
EOSINOPHIL NFR BLD: 1 % (ref 0–7)
ERYTHROCYTE [DISTWIDTH] IN BLOOD BY AUTOMATED COUNT: 15.7 % (ref 11.5–14.5)
FLUAV RNA SPEC QL NAA+PROBE: NOT DETECTED
FLUBV RNA SPEC QL NAA+PROBE: NOT DETECTED
GLOBULIN SER CALC-MCNC: 4.6 G/DL (ref 2–4)
GLUCOSE SERPL-MCNC: 139 MG/DL (ref 65–100)
HCT VFR BLD AUTO: 37.3 % (ref 36.6–50.3)
HGB BLD-MCNC: 12.3 G/DL (ref 12.1–17)
IMM GRANULOCYTES # BLD AUTO: 0.1 K/UL (ref 0–0.04)
IMM GRANULOCYTES NFR BLD AUTO: 1 % (ref 0–0.5)
LYMPHOCYTES # BLD: 1 K/UL (ref 0.8–3.5)
LYMPHOCYTES NFR BLD: 11 % (ref 12–49)
MCH RBC QN AUTO: 31.3 PG (ref 26–34)
MCHC RBC AUTO-ENTMCNC: 33 G/DL (ref 30–36.5)
MCV RBC AUTO: 94.9 FL (ref 80–99)
MONOCYTES # BLD: 0.6 K/UL (ref 0–1)
MONOCYTES NFR BLD: 7 % (ref 5–13)
NEUTS SEG # BLD: 7.2 K/UL (ref 1.8–8)
NEUTS SEG NFR BLD: 79 % (ref 32–75)
NRBC # BLD: 0 K/UL (ref 0–0.01)
NRBC BLD-RTO: 0 PER 100 WBC
PLATELET # BLD AUTO: 368 K/UL (ref 150–400)
PMV BLD AUTO: 10.3 FL (ref 8.9–12.9)
POTASSIUM SERPL-SCNC: 4.2 MMOL/L (ref 3.5–5.1)
PROT SERPL-MCNC: 7.7 G/DL (ref 6.4–8.2)
RBC # BLD AUTO: 3.93 M/UL (ref 4.1–5.7)
SAMPLES BEING HELD,HOLD: NORMAL
SARS-COV-2, COV2: NOT DETECTED
SODIUM SERPL-SCNC: 136 MMOL/L (ref 136–145)
WBC # BLD AUTO: 8.9 K/UL (ref 4.1–11.1)

## 2021-12-27 PROCEDURE — 99284 EMERGENCY DEPT VISIT MOD MDM: CPT

## 2021-12-27 PROCEDURE — 85025 COMPLETE CBC W/AUTO DIFF WBC: CPT

## 2021-12-27 PROCEDURE — 36415 COLL VENOUS BLD VENIPUNCTURE: CPT

## 2021-12-27 PROCEDURE — 87636 SARSCOV2 & INF A&B AMP PRB: CPT

## 2021-12-27 PROCEDURE — 80053 COMPREHEN METABOLIC PANEL: CPT

## 2021-12-27 NOTE — ED PROVIDER NOTES
79-year-old male presents from [facility] rehab with a complaint of altered mental status. Patient was discharged from the hospital on  to [facility]. According to EMS, the patient has been off his psych meds since getting the [facility]. Has been uncooperative with treatment and combative with staff. Patient has no complaints at this time but goes on about his wife taking his car keys and not listening. Patient was in the hospital for 2 weeks been evaluated for metabolic encephalopathy ultimately felt to be related to Covid. He had improved and was doing better prior to discharge to [facility]. Denies any fever, cough, chest pain, abdominal pain. No other symptoms at this time.            Past Medical History:   Diagnosis Date    Abnormal nuclear cardiac imaging test 2019    Anemia 2017    Atrial fibrillation (HCC)     Chronic kidney disease     CKD (chronic kidney disease) 2019    Coronary atherosclerosis of native coronary artery     Diabetes mellitus, type II ([address]     MCCOLLUM (dyspnea on exertion) 2019    Glaucoma     Mixed hyperlipidemia     Other dyspnea and respiratory abnormality     Pacemaker     S/P ablation of atrial fibrillation 2016       Past Surgical History:   Procedure Laterality Date    HX AFIB ABLATION      HX CATARACT REMOVAL Bilateral 2018    HX CORONARY ARTERY BYPASS GRAFT  2001    HX CORONARY STENT PLACEMENT      HX HEART CATHETERIZATION  2019    Left     HX PTCA      FL INS NEW/RPLCMT PRM PM W/TRANSV ELTRD ATRIAL&VENT N/A 3/29/2019    INSERT PPM DUAL performed by Eleonora Wilson MD at [facility] CARDIAC CATH LAB    FL REMOVAL SUBCUTANEOUS CARDIAC RHYTHM MONITOR N/A 3/29/2019    LOOP RECORDER REMOVAL performed by Eleonora Wilson MD at [facility] CARDIAC CATH LAB    UPPER GI ENDOSCOPY,BIOPSY  2019              Family History:   Problem Relation Age of Onset    Diabetes Mother     Emphysema Father          age 46 - smoker Social History     Socioeconomic History    Marital status:      Spouse name: Not on file    Number of children: Not on file    Years of education: Not on file    Highest education level: Not on file   Occupational History    Not on file   Tobacco Use    Smoking status: Former Smoker     Packs/day: 4.00     Years: 20.00     Pack years: 80.00     Types: Cigarettes     Quit date: 1980     Years since quittin.4    Smokeless tobacco: Former User    Tobacco comment: QUIT AT AGE 42   Vaping Use    Vaping Use: Never used   Substance and Sexual Activity    Alcohol use: No     Alcohol/week: 0.0 standard drinks    Drug use: Never    Sexual activity: Not on file   Other Topics Concern    Not on file   Social History Narrative    Not on file     Social Determinants of Health     Financial Resource Strain:     Difficulty of Paying Living Expenses: Not on file   Food Insecurity:     Worried About [address] in the Last Year: Not on file    [address] in the Last Year: Not on file   Transportation Needs:     Lack of Transportation (Medical): Not on file    Lack of Transportation (Non-Medical):  Not on file   Physical Activity:     Days of Exercise per Week: Not on file    Minutes of Exercise per Session: Not on file   Stress:     Feeling of Stress : Not on file   Social Connections:     Frequency of Communication with Friends and Family: Not on file    Frequency of Social Gatherings with Friends and Family: Not on file    Attends Zoroastrian Services: Not on file    Active Member of Clubs or Organizations: Not on file    Attends Club or Organization Meetings: Not on file    Marital Status: Not on file   Intimate Partner Violence:     Fear of Current or Ex-Partner: Not on file    Emotionally Abused: Not on file    Physically Abused: Not on file    Sexually Abused: Not on file   Housing Stability:     Unable to Pay for Housing in the Last Year: Not on file    Number of Places Lived in the Last Year: Not on file    Unstable Housing in the Last Year: Not on file         ALLERGIES: Ciprofloxacin    Review of Systems   Constitutional: Negative for diaphoresis and fever. HENT: Negative for facial swelling. Eyes: Negative for visual disturbance. Respiratory: Negative for cough. Cardiovascular: Negative for chest pain. Gastrointestinal: Negative for abdominal pain. Genitourinary: Negative for dysuria. Musculoskeletal: Negative for joint swelling. Skin: Negative for rash. Neurological: Negative for headaches. Hematological: Negative for adenopathy. Psychiatric/Behavioral: Negative for suicidal ideas. Vitals:    12/27/21 1254   BP: 121/81   Pulse: 95   Resp: 20   Temp: 98.5 °F (36.9 °C)   SpO2: 95%            Physical Exam  Vitals and nursing note reviewed. Constitutional:       General: He is not in acute distress. Appearance: He is well-developed. HENT:      Head: Normocephalic and atraumatic. Eyes:      Pupils: Pupils are equal, round, and reactive to light. Cardiovascular:      Rate and Rhythm: Normal rate. Pulmonary:      Effort: Pulmonary effort is normal. No respiratory distress. Abdominal:      General: There is no distension. Musculoskeletal:         General: Normal range of motion. Cervical back: Normal range of motion and neck supple. Skin:     General: Skin is warm and dry. Neurological:      Mental Status: He is alert and oriented to person, place, and time. MDM  Number of Diagnoses or Management Options  Behavioral change  Diagnosis management comments: Assessment: Blood work in the ED unremarkable. Covid test was negative. Patient resting comfortably with stable vital signs. No distress. We will have the smart evaluate the patient. If he is not a candidate for admission by their criteria, I think he can be discharged back to [facility].          Amount and/or Complexity of Data Reviewed  Clinical lab tests: reviewed    3:21 PM  Patient evaluated by be smart and does not meet any criteria for admission psychiatrically. He has been resting comfortably with stable vital signs. Blood work unremarkable. No indication for medical admission to the hospital. Patient states that at this point he would prefer to be discharged home. I have case management set up home health visit and transportation.        Procedures
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
## 2021-12-27 NOTE — ED NOTES
Pt reports to the ED via EMS from home with c/o of increased altered mental status. Per EMS pt has been off his psychiatric medications.

## 2021-12-27 NOTE — PROGRESS NOTES
3:55 PM  Date of previous inpatient admission/ ED visit?  11/27/21-12/20/21 Inpatient Admission MRM: Altered Mental Status    What brought the patient back to ED? Chart reviewed: Pt reports to the ED via EMS from home with c/o of increased altered mental status. Per EMS pt has been off his psychiatric medications. Did patient decline recommended services during last admission/ ED visit (if yes, what)? No    Has patient seen a provider since their last inpatient admission/ED visit (if yes, when)? No    PCP: Karen  First and Last name:   Name of Practice:    Are you a current patient: Yes/No:    Approximate date of last visit:    CM Interventions:  From previous inpatient admission/ED visit: Patient transitioned to [facility] for skilled rehab. BLS transported. From current inpatient admission/ED visit:  Patient currently in the ED being evaluated and needs assessed. CM consult received. Informed patient is from [facility] and does not wish to return to facility. CM informed patient can go home with home health. Call placed to patient's spouse, Veronika Das [phone]. CM reviewed updates with spouse. Spouse stated that she has not spoken to anyone in regards to husbands current condition and is concerned about mental and physical well-being. She is unsure if patient should return home at this time with home health services. Per chart it does indicate when wife is around patient becomes agitated. Call placed to [facility] to follow-up with patients spouse in regards to spouse concerns and resources available. Updates provided to Attending as well. CM will continue to follow and assist with DAREK needs as they arise. 5:12 PM  CM notified that patient is agreeable to return to [facility] for skilled services today. Spouse and BSMART notified of updates. Call placed to [facility] [phone] to notify of patient return.   RN to call report to [address] [place] [address] and request to speak with TIN Segovia Brochure. Patient will be going to room 128 A. Referral placed to [facility] for stretcher transport [phone]. [facility] able to accept and accommodate patient for transport at 7:00 pm.  PCS form completed and provided to ED for patient transport.     BENNY Hoang/CRM  Care Management

## 2021-12-27 NOTE — ED NOTES
TRANSFER - OUT REPORT:    Verbal report given to ILDEFONSO BRODY (name) on Ale Soto  being transferred to [facility] (unit) for routine progression of care       Report consisted of patients Situation, Background, Assessment and   Recommendations(SBAR). Information from the following report(s) SBAR, ED Summary and MAR was reviewed with the receiving nurse. Lines:       Opportunity for questions and clarification was provided.       Patient transported with:   MADDY
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
## 2021-12-27 NOTE — ED NOTES
The patient has been stable in the ED. It was my understanding at change of shift that the patient was being discharged home to the wife as he did not want to go [facility] to [facility]. However, the patient has changed his mind and would like to go back to Psychiatric hospital. Case management is currently working towards making that happen.   Again, the patient remains calm and cooperative in the ED. 5:33 PM

## 2022-01-05 ENCOUNTER — PATIENT OUTREACH (OUTPATIENT)
Dept: CASE MANAGEMENT | Age: 82
End: 2022-01-05

## 2022-01-05 NOTE — PROGRESS NOTES
Transition of care outreach postponed for 14 days due to patient's discharge to SNF.   D/C to [facility] 12[facility] still admitted f/u 14d

## 2022-01-07 ENCOUNTER — PATIENT OUTREACH (OUTPATIENT)
Dept: CASE MANAGEMENT | Age: 82
End: 2022-01-07

## 2022-01-07 NOTE — PROGRESS NOTES
Transition of care outreach postponed for 14 days due to patient's discharge to SNF.   D/C to [facility] 12/20/21 still admitted f/u 14d

## 2022-01-13 ENCOUNTER — TELEPHONE (OUTPATIENT)
Dept: NEUROLOGY | Age: 82
End: 2022-01-13

## 2022-01-21 ENCOUNTER — PATIENT OUTREACH (OUTPATIENT)
Dept: CASE MANAGEMENT | Age: 82
End: 2022-01-21

## 2022-01-25 ENCOUNTER — OFFICE VISIT (OUTPATIENT)
Dept: CARDIOLOGY CLINIC | Age: 82
End: 2022-01-25
Payer: MEDICARE

## 2022-01-25 VITALS
OXYGEN SATURATION: 96 % | RESPIRATION RATE: 18 BRPM | BODY MASS INDEX: 30.41 KG/M2 | HEIGHT: 72 IN | SYSTOLIC BLOOD PRESSURE: 136 MMHG | DIASTOLIC BLOOD PRESSURE: 80 MMHG | HEART RATE: 106 BPM | WEIGHT: 224.5 LBS

## 2022-01-25 DIAGNOSIS — I25.810 CORONARY ARTERY DISEASE INVOLVING CORONARY BYPASS GRAFT OF NATIVE HEART WITHOUT ANGINA PECTORIS: ICD-10-CM

## 2022-01-25 DIAGNOSIS — I48.0 PAROXYSMAL ATRIAL FIBRILLATION (HCC): Primary | ICD-10-CM

## 2022-01-25 DIAGNOSIS — Z95.1 POSTSURGICAL AORTOCORONARY BYPASS STATUS: ICD-10-CM

## 2022-01-25 DIAGNOSIS — I10 ESSENTIAL HYPERTENSION: ICD-10-CM

## 2022-01-25 DIAGNOSIS — Z95.0 CARDIAC PACEMAKER IN SITU: ICD-10-CM

## 2022-01-25 PROCEDURE — G0463 HOSPITAL OUTPT CLINIC VISIT: HCPCS | Performed by: INTERNAL MEDICINE

## 2022-01-25 PROCEDURE — 99214 OFFICE O/P EST MOD 30 MIN: CPT | Performed by: INTERNAL MEDICINE

## 2022-01-25 PROCEDURE — G8536 NO DOC ELDER MAL SCRN: HCPCS | Performed by: INTERNAL MEDICINE

## 2022-01-25 PROCEDURE — G8427 DOCREV CUR MEDS BY ELIG CLIN: HCPCS | Performed by: INTERNAL MEDICINE

## 2022-01-25 PROCEDURE — 93005 ELECTROCARDIOGRAM TRACING: CPT | Performed by: INTERNAL MEDICINE

## 2022-01-25 PROCEDURE — G8754 DIAS BP LESS 90: HCPCS | Performed by: INTERNAL MEDICINE

## 2022-01-25 PROCEDURE — G8417 CALC BMI ABV UP PARAM F/U: HCPCS | Performed by: INTERNAL MEDICINE

## 2022-01-25 PROCEDURE — 93010 ELECTROCARDIOGRAM REPORT: CPT | Performed by: INTERNAL MEDICINE

## 2022-01-25 PROCEDURE — G8432 DEP SCR NOT DOC, RNG: HCPCS | Performed by: INTERNAL MEDICINE

## 2022-01-25 PROCEDURE — G8752 SYS BP LESS 140: HCPCS | Performed by: INTERNAL MEDICINE

## 2022-01-25 PROCEDURE — 1101F PT FALLS ASSESS-DOCD LE1/YR: CPT | Performed by: INTERNAL MEDICINE

## 2022-01-25 RX ORDER — METOPROLOL TARTRATE 25 MG/1
TABLET, FILM COATED ORAL
COMMUNITY
Start: 2022-01-20 | End: 2022-01-25

## 2022-01-25 RX ORDER — METOPROLOL TARTRATE 25 MG/1
25 TABLET, FILM COATED ORAL 2 TIMES DAILY
Qty: 60 TABLET | Refills: 12 | Status: SHIPPED | OUTPATIENT
Start: 2022-01-25 | End: 2022-05-02

## 2022-01-25 NOTE — PROGRESS NOTES
Peri Sykes MD          NAME:  Elena Ponce   :   1940   MRN:   [id number]   PCP:  None           Subjective: The patient is a [de-identified]y.o. year old male  who returns for a routine follow-up. Since the last visit, patient reports no change in exercise tolerance, chest pain, edema, medication intolerance, palpitations, shortness of breath, PND/orthopnea wheezing, sputum, syncope, dizziness or light headedness. Doing well. Past Medical History:   Diagnosis Date    Abnormal nuclear cardiac imaging test 2019    Anemia 2017    Atrial fibrillation (HCC)     Chronic kidney disease     CKD (chronic kidney disease) 2019    Coronary atherosclerosis of native coronary artery     Diabetes mellitus, type II ([address]     MCCOLLUM (dyspnea on exertion) 2019    Glaucoma     Mixed hyperlipidemia     Other dyspnea and respiratory abnormality     Pacemaker     S/P ablation of atrial fibrillation 2016        ICD-10-CM ICD-9-CM    1. Paroxysmal atrial fibrillation (HCC)  I48.0 427.31 AMB POC EKG ROUTINE W/ 12 LEADS, INTER & REP   2. Cardiac pacemaker in situ  Z95.0 V45.01    3. Coronary artery disease involving coronary bypass graft of native heart without angina pectoris  I25.810 414.05    4. Postsurgical aortocoronary bypass status  Z95.1 V45.81    5.  Essential hypertension  I10 401.9       Social History     Tobacco Use    Smoking status: Former Smoker     Packs/day: 4.00     Years: 20.00     Pack years: 80.00     Types: Cigarettes     Quit date: 1980     Years since quittin.5    Smokeless tobacco: Former User    Tobacco comment: QUIT AT AGE 42   Substance Use Topics    Alcohol use: No     Alcohol/week: 0.0 standard drinks      Family History   Problem Relation Age of Onset    Diabetes Mother     Emphysema Father          age 46 - smoker        Review of Systems  Cardiovascular: Negative except as noted in HPI      Objective:       Vitals:    22 1510   BP: 136/80 Pulse: (!) 106   Resp: 18   SpO2: 96%   Weight: 224 lb 8 oz (101.8 kg)   Height: 6' (1.829 m)    Body mass index is 30.45 kg/m². General PE  Mental Status - Alert. General Appearance - Not in acute distress. Chest and Lung Exam   Inspection: Accessory muscles - No use of accessory muscles in breathing. Auscultation:   Breath sounds: - Normal.    Cardiovascular   Inspection: Jugular vein - Bilateral - Inspection Normal.  Palpation/Percussion:   Apical Impulse: - Normal.  Auscultation: Rhythm - Regular. Heart Sounds - S1 WNL and S2 WNL. No S3 or S4. Murmurs & Other Heart Sounds: Auscultation of the heart reveals - No Murmurs. Peripheral Vascular   Upper Extremity: Inspection - Bilateral - No Cyanotic nailbeds or Digital clubbing. Lower Extremity:   Palpation: Edema - Bilateral - No edema. Data Review:     EKG -  EKG: AF rate 106. LABS- @brieflabs@      Allergies reviewed  Allergies   Allergen Reactions    Ciprofloxacin Other (comments)     Difficulty breathing; 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       Medications reviewed  Current Outpatient Medications   Medication Sig    metoprolol tartrate (LOPRESSOR) 25 mg tablet Take 1 Tablet by mouth two (2) times a day.  rosuvastatin (CRESTOR) 40 mg tablet TAKE 1 TABLET BY MOUTH EVERY DAY    latanoprost (XALATAN) 0.005 % ophthalmic solution Administer 1 Drop to both eyes daily.  glucose blood VI test strips (ACCU-CHEK YVES PLUS TEST STRP) strip Use to check Blood glucose 3 times daily (Diagnosis code: E11.21)    nitroglycerin (NITROSTAT) 0.4 mg SL tablet 1 Tab by SubLINGual route every five (5) minutes as needed for Chest Pain.  cholecalciferol (VITAMIN D3) 1,000 unit cap Take 1,000 Units by mouth daily.  cyanocobalamin 1,000 mcg tablet Take 1,000 mcg by mouth daily.  albuterol-ipratropium (DUO-NEB) 2.5 mg-0.5 mg/3 ml nebu 3 mL by Nebulization route every four (4) hours as needed for Wheezing, Shortness of Breath or Respiratory Distress. (Patient not taking: Reported on 1/25/2022)    ranolazine ER (RANEXA) 500 mg SR tablet TAKE 1 TABLET BY MOUTH TWICE A DAY (Patient not taking: Reported on 1/25/2022)    clopidogrel (PLAVIX) 75 mg tab Take 1 Tab by mouth daily. (Patient not taking: Reported on 1/25/2022)    aspirin delayed-release 81 mg tablet Take 1 Tab by mouth daily. (Patient not taking: Reported on 9/23/2021)     No current facility-administered medications for this visit. Assessment:       ICD-10-CM ICD-9-CM    1. Paroxysmal atrial fibrillation (HCC)  I48.0 427.31 AMB POC EKG ROUTINE W/ 12 LEADS, INTER & REP   2. Cardiac pacemaker in situ  Z95.0 V45.01    3. Coronary artery disease involving coronary bypass graft of native heart without angina pectoris  I25.810 414.05    4. Postsurgical aortocoronary bypass status  Z95.1 V45.81    5. Essential hypertension  I10 401.9         Orders Placed This Encounter    AMB POC EKG ROUTINE W/ 12 LEADS, INTER & REP     Order Specific Question:   Reason for Exam:     Answer:   routine    DISCONTD: metoprolol tartrate (LOPRESSOR) 25 mg tablet    metoprolol tartrate (LOPRESSOR) 25 mg tablet     Sig: Take 1 Tablet by mouth two (2) times a day. Dispense:  60 Tablet     Refill:  12       Plan:     Now chronic AF. Add metoprolol for better rate control. Prev refused [address] or watchman.   No angina    Nichol Cox MD

## 2022-01-25 NOTE — PROGRESS NOTES
1. Have you been to the ER, urgent care clinic since your last visit? Hospitalized since your last visit? ED [facility] ER 12-27-21 Mental Health Problem, Nov 2021 two ER visits, DANY, jad fib. 2. Have you seen or consulted any other health care providers outside of the [address] [place] [address] since your last visit? Include any pap smears or colon screening. No      Chief Complaint   Patient presents with    Coronary Artery Disease     Yearly appt. Wants psych doctor. Denied cardiac symptoms.

## 2022-03-18 PROBLEM — G93.41 ACUTE METABOLIC ENCEPHALOPATHY: Status: ACTIVE | Noted: 2021-11-28

## 2022-03-18 PROBLEM — E66.01 SEVERE OBESITY (BMI 35.0-39.9) WITH COMORBIDITY (HCC): Status: ACTIVE | Noted: 2018-04-10

## 2022-03-18 PROBLEM — I25.810 CORONARY ARTERY DISEASE INVOLVING CORONARY BYPASS GRAFT OF NATIVE HEART WITHOUT ANGINA PECTORIS: Status: ACTIVE | Noted: 2017-06-02

## 2022-03-18 PROBLEM — H25.9 AGE-RELATED CATARACT OF BOTH EYES: Status: ACTIVE | Noted: 2017-06-02

## 2022-03-18 PROBLEM — I65.23 BILATERAL CAROTID ARTERY STENOSIS: Status: ACTIVE | Noted: 2021-11-28

## 2022-03-18 PROBLEM — R06.09 DOE (DYSPNEA ON EXERTION): Status: ACTIVE | Noted: 2019-05-08

## 2022-03-19 PROBLEM — R07.9 CHEST PAIN: Status: ACTIVE | Noted: 2019-07-25

## 2022-03-19 PROBLEM — R41.82 ACUTE ALTERATION IN MENTAL STATUS: Status: ACTIVE | Noted: 2021-11-28

## 2022-03-19 PROBLEM — R41.82 AMS (ALTERED MENTAL STATUS): Status: ACTIVE | Noted: 2021-11-27

## 2022-03-19 PROBLEM — I48.91 RAPID ATRIAL FIBRILLATION (HCC): Status: ACTIVE | Noted: 2021-11-27

## 2022-03-19 PROBLEM — Z98.890 S/P CARDIAC CATH: Status: ACTIVE | Noted: 2019-05-07

## 2022-03-19 PROBLEM — E78.5 HYPERLIPIDEMIA: Status: ACTIVE | Noted: 2017-06-02

## 2022-03-19 PROBLEM — E11.9 CONTROLLED TYPE 2 DIABETES MELLITUS WITHOUT COMPLICATION, WITHOUT LONG-TERM CURRENT USE OF INSULIN (HCC): Status: ACTIVE | Noted: 2017-06-02

## 2022-03-19 PROBLEM — N18.9 CKD (CHRONIC KIDNEY DISEASE): Status: ACTIVE | Noted: 2019-05-08

## 2022-03-19 PROBLEM — I67.89 CEREBRAL MICROVASCULAR DISEASE: Status: ACTIVE | Noted: 2021-11-28

## 2022-03-19 PROBLEM — I49.3 VENTRICULAR ESCAPE RHYTHM: Status: ACTIVE | Noted: 2019-03-29

## 2022-03-19 PROBLEM — D64.9 ANEMIA: Status: ACTIVE | Noted: 2017-06-05

## 2022-03-19 PROBLEM — R93.1 ABNORMAL NUCLEAR CARDIAC IMAGING TEST: Status: ACTIVE | Noted: 2019-05-08

## 2022-03-20 PROBLEM — K92.2 GI BLEED: Status: ACTIVE | Noted: 2019-07-25

## 2022-03-20 PROBLEM — E11.21 TYPE 2 DIABETES WITH NEPHROPATHY (HCC): Status: ACTIVE | Noted: 2018-04-10

## 2022-03-20 PROBLEM — R56.9 CONVULSION (HCC): Status: ACTIVE | Noted: 2021-11-28

## 2022-03-29 ENCOUNTER — OFFICE VISIT (OUTPATIENT)
Dept: FAMILY MEDICINE CLINIC | Age: 82
End: 2022-03-29
Payer: MEDICARE

## 2022-03-29 VITALS
OXYGEN SATURATION: 100 % | TEMPERATURE: 96.8 F | SYSTOLIC BLOOD PRESSURE: 139 MMHG | HEIGHT: 72 IN | HEART RATE: 78 BPM | DIASTOLIC BLOOD PRESSURE: 84 MMHG | WEIGHT: 231 LBS | BODY MASS INDEX: 31.29 KG/M2 | RESPIRATION RATE: 18 BRPM

## 2022-03-29 DIAGNOSIS — I10 PRIMARY HYPERTENSION: Primary | ICD-10-CM

## 2022-03-29 DIAGNOSIS — N52.9 VASCULOGENIC ERECTILE DYSFUNCTION, UNSPECIFIED VASCULOGENIC ERECTILE DYSFUNCTION TYPE: ICD-10-CM

## 2022-03-29 DIAGNOSIS — I48.0 PAROXYSMAL ATRIAL FIBRILLATION (HCC): ICD-10-CM

## 2022-03-29 DIAGNOSIS — E11.22 CONTROLLED TYPE 2 DIABETES MELLITUS WITH CHRONIC KIDNEY DISEASE, WITHOUT LONG-TERM CURRENT USE OF INSULIN, UNSPECIFIED CKD STAGE (HCC): ICD-10-CM

## 2022-03-29 DIAGNOSIS — E78.2 MIXED HYPERLIPIDEMIA: ICD-10-CM

## 2022-03-29 DIAGNOSIS — I25.119 ATHEROSCLEROSIS OF NATIVE CORONARY ARTERY OF NATIVE HEART WITH ANGINA PECTORIS (HCC): ICD-10-CM

## 2022-03-29 PROBLEM — R56.9 CONVULSION (HCC): Status: RESOLVED | Noted: 2021-11-28 | Resolved: 2022-03-29

## 2022-03-29 PROBLEM — Z87.19 HISTORY OF GI BLEED: Status: ACTIVE | Noted: 2019-07-25

## 2022-03-29 PROBLEM — R07.9 CHEST PAIN: Status: RESOLVED | Noted: 2019-07-25 | Resolved: 2022-03-29

## 2022-03-29 PROBLEM — R41.82 AMS (ALTERED MENTAL STATUS): Status: RESOLVED | Noted: 2021-11-27 | Resolved: 2022-03-29

## 2022-03-29 PROBLEM — Z87.19 HISTORY OF GI BLEED: Chronic | Status: ACTIVE | Noted: 2019-07-25

## 2022-03-29 PROBLEM — E66.01 SEVERE OBESITY (BMI 35.0-39.9) WITH COMORBIDITY (HCC): Status: RESOLVED | Noted: 2018-04-10 | Resolved: 2022-03-29

## 2022-03-29 PROBLEM — I67.89 CEREBRAL MICROVASCULAR DISEASE: Status: RESOLVED | Noted: 2021-11-28 | Resolved: 2022-03-29

## 2022-03-29 PROBLEM — I49.3 VENTRICULAR ESCAPE RHYTHM: Status: RESOLVED | Noted: 2019-03-29 | Resolved: 2022-03-29

## 2022-03-29 PROBLEM — R41.82 ACUTE ALTERATION IN MENTAL STATUS: Status: RESOLVED | Noted: 2021-11-28 | Resolved: 2022-03-29

## 2022-03-29 PROCEDURE — 99203 OFFICE O/P NEW LOW 30 MIN: CPT | Performed by: NURSE PRACTITIONER

## 2022-03-29 RX ORDER — LANOLIN ALCOHOL/MO/W.PET/CERES
CREAM (GRAM) TOPICAL
COMMUNITY

## 2022-03-29 RX ORDER — TADALAFIL 2.5 MG/1
2.5-5 TABLET ORAL
Qty: 30 TABLET | Refills: 0 | Status: SHIPPED | OUTPATIENT
Start: 2022-03-29 | End: 2022-04-26

## 2022-03-29 NOTE — PROGRESS NOTES
Subjective:     CC: establish care    Ene Holland is a [de-identified] y.o. male who presents today to establish care. He states his cardiologist has been telling him to get a PCP so he scheduled an appt. He sees cardiologist Dr Luh Haji for the following problems:     HTN  BP at goal today. He takes 1/2 of a Metoprolol 25mg tab BID. Denies Cp or pressure. Denies SOB or dizziness. He does have some swelling in the lower extremities today. A-fib  HX of ablation. He is on Metoprolol for rate control but has declined anti-coagulants. Hx of GI bleed. Pacemaker  Placed 2 years ago for hx of Sick Sinus Syndrome. He has an appt to get it checked tomorrow. CAD  Denies hx of acute MI. He has had 4 stents placed. He is on Crestor and Ranexa daily. Keeps Nitro on hand just in case- states he has not had to use it in over 20 years. Type 2 diabetes  Lab Results   Component Value Date/Time    Hemoglobin A1c 7.1 (H) 07/31/2018 08:52 AM     He does not remember the last time his A1C was checked. Does not check sugars at home. He denies any excessive thirst or urination. Denies any numbness or tingling. Bilateral carotid stenosis  Last ultrasound was , showed very mild stenosis bilaterally. He is on a statin daily. CKD   Lab Results   Component Value Date/Time    Creatinine 2.04 (H) 12/27/2021 01:14 PM     He does not see a kidney specialist.    ED  He has a problem with ED and it is affecting his marriage. His wife would like him to take something for it. He denies any hx of stroke but has a documented hx of Cerebrovascular disease. Brain CT and MRI were normal in . He is on Crestor 40mg daily and is cognitively intact. He is on a daily iron and B12 supplement. Health maintenance  PNA vaccines- rec'd Pneumovax 23 in 2019  Shingrix- not addressed today  Covid vaccine- states he has had both vaccines but has not had the booster yet. He was diagnosed with Covid a month ago.  It affected his memory and concentration and vision more than anything. States \"I was out of it for 12 days. \"       Patient Active Problem List   Diagnosis Code    Postsurgical aortocoronary bypass status Z95.1    Mixed hyperlipidemia E78.2    Coronary atherosclerosis of native coronary artery I25.10    Atrial fibrillation (HCC) I48.91    Irregular heartbeat I49.9    SSS (sick sinus syndrome) ([facility]) I49.5    S/P ablation of atrial fibrillation Z98.890, Z86.79    BMI 39.0-39.9,adult Z68.39    Hyperlipidemia E78.5    Controlled type 2 diabetes mellitus without complication, without long-term current use of insulin ([facility]) E11.9    Coronary artery disease involving coronary bypass graft of native heart without angina pectoris I25.810    Age-related cataract of both eyes H25.9    Anemia D64.9    Type 2 diabetes with nephropathy ([facility]) E11.21    Severe obesity (BMI 35.0-39. 9) with comorbidity ([facility] [address] [address] E66.01    Ventricular escape rhythm I49.3    S/P cardiac cath Z98.890    Abnormal nuclear cardiac imaging test R93.1    MCCOLLUM (dyspnea on exertion) R06.00    CKD (chronic kidney disease) N18.9    Chest pain R07.9    GI bleed K92.2    Rapid atrial fibrillation (HCC) I48.91    AMS (altered mental status) R41.82    Acute alteration in mental status R41.82    Convulsion ([facility] [address] R56.9    Bilateral carotid artery stenosis I65.23    Cerebral microvascular disease I67.89    Acute metabolic encephalopathy T13.04       Past Medical History:   Diagnosis Date    Abnormal nuclear cardiac imaging test 5/8/2019    Anemia 6/5/2017    Atrial fibrillation (HCC)     Chronic kidney disease     CKD (chronic kidney disease) 5/8/2019    Coronary atherosclerosis of native coronary artery     Diabetes mellitus, type II (HCC)     MCCOLLUM (dyspnea on exertion) 5/8/2019    Glaucoma     Mixed hyperlipidemia     Other dyspnea and respiratory abnormality     Pacemaker     S/P ablation of atrial fibrillation 8/4/2016 Current Outpatient Medications:     metoprolol tartrate (LOPRESSOR) 25 mg tablet, Take 1 Tablet by mouth two (2) times a day., Disp: 60 Tablet, Rfl: 12    albuterol-ipratropium (DUO-NEB) 2.5 mg-0.5 mg/3 ml nebu, 3 mL by Nebulization route every four (4) hours as needed for Wheezing, Shortness of Breath or Respiratory Distress. (Patient not taking: Reported on 1/25/2022), Disp: 30 Nebule, Rfl: 2    ranolazine ER (RANEXA) 500 mg SR tablet, TAKE 1 TABLET BY MOUTH TWICE A DAY (Patient not taking: Reported on 1/25/2022), Disp: 180 Tablet, Rfl: 3    rosuvastatin (CRESTOR) 40 mg tablet, TAKE 1 TABLET BY MOUTH EVERY DAY, Disp: 30 Tablet, Rfl: 5    latanoprost (XALATAN) 0.005 % ophthalmic solution, Administer 1 Drop to both eyes daily. , Disp: , Rfl:     clopidogrel (PLAVIX) 75 mg tab, Take 1 Tab by mouth daily. (Patient not taking: Reported on 1/25/2022), Disp: 30 Tab, Rfl: 11    glucose blood VI test strips (ACCU-CHEK YVES PLUS TEST STRP) strip, Use to check Blood glucose 3 times daily (Diagnosis code: E11.21), Disp: 100 Strip, Rfl: 11    aspirin delayed-release 81 mg tablet, Take 1 Tab by mouth daily. (Patient not taking: Reported on 9/23/2021), Disp: 90 Tab, Rfl: 1    nitroglycerin (NITROSTAT) 0.4 mg SL tablet, 1 Tab by SubLINGual route every five (5) minutes as needed for Chest Pain., Disp: 25 Tab, Rfl: 1    cholecalciferol (VITAMIN D3) 1,000 unit cap, Take 1,000 Units by mouth daily. , Disp: , Rfl:     cyanocobalamin 1,000 mcg tablet, Take 1,000 mcg by mouth daily. 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       Past Surgical History:   Procedure Laterality Date    HX AFIB ABLATION      HX CATARACT REMOVAL Bilateral 2018    HX CORONARY ARTERY BYPASS GRAFT  2001    HX CORONARY STENT PLACEMENT      HX HEART CATHETERIZATION  11/21/2019    Left     HX PTCA      AZ INS NEW/RPLCMT PRM PM W/TRANSV ELTRD ATRIAL&VENT N/A 3/29/2019    INSERT PPM DUAL performed by Luan Dominguez [facility] at [facility] CARDIAC CATH [facility]    AL REMOVAL SUBCUTANEOUS CARDIAC RHYTHM MONITOR N/A 3/29/2019    LOOP RECORDER REMOVAL performed by Luan Dominguez MD at [facility] CARDIAC CATH LAB    UPPER GI ENDOSCOPY,BIOPSY  2019            Social History     Tobacco Use   Smoking Status Former Smoker    Packs/day: 4.00    Years: 20.00    Pack years: 80.00    Types: Cigarettes    Quit date: 1980    Years since quittin.7   Smokeless Tobacco Former User   Tobacco Comment    QUIT AT AGE 42       Social History     Socioeconomic History    Marital status:    Tobacco Use    Smoking status: Former Smoker     Packs/day: 4.00     Years: 20.00     Pack years: 80.00     Types: Cigarettes     Quit date: 1980     Years since quittin.7    Smokeless tobacco: Former User    Tobacco comment: QUIT AT AGE 42   Vaping Use    Vaping Use: Never used   Substance and Sexual Activity    Alcohol use: No     Alcohol/week: 0.0 standard drinks    Drug use: Never       Family History   Problem Relation Age of Onset    Diabetes Mother     Emphysema Father          age 46 - smoker       ROS:  Gen: denies fever, chills, or fatigue  HEENT: +wears hearing aids Denies H/A, nasal congestion, or sore throat  Resp: denies dyspnea, cough, or wheezing  CV: denies chest pain, pressure, or palpitations  Extremeties: denies edema  GI[de-identified] denies abdominal pain, nausea, vomiting, diarrhea, or constipation  Musculoskeletal: no joint pain, stiffness, or muscle cramps  Neuro: denies numbness/tingling or dizziness  Skin: denies rashes or new lesions   Psych: denies anxiety, depression, cora, or other changes in mood      Objective:     Visit Vitals  /84 (BP 1 Location: Left arm, BP Patient Position: Sitting)   Pulse 78   Temp 96.8 °F (36 °C) (Temporal)   Resp 18   Ht 6' (1.829 m)   Wt 231 lb (104.8 kg)   SpO2 100%   BMI 31.33 kg/m²       General: Alert and oriented. No acute distress. Well nourished. HEENT :  Ears:Hearing aids in place  Eyes: Sclera white, conjunctiva clear. PERRLA. Extra ocular movements intact. Nose: Patent. Nasal mucosa pink, non-edematous, and without drainage. Mouth: Pharynx non-erythematous, without exudate   Neck: Supple with FROM. No thyroid-megaly, carotid bruits, or lymphadenopathy  Lungs: Breathing even and unlabored. All lobes clear to auscultation bilaterally   Heart :RRR, S1 and S2 normal intensity, no extra heart sounds  Extremities: +mild non-pitting edema to BLE's   Neuro: Cranial nerves grossly normal.  Psych: Mood and thought content appropriate for situation. Dressed appropriately and with good hygiene. Skin: Warm, dry, and intact. No lesions or discoloration. Assessment/ Plan:     HTN  BP at goal today. Cont Metoprolol   Notify provider or go to ER for CP, SOB, dizziness, or worsening swelling    A-fib  Per cardiology    Pacemaker  Per cardiology    CAD  Check lipids- he is fasting  Per cardiology    Type 2 diabetes  Check A1C, CBC, and CMP - will call with results      Bilateral carotid stenosis  Last ultrasound was   Cont Crestor daily     CKD   Check Creatinine- will call with results    F/U 3-6 months or sooner pending lab results    ED  Try Cialis 2.5mg- take 1-2 tabs 30-60 minutes prior to activity  Side effects reviewed  States he has not had to take Nitro in over 20 years- he was educated on the drug-drug interaction    Health maintenance  PNA vaccines- rec'd Pneumovax 23 in 2019  Shingrix- not addressed today  Covid vaccine- states he has had both vaccines but has not had the booster yet. He was diagnosed with Covid a month ago. It affected his memory and concentration and vision more than anything. States \"I was out of it for 12 days. \"           Verbal and written instructions (see AVS) provided.  Patient expresses understanding of diagnosis and treatment plan.     Health Maintenance Due Topic Date Due    COVID-19 Vaccine (1) Never done    DTaP/Tdap/Td series (1 - Tdap) Never done    Shingrix Vaccine Age 50> (1 of 2) Never done    Pneumococcal 65+ years (1 of 1 - PPSV23) Never done    Medicare Yearly Exam  Never done    A1C test (Diabetic or Prediabetic)  01/31/2019    Foot Exam Q1  07/26/2019    MICROALBUMIN Q1  07/31/2019    Eye Exam Retinal or Dilated  10/09/2020    Lipid Screen  07/20/2021    Flu Vaccine (1) Never done               [place] D. Gaetano Goltz, NP

## 2022-03-29 NOTE — PROGRESS NOTES
1. \"Have you been to the ER, urgent care clinic since your last visit? Hospitalized since your last visit? \" No    2. \"Have you seen or consulted any other health care providers outside of the [address] [place] [address] since your last visit? \" Dr. Armin Rios, Dr. GONZALEZ [facility] for diabetes    3. For patients aged 39-70: Has the patient had a colonoscopy / FIT/ Cologuard? NA - based on age      If the patient is female:    4. For patients aged 41-77: Has the patient had a mammogram within the past 2 years? NA - based on age or sex      11. For patients aged 21-65: Has the patient had a pap smear? NA - based on age or sex    Chief Complaint   Patient presents with    Diabetes    Cholesterol Problem    Hypertension     Visit Vitals  /84 (BP 1 Location: Left arm, BP Patient Position: Sitting)   Pulse 78   Temp 96.8 °F (36 °C) (Temporal)   Resp 18   Ht 6' (1.829 m)   Wt 231 lb (104.8 kg)   SpO2 100%   BMI 31.33 kg/m²     1410 Labs drawn in left arm per Kimberley Arevalo NP's orders. Patient tolerated well.

## 2022-03-30 ENCOUNTER — OFFICE VISIT (OUTPATIENT)
Dept: CARDIOLOGY CLINIC | Age: 82
End: 2022-03-30
Payer: MEDICARE

## 2022-03-30 VITALS
HEART RATE: 79 BPM | BODY MASS INDEX: 30.88 KG/M2 | HEIGHT: 72 IN | SYSTOLIC BLOOD PRESSURE: 116 MMHG | OXYGEN SATURATION: 96 % | WEIGHT: 228 LBS | RESPIRATION RATE: 18 BRPM | DIASTOLIC BLOOD PRESSURE: 78 MMHG

## 2022-03-30 DIAGNOSIS — I10 ESSENTIAL HYPERTENSION: ICD-10-CM

## 2022-03-30 DIAGNOSIS — Z95.0 CARDIAC PACEMAKER IN SITU: ICD-10-CM

## 2022-03-30 DIAGNOSIS — Z95.1 POSTSURGICAL AORTOCORONARY BYPASS STATUS: ICD-10-CM

## 2022-03-30 DIAGNOSIS — I48.0 PAROXYSMAL ATRIAL FIBRILLATION (HCC): ICD-10-CM

## 2022-03-30 DIAGNOSIS — I25.810 CORONARY ARTERY DISEASE INVOLVING CORONARY BYPASS GRAFT OF NATIVE HEART WITHOUT ANGINA PECTORIS: Primary | ICD-10-CM

## 2022-03-30 DIAGNOSIS — Z95.0 CARDIAC PACEMAKER IN SITU: Primary | ICD-10-CM

## 2022-03-30 LAB
ALBUMIN SERPL-MCNC: 4.1 G/DL (ref 3.5–5)
ALBUMIN/GLOB SERPL: 1.2 {RATIO} (ref 1.1–2.2)
ALP SERPL-CCNC: 66 U/L (ref 45–117)
ALT SERPL-CCNC: 29 U/L (ref 12–78)
ANION GAP SERPL CALC-SCNC: 4 MMOL/L (ref 5–15)
AST SERPL-CCNC: 16 U/L (ref 15–37)
BILIRUB SERPL-MCNC: 0.7 MG/DL (ref 0.2–1)
BUN SERPL-MCNC: 32 MG/DL (ref 6–20)
BUN/CREAT SERPL: 18 (ref 12–20)
CALCIUM SERPL-MCNC: 9.5 MG/DL (ref 8.5–10.1)
CHLORIDE SERPL-SCNC: 107 MMOL/L (ref 97–108)
CHOLEST SERPL-MCNC: 114 MG/DL
CO2 SERPL-SCNC: 27 MMOL/L (ref 21–32)
COMMENT, HOLDF: NORMAL
CREAT SERPL-MCNC: 1.78 MG/DL (ref 0.7–1.3)
ERYTHROCYTE [DISTWIDTH] IN BLOOD BY AUTOMATED COUNT: 14.7 % (ref 11.5–14.5)
EST. AVERAGE GLUCOSE BLD GHB EST-MCNC: 143 MG/DL
GLOBULIN SER CALC-MCNC: 3.4 G/DL (ref 2–4)
GLUCOSE SERPL-MCNC: 121 MG/DL (ref 65–100)
HBA1C MFR BLD: 6.6 % (ref 4–5.6)
HCT VFR BLD AUTO: 40.6 % (ref 36.6–50.3)
HDLC SERPL-MCNC: 61 MG/DL
HDLC SERPL: 1.9 {RATIO} (ref 0–5)
HGB BLD-MCNC: 12.8 G/DL (ref 12.1–17)
LDLC SERPL CALC-MCNC: 39.8 MG/DL (ref 0–100)
MCH RBC QN AUTO: 29.9 PG (ref 26–34)
MCHC RBC AUTO-ENTMCNC: 31.5 G/DL (ref 30–36.5)
MCV RBC AUTO: 94.9 FL (ref 80–99)
NRBC # BLD: 0 K/UL (ref 0–0.01)
NRBC BLD-RTO: 0 PER 100 WBC
PLATELET # BLD AUTO: 170 K/UL (ref 150–400)
PMV BLD AUTO: 11.3 FL (ref 8.9–12.9)
POTASSIUM SERPL-SCNC: 4.6 MMOL/L (ref 3.5–5.1)
PROT SERPL-MCNC: 7.5 G/DL (ref 6.4–8.2)
RBC # BLD AUTO: 4.28 M/UL (ref 4.1–5.7)
SAMPLES BEING HELD,HOLD: NORMAL
SODIUM SERPL-SCNC: 138 MMOL/L (ref 136–145)
TRIGL SERPL-MCNC: 66 MG/DL (ref ?–150)
VLDLC SERPL CALC-MCNC: 13.2 MG/DL
WBC # BLD AUTO: 5.6 K/UL (ref 4.1–11.1)

## 2022-03-30 PROCEDURE — G8752 SYS BP LESS 140: HCPCS | Performed by: INTERNAL MEDICINE

## 2022-03-30 PROCEDURE — 93005 ELECTROCARDIOGRAM TRACING: CPT | Performed by: INTERNAL MEDICINE

## 2022-03-30 PROCEDURE — G8536 NO DOC ELDER MAL SCRN: HCPCS | Performed by: INTERNAL MEDICINE

## 2022-03-30 PROCEDURE — G8754 DIAS BP LESS 90: HCPCS | Performed by: INTERNAL MEDICINE

## 2022-03-30 PROCEDURE — 1101F PT FALLS ASSESS-DOCD LE1/YR: CPT | Performed by: INTERNAL MEDICINE

## 2022-03-30 PROCEDURE — 93010 ELECTROCARDIOGRAM REPORT: CPT | Performed by: INTERNAL MEDICINE

## 2022-03-30 PROCEDURE — 93280 PM DEVICE PROGR EVAL DUAL: CPT | Performed by: INTERNAL MEDICINE

## 2022-03-30 PROCEDURE — 99214 OFFICE O/P EST MOD 30 MIN: CPT | Performed by: INTERNAL MEDICINE

## 2022-03-30 PROCEDURE — G8432 DEP SCR NOT DOC, RNG: HCPCS | Performed by: INTERNAL MEDICINE

## 2022-03-30 PROCEDURE — G8427 DOCREV CUR MEDS BY ELIG CLIN: HCPCS | Performed by: INTERNAL MEDICINE

## 2022-03-30 PROCEDURE — G0463 HOSPITAL OUTPT CLINIC VISIT: HCPCS | Performed by: INTERNAL MEDICINE

## 2022-03-30 PROCEDURE — G8417 CALC BMI ABV UP PARAM F/U: HCPCS | Performed by: INTERNAL MEDICINE

## 2022-03-30 PROCEDURE — 93288 INTERROG EVL PM/LDLS PM IP: CPT | Performed by: INTERNAL MEDICINE

## 2022-03-30 NOTE — PROGRESS NOTES
Cholesterol is very low, I would suggest reducing Crestor to 20mg daily. His kidney function is also low and if we reduce the Crestor this might improve his kidney function. He should avoid NDSAIDS like ibuprofen, motrin, aleve, and motrin, and drink plenty of water daily. Lets recheck cholesterol and kidney function in 3 months.

## 2022-03-30 NOTE — PROGRESS NOTES
1. Have you been to the ER, urgent care clinic since your last visit? Hospitalized since your last visit? No    2. Have you seen or consulted any other health care providers outside of the [address] [place] [address] since your last visit? Include any pap smears or colon screening.  No

## 2022-03-30 NOTE — PROGRESS NOTES
Poornima Arango MD          NAME:  Michelle Kovacs   :   1940   MRN:   [id number]   PCP:  Amirah Christianson NP           Subjective: The patient is a [de-identified]y.o. year old male  who returns for a routine follow-up. Since the last visit, patient reports no change in exercise tolerance, chest pain, edema, medication intolerance, palpitations, shortness of breath, PND/orthopnea wheezing, sputum, syncope, dizziness or light headedness. Doing well. Past Medical History:   Diagnosis Date    Abnormal nuclear cardiac imaging test 2019    Anemia 2017    Atrial fibrillation (HCC)     Chronic kidney disease     CKD (chronic kidney disease) 2019    Coronary atherosclerosis of native coronary artery     Diabetes mellitus, type II ([address]     MCCOLLUM (dyspnea on exertion) 2019    Glaucoma     Mixed hyperlipidemia     Other dyspnea and respiratory abnormality     Pacemaker     S/P ablation of atrial fibrillation 2016        ICD-10-CM ICD-9-CM    1. Coronary artery disease involving coronary bypass graft of native heart without angina pectoris  I25.810 414.05    2. Paroxysmal atrial fibrillation (HCC)  I48.0 427.31 AMB POC EKG ROUTINE W/ 12 LEADS, INTER & REP   3. Cardiac pacemaker in situ  Z95.0 V45.01    4. Postsurgical aortocoronary bypass status  Z95.1 V45.81    5.  Essential hypertension  I10 401.9       Social History     Tobacco Use    Smoking status: Former Smoker     Packs/day: 4.00     Years: 20.00     Pack years: 80.00     Types: Cigarettes     Quit date: 1980     Years since quittin.7    Smokeless tobacco: Former User    Tobacco comment: QUIT AT AGE 42   Substance Use Topics    Alcohol use: No     Alcohol/week: 0.0 standard drinks      Family History   Problem Relation Age of Onset    Diabetes Mother     Emphysema Father          age 46 - smoker        Review of Systems  Cardiovascular: Negative except as noted in HPI      Objective:       Vitals:    22 0912 BP: 116/78   Pulse: 79   Resp: 18   SpO2: 96%   Weight: 228 lb (103.4 kg)   Height: 6' (1.829 m)    Body mass index is 30.92 kg/m². General PE  Mental Status - Alert. General Appearance - Not in acute distress. Chest and Lung Exam   Inspection: Accessory muscles - No use of accessory muscles in breathing. Auscultation:   Breath sounds: - Normal.    Cardiovascular   Inspection: Jugular vein - Bilateral - Inspection Normal.  Palpation/Percussion:   Apical Impulse: - Normal.  Auscultation: Rhythm - Regular. Heart Sounds - S1 WNL and S2 WNL. No S3 or S4. Murmurs & Other Heart Sounds: Auscultation of the heart reveals - No Murmurs. Peripheral Vascular   Upper Extremity: Inspection - Bilateral - No Cyanotic nailbeds or Digital clubbing. Lower Extremity:   Palpation: Edema - Bilateral - No edema. Data Review:     EKG -  EKG: rate controlled AF. LABS- @brieflabs@      Allergies reviewed  Allergies   Allergen Reactions    Ciprofloxacin Other (comments)     Difficulty breathing; 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       Medications reviewed  Current Outpatient Medications   Medication Sig    ferrous sulfate (Iron) 325 mg (65 mg iron) tablet Take  by mouth Daily (before breakfast).  tadalafiL (Cialis) 2.5 mg tablet Take 1-2 Tablets by mouth daily as needed for Erectile Dysfunction.  metoprolol tartrate (LOPRESSOR) 25 mg tablet Take 1 Tablet by mouth two (2) times a day. (Patient taking differently: Take 12.5 mg by mouth two (2) times a day.)    ranolazine ER (RANEXA) 500 mg SR tablet TAKE 1 TABLET BY MOUTH TWICE A DAY    rosuvastatin (CRESTOR) 40 mg tablet TAKE 1 TABLET BY MOUTH EVERY DAY    latanoprost (XALATAN) 0.005 % ophthalmic solution Administer 1 Drop to both eyes daily.     glucose blood VI test strips (ACCU-CHEK YVES PLUS TEST STRP) strip Use to check Blood glucose 3 times daily (Diagnosis code: E11.21)    nitroglycerin (NITROSTAT) 0.4 mg SL tablet 1 Tab by SubLINGual route every five (5) minutes as needed for Chest Pain.  cholecalciferol (VITAMIN D3) 1,000 unit cap Take 1,000 Units by mouth daily.  cyanocobalamin 1,000 mcg tablet Take 1,000 mcg by mouth daily. No current facility-administered medications for this visit. Assessment:       ICD-10-CM ICD-9-CM    1. Coronary artery disease involving coronary bypass graft of native heart without angina pectoris  I25.810 414.05    2. Paroxysmal atrial fibrillation (HCC)  I48.0 427.31 AMB POC EKG ROUTINE W/ 12 LEADS, INTER & REP   3. Cardiac pacemaker in situ  Z95.0 V45.01    4. Postsurgical aortocoronary bypass status  Z95.1 V45.81    5. Essential hypertension  I10 401.9         Orders Placed This Encounter    AMB POC EKG ROUTINE W/ 12 LEADS, INTER & REP     Order Specific Question:   Reason for Exam:     Answer:   routine       Plan:     No angina. CHF compensated. Bp at target. EKG shows rate controlled AF.   Continue current regimen and f/u 6 mo    Galen Hunt MD

## 2022-03-31 ENCOUNTER — PATIENT OUTREACH (OUTPATIENT)
Dept: CASE MANAGEMENT | Age: 82
End: 2022-03-31

## 2022-04-06 NOTE — PROGRESS NOTES
Ambulatory Care Management Note    Date/Time:  4/6/2022 8:43 AM    This patient was received as a referral from [facility]. Ambulatory Care Manager outreached to patient today to offer care management services. Introduction to self and role of care manager provided. Pt reports he is taking po metropolol as directed by Cardiology and a 1/2 tab (20mg) of his rosuvastatin as directed by his PCP. Pt sts he did find PT located closer to home [facility]) but is on a waiting list to get in (no timeframe was provided to him). He also reports his LBP/RLE pain is well controlled w/ES APAP (takes 1tab on good days and 2tabs when the pain is flaring up). He endorses that he has not been taking IB or naproxen and trying to increase his water intake for renal health. At time of this call, pt had no 3mos follow-up appt scheduled w/PCP. ACM contacted the [place] office, spoke w/Riddhi who scheduled pt for Tue, 06/07/22 @ 1030. Pt notified of this appt and asked to call the office back if he needs to change it. Patient accepted care management services at this time. Follow up call was scheduled.   Patient has Ambulatory Care Manager's contact number for any questions or concerns going forward.   Adalberto Oseguera

## 2022-04-22 ENCOUNTER — PATIENT OUTREACH (OUTPATIENT)
Dept: CASE MANAGEMENT | Age: 82
End: 2022-04-22

## 2022-04-22 NOTE — PROGRESS NOTES
Ambulatory Care Management Note    Date/Time:  4/22/2022 11:31 AM    This Ambulatory Care Manager (ACM) called pt to update the following screenings general assessment, disease specific assessment , self management assessment, SDOH assessments, ACP assessment and note and medication reconciliation , but pt reports he is currently at the laundromat washing clothes and this isn't a good time. [employer], Referrals, and Durable Medical Equipment:   Pt reports he has gotten in to [employer] for outpt tx and has been going for the last 3wks. ES APAP is still providing relief of pain. He also reports he has been walking 2mi and doing some light wt-lifting almost every day. Health Maintenance Due   Topic Date Due    COVID-19 Vaccine (1) Never done    DTaP/Tdap/Td series (1 - Tdap) Never done    Shingrix Vaccine Age 50> (1 of 2) Never done    Medicare Yearly Exam  Never done    Foot Exam Q1  07/26/2019    MICROALBUMIN Q1  07/31/2019    Eye Exam Retinal or Dilated  10/09/2020    Pneumococcal 65+ years (2 - PCV) 10/24/2020     Health Maintenance Reviewed: Yes. Pt reminded to have an [address] he and his wife will be making an appt w/Dr Scott Escobar soon. Pt sts he has been losing wt w/this current exercise regimen. ACM instructed pt to call his PCP if his wt increases by 3lbs in one day or >5lbs in one wk as this is evidence of water retention in regards to his CHF and he may need his meds adjusted. Pt voiced understanding. Patient was asked to consider health care goals that they would like to focus on with this ACM. ACM will follow up with patient to discuss goals and establish care plan in the next 7-14 days.        PCP/Specialist follow up:   Future Appointments   Date Time Provider Susan Bee   6/7/2022 10:30 AM Carol Ann Kingston NP [facility] MAIN BS AMB   9/27/2022  9:45 [facility]   9/27/2022 10:00 AM Jordyn Montez MD [facility] BS AMB Pt is looking into counseling at this time. He sts he has been experiencing diff concentrating, losing things easily and when he wants to drive somewhere, he is having diff w/coursing a route to get there. 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 [facility] offered to make pt an appt w/neurology, but pt declined stating he would like to talk to his PCP about it. AC offered to get his June appt moved up, but he said, \"No, that's okay\".    Emily Wilkinson

## 2022-04-26 RX ORDER — TADALAFIL 2.5 MG/1
2.5-5 TABLET ORAL
Qty: 30 TABLET | Refills: 0 | Status: SHIPPED | OUTPATIENT
Start: 2022-04-26 | End: 2022-05-19 | Stop reason: DRUGHIGH

## 2022-05-02 RX ORDER — METOPROLOL TARTRATE 25 MG/1
TABLET, FILM COATED ORAL
Qty: 90 TABLET | Refills: 7 | Status: SHIPPED | OUTPATIENT
Start: 2022-05-02

## 2022-05-11 ENCOUNTER — PATIENT OUTREACH (OUTPATIENT)
Dept: CASE MANAGEMENT | Age: 82
End: 2022-05-11

## 2022-05-19 ENCOUNTER — VIRTUAL VISIT (OUTPATIENT)
Dept: FAMILY MEDICINE CLINIC | Age: 82
End: 2022-05-19
Payer: MEDICARE

## 2022-05-19 DIAGNOSIS — R41.3 MEMORY PROBLEM: Primary | ICD-10-CM

## 2022-05-19 DIAGNOSIS — R44.3 HALLUCINATION: ICD-10-CM

## 2022-05-19 DIAGNOSIS — N52.9 ERECTILE DYSFUNCTION, UNSPECIFIED ERECTILE DYSFUNCTION TYPE: ICD-10-CM

## 2022-05-19 PROBLEM — N18.30 CHRONIC RENAL DISEASE, STAGE III (HCC): Status: ACTIVE | Noted: 2022-05-19

## 2022-05-19 LAB
BILIRUB UR QL STRIP: NEGATIVE
GLUCOSE UR-MCNC: NEGATIVE MG/DL
KETONES P FAST UR STRIP-MCNC: NORMAL MG/DL
PH UR STRIP: 5.5 [PH] (ref 4.6–8)
PROT UR QL STRIP: NORMAL
SP GR UR STRIP: 1.02 (ref 1–1.03)
UA UROBILINOGEN AMB POC: NORMAL (ref 0.2–1)
URINALYSIS CLARITY POC: CLEAR
URINALYSIS COLOR POC: YELLOW
URINE BLOOD POC: NEGATIVE
URINE LEUKOCYTES POC: NEGATIVE
URINE NITRITES POC: NEGATIVE

## 2022-05-19 PROCEDURE — 81003 URINALYSIS AUTO W/O SCOPE: CPT | Performed by: NURSE PRACTITIONER

## 2022-05-19 PROCEDURE — 99214 OFFICE O/P EST MOD 30 MIN: CPT | Performed by: NURSE PRACTITIONER

## 2022-05-19 RX ORDER — MEMANTINE HYDROCHLORIDE 5 MG/1
TABLET ORAL
Qty: 60 TABLET | Refills: 0 | Status: SHIPPED | OUTPATIENT
Start: 2022-05-19 | End: 2022-05-19 | Stop reason: CLARIF

## 2022-05-19 RX ORDER — TADALAFIL 5 MG/1
5 TABLET ORAL DAILY
Qty: 30 TABLET | Refills: 2 | Status: SHIPPED | OUTPATIENT
Start: 2022-05-19

## 2022-05-19 RX ORDER — ZOSTER VACCINE RECOMBINANT, ADJUVANTED 50 MCG/0.5
0.5 KIT INTRAMUSCULAR ONCE
Qty: 0.5 ML | Refills: 0 | Status: CANCELLED | OUTPATIENT
Start: 2022-05-19 | End: 2022-05-19

## 2022-05-19 NOTE — PROGRESS NOTES
Subjective:     CC: hallucinations, memory issues    Master Hand is a [de-identified] y.o. male who presents today to discuss memory issues and reports he is having hallucinations. This appt was originally scheduled as a virtual visit but patient did not realize that and came to the office so we actually had an office visit today. States for the past month he has been \"seeing things\" at night. He will get up to go to the bathroom and will see someone sitting on the couch. He will wake up his wife but she will tell him there is no one there. He will try to have a conversation with someone and will not be able to get his words out. He will set something down on the table and walk away from it and then forget that he placed it there. His Yesterday he could not write a check. This really frustrated him and prompted him to make an appt. His wife is also worried and has told him she does not wanting him driving his motorcylce anymore. He can still drive a car and is very independint for his age. Denies any depression or anxiety or traumatic events recently. He was hospitalized with Covid back in [place] and believes these symptoms are a result from the Covid infection. He established care with me a few moths ago and at that appt he seemed very much cognitiveily intact. He denies any hx of stroke but has a documented hx of Cerebrovascular disease. Brain CT and MRI were normal in . He is on Crestor 40mg daily. ED  At the last appt he was prescribed Cialis 2.5mg daily prn but it has not been helpful. Will increase dose to 5mg daily.      Patient Active Problem List   Diagnosis Code    Postsurgical aortocoronary bypass status Z95.1    Mixed hyperlipidemia E78.2    Coronary atherosclerosis of native coronary artery I25.10    Atrial fibrillation ([facility]) I48.91    SSS (sick sinus syndrome) ([facility]) I49.5    S/P ablation of atrial fibrillation Z98.890, Z86.79    Hyperlipidemia E78.5    Controlled type 2 diabetes mellitus without complication, without long-term current use of insulin (HCC) E11.9    Coronary artery disease involving coronary bypass graft of native heart without angina pectoris I25.810    Age-related cataract of both eyes H25.9    Anemia D64.9    S/P cardiac cath Z98.890    Abnormal nuclear cardiac imaging test R93.1    MCCOLLUM (dyspnea on exertion) R06.00    CKD (chronic kidney disease) N18.9    History of GI bleed Z87.19    Rapid atrial fibrillation (HCC) I48.91    Bilateral carotid artery stenosis I65.23    Acute metabolic encephalopathy R82.96    Type 2 diabetes mellitus with chronic kidney disease (HCC) E11.22    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       Past Medical History:   Diagnosis Date    Abnormal nuclear cardiac imaging test 5/8/2019    Anemia 6/5/2017    Atrial fibrillation (HCC)     CKD (chronic kidney disease) 5/8/2019    Coronary atherosclerosis of native coronary artery     Diabetes mellitus, type II (HCC)     MCCOLLUM (dyspnea on exertion) 5/8/2019    Glaucoma     HTN (hypertension)     Mixed hyperlipidemia     Other dyspnea and respiratory abnormality     Pacemaker     S/P ablation of atrial fibrillation 8/4/2016         Current Outpatient Medications:     metoprolol tartrate (LOPRESSOR) 25 mg tablet, TAKE 1/2 TABLET BY MOUTH TWICE A DAY, Disp: 90 Tablet, Rfl: 7    tadalafiL (CIALIS) 2.5 mg tablet, TAKE 1-2 TABLETS BY MOUTH DAILY AS NEEDED FOR ERECTILE DYSFUNCTION. , Disp: 30 Tablet, Rfl: 0    ferrous sulfate (Iron) 325 mg (65 mg iron) tablet, Take  by mouth Daily (before breakfast). , Disp: , Rfl:     ranolazine ER (RANEXA) 500 mg SR tablet, TAKE 1 TABLET BY MOUTH TWICE A DAY, Disp: 180 Tablet, Rfl: 3    rosuvastatin (CRESTOR) 40 mg tablet, TAKE 1 TABLET BY MOUTH EVERY DAY, Disp: 30 Tablet, Rfl: 5    latanoprost (XALATAN) 0.005 % ophthalmic solution, Administer 1 Drop to both eyes daily. , Disp: , Rfl:     glucose blood VI test strips (ACCU-CHEK YVES PLUS TEST STRP) strip, Use to check Blood glucose 3 times daily (Diagnosis code: E11.21), Disp: 100 Strip, Rfl: 11    nitroglycerin (NITROSTAT) 0.4 mg SL tablet, 1 Tab by SubLINGual route every five (5) minutes as needed for Chest Pain., Disp: 25 Tab, Rfl: 1    cholecalciferol (VITAMIN D3) 1,000 unit cap, Take 1,000 Units by mouth daily. , Disp: , Rfl:     cyanocobalamin 1,000 mcg tablet, Take 1,000 mcg by mouth daily. 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       Past Surgical History:   Procedure Laterality Date    HX AFIB ABLATION      HX CATARACT REMOVAL Bilateral     HX CORONARY ARTERY BYPASS GRAFT      HX CORONARY STENT PLACEMENT      HX HEART CATHETERIZATION  2019    Left     HX PTCA      VT INS NEW/RPLCMT PRM PM W/TRANSV ELTRD ATRIAL&VENT N/A 3/29/2019    INSERT PPM DUAL performed by Kang Oliver MD at [facility] CARDIAC CATH LAB    VT REMOVAL SUBCUTANEOUS CARDIAC RHYTHM MONITOR N/A 3/29/2019    LOOP RECORDER REMOVAL performed by Kang Oliver MD at [facility] CARDIAC CATH LAB    UPPER GI ENDOSCOPY,BIOPSY  2019            Social History     Tobacco Use   Smoking Status Former Smoker    Packs/day: 4.00    Years: 20.00    Pack years: 80.00    Types: Cigarettes    Quit date: 1980    Years since quittin.8   Smokeless Tobacco Former User   Tobacco Comment    QUIT AT AGE 42       Social History     Socioeconomic History    Marital status:    Tobacco Use    Smoking status: Former Smoker     Packs/day: 4.00     Years: 20.00     Pack years: 80.00     Types: Cigarettes     Quit date: 1980     Years since quittin.8    Smokeless tobacco: Former User    Tobacco comment: QUIT AT AGE 42   Vaping Use    Vaping Use: Never used   Substance and Sexual Activity    Alcohol use: No     Alcohol/week: 0.0 standard drinks    Drug use: Never       Family History   Problem Relation Age of Onset    Diabetes Mother     Emphysema Father          age 46 - smoker       ROS:  Gen: denies fever, chills, or fatigue  HEENT: +wears hearing aids Denies H/A, nasal congestion, or sore throat  Resp: denies dyspnea, cough, or wheezing  CV: denies chest pain, pressure, or palpitations  Extremeties: denies edema  GI[de-identified] denies abdominal pain, nausea, vomiting, diarrhea, or constipation  : +ED denies dysuria, urinary frequency, or urgency  Musculoskeletal: no joint pain, stiffness, or muscle cramps  Neuro: +memory problems and hallucinations denies numbness/tingling, tremor, or dizziness  Skin: denies rashes or new lesions   Psych: denies anxiety, depression, cora, or other changes in mood      Objective: There were no vitals taken for this visit. General: Alert and oriented. No acute distress. Well nourished. HEENT :  Ears:Hearing aids in place  Eyes: Sclera white, conjunctiva clear. PERRLA. Extra ocular movements intact. Neck: Supple with FROM. Lungs: Breathing even and unlabored. All lobes clear to auscultation bilaterally   Heart :RRR, S1 and S2 normal intensity, no extra heart sounds  Extremities: +mild non-pitting edema to BLE's   Neuro: Cranial nerves grossly normal. Answers questions appropriately without hesitation. Does not repeat himself. Psych: Mood and thought content appropriate for situation. Dressed appropriately and with good hygiene. Skin: Warm, dry, and intact. No lesions or discoloration. Assessment/ Plan:     Memory problems with hallucinations  UA done to r/o UTI- negative  MOCA test administered- he scored a 19/20 (low)  We discussed the likelihood of dementia and the importance of seeing a neurologist for further evaluation, however I cannot find a neurologist who has a sooner appt than November. We discussed the option of starting a medication to slow the progress of the moemory impairment (NAmenda) but he declines.  He is still convinced this is a result of Covid infection from Nov and is hoping it will go away on its own. He was given an appt for Nov 8 to see neurologist and will keep the appt and if symptoms resolve prior to the appt he will cancel it. If symptoms worsen prior to the appt he will notify me and we will start him on Namenda. I did tell him I agree with his wife and do not think he should be driving a motorcycle anymore. He verbalized understanding. F/U 3 months     ED  Increase Cialis to 5mg daily  Call for any problems  F/U 3 months          Verbal and written instructions (see AVS) provided.  Patient expresses understanding of diagnosis and treatment plan. Health Maintenance Due   Topic Date Due    COVID-19 Vaccine (1) Never done    DTaP/Tdap/Td series (1 - Tdap) Never done    Shingrix Vaccine Age 50> (1 of 2) Never done    Medicare Yearly Exam  Never done    Foot Exam Q1  07/26/2019    MICROALBUMIN Q1  07/31/2019    Eye Exam Retinal or Dilated  10/09/2020    Pneumococcal 65+ years (2 - PCV) 10/24/2020               Chyna White, CHAD

## 2022-05-20 ENCOUNTER — DOCUMENTATION ONLY (OUTPATIENT)
Dept: FAMILY MEDICINE CLINIC | Age: 82
End: 2022-05-20

## 2022-05-20 LAB
CREAT UR-MCNC: 82.1 MG/DL
MICROALBUMIN UR-MCNC: 5.36 MG/DL
MICROALBUMIN/CREAT UR-RTO: 65 MG/G (ref 0–30)

## 2022-06-07 ENCOUNTER — OFFICE VISIT (OUTPATIENT)
Dept: FAMILY MEDICINE CLINIC | Age: 82
End: 2022-06-07
Payer: MEDICARE

## 2022-06-07 VITALS
HEIGHT: 72 IN | TEMPERATURE: 97.3 F | BODY MASS INDEX: 31.04 KG/M2 | RESPIRATION RATE: 16 BRPM | DIASTOLIC BLOOD PRESSURE: 71 MMHG | WEIGHT: 229.2 LBS | OXYGEN SATURATION: 99 % | HEART RATE: 64 BPM | SYSTOLIC BLOOD PRESSURE: 130 MMHG

## 2022-06-07 DIAGNOSIS — E78.2 MIXED HYPERLIPIDEMIA: ICD-10-CM

## 2022-06-07 DIAGNOSIS — N52.9 ERECTILE DYSFUNCTION, UNSPECIFIED ERECTILE DYSFUNCTION TYPE: ICD-10-CM

## 2022-06-07 DIAGNOSIS — I49.5 SSS (SICK SINUS SYNDROME) (HCC): ICD-10-CM

## 2022-06-07 DIAGNOSIS — E11.22 CONTROLLED TYPE 2 DIABETES MELLITUS WITH CHRONIC KIDNEY DISEASE, WITHOUT LONG-TERM CURRENT USE OF INSULIN, UNSPECIFIED CKD STAGE (HCC): ICD-10-CM

## 2022-06-07 DIAGNOSIS — I25.119 ATHEROSCLEROSIS OF NATIVE CORONARY ARTERY OF NATIVE HEART WITH ANGINA PECTORIS (HCC): ICD-10-CM

## 2022-06-07 DIAGNOSIS — I48.0 PAROXYSMAL ATRIAL FIBRILLATION (HCC): ICD-10-CM

## 2022-06-07 DIAGNOSIS — I10 PRIMARY HYPERTENSION: Primary | ICD-10-CM

## 2022-06-07 DIAGNOSIS — R41.3 MEMORY PROBLEM: ICD-10-CM

## 2022-06-07 PROBLEM — N18.30 CHRONIC RENAL DISEASE, STAGE III (HCC): Status: RESOLVED | Noted: 2022-05-19 | Resolved: 2022-06-07

## 2022-06-07 PROCEDURE — 36415 COLL VENOUS BLD VENIPUNCTURE: CPT | Performed by: NURSE PRACTITIONER

## 2022-06-07 PROCEDURE — 99214 OFFICE O/P EST MOD 30 MIN: CPT | Performed by: NURSE PRACTITIONER

## 2022-06-07 RX ORDER — SILDENAFIL 50 MG/1
50-100 TABLET, FILM COATED ORAL
Qty: 30 TABLET | Refills: 0 | Status: SHIPPED | OUTPATIENT
Start: 2022-06-07

## 2022-06-07 NOTE — PROGRESS NOTES
Subjective:     CC: HTN    Apurva Ridley is a [de-identified] y.o. male who presents today for for a 3 month follow up for HTN with CKD stage 3. HTN with CKD stage 3  BP at goal today. He takes 1/2 of a Metoprolol 25mg tab BID. Denies Cp or pressure. Denies SOB or dizziness. He has very mild swelling in the lower extremities. He avoids NSAIDS. He is followed by cardiologist Dr Garrett Guy. He does not see a kidney specialist.      Lab Results   Component Value Date/Time    Creatinine 1.78 (H) 03/29/2022 03:52 PM       A-fib  HX of ablation. He is on Metoprolol for rate control but has declined anti-coagulants. Hx of GI bleed. He sees cardiologist Dr Garrett Guy. Pacemaker  Placed 2 years ago for hx of Sick Sinus Syndrome. CAD  Denies hx of acute MI. He has had 4 stents placed. He is on Crestor and Ranexa daily. Keeps Nitro on hand just in case- states he has not had to use it in over 20 years. He sees cardiologist Dr Garrett Guy. Type 2 diabetes, diet controlled  Lab Results   Component Value Date/Time    Hemoglobin A1c 6.6 (H) 03/29/2022 03:52 PM     He used to take Metformin in the past but was able to come off. Does not check sugars at home. He denies any excessive thirst or urination. Denies any numbness or tingling. Bilateral carotid stenosis  Last ultrasound was , showed very mild stenosis bilaterally. He is on a statin daily. Cognitive impairment  He was seen last month (5/2022) with c/o memory problems and hallucinations. For the past month he had been \"seeing things\" at night. While getting up to go to the bathroom he will see someone sitting on the couch. He will wake up his wife but she will tell him there is no one there. He will try to have a conversation with someone and will not be able to get his words out. He will set something down on the table and walk away from it and then forget that he placed it there. One day he could not write a check.  His wife is also worried and has told him she does not wanting him driving his motorcylce anymore. He can still drive a car and is very independint for his age. Denies any depression or anxiety or traumatic events recently. He was hospitalized with Covid back in [place] and believes these symptoms are a result from the Covid infection. [facility] and he scored a 19/20 (low)  We discussed the likelihood of dementia and the importance of seeing a neurologist for further evaluation, however I cannot find a neurologist who has a sooner appt than November. We discussed the option of starting a medication to slow the progress of the moemory impairment (NAmenda) but he declined. He was still convinced this is a result of Covid infection from Nov and is hoping it will go away on its own. He was given an appt for Nov 8 to see neurologist and will keep the appt and if symptoms resolve prior to the appt he will cancel it. I did tell him I agree with his wife and do not think he should be driving a motorcycle anymore     He denies any hx of stroke but has a documented hx of Cerebrovascular disease. Brain CT and MRI were normal in . He is on Crestor 40mg daily. Today he states the problem is no better or no worse but he has beenable to wrist a few checks.      ED  At the last appt he was prescribed Cialis 5mg daily but this has been ineffective. He would like to try Viagra. Health maintenance  PNA vaccines- rec'd Pneumovax 23 in 2019  Shingrix- not addressed today  Covid vaccine- states he has had both vaccines but has not had the booster yet. He was diagnosed with Covid a month ago. It affected his memory and concentration and vision more than anything. States \"I was out of it for 12 days. \"         Patient Active Problem List   Diagnosis Code    Postsurgical aortocoronary bypass status Z95.1    Mixed hyperlipidemia E78.2    Coronary atherosclerosis of native coronary artery I25.10    Atrial fibrillation ([facility] [address] I48.91  SSS (sick sinus syndrome) ([facility]) I49.5    S/P ablation of atrial fibrillation Z98.890, Z86.79    Hyperlipidemia E78.5    Controlled type 2 diabetes mellitus without complication, without long-term current use of insulin ([facility]) E11.9    Coronary artery disease involving coronary bypass graft of native heart without angina pectoris I25.810    Age-related cataract of both eyes H25.9    Anemia D64.9    S/P cardiac cath Z98.890    Abnormal nuclear cardiac imaging test R93.1    MCCOLLUM (dyspnea on exertion) R06.00    CKD (chronic kidney disease) N18.9    History of GI bleed Z87.19    Rapid atrial fibrillation ([facility]) I48.91    Bilateral carotid artery stenosis I65.23    Acute metabolic encephalopathy E00.83    Type 2 diabetes mellitus with chronic kidney disease ([facility]) E11.22    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    Chronic renal disease, stage III N18.30       Past Medical History:   Diagnosis Date    Abnormal nuclear cardiac imaging test 5/8/2019    Anemia 6/5/2017    Atrial fibrillation ([facility])     CKD (chronic kidney disease) 5/8/2019    Coronary atherosclerosis of native coronary artery     Diabetes mellitus, type II ([facility])     MCCOLLUM (dyspnea on exertion) 5/8/2019    Glaucoma     HTN (hypertension)     Mixed hyperlipidemia     Other dyspnea and respiratory abnormality     Pacemaker     S/P ablation of atrial fibrillation 8/4/2016         Current Outpatient Medications:     tadalafiL (Cialis) 5 mg tablet, Take 1 Tablet by mouth daily. , Disp: 30 Tablet, Rfl: 2    metoprolol tartrate (LOPRESSOR) 25 mg tablet, TAKE 1/2 TABLET BY MOUTH TWICE A DAY, Disp: 90 Tablet, Rfl: 7    ferrous sulfate (Iron) 325 mg (65 mg iron) tablet, Take  by mouth Daily (before breakfast). , Disp: , Rfl:     ranolazine ER (RANEXA) 500 mg SR tablet, TAKE 1 TABLET BY MOUTH TWICE A DAY, Disp: 180 Tablet, Rfl: 3    rosuvastatin (CRESTOR) 40 mg tablet, TAKE 1 TABLET BY MOUTH EVERY DAY, Disp: 30 Tablet, Rfl: 5   latanoprost (XALATAN) 0.005 % ophthalmic solution, Administer 1 Drop to both eyes daily. , Disp: , Rfl:     glucose blood VI test strips (ACCU-CHEK YVES PLUS TEST STRP) strip, Use to check Blood glucose 3 times daily (Diagnosis code: E11.21), Disp: 100 Strip, Rfl: 11    nitroglycerin (NITROSTAT) 0.4 mg SL tablet, 1 Tab by SubLINGual route every five (5) minutes as needed for Chest Pain., Disp: 25 Tab, Rfl: 1    cholecalciferol (VITAMIN D3) 1,000 unit cap, Take 1,000 Units by mouth daily. , Disp: , Rfl:     cyanocobalamin 1,000 mcg tablet, Take 1,000 mcg by mouth daily. 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       Past Surgical History:   Procedure Laterality Date    HX AFIB ABLATION      HX CATARACT REMOVAL Bilateral 2018    HX CORONARY ARTERY BYPASS GRAFT      HX CORONARY STENT PLACEMENT      HX HEART CATHETERIZATION  2019    Left     HX PTCA      WA INS NEW/RPLCMT PRM PM W/TRANSV ELTRD ATRIAL&VENT N/A 3/29/2019    INSERT PPM DUAL performed [facility]ARD[facility]AC [facility]    WA REMOVAL SUBCUTANEOUS CARDIAC RHYTHM MONITOR N/A 3/29/2019    LOOP RECORDER REMOVAL performed by Erik [facility]ida MD [facility] CARDIAC CATH LAB    UPPER GI ENDOSCOPY,BIOPSY  2019            Social History     Tobacco Use   Smoking Status Former Smoker    Packs/day: 4.00    Years: 20.00    Pack years: 80.00    Types: Cigarettes    Quit date: 1980    Years since quittin.9   Smokeless Tobacco Former User   Tobacco Comment    QUIT AT AGE 42       Social History     Socioeconomic History    Marital status:    Tobacco Use    Smoking status: Former Smoker     Packs/day: 4.00     Years: 20.00     Pack years: 80.00     Types: Cigarettes     Quit date: 1980     Years since quittin.9    Smokeless tobacco: Former User    Tobacco comment: QUIT AT AGE 42   Vaping Use    Vaping Use: Never used   Substance and Sexual Activity    Alcohol use: No     Alcohol/week: 0.0 standard drinks    Drug use: Never       Family History   Problem Relation Age of Onset    Diabetes Mother     Emphysema Father          age 46 - smoker       ROS:  Gen: denies fever, chills, or fatigue  HEENT: +wears hearing aids Denies H/A, nasal congestion, or sore throat  Resp: denies dyspnea, cough, or wheezing  CV: denies chest pain, pressure, or palpitations  Extremeties: denies edema  GI[de-identified] denies abdominal pain, nausea, vomiting, diarrhea, or constipation  Musculoskeletal: no joint pain, stiffness, or muscle cramps  Neuro: denies numbness/tingling or dizziness +memory problems  Skin: denies rashes or new lesions   Psych: denies anxiety, depression, cora, or other changes in mood      Objective:     Visit Vitals  /71 (BP 1 Location: Right arm)   Pulse 64   Temp 97.3 °F (36.3 °C) (Oral)   Resp 16   Ht 6' (1.829 m)   Wt 229 lb 3.2 oz (104 kg)   SpO2 99%   BMI 31.09 kg/m²     General: Alert and oriented. No acute distress. Well nourished. HEENT :  Ears:Hearing aids in place  Eyes: Sclera white, conjunctiva clear. PERRLA. Extra ocular movements intact. Neck: Supple with FROM. No thyroid-megaly, carotid bruits, or lymphadenopathy  Lungs: Breathing even and unlabored. All lobes clear to auscultation bilaterally   Heart :RRR, S1 and S2 normal intensity, no extra heart sounds  Extremities: +mild non-pitting edema to BLE's   Neuro: Cranial nerves grossly normal.  Psych: Mood and thought content appropriate for situation. Dressed appropriately and with good hygiene. Skin: Warm, dry, and intact. No lesions or discoloration. Assessment/ Plan:     HTN with CKD stage 3  BP at goal today.    Check CMP  Cont Metoprolol   We discussed the importance of keeping BP under tight control to prevent progression to CKD stage 4  Also avoid NSAIDS and drink plenty of water daily  Notify provider or go to ER for CP, SOB, dizziness, or worsening swelling  F/U 6 months    A-fib  Per cardiology    Pacemaker  Per cardiology    CAD  Per cardiology    Type 2 diabetes, diet controlled    It is too soon to check A1C today so we will check at next appt  Watch the sugar and carbs    Bilateral carotid stenosis  Last ultrasound was   Cont Crestor daily     ED  Cialis 5mg daily was not effective  Switched to Viagra 50mg- take 1-2 tabs daily prn  Side effects reviewed  States he has not had to take Nitro in over 20 years- he was educated on the drug-drug interaction    Cognitive impairment  He is still not interested in taking any medication for this problem   He has a neurologist appt scheduled for November  I told him I will do some research on brain fog related to Covid and let him know if there is anything he can do for it    RTO 6 months or sooner prn    Health maintenance  PNA vaccines- rec'd Pneumovax 23 in 2019  Shingrix- not addressed today  Covid vaccine- states he has had both vaccines but has not had the booster yet. He was diagnosed with Covid a month ago. It affected his memory and concentration and vision more than anything. States \"I was out of it for 12 days. \"           Verbal and written instructions (see AVS) provided.  Patient expresses understanding of diagnosis and treatment plan. Health Maintenance Due   Topic Date Due    COVID-19 Vaccine (1) Never done    DTaP/Tdap/Td series (1 - Tdap) Never done    Shingrix Vaccine Age 50> (1 of 2) Never done    Medicare Yearly Exam  Never done    Foot Exam Q1  07/26/2019    Eye Exam Retinal or Dilated  10/09/2020    Pneumococcal 65+ years (2 - PCV) 10/24/2020               Chyna Vasquez, CHAD

## 2022-06-07 NOTE — PROGRESS NOTES
Chief Complaint   Patient presents with    Hypertension       1. \"Have you been to the ER, urgent care clinic since your last visit? Hospitalized since your last visit? \" No    2. \"Have you seen or consulted any other health care providers outside of the [address] [place] [address] since your last visit? \" No     3. For patients aged 39-70: Has the patient had a colonoscopy / FIT/ Cologuard? NA - based on age      If the patient is female:    4. For patients aged 41-77: Has the patient had a mammogram within the past 2 years? NA - based on age or sex      11. For patients aged 21-65: Has the patient had a pap smear? NA - based on age or sex      Identified pt with two pt identifiers(name and ). Reviewed record in preparation for visit and have obtained necessary documentation.     Symptom review:    NO  Fever   NO  Shaking chills  NO  Cough  NO Headaches  NO  Body aches  NO  Coughing up blood  NO  Chest congestion  NO  Chest pain  NO  Shortness of breath  NO  Profound Loss of smell/taste  NO  Nausea/Vomiting   NO  Loose stool/Diarrhea  NO  any skin issues

## 2022-06-07 NOTE — PATIENT INSTRUCTIONS
Medicare Wellness Visit, Male    The best way to live healthy is to have a lifestyle where you eat a well-balanced diet, exercise regularly, limit alcohol use, and quit all forms of tobacco/nicotine, if applicable. Regular preventive services are another way to keep healthy. Preventive services (vaccines, screening tests, monitoring & exams) can help personalize your care plan, which helps you manage your own care. Screening tests can find health problems at the earliest stages, when they are easiest to treat. [facility] follows the current, evidence-based guidelines published by the [facility] ([facility]) when recommending preventive services for our patients. Because we follow these guidelines, sometimes recommendations change over time as research supports it. (For example, a prostate screening blood test is no longer routinely recommended for men with no symptoms). Of course, you and your doctor may decide to screen more often for some diseases, based on your risk and co-morbidities (chronic disease you are already diagnosed with). Preventive services for you include:  - Medicare offers their members a free annual wellness visit, which is time for you and your primary care provider to discuss and plan for your preventive service needs. Take advantage of this benefit every year!  -All adults over age 72 should receive the recommended pneumonia vaccines. Current USPSTF guidelines recommend a series of two vaccines for the best pneumonia protection.   -All adults should have a flu vaccine yearly and tetanus vaccine every 10 years.  -All adults age 48 and older should receive the shingles vaccines (series of two vaccines).        -All adults age 38-68 who are overweight should have a diabetes screening test once every three years.   -Other screening tests & preventive services for persons with diabetes include: an eye exam to screen for diabetic retinopathy, a kidney function test, a foot exam, and stricter control over your cholesterol.   -Cardiovascular screening for adults with routine risk involves an electrocardiogram (ECG) at intervals determined by the provider.   -Colorectal cancer screening should be done for adults age 54-65 with no increased risk factors for colorectal cancer. There are a number of acceptable methods of screening for this type of cancer. Each test has its own benefits and drawbacks. Discuss with your provider what is most appropriate for you during your annual wellness visit. The different tests include: colonoscopy (considered the best screening method), a fecal occult blood test, a fecal DNA test, and sigmoidoscopy.  -All adults born between [facility] should be screened once for Hepatitis C.  -An Abdominal Aortic Aneurysm (AAA) Screening is recommended for men age 73-68 who has ever smoked in their lifetime.      Here is a list of your current Health Maintenance items (your personalized list of preventive services) with a due date:  Health Maintenance Due   Topic Date Due    COVID-19 Vaccine (1) Never done    DTaP/Tdap/Td  (1 - Tdap) Never done    Shingles Vaccine (1 of 2) Never done    Diabetic Foot Care  07/26/2019    Eye Exam  10/09/2020    Pneumococcal Vaccine (2 - PCV) 10/24/2020

## 2022-06-08 ENCOUNTER — PATIENT OUTREACH (OUTPATIENT)
Dept: CASE MANAGEMENT | Age: 82
End: 2022-06-08

## 2022-06-08 LAB
ALBUMIN SERPL-MCNC: 3.9 G/DL (ref 3.5–5)
ALBUMIN/GLOB SERPL: 1.2 {RATIO} (ref 1.1–2.2)
ALP SERPL-CCNC: 71 U/L (ref 45–117)
ALT SERPL-CCNC: 23 U/L (ref 12–78)
ANION GAP SERPL CALC-SCNC: 3 MMOL/L (ref 5–15)
AST SERPL-CCNC: 19 U/L (ref 15–37)
BILIRUB SERPL-MCNC: 0.9 MG/DL (ref 0.2–1)
BUN SERPL-MCNC: 29 MG/DL (ref 6–20)
BUN/CREAT SERPL: 17 (ref 12–20)
CALCIUM SERPL-MCNC: 9.3 MG/DL (ref 8.5–10.1)
CHLORIDE SERPL-SCNC: 112 MMOL/L (ref 97–108)
CO2 SERPL-SCNC: 26 MMOL/L (ref 21–32)
CREAT SERPL-MCNC: 1.69 MG/DL (ref 0.7–1.3)
GLOBULIN SER CALC-MCNC: 3.2 G/DL (ref 2–4)
GLUCOSE SERPL-MCNC: 134 MG/DL (ref 65–100)
POTASSIUM SERPL-SCNC: 4.8 MMOL/L (ref 3.5–5.1)
PROT SERPL-MCNC: 7.1 G/DL (ref 6.4–8.2)
SODIUM SERPL-SCNC: 141 MMOL/L (ref 136–145)

## 2022-06-08 NOTE — LETTER
6/8/2022 3:38 PM    Mr. Ajit Hensley  [address] [facility]            Good Day Mr Palmer Grayson,      It was a pleasure speaking with you today. Enclosed in this mailing is the information on Advance Care Planning that we discussed. This document can be added to your electronic chart to inform health care providers of your values and desires related to end-of-life care. Not everyone wants full life-support measures performed in certain situations (example: when brain death occurs). Feel free to review this with your family as well, so they are aware of your final decisions. Again, please call me with any questions.         Kind Regards,   CATHY Mensah, RN - Ambulatory - [phone]

## 2022-06-08 NOTE — PROGRESS NOTES
Kidney function is slightly improved, there is a pill he can take to help preserve the kidney function called lisinopril. It is very well tolerated. If he would like to start it I can send it in to the pharmacy.

## 2022-06-08 NOTE — PROGRESS NOTES
Ambulatory Care Management Note    Date/Time:  6/8/2022 3:29 PM    This Ambulatory Care Manager (ACM) reviewed and updated the following screenings during this call; general assessment, disease specific assessment , self management assessment, SDOH assessments, ACP assessment and note and medication reconciliation     Patient's challenges to self-management identified:   multi health system providers and polypharmacy      Medication Management:  good adherence and good understanding; no report of side effects    Advance Care Planning:   Does patient have an Advance Directive:  currently not on file; education provided (info sent to pt's address on file-No eSharest set-up to send electronically to pt)    [employer], Referrals, and Durable Medical Equipment: glucometer      Health Maintenance Due   Topic Date Due    COVID-19 Vaccine (1) Never done    DTaP/Tdap/Td series (1 - Tdap) Never done    Shingrix Vaccine Age 50> (1 of 2) Never done    Medicare Yearly Exam  Never done    Foot Exam Q1  07/26/2019    Eye Exam Retinal or Dilated  10/09/2020    Pneumococcal 65+ years (2 - PCV) 10/24/2020     Health Maintenance Reviewed: Yes    Pt has an appt w/Neurology on 11/08/22 w/Dr Lilli Lea to evaluate pt's brain fog/memory issues/hallucinations. Pt cont's to drive w/o incident. Patient was asked to consider health care goals that they would like to focus on with this ACM. ACM will follow up with patient to discuss goals and establish care plan in the next 7-14 days.        PCP/Specialist follow up:   Future Appointments   Date Time Provider Susan Bee   9/27/2022  9:45 AM PACEMAKER, RCATANYA ANDUJAR BS AMB   9/27/2022 10:00 AM MD PRATIMA Bell BS AMB   12/5/2022 10:30 AM Attila Allen NP [facility]IN BS AMB

## 2022-06-09 NOTE — PROGRESS NOTES
Patient identified by two patient identifiers, name and date of birth. Spoke with patient. Patient aware of results and states understanding at this time. Patient would like to start the lisinopril and [facility].  He is aware of the possible dry cough and will notify us if this occurs.

## 2022-06-10 DIAGNOSIS — N18.32 TYPE 2 DIABETES MELLITUS WITH STAGE 3B CHRONIC KIDNEY DISEASE, WITHOUT LONG-TERM CURRENT USE OF INSULIN (HCC): Primary | ICD-10-CM

## 2022-06-10 DIAGNOSIS — E11.22 TYPE 2 DIABETES MELLITUS WITH STAGE 3B CHRONIC KIDNEY DISEASE, WITHOUT LONG-TERM CURRENT USE OF INSULIN (HCC): Primary | ICD-10-CM

## 2022-06-10 RX ORDER — LISINOPRIL 2.5 MG/1
2.5 TABLET ORAL DAILY
Qty: 90 TABLET | Refills: 1 | Status: SHIPPED | OUTPATIENT
Start: 2022-06-10

## 2022-06-13 ENCOUNTER — TELEPHONE (OUTPATIENT)
Dept: FAMILY MEDICINE CLINIC | Age: 82
End: 2022-06-13

## 2022-06-13 NOTE — TELEPHONE ENCOUNTER
Pt aware 2 identifiers verified name and    He is aware that Kimberly Gregg wanted him to take the lisinopril with the other BP med he is taking he just needed clarification

## 2022-06-13 NOTE — TELEPHONE ENCOUNTER
----- Message from Carnes sent at 6/13/2022  2:10 PM EDT -----  Subject: Message to Provider    QUESTIONS  Information for Provider? Patient has questions regarding the his new   prescription. He said he believes it for blood pressure and he is already   taking two blood pressure medications  ---------------------------------------------------------------------------  --------------  CALL BACK INFO  What is the best way for the office to contact you? OK to leave message on   voicemail  Preferred Call Back Phone Number? [phone]  ---------------------------------------------------------------------------  --------------  SCRIPT ANSWERS  Relationship to Patient?  Self

## 2022-06-22 ENCOUNTER — DOCUMENTATION ONLY (OUTPATIENT)
Dept: FAMILY MEDICINE CLINIC | Age: 82
End: 2022-06-22

## 2022-06-22 NOTE — PROGRESS NOTES
Pt seen by Dr Kalpana Corrales on 6-21-22 to follow up for CAD, HTN, and A-fib.  EKG was done, it was normal. He will follow up with the electrophysicist Dr Kimmy Richards in 9-2022 and follow up with Dr Kalpana Corrales in

## 2022-07-13 ENCOUNTER — PATIENT OUTREACH (OUTPATIENT)
Dept: CASE MANAGEMENT | Age: 82
End: 2022-07-13

## 2022-07-13 NOTE — ACP (ADVANCE CARE PLANNING)
Advance Care Planning:   Does patient have an Advance Directive:  currently not on file; education provided (info sent to pt's address on file-No [place] set-up to send electronically to pt)

## 2022-08-12 ENCOUNTER — PATIENT OUTREACH (OUTPATIENT)
Dept: CASE MANAGEMENT | Age: 82
End: 2022-08-12

## 2022-09-14 ENCOUNTER — PATIENT OUTREACH (OUTPATIENT)
Dept: CASE MANAGEMENT | Age: 82
End: 2022-09-14

## 2022-09-14 NOTE — PROGRESS NOTES
Ambulatory Care Management Note    Date/Time:  9/14/2022 3:58 PM    This patient was received as a referral from [facility]. Multiple unsuccessful attempts have been made to contact patient. Ambulatory Care Management Get-in-Touch (GIT) letter was mailed to the patient's address on file, as well as, an Unable-to-Contact message via UPlanMe w/no response to date . Complex case mgmt. episode now resolved and no further outreach attempts are scheduled by [facility] at this time.    Patient has Ambulatory Care Manager's contact number for any questions or concerns.   Mp Miller

## 2022-09-22 ENCOUNTER — OFFICE VISIT (OUTPATIENT)
Dept: FAMILY MEDICINE CLINIC | Age: 82
End: 2022-09-22
Payer: MEDICARE

## 2022-09-22 VITALS
WEIGHT: 224 LBS | OXYGEN SATURATION: 98 % | TEMPERATURE: 97 F | BODY MASS INDEX: 30.34 KG/M2 | RESPIRATION RATE: 18 BRPM | DIASTOLIC BLOOD PRESSURE: 80 MMHG | SYSTOLIC BLOOD PRESSURE: 130 MMHG | HEART RATE: 83 BPM | HEIGHT: 72 IN

## 2022-09-22 DIAGNOSIS — H57.89 REDNESS OF LEFT EYE: Primary | ICD-10-CM

## 2022-09-22 PROCEDURE — G8536 NO DOC ELDER MAL SCRN: HCPCS | Performed by: NURSE PRACTITIONER

## 2022-09-22 PROCEDURE — G8752 SYS BP LESS 140: HCPCS | Performed by: NURSE PRACTITIONER

## 2022-09-22 PROCEDURE — G8417 CALC BMI ABV UP PARAM F/U: HCPCS | Performed by: NURSE PRACTITIONER

## 2022-09-22 PROCEDURE — 99213 OFFICE O/P EST LOW 20 MIN: CPT | Performed by: NURSE PRACTITIONER

## 2022-09-22 PROCEDURE — G8427 DOCREV CUR MEDS BY ELIG CLIN: HCPCS | Performed by: NURSE PRACTITIONER

## 2022-09-22 PROCEDURE — G8754 DIAS BP LESS 90: HCPCS | Performed by: NURSE PRACTITIONER

## 2022-09-22 PROCEDURE — G8432 DEP SCR NOT DOC, RNG: HCPCS | Performed by: NURSE PRACTITIONER

## 2022-09-22 PROCEDURE — 1123F ACP DISCUSS/DSCN MKR DOCD: CPT | Performed by: NURSE PRACTITIONER

## 2022-09-22 PROCEDURE — 1101F PT FALLS ASSESS-DOCD LE1/YR: CPT | Performed by: NURSE PRACTITIONER

## 2022-09-22 RX ORDER — ERYTHROMYCIN 5 MG/G
OINTMENT OPHTHALMIC
Qty: 1 G | Refills: 0 | Status: SHIPPED | OUTPATIENT
Start: 2022-09-22

## 2022-09-22 NOTE — PROGRESS NOTES
Elena Alcaraz is a [de-identified] y.o. male who presents today with c/o   Chief Complaint   Patient presents with    Eye Problem     Assessment/ Plan:         ICD-10-CM ICD-9-CM    1. Redness of left eye  H57.89 379.93         Continue cool compress and artificial tears PRN  Start erythromycin ointment to the left eye if symptoms do not progressively improve    RTC in 3 months for your 6 month follow-up. Patient given appt details today  Orders Placed This Encounter    erythromycin (ILOTYCIN) ophthalmic ointment     Sig: Apply a thin ribbon to the left eye three times daily     Dispense:  1 g     Refill:  0           Subjective:  Elena Alcaraz is a [de-identified] y.o. male patient of Jr Sherwood NP. He is here today for left eye redness that started a couple of days ago. He denies vision changes. Denies fevers. Denies recent viral illness. States the left eye just felt watery. He started using artifical tears and applying a compress which he said did help. He was going to cancel his appointment, but decided to keep it. He is followed by an eye doctor in [facility] and reports being prescribed an eye antibiotic in the past with worked well. I will prescribe erythromycin ointment for him and I have asked him to start using the ointment if his symptoms do not continue to improve with his current treatment.      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    Mixed hyperlipidemia E78.2    Coronary atherosclerosis of native coronary artery I25.10    Atrial fibrillation ([facility]) I48.91    SSS (sick sinus syndrome) ([facility]) I49.5    S/P ablation of atrial fibrillation Z98.890, Z86.79    Hyperlipidemia E78.5    Coronary artery disease involving coronary bypass graft of native heart without angina pectoris I25.810    Age-related cataract of both eyes H25.9    Anemia D64.9    S/P cardiac cath Z98.890    Abnormal nuclear cardiac imaging test R93.1    MCCOLLUM (dyspnea on exertion) R06.00    History of GI bleed Z87.19    Rapid atrial fibrillation (HCC) I48.91    Bilateral carotid artery stenosis F89.69    Acute metabolic encephalopathy R34.91    Type 2 diabetes mellitus with stage 3b chronic kidney disease (HCC) E11.22, N18.32    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       Past Medical History:   Diagnosis Date    Abnormal nuclear cardiac imaging test 5/8/2019    Anemia 6/5/2017    Atrial fibrillation (HCC)     CKD (chronic kidney disease) 5/8/2019    Coronary atherosclerosis of native coronary artery     Diabetes mellitus, type II (HCC)     MCCOLLUM (dyspnea on exertion) 5/8/2019    Glaucoma     HTN (hypertension)     Mixed hyperlipidemia     Other dyspnea and respiratory abnormality     Pacemaker     S/P ablation of atrial fibrillation 8/4/2016         Current Outpatient Medications:     erythromycin (ILOTYCIN) ophthalmic ointment, Apply a thin ribbon to the left eye three times daily, Disp: 1 g, Rfl: 0    lisinopriL (PRINIVIL, ZESTRIL) 2.5 mg tablet, Take 1 Tablet by mouth daily. , Disp: 90 Tablet, Rfl: 1    metoprolol tartrate (LOPRESSOR) 25 mg tablet, TAKE 1/2 TABLET BY MOUTH TWICE A DAY, Disp: 90 Tablet, Rfl: 7    ferrous sulfate 325 mg (65 mg iron) tablet, Take  by mouth Daily (before breakfast). , Disp: , Rfl:     rosuvastatin (CRESTOR) 40 mg tablet, TAKE 1 TABLET BY MOUTH EVERY DAY, Disp: 30 Tablet, Rfl: 5    latanoprost (XALATAN) 0.005 % ophthalmic solution, Administer 1 Drop to both eyes daily. , Disp: , Rfl:     glucose blood VI test strips (ACCU-CHEK YVES PLUS TEST STRP) strip, Use to check Blood glucose 3 times daily (Diagnosis code: E11.21), Disp: 100 Strip, Rfl: 11    cholecalciferol (VITAMIN D3) 25 mcg (1,000 unit) cap, Take 1,000 Units by mouth daily. , Disp: , Rfl:     cyanocobalamin 1,000 mcg tablet, Take 1,000 mcg by mouth daily. , Disp: , Rfl:     sildenafil citrate (VIAGRA) 50 mg tablet, Take 1-2 Tablets by mouth daily as needed for Erectile Dysfunction.  (Patient not taking: Reported on 9/22/2022), Disp: 30 Tablet, Rfl: 0    tadalafiL (Cialis) 5 mg tablet, Take 1 Tablet by mouth daily.  (Patient not taking: Reported on 2022), Disp: 30 Tablet, Rfl: 2    ranolazine ER (RANEXA) 500 mg SR tablet, TAKE 1 TABLET BY MOUTH TWICE A DAY (Patient not taking: Reported on 2022), Disp: 180 Tablet, Rfl: 3    Allergies   Allergen Reactions    Ciprofloxacin Other (comments)     Difficulty breathing; 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       Past Surgical History:   Procedure Laterality Date    HX AFIB ABLATION      HX CATARACT REMOVAL Bilateral 2018    HX CORONARY ARTERY BYPASS GRAFT      HX CORONARY STENT PLACEMENT      HX HEART CATHETERIZATION  2019    Left     HX PTCA      [place] INS NEW/RPLCMT PRM PM W/TRANSV ELTRD ATRIAL&VENT N/A 3/29/2019    INSERT PPM DUAL performed by Poncho Chao MD at [facility] CARDIAC CATH LAB    [place] REMOVAL SUBCUTANEOUS CARDIAC RHYTHM MONITOR N/A 3/29/2019    LOOP RECORDER REMOVAL performed by Poncho Chao MD at [facility] CARDIAC CATH LAB    UPPER GI ENDOSCOPY,BIOPSY  2019            Social History     Tobacco Use   Smoking Status Former    Packs/day: 4.00    Years: 20.00    Pack years: 80.00    Types: Cigarettes    Quit date: 1980    Years since quittin.2   Smokeless Tobacco Former   Tobacco Comments    QUIT AT AGE 42       Social History     Socioeconomic History    Marital status:    Tobacco Use    Smoking status: Former     Packs/day: 4.00     Years: 20.00     Pack years: 80.00     Types: Cigarettes     Quit date: 1980     Years since quittin.2    Smokeless tobacco: Former    Tobacco comments:     QUIT AT AGE 42   Vaping Use    Vaping Use: Never used   Substance and Sexual Activity    Alcohol use: Yes     Comment: pt will 1-2 drinks (liquor/beer) on occasion    Drug use: Never     Social Determinants of Health     Financial Resource Strain: Low Risk     Difficulty of Paying Living Expenses: Not hard at all   Food Insecurity: No Food Insecurity    Worried About Running Out of Food in the Last Year: Never true    Ran Out of Food in the Last Year: Never true   Transportation Needs: No Transportation Needs    Lack of Transportation (Medical): No    Lack of Transportation (Non-Medical): No   Physical Activity: Sufficiently Active    Days of Exercise per Week: 7 days    Minutes of Exercise per Session: 30 min   Stress: No Stress Concern Present    Feeling of Stress : Not at all   Social Connections: Moderately Isolated    Frequency of Communication with Friends and Family: More than three times a week    Frequency of Social Gatherings with Friends and Family: More than three times a week    Attends Tenriism Services: Never    Active Member of Clubs or Organizations: No    Attends Club or Organization Meetings: Never    Marital Status:    Housing Stability: Low Risk     Unable to Pay for Housing in the Last Year: No    Number of [place] in the Last Year: 1    Unstable Housing in the Last Year: No       Family History   Problem Relation Age of Onset    Diabetes Mother     Emphysema Father          age 46 - smoker       ROS:  Review of Systems   Constitutional:  Negative for chills and fever. HENT:  Negative for hearing loss. Eyes:  Positive for redness (left eye). Negative for blurred vision, double vision, photophobia and pain. Respiratory:  Negative for cough. Cardiovascular:  Negative for chest pain and leg swelling. Gastrointestinal:  Negative for heartburn. Genitourinary:  Negative for dysuria. Musculoskeletal:  Negative for myalgias. Skin:  Negative for itching and rash. Objective:     Visit Vitals  /80 (BP 1 Location: Left upper arm, BP Patient Position: At rest, BP Cuff Size: Adult)   Pulse 83   Temp 97 °F (36.1 °C) (Temporal)   Resp 18   Ht 6' (1.829 m)   Wt 224 lb (101.6 kg)   SpO2 98%   BMI 30.38 kg/m²     Body mass index is 30.38 kg/m². Physical Exam  Vitals reviewed. HENT:      Head: Normocephalic.       Right Ear: External ear normal.      Left Ear: External ear normal.      Nose: Nose normal.      Mouth/Throat:      Mouth: Mucous membranes are moist.   Eyes:      Pupils: Pupils are equal, round, and reactive to light. Comments: Left eye with scleral redness noted   Pulmonary:      Effort: Pulmonary effort is normal.   Abdominal:      General: Abdomen is flat. Musculoskeletal:         General: Normal range of motion. Cervical back: Normal range of motion. Skin:     General: Skin is warm. Neurological:      Mental Status: He is alert and oriented to person, place, and time. Results for orders placed or performed in visit on 35/92/25   METABOLIC PANEL, COMPREHENSIVE   Result Value Ref Range    Sodium 141 136 - 145 mmol/L    Potassium 4.8 3.5 - 5.1 mmol/L    Chloride 112 (H) 97 - 108 mmol/L    CO2 26 21 - 32 mmol/L    Anion gap 3 (L) 5 - 15 mmol/L    Glucose 134 (H) 65 - 100 mg/dL    BUN 29 (H) 6 - 20 MG/DL    Creatinine 1.69 (H) 0.70 - 1.30 MG/DL    BUN/Creatinine ratio 17 12 - 20      GFR est AA 48 (L) >60 ml/min/1.73m2    GFR est non-AA 39 (L) >60 ml/min/1.73m2    Calcium 9.3 8.5 - 10.1 MG/DL    Bilirubin, total 0.9 0.2 - 1.0 MG/DL    ALT (SGPT) 23 12 - 78 U/L    AST (SGOT) 19 15 - 37 U/L    Alk. phosphatase 71 45 - 117 U/L    Protein, total 7.1 6.4 - 8.2 g/dL    Albumin 3.9 3.5 - 5.0 g/dL    Globulin 3.2 2.0 - 4.0 g/dL    A-G Ratio 1.2 1.1 - 2.2         No results found for this visit on 09/22/22. Verbal and written instructions (see AVS) provided. Patient expresses understanding of diagnosis and treatment plan. Patient has been advised to contact practice or seek care if condition persists or worsens.      Basilio Patience, NP

## 2022-09-22 NOTE — PROGRESS NOTES
1. \"Have you been to the ER, urgent care clinic since your last visit? Hospitalized since your last visit? \" No    2. \"Have you seen or consulted any other health care providers outside of the [address] [place] [address] since your last visit? \" No     3. For patients aged 39-70: Has the patient had a colonoscopy / FIT/ Cologuard? Yes - no Care Gap present      If the patient is female:    4. For patients aged 41-77: Has the patient had a mammogram within the past 2 years? NA - based on age or sex      11. For patients aged 21-65: Has the patient had a pap smear?  NA - based on age or sex

## 2022-11-19 DIAGNOSIS — N18.32 TYPE 2 DIABETES MELLITUS WITH STAGE 3B CHRONIC KIDNEY DISEASE, WITHOUT LONG-TERM CURRENT USE OF INSULIN (HCC): ICD-10-CM

## 2022-11-19 DIAGNOSIS — E11.22 TYPE 2 DIABETES MELLITUS WITH STAGE 3B CHRONIC KIDNEY DISEASE, WITHOUT LONG-TERM CURRENT USE OF INSULIN (HCC): ICD-10-CM

## 2022-11-20 RX ORDER — LISINOPRIL 2.5 MG/1
TABLET ORAL
Qty: 90 TABLET | Refills: 1 | Status: SHIPPED | OUTPATIENT
Start: 2022-11-20

## 2022-12-05 ENCOUNTER — OFFICE VISIT (OUTPATIENT)
Dept: FAMILY MEDICINE CLINIC | Age: 82
End: 2022-12-05
Payer: MEDICARE

## 2022-12-05 VITALS
WEIGHT: 222.2 LBS | DIASTOLIC BLOOD PRESSURE: 86 MMHG | HEART RATE: 73 BPM | BODY MASS INDEX: 30.1 KG/M2 | SYSTOLIC BLOOD PRESSURE: 126 MMHG | HEIGHT: 72 IN | TEMPERATURE: 97.6 F | OXYGEN SATURATION: 99 % | RESPIRATION RATE: 16 BRPM

## 2022-12-05 DIAGNOSIS — N18.32 TYPE 2 DIABETES MELLITUS WITH STAGE 3B CHRONIC KIDNEY DISEASE, WITHOUT LONG-TERM CURRENT USE OF INSULIN (HCC): ICD-10-CM

## 2022-12-05 DIAGNOSIS — I10 PRIMARY HYPERTENSION: ICD-10-CM

## 2022-12-05 DIAGNOSIS — E11.22 TYPE 2 DIABETES MELLITUS WITH STAGE 3B CHRONIC KIDNEY DISEASE, WITHOUT LONG-TERM CURRENT USE OF INSULIN (HCC): ICD-10-CM

## 2022-12-05 DIAGNOSIS — E78.2 MIXED HYPERLIPIDEMIA: ICD-10-CM

## 2022-12-05 DIAGNOSIS — R41.3 MEMORY LOSS: Primary | ICD-10-CM

## 2022-12-05 DIAGNOSIS — I48.0 PAROXYSMAL ATRIAL FIBRILLATION (HCC): ICD-10-CM

## 2022-12-05 DIAGNOSIS — N52.9 VASCULOGENIC ERECTILE DYSFUNCTION, UNSPECIFIED VASCULOGENIC ERECTILE DYSFUNCTION TYPE: ICD-10-CM

## 2022-12-05 PROCEDURE — G8427 DOCREV CUR MEDS BY ELIG CLIN: HCPCS | Performed by: NURSE PRACTITIONER

## 2022-12-05 PROCEDURE — 1123F ACP DISCUSS/DSCN MKR DOCD: CPT | Performed by: NURSE PRACTITIONER

## 2022-12-05 PROCEDURE — G8417 CALC BMI ABV UP PARAM F/U: HCPCS | Performed by: NURSE PRACTITIONER

## 2022-12-05 PROCEDURE — 3078F DIAST BP <80 MM HG: CPT | Performed by: NURSE PRACTITIONER

## 2022-12-05 PROCEDURE — 3074F SYST BP LT 130 MM HG: CPT | Performed by: NURSE PRACTITIONER

## 2022-12-05 PROCEDURE — G8752 SYS BP LESS 140: HCPCS | Performed by: NURSE PRACTITIONER

## 2022-12-05 PROCEDURE — G8536 NO DOC ELDER MAL SCRN: HCPCS | Performed by: NURSE PRACTITIONER

## 2022-12-05 PROCEDURE — 99214 OFFICE O/P EST MOD 30 MIN: CPT | Performed by: NURSE PRACTITIONER

## 2022-12-05 PROCEDURE — G8432 DEP SCR NOT DOC, RNG: HCPCS | Performed by: NURSE PRACTITIONER

## 2022-12-05 PROCEDURE — 3044F HG A1C LEVEL LT 7.0%: CPT | Performed by: NURSE PRACTITIONER

## 2022-12-05 PROCEDURE — G8754 DIAS BP LESS 90: HCPCS | Performed by: NURSE PRACTITIONER

## 2022-12-05 PROCEDURE — 1101F PT FALLS ASSESS-DOCD LE1/YR: CPT | Performed by: NURSE PRACTITIONER

## 2022-12-05 PROCEDURE — 36415 COLL VENOUS BLD VENIPUNCTURE: CPT | Performed by: NURSE PRACTITIONER

## 2022-12-05 RX ORDER — ZOSTER VACCINE RECOMBINANT, ADJUVANTED 50 MCG/0.5
0.5 KIT INTRAMUSCULAR ONCE
Qty: 0.5 ML | Refills: 0 | Status: SHIPPED | OUTPATIENT
Start: 2022-12-05 | End: 2022-12-05

## 2022-12-05 NOTE — PROGRESS NOTES
Subjective:     CC: HTN    Nancy Edwards is a [de-identified] y.o. male who presents today for for a 6 month follow up for HTN with CKD stage 3. HTN with CKD stage 3  BP at goal today. He takes 1/2 of a Metoprolol 25mg tab BID and lisinopril 2.5mg daily for renal CKD. Denies Cp or pressure. Denies SOB or dizziness. He has very mild swelling in the lower extremities. He avoids NSAIDS. He is followed by cardiologist Dr Brenna Sanford, last seen on 6-21-22. EKG was done, it was normal.     He does not see a kidney specialist. He avoids NSAIDS. Lab Results   Component Value Date/Time    Creatinine 1.69 (H) 06/07/2022 05:20 AM       A-fib  HX of ablation. He is on Metoprolol for rate control but has declined anti-coagulants. Hx of GI bleed. He sees cardiologist Dr Brenna Sanford. Pacemaker  Placed 2 years ago for hx of Sick Sinus Syndrome. He is followed by electrophysicist Dr Madelaine Neal, last seen in 9-2022. CAD  Denies hx of acute MI. He has had 4 stents placed. He is on Crestor and Ranexa daily. Keeps Nitro on hand just in case- states he has not had to use it in over 20 years. He sees cardiologist Dr Brenna Sanford. Lab Results   Component Value Date/Time    Cholesterol, total 114 03/29/2022 03:52 PM    HDL Cholesterol 61 03/29/2022 03:52 PM    LDL, calculated 39.8 03/29/2022 03:52 PM    VLDL, calculated 13.2 03/29/2022 03:52 PM    Triglyceride 66 03/29/2022 03:52 PM    CHOL/HDL Ratio 1.9 03/29/2022 03:52 PM         Type 2 diabetes, diet controlled  Lab Results   Component Value Date/Time    Hemoglobin A1c 6.6 (H) 03/29/2022 03:52 PM     He used to take Metformin in the past but was able to come off. Does not check sugars at home. He denies any excessive thirst or urination. Denies any numbness or tingling. Bilateral carotid stenosis  Last ultrasound was , showed very mild stenosis bilaterally. He is on a statin daily. Cognitive impairment  He was seen in 5/2022 with c/o memory problems and hallucinations.  He had been \"seeing things\" at night. While getting up to go to the bathroom he will see someone sitting on the couch. He will wake up his wife but she will tell him there is no one there. He will try to have a conversation with someone and will not be able to get his words out. He will set something down on the table and walk away from it and then forget that he placed it there. One day he could not write a check. His wife is also worried and has told him she does not wanting him driving his motorcylce anymore. He can still drive a car and is very independint for his age. Denies any depression or anxiety or traumatic events recently. He was hospitalized with Covid back in November of 2021 and believes these symptoms are a result from the Covid infection. During this visit a [facility] was performed and he scored a 19/30 (low)  We discussed the likelihood of dementia and the importance of seeing a neurologist for further evaluation, however I could not find a neurologist who had a sooner appt than November. We discussed the option of starting a medication to slow the progress of the moemory impairment (NAmenda) but he declined. He was still convinced this is a result of Covid infection from Nov and is hoping it will go away on its own. He was given an appt for Nov 8, 2022 to see the neurologist. Today he states he could not find the office so he missed his appt. He denies any hx of stroke but has a documented hx of Cerebrovascular disease. Brain CT and MRI were normal in . He is on Crestor 40mg daily. Today he states the problem has not gotten any better and still wants to see a specialist. Will refer to neuropsychiatrist Dr Wesley Harris today. ED  He has tried Cialis and Viagra without improvement. He is not interested in seeing urology.        Health maintenance  PNA vaccines- rec'd Pneumovax 23 in 2019  Shingrix- due, advised to get from pharmacy  Flu shot- declines (states he had reactions in the past)    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    Mixed hyperlipidemia E78.2    Coronary atherosclerosis of native coronary artery I25.10    Atrial fibrillation ([facility]) I48.91    SSS (sick sinus syndrome) ([facility]) I49.5    S/P ablation of atrial fibrillation Z98.890, Z86.79    Hyperlipidemia E78.5    Coronary artery disease involving coronary bypass graft of native heart without angina pectoris I25.810    Age-related cataract of both eyes H25.9    Anemia D64.9    S/P cardiac cath Z98.890    Abnormal nuclear cardiac imaging test R93.1    MCCOLLUM (dyspnea on exertion) R06.09    History of GI bleed Z87.19    Rapid atrial fibrillation ([facility]) I48.91    Bilateral carotid artery stenosis L18.92    Acute metabolic encephalopathy E32.37    Type 2 diabetes mellitus with stage 3b chronic kidney disease ([facility]) E11.22, N18.32    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       Past Medical History:   Diagnosis Date    Abnormal nuclear cardiac imaging test 5/8/2019    Anemia 6/5/2017    Atrial fibrillation ([facility])     CKD (chronic kidney disease) 5/8/2019    Coronary atherosclerosis of native coronary artery     Diabetes mellitus, type II ([facility])     MCCOLLUM (dyspnea on exertion) 5/8/2019    Glaucoma     HTN (hypertension)     Mixed hyperlipidemia     Other dyspnea and respiratory abnormality     Pacemaker     S/P ablation of atrial fibrillation 8/4/2016         Current Outpatient Medications:     lisinopriL (PRINIVIL, ZESTRIL) 2.5 mg tablet, TAKE 1 TABLET BY MOUTH EVERY DAY, Disp: 90 Tablet, Rfl: 1    erythromycin (ILOTYCIN) ophthalmic ointment, Apply a thin ribbon to the left eye three times daily, Disp: 1 g, Rfl: 0    sildenafil citrate (VIAGRA) 50 mg tablet, Take 1-2 Tablets by mouth daily as needed for Erectile Dysfunction. (Patient not taking: Reported on 9/22/2022), Disp: 30 Tablet, Rfl: 0    tadalafiL (Cialis) 5 mg tablet, Take 1 Tablet by mouth daily.  (Patient not taking: Reported on 2022), Disp: 30 Tablet, Rfl: 2    metoprolol tartrate (LOPRESSOR) 25 mg tablet, TAKE 1/2 TABLET BY MOUTH TWICE A DAY, Disp: 90 Tablet, Rfl: 7    ferrous sulfate 325 mg (65 mg iron) tablet, Take  by mouth Daily (before breakfast). , Disp: , Rfl:     ranolazine ER (RANEXA) 500 mg SR tablet, TAKE 1 TABLET BY MOUTH TWICE A DAY (Patient not taking: Reported on 2022), Disp: 180 Tablet, Rfl: 3    rosuvastatin (CRESTOR) 40 mg tablet, TAKE 1 TABLET BY MOUTH EVERY DAY, Disp: 30 Tablet, Rfl: 5    latanoprost (XALATAN) 0.005 % ophthalmic solution, Administer 1 Drop to both eyes daily. , Disp: , Rfl:     glucose blood VI test strips (ACCU-CHEK YVES PLUS TEST STRP) strip, Use to check Blood glucose 3 times daily (Diagnosis code: E11.21), Disp: 100 Strip, Rfl: 11    cholecalciferol (VITAMIN D3) 25 mcg (1,000 unit) cap, Take 1,000 Units by mouth daily. , Disp: , Rfl:     cyanocobalamin 1,000 mcg tablet, Take 1,000 mcg by mouth daily. , Disp: , Rfl:     Allergies   Allergen Reactions    Ciprofloxacin Other (comments)     Difficulty breathing; 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       Past Surgical History:   Procedure Laterality Date    HX AFIB ABLATION      HX CATARACT REMOVAL Bilateral 2018    HX CORONARY ARTERY BYPASS GRAFT  2001    HX CORONARY STENT PLACEMENT      HX HEART CATHETERIZATION  2019    Left     HX PTCA      [place] INS NEW/RPLCMT PRM PM W/TRANSV ELTRD ATRIAL&VENT N/A 3/29/2019    INSERT PPM DUAL performed by Robin Alejandra MD at [facility] CARDIAC CATH LAB    [place] REMOVAL SUBCUTANEOUS CARDIAC RHYTHM MONITOR N/A 3/29/2019    LOOP RECORDER REMOVAL performed by Robin Alejandra MD at [facility] CARDIAC CATH LAB    UPPER GI ENDOSCOPY,BIOPSY  2019            Social History     Tobacco Use   Smoking Status Former    Packs/day: 4.00    Years: 20.00    Pack years: 80.00    Types: Cigarettes    Quit date: 1980    Years since quittin.4   Smokeless Tobacco Former   Tobacco Comments    QUIT AT AGE 42       Social History Socioeconomic History    Marital status:    Tobacco Use    Smoking status: Former     Packs/day: 4.00     Years: 20.00     Pack years: 80.00     Types: Cigarettes     Quit date: 1980     Years since quittin.4    Smokeless tobacco: Former    Tobacco comments:     QUIT AT AGE 42   Vaping Use    Vaping Use: Never used   Substance and Sexual Activity    Alcohol use: Yes     Comment: pt will 1-2 drinks (liquor/beer) on occasion    Drug use: Never     Social Determinants of Health     Financial Resource Strain: Low Risk     Difficulty of Paying Living Expenses: Not hard at all   Food Insecurity: No Food Insecurity    Worried About Running Out of Food in the Last Year: Never true    Ran Out of Food in the Last Year: Never true   Transportation Needs: No Transportation Needs    Lack of Transportation (Medical): No    Lack of Transportation (Non-Medical): No   Physical Activity: Sufficiently Active    Days of Exercise per Week: 7 days    Minutes of Exercise per Session: 30 min   Stress: No Stress Concern Present    Feeling of Stress : Not at all   Social Connections:  Moderately Isolated    Frequency of Communication with Friends and Family: More than three times a week    Frequency of Social Gatherings with Friends and Family: More than three times a week    Attends Mandaen Services: Never    Active Member of Clubs or Organizations: No    Attends Club or Organization Meetings: Never    Marital Status:    Housing Stability: Low Risk     Unable to Pay for Housing in the Last Year: No    Number of [place] in the Last Year: 1    Unstable Housing in the Last Year: No       Family History   Problem Relation Age of Onset    Diabetes Mother     Emphysema Father          age 46 - smoker       ROS:  Gen: denies fever, chills, or fatigue  HEENT: +wears hearing aids Denies H/A, nasal congestion, or sore throat  Resp: denies dyspnea, cough, or wheezing  CV: denies chest pain, pressure, or palpitations  Extremeties: denies edema  GI[de-identified] denies abdominal pain, nausea, vomiting, diarrhea, or constipation  Musculoskeletal: no joint pain, stiffness, or muscle cramps  Neuro: denies numbness/tingling or dizziness +memory loss and hallucinations  Skin: denies rashes or new lesions   Psych: denies anxiety, depression, cora, or other changes in mood      Objective:     Visit Vitals  /86 (BP 1 Location: Left upper arm)   Pulse 73   Temp 97.6 °F (36.4 °C) (Oral)   Resp 16   Ht 6' (1.829 m)   Wt 222 lb 3.2 oz (100.8 kg)   SpO2 99%   BMI 30.14 kg/m²       General: Alert and oriented. No acute distress. Well nourished. HEENT :  Ears:Hearing aids in place  Eyes: Sclera white, conjunctiva clear. PERRLA. Extra ocular movements intact. Neck: Supple with FROM. No thyroid-megaly, carotid bruits, or lymphadenopathy  Lungs: Breathing even and unlabored. All lobes clear to auscultation bilaterally   Heart :RRR, S1 and S2 normal intensity, no extra heart sounds  Extremities: +mild non-pitting edema to BLE's   Neuro: Cranial nerves grossly normal. Answers questions appropriately without hesitation. He does not repeat himself and I did not have to repeat myself. Psych: Mood and thought content appropriate for situation. Dressed appropriately and with good hygiene. Skin: Warm, dry, and intact. No lesions or discoloration. Assessment/ Plan:     HTN with CKD stage 3  BP at goal today.    Check renal function panel and CBC  Cont Metoprolol   Avoid NSAIDS and drink plenty of water daily  Notify provider or go to ER for CP, SOB, dizziness, or worsening swelling  F/U 6 months    A-fib  Per cardiology    Pacemaker  Per cardiology    CAD  Per cardiology    Type 2 diabetes, diet controlled    Check A1C  Watch the sugar and carbs    Bilateral carotid stenosis  Last ultrasound was   Cont Crestor daily     ED  Cialis and Viagra have not been effective  Not intersted in urology consult    Cognitive impairment  Low score on MOCA test earlier this year  He is not interested in starting any medication for dementia  Referred to neuropsychiatrist for further eval per his request        Verbal and written instructions (see AVS) provided. Patient expresses understanding of diagnosis and treatment plan. Health Maintenance Due   Topic Date Due    COVID-19 Vaccine (1) Never done    DTaP/Tdap/Td series (1 - Tdap) Never done    Shingrix Vaccine Age 50> (1 of 2) Never done    Foot Exam Q1  07/26/2019    Eye Exam Retinal or Dilated  10/09/2020    Flu Vaccine (1) 08/01/2022    A1C test (Diabetic or Prediabetic)  09/29/2022               Faye Simmons, NP

## 2022-12-05 NOTE — PROGRESS NOTES
Identified pt with two pt identifiers(name and ). Reviewed record in preparation for visit and have obtained necessary documentation. Chief Complaint   Patient presents with    Memory Loss    Other       1. \"Have you been to the ER, urgent care clinic since your last visit? Hospitalized since your last visit? \" No    2. \"Have you seen or consulted any other health care providers outside of the [address] [place] [address] since your last visit? \" No     3. For patients aged 39-70: Has the patient had a colonoscopy / FIT/ Cologuard? No, Based on Age      If the patient is female:    4. For patients aged 41-77: Has the patient had a mammogram within the past 2 years? NA - based on age or sex      11. For patients aged 21-65: Has the patient had a pap smear?  NA - based on age or sex

## 2022-12-06 LAB
ALBUMIN SERPL-MCNC: 4.1 G/DL (ref 3.5–5)
ANION GAP SERPL CALC-SCNC: 4 MMOL/L (ref 5–15)
BUN SERPL-MCNC: 22 MG/DL (ref 6–20)
BUN/CREAT SERPL: 13 (ref 12–20)
CALCIUM SERPL-MCNC: 9.1 MG/DL (ref 8.5–10.1)
CHLORIDE SERPL-SCNC: 108 MMOL/L (ref 97–108)
CHOLEST SERPL-MCNC: 117 MG/DL
CO2 SERPL-SCNC: 30 MMOL/L (ref 21–32)
CREAT SERPL-MCNC: 1.65 MG/DL (ref 0.7–1.3)
ERYTHROCYTE [DISTWIDTH] IN BLOOD BY AUTOMATED COUNT: 12.9 % (ref 11.5–14.5)
EST. AVERAGE GLUCOSE BLD GHB EST-MCNC: 134 MG/DL
GLUCOSE SERPL-MCNC: 127 MG/DL (ref 65–100)
HBA1C MFR BLD: 6.3 % (ref 4–5.6)
HCT VFR BLD AUTO: 39.8 % (ref 36.6–50.3)
HDLC SERPL-MCNC: 62 MG/DL
HDLC SERPL: 1.9 {RATIO} (ref 0–5)
HGB BLD-MCNC: 12.7 G/DL (ref 12.1–17)
LDLC SERPL CALC-MCNC: 41 MG/DL (ref 0–100)
MCH RBC QN AUTO: 31.9 PG (ref 26–34)
MCHC RBC AUTO-ENTMCNC: 31.9 G/DL (ref 30–36.5)
MCV RBC AUTO: 100 FL (ref 80–99)
NRBC # BLD: 0 K/UL (ref 0–0.01)
NRBC BLD-RTO: 0 PER 100 WBC
PHOSPHATE SERPL-MCNC: 3.1 MG/DL (ref 2.6–4.7)
PLATELET # BLD AUTO: 141 K/UL (ref 150–400)
PMV BLD AUTO: 11.2 FL (ref 8.9–12.9)
POTASSIUM SERPL-SCNC: 4.4 MMOL/L (ref 3.5–5.1)
RBC # BLD AUTO: 3.98 M/UL (ref 4.1–5.7)
SODIUM SERPL-SCNC: 142 MMOL/L (ref 136–145)
TRIGL SERPL-MCNC: 70 MG/DL (ref ?–150)
VLDLC SERPL CALC-MCNC: 14 MG/DL
WBC # BLD AUTO: 5.2 K/UL (ref 4.1–11.1)

## 2023-01-30 ENCOUNTER — DOCUMENTATION ONLY (OUTPATIENT)
Dept: FAMILY MEDICINE CLINIC | Age: 83
End: 2023-01-30

## 2023-01-30 NOTE — PROGRESS NOTES
Pt saw cardio,Dr. Claudy Vargas on 1/25/23 for follow up, c/o chest pain that pt stated feels like it it from pacemaker.  Order liyah scan and EKG, EKG showed A Fib with Ventricular response of 87bpm.  Follow up 2/15

## 2023-03-20 DIAGNOSIS — E11.22 TYPE 2 DIABETES MELLITUS WITH STAGE 3B CHRONIC KIDNEY DISEASE, WITHOUT LONG-TERM CURRENT USE OF INSULIN (HCC): ICD-10-CM

## 2023-03-20 DIAGNOSIS — N18.32 TYPE 2 DIABETES MELLITUS WITH STAGE 3B CHRONIC KIDNEY DISEASE, WITHOUT LONG-TERM CURRENT USE OF INSULIN (HCC): ICD-10-CM

## 2023-03-21 RX ORDER — LISINOPRIL 2.5 MG/1
TABLET ORAL
Qty: 90 TABLET | Refills: 1 | Status: SHIPPED | OUTPATIENT
Start: 2023-03-21

## 2023-03-22 RX ORDER — ROSUVASTATIN CALCIUM 40 MG/1
TABLET, COATED ORAL
Qty: 90 TABLET | Refills: 1 | OUTPATIENT
Start: 2023-03-22

## 2023-05-30 ENCOUNTER — OFFICE VISIT (OUTPATIENT)
Age: 83
End: 2023-05-30
Payer: MEDICARE

## 2023-05-30 VITALS
SYSTOLIC BLOOD PRESSURE: 159 MMHG | HEIGHT: 72 IN | DIASTOLIC BLOOD PRESSURE: 75 MMHG | RESPIRATION RATE: 16 BRPM | OXYGEN SATURATION: 96 % | BODY MASS INDEX: 30.14 KG/M2 | HEART RATE: 68 BPM

## 2023-05-30 DIAGNOSIS — K42.9 UMBILICAL HERNIA WITHOUT OBSTRUCTION AND WITHOUT GANGRENE: ICD-10-CM

## 2023-05-30 DIAGNOSIS — K40.90 RIGHT INGUINAL HERNIA: ICD-10-CM

## 2023-05-30 PROCEDURE — 99204 OFFICE O/P NEW MOD 45 MIN: CPT | Performed by: SURGERY

## 2023-05-30 PROCEDURE — G8427 DOCREV CUR MEDS BY ELIG CLIN: HCPCS | Performed by: SURGERY

## 2023-05-30 PROCEDURE — 1123F ACP DISCUSS/DSCN MKR DOCD: CPT | Performed by: SURGERY

## 2023-05-30 PROCEDURE — 1036F TOBACCO NON-USER: CPT | Performed by: SURGERY

## 2023-05-30 PROCEDURE — G8417 CALC BMI ABV UP PARAM F/U: HCPCS | Performed by: SURGERY

## 2023-05-30 ASSESSMENT — PATIENT HEALTH QUESTIONNAIRE - PHQ9
SUM OF ALL RESPONSES TO PHQ QUESTIONS 1-9: 0
SUM OF ALL RESPONSES TO PHQ9 QUESTIONS 1 & 2: 0
1. LITTLE INTEREST OR PLEASURE IN DOING THINGS: 0
SUM OF ALL RESPONSES TO PHQ QUESTIONS 1-9: 0
2. FEELING DOWN, DEPRESSED OR HOPELESS: 0

## 2023-05-30 NOTE — PROGRESS NOTES
Subjective:       Lily Cid is a 82 y.o. male who presents for evaluation of umbilical hernia. Its the size of a golf ball. He has had it for 3-4 years. Its causing some pain and discomfort. It gets worse with activity. Tolerating a diet. Having bowel function. He also reports pain in his right groin. He reports this has been ongoing for a couple of months. No bulges. It felt swollen. He had a CABG and a pacemaker. Cardiologist is Dr. Yodit Torres. He is not on any blood thinners. Last A1c was in the 6s but was over 6 months ago. Quit smoking at age 42.      Past Medical History:   Diagnosis Date    Abnormal nuclear cardiac imaging test 5/8/2019    Anemia 6/5/2017    Atrial fibrillation (HCC)     CKD (chronic kidney disease) 5/8/2019    Coronary atherosclerosis of native coronary artery     Diabetes mellitus, type II (HCC)     IYER (dyspnea on exertion) 5/8/2019    Glaucoma     HTN (hypertension)     Mixed hyperlipidemia     Other dyspnea and respiratory abnormality     Pacemaker     S/P ablation of atrial fibrillation 8/4/2016     Past Surgical History:   Procedure Laterality Date    ABLATION OF DYSRHYTHMIC FOCUS      CARDIAC CATHETERIZATION  11/21/2019    Left     CATARACT REMOVAL Bilateral 2018    CORONARY ANGIOPLASTY WITH STENT PLACEMENT      CORONARY ARTERY BYPASS GRAFT  2001    INS NEW/RPLCMT PRM PM W/TRANSV ELTRD ATRIAL&VENT N/A 3/29/2019    INSERT PPM DUAL performed by Hannah Membreno MD at [facility] CARDIAC CATH LAB    PTCA      REMOVAL SUBCUTANEOUS CARDIAC RHYTHM MONITOR N/A 3/29/2019    LOOP RECORDER REMOVAL performed by Hannah Membreno MD at [facility] CARDIAC CATH LAB    UPPER GI ENDOSCOPY,BIOPSY  7/26/2019          Social History     Socioeconomic History    Marital status:      Spouse name: None    Number of children: None    Years of education: None    Highest education level: None   Tobacco Use    Smoking status: Former     Packs/day: 4.00     Types: Cigarettes     Quit date: 7/19/1980

## 2023-05-30 NOTE — PROGRESS NOTES
Rm    Chief Complaint   Patient presents with    New Patient     Umbilical and groin hernia        BP (!) 159/75 (Site: Right Upper Arm, Position: Sitting, Cuff Size: Medium Adult)   Pulse 68   Resp 16   Ht 6' (1.829 m)   SpO2 96%   BMI 30.14 kg/m²      1. Have you been to the ER, urgent care clinic since your last visit? Hospitalized since your last visit? no    2. Have you seen or consulted any other health care providers outside of the [address] [place] [address] since your last visit? Include any pap smears or colon screening. no    Health Maintenance Due   Topic Date Due    COVID-19 Vaccine (1) Never done    DTaP/Tdap/Td vaccine (1 - Tdap) Never done    Shingles vaccine (1 of 2) Never done    Diabetic foot exam  07/26/2019    Diabetic retinal exam  10/09/2019    Annual Wellness Visit (AWV)  02/10/2023    A1C test (Diabetic or Prediabetic)  06/05/2023        No flowsheet data found. No flowsheet data found. No flowsheet data found.

## 2023-07-03 ENCOUNTER — TELEPHONE (OUTPATIENT)
Age: 83
End: 2023-07-03

## 2023-07-11 ENCOUNTER — TELEPHONE (OUTPATIENT)
Age: 83
End: 2023-07-11

## 2023-07-14 ENCOUNTER — TELEPHONE (OUTPATIENT)
Age: 83
End: 2023-07-14

## 2023-07-14 NOTE — TELEPHONE ENCOUNTER
Called Dr. Krys Rodriguez cardiology office, spoke with Ric Palmer informed her Dr. Juliana Grande is waiting for cardiac clear on patient & cannot schedule surgery until clear is received. Was informed patient missed schedule appointment 6/12/23, can not be cleared until seen by cardiology. Called patient and wife number no answer mail box full.

## 2023-07-14 NOTE — TELEPHONE ENCOUNTER
Received a call from Dr Raisa Chan office in regards to cardiac clearance request that was faxed over, Raisa Chan office will not write cardiac clearance letter until they have a surgery date for patient, advised the office MD does not write orders for surgery until patient is cleared and would relay message to nurse    Please advise

## 2023-08-09 ENCOUNTER — OFFICE VISIT (OUTPATIENT)
Age: 83
End: 2023-08-09

## 2023-08-09 VITALS
WEIGHT: 192 LBS | SYSTOLIC BLOOD PRESSURE: 110 MMHG | HEART RATE: 68 BPM | TEMPERATURE: 97 F | OXYGEN SATURATION: 98 % | RESPIRATION RATE: 16 BRPM | HEIGHT: 69 IN | DIASTOLIC BLOOD PRESSURE: 60 MMHG | BODY MASS INDEX: 28.44 KG/M2

## 2023-08-09 DIAGNOSIS — I48.0 PAROXYSMAL ATRIAL FIBRILLATION (HCC): ICD-10-CM

## 2023-08-09 DIAGNOSIS — N18.32 CHRONIC KIDNEY DISEASE, STAGE 3B (HCC): ICD-10-CM

## 2023-08-09 DIAGNOSIS — D68.9 COAGULATION DEFECT (HCC): ICD-10-CM

## 2023-08-09 DIAGNOSIS — Z76.89 ENCOUNTER TO ESTABLISH CARE: Primary | ICD-10-CM

## 2023-08-09 DIAGNOSIS — K45.8 RECURRENT ABDOMINAL HERNIA WITHOUT OBSTRUCTION OR GANGRENE, UNSPECIFIED HERNIA TYPE: ICD-10-CM

## 2023-08-09 DIAGNOSIS — I25.119 ATHEROSCLEROSIS OF NATIVE CORONARY ARTERY OF NATIVE HEART WITH ANGINA PECTORIS (HCC): ICD-10-CM

## 2023-08-09 SDOH — HEALTH STABILITY: PHYSICAL HEALTH: ON AVERAGE, HOW MANY MINUTES DO YOU ENGAGE IN EXERCISE AT THIS LEVEL?: 0 MIN

## 2023-08-09 SDOH — HEALTH STABILITY: PHYSICAL HEALTH: ON AVERAGE, HOW MANY DAYS PER WEEK DO YOU ENGAGE IN MODERATE TO STRENUOUS EXERCISE (LIKE A BRISK WALK)?: 0 DAYS

## 2023-08-09 SDOH — ECONOMIC STABILITY: HOUSING INSECURITY
IN THE LAST 12 MONTHS, WAS THERE A TIME WHEN YOU DID NOT HAVE A STEADY PLACE TO SLEEP OR SLEPT IN A SHELTER (INCLUDING NOW)?: NO

## 2023-08-09 ASSESSMENT — LIFESTYLE VARIABLES
HOW OFTEN DO YOU HAVE A DRINK CONTAINING ALCOHOL: MONTHLY OR LESS
HOW MANY STANDARD DRINKS CONTAINING ALCOHOL DO YOU HAVE ON A TYPICAL DAY: PATIENT DOES NOT DRINK
HOW OFTEN DO YOU HAVE A DRINK CONTAINING ALCOHOL: MONTHLY OR LESS
HOW MANY STANDARD DRINKS CONTAINING ALCOHOL DO YOU HAVE ON A TYPICAL DAY: PATIENT DOES NOT DRINK

## 2023-08-09 ASSESSMENT — PATIENT HEALTH QUESTIONNAIRE - PHQ9
SUM OF ALL RESPONSES TO PHQ QUESTIONS 1-9: 0
SUM OF ALL RESPONSES TO PHQ9 QUESTIONS 1 & 2: 0
SUM OF ALL RESPONSES TO PHQ QUESTIONS 1-9: 0
SUM OF ALL RESPONSES TO PHQ QUESTIONS 1-9: 0
1. LITTLE INTEREST OR PLEASURE IN DOING THINGS: 0
2. FEELING DOWN, DEPRESSED OR HOPELESS: 0
SUM OF ALL RESPONSES TO PHQ QUESTIONS 1-9: 0

## 2023-08-09 ASSESSMENT — SOCIAL DETERMINANTS OF HEALTH (SDOH)
WITHIN THE LAST YEAR, HAVE YOU BEEN KICKED, HIT, SLAPPED, OR OTHERWISE PHYSICALLY HURT BY YOUR PARTNER OR EX-PARTNER?: NO
IN A TYPICAL WEEK, HOW MANY TIMES DO YOU TALK ON THE PHONE WITH FAMILY, FRIENDS, OR NEIGHBORS?: MORE THAN THREE TIMES A WEEK
HOW OFTEN DO YOU GET TOGETHER WITH FRIENDS OR RELATIVES?: MORE THAN THREE TIMES A WEEK
HOW HARD IS IT FOR YOU TO PAY FOR THE VERY BASICS LIKE FOOD, HOUSING, MEDICAL CARE, AND HEATING?: NOT HARD AT ALL
DO YOU BELONG TO ANY CLUBS OR ORGANIZATIONS SUCH AS CHURCH GROUPS UNIONS, FRATERNAL OR ATHLETIC GROUPS, OR SCHOOL GROUPS?: NO
WITHIN THE LAST YEAR, HAVE YOU BEEN AFRAID OF YOUR PARTNER OR EX-PARTNER?: NO
WITHIN THE LAST YEAR, HAVE YOU BEEN HUMILIATED OR EMOTIONALLY ABUSED IN OTHER WAYS BY YOUR PARTNER OR EX-PARTNER?: NO
HOW OFTEN DO YOU ATTENT MEETINGS OF THE CLUB OR ORGANIZATION YOU BELONG TO?: NEVER
WITHIN THE LAST YEAR, HAVE TO BEEN RAPED OR FORCED TO HAVE ANY KIND OF SEXUAL ACTIVITY BY YOUR PARTNER OR EX-PARTNER?: NO
HOW OFTEN DO YOU ATTEND CHURCH OR RELIGIOUS SERVICES?: MORE THAN 4 TIMES PER YEAR

## 2023-08-10 PROBLEM — D68.9 COAGULATION DEFECT (HCC): Status: ACTIVE | Noted: 2023-08-10

## 2023-08-16 ENCOUNTER — CLINICAL DOCUMENTATION (OUTPATIENT)
Age: 83
End: 2023-08-16

## 2023-08-23 ENCOUNTER — TELEPHONE (OUTPATIENT)
Age: 83
End: 2023-08-23

## 2023-09-01 ENCOUNTER — TELEPHONE (OUTPATIENT)
Age: 83
End: 2023-09-01

## 2023-09-05 ENCOUNTER — OFFICE VISIT (OUTPATIENT)
Age: 83
End: 2023-09-05
Payer: MEDICARE

## 2023-09-05 VITALS
HEIGHT: 69 IN | OXYGEN SATURATION: 96 % | SYSTOLIC BLOOD PRESSURE: 119 MMHG | WEIGHT: 190 LBS | HEART RATE: 67 BPM | TEMPERATURE: 97.4 F | DIASTOLIC BLOOD PRESSURE: 68 MMHG | RESPIRATION RATE: 20 BRPM | BODY MASS INDEX: 28.14 KG/M2

## 2023-09-05 DIAGNOSIS — K40.90 RIGHT INGUINAL HERNIA: ICD-10-CM

## 2023-09-05 DIAGNOSIS — K42.9 UMBILICAL HERNIA WITHOUT OBSTRUCTION AND WITHOUT GANGRENE: Primary | ICD-10-CM

## 2023-09-05 PROCEDURE — G8427 DOCREV CUR MEDS BY ELIG CLIN: HCPCS | Performed by: SURGERY

## 2023-09-05 PROCEDURE — 1123F ACP DISCUSS/DSCN MKR DOCD: CPT | Performed by: SURGERY

## 2023-09-05 PROCEDURE — 1036F TOBACCO NON-USER: CPT | Performed by: SURGERY

## 2023-09-05 PROCEDURE — 99212 OFFICE O/P EST SF 10 MIN: CPT | Performed by: SURGERY

## 2023-09-05 PROCEDURE — G8417 CALC BMI ABV UP PARAM F/U: HCPCS | Performed by: SURGERY

## 2023-09-05 RX ORDER — ASPIRIN/CALCIUM/MAG/ALUMINUM 325 MG
325 TABLET ORAL EVERY 6 HOURS PRN
COMMUNITY

## 2023-09-05 ASSESSMENT — PATIENT HEALTH QUESTIONNAIRE - PHQ9
2. FEELING DOWN, DEPRESSED OR HOPELESS: 0
SUM OF ALL RESPONSES TO PHQ QUESTIONS 1-9: 0
SUM OF ALL RESPONSES TO PHQ QUESTIONS 1-9: 0
1. LITTLE INTEREST OR PLEASURE IN DOING THINGS: 0
SUM OF ALL RESPONSES TO PHQ QUESTIONS 1-9: 0
SUM OF ALL RESPONSES TO PHQ QUESTIONS 1-9: 0
SUM OF ALL RESPONSES TO PHQ9 QUESTIONS 1 & 2: 0

## 2023-09-05 NOTE — PROGRESS NOTES
Subjective:   9/5/23: Patient wants to discuss surgery however he has not seen a cardiologist since his last visit. 5/30/23:   Max Lees is a 82 y.o. male who presents for evaluation of umbilical hernia. Its the size of a golf ball. He has had it for 3-4 years. Its causing some pain and discomfort. It gets worse with activity. Tolerating a diet. Having bowel function. He also reports pain in his right groin. He reports this has been ongoing for a couple of months. No bulges. It felt swollen. He had a CABG and a pacemaker. Cardiologist is Dr. Moises Fan. He is not on any blood thinners. Last A1c was in the 6s but was over 6 months ago. Quit smoking at age 42.      Past Medical History:   Diagnosis Date    Abnormal nuclear cardiac imaging test 5/8/2019    Anemia 6/5/2017    Atrial fibrillation (HCC)     CKD (chronic kidney disease) 5/8/2019    Coronary atherosclerosis of native coronary artery     Diabetes mellitus, type II (HCC)     IYER (dyspnea on exertion) 5/8/2019    Glaucoma     HTN (hypertension)     Mixed hyperlipidemia     Other dyspnea and respiratory abnormality     Pacemaker     S/P ablation of atrial fibrillation 8/4/2016     Past Surgical History:   Procedure Laterality Date    ABLATION OF DYSRHYTHMIC FOCUS      CARDIAC CATHETERIZATION  11/21/2019    Left     CATARACT REMOVAL Bilateral 2018    CORONARY ANGIOPLASTY WITH STENT PLACEMENT      CORONARY ARTERY BYPASS GRAFT  2001    INS NEW/RPLCMT PRM PM W/TRANSV ELTRD ATRIAL&VENT N/A 3/29/2019    INSERT PPM DUAL performed by Young aDniels MD at [facility] CARDIAC CATH LAB    PTCA      [address] N/A 3/29/2019    LOOP RECORDER REMOVAL performed by Young Daniels MD at [facility] CARDIAC CATH LAB    UPPER GI ENDOSCOPY,BIOPSY  7/26/2019          Social History     Socioeconomic History    Marital status:      Spouse name: None    Number of children: None    Years of education: None    Highest education level:

## 2023-09-19 ENCOUNTER — TELEPHONE (OUTPATIENT)
Age: 83
End: 2023-09-19

## 2023-09-19 NOTE — TELEPHONE ENCOUNTER
Patient called to see if we received clearance from [facility] office

## 2023-09-19 NOTE — TELEPHONE ENCOUNTER
Patient called to see if we received clearance from [facility] office    [phone]

## 2023-09-29 ENCOUNTER — TELEPHONE (OUTPATIENT)
Age: 83
End: 2023-09-29

## 2023-09-29 NOTE — TELEPHONE ENCOUNTER
Patient states they received a call, calling back, informed patient there was no documentation of a call but I would leave a message for nurse to call back if necessary    [phone]

## 2023-09-29 NOTE — TELEPHONE ENCOUNTER
I spoke with  patient and told him I didn't see where anyone had called him I told the patient that I would check with the nurse when she comes in on Tuesday and I will have her call him if need.

## 2023-10-12 NOTE — LETTER
3/30/2022    Patient: Allison Ward   YOB: 1940   Date of Visit: 3/30/2022     Reagan Tam NP  [facility]  Via In Basket    Dear Reagan Tam NP,      Thank you for referring Mr. Lavelle Babin to [address] [place] [address] for evaluation. My notes for this consultation are attached. If you have questions, please do not hesitate to call me. I look forward to following your patient along with you.       Sincerely,    Isela Byrd MD 205

## 2023-11-01 ENCOUNTER — OFFICE VISIT (OUTPATIENT)
Age: 83
End: 2023-11-01
Payer: MEDICARE

## 2023-11-01 VITALS
BODY MASS INDEX: 29.92 KG/M2 | DIASTOLIC BLOOD PRESSURE: 60 MMHG | TEMPERATURE: 97.3 F | OXYGEN SATURATION: 99 % | WEIGHT: 202 LBS | SYSTOLIC BLOOD PRESSURE: 112 MMHG | HEIGHT: 69 IN | HEART RATE: 61 BPM

## 2023-11-01 DIAGNOSIS — I10 ESSENTIAL (PRIMARY) HYPERTENSION: ICD-10-CM

## 2023-11-01 DIAGNOSIS — N18.31 TYPE 2 DIABETES MELLITUS WITH STAGE 3A CHRONIC KIDNEY DISEASE, WITHOUT LONG-TERM CURRENT USE OF INSULIN (HCC): Primary | ICD-10-CM

## 2023-11-01 DIAGNOSIS — E11.22 TYPE 2 DIABETES MELLITUS WITH STAGE 3A CHRONIC KIDNEY DISEASE, WITHOUT LONG-TERM CURRENT USE OF INSULIN (HCC): Primary | ICD-10-CM

## 2023-11-01 DIAGNOSIS — E78.2 MIXED HYPERLIPIDEMIA: ICD-10-CM

## 2023-11-01 DIAGNOSIS — Z23 NEEDS FLU SHOT: ICD-10-CM

## 2023-11-01 DIAGNOSIS — Z23 ENCOUNTER FOR IMMUNIZATION: ICD-10-CM

## 2023-11-01 PROBLEM — M54.31 SCIATICA, RIGHT SIDE: Status: ACTIVE | Noted: 2022-02-23

## 2023-11-01 PROBLEM — M51.36 DEGENERATION, INTERVERTEBRAL DISC, LUMBAR: Status: ACTIVE | Noted: 2022-02-23

## 2023-11-01 PROBLEM — Z95.0 CARDIAC PACEMAKER IN SITU: Status: ACTIVE | Noted: 2022-06-21

## 2023-11-01 PROBLEM — I44.1 MOBITZ TYPE II ATRIOVENTRICULAR BLOCK: Status: ACTIVE | Noted: 2022-09-27

## 2023-11-01 PROBLEM — R00.0 TACHYCARDIA: Status: ACTIVE | Noted: 2022-09-27

## 2023-11-01 PROCEDURE — 36415 COLL VENOUS BLD VENIPUNCTURE: CPT | Performed by: NURSE PRACTITIONER

## 2023-11-01 PROCEDURE — 1123F ACP DISCUSS/DSCN MKR DOCD: CPT | Performed by: NURSE PRACTITIONER

## 2023-11-01 PROCEDURE — 3078F DIAST BP <80 MM HG: CPT | Performed by: NURSE PRACTITIONER

## 2023-11-01 PROCEDURE — G0009 ADMIN PNEUMOCOCCAL VACCINE: HCPCS | Performed by: NURSE PRACTITIONER

## 2023-11-01 PROCEDURE — 1036F TOBACCO NON-USER: CPT | Performed by: NURSE PRACTITIONER

## 2023-11-01 PROCEDURE — G8427 DOCREV CUR MEDS BY ELIG CLIN: HCPCS | Performed by: NURSE PRACTITIONER

## 2023-11-01 PROCEDURE — 99214 OFFICE O/P EST MOD 30 MIN: CPT | Performed by: NURSE PRACTITIONER

## 2023-11-01 PROCEDURE — 3074F SYST BP LT 130 MM HG: CPT | Performed by: NURSE PRACTITIONER

## 2023-11-01 PROCEDURE — 90677 PCV20 VACCINE IM: CPT | Performed by: NURSE PRACTITIONER

## 2023-11-01 PROCEDURE — 90694 VACC AIIV4 NO PRSRV 0.5ML IM: CPT | Performed by: NURSE PRACTITIONER

## 2023-11-01 PROCEDURE — G8484 FLU IMMUNIZE NO ADMIN: HCPCS | Performed by: NURSE PRACTITIONER

## 2023-11-01 PROCEDURE — G8417 CALC BMI ABV UP PARAM F/U: HCPCS | Performed by: NURSE PRACTITIONER

## 2023-11-01 PROCEDURE — G0008 ADMIN INFLUENZA VIRUS VAC: HCPCS | Performed by: NURSE PRACTITIONER

## 2023-11-01 SDOH — ECONOMIC STABILITY: INCOME INSECURITY: HOW HARD IS IT FOR YOU TO PAY FOR THE VERY BASICS LIKE FOOD, HOUSING, MEDICAL CARE, AND HEATING?: NOT HARD AT ALL

## 2023-11-01 SDOH — ECONOMIC STABILITY: FOOD INSECURITY: WITHIN THE PAST 12 MONTHS, YOU WORRIED THAT YOUR FOOD WOULD RUN OUT BEFORE YOU GOT MONEY TO BUY MORE.: NEVER TRUE

## 2023-11-01 SDOH — ECONOMIC STABILITY: FOOD INSECURITY: WITHIN THE PAST 12 MONTHS, THE FOOD YOU BOUGHT JUST DIDN'T LAST AND YOU DIDN'T HAVE MONEY TO GET MORE.: NEVER TRUE

## 2023-11-01 ASSESSMENT — PATIENT HEALTH QUESTIONNAIRE - PHQ9
SUM OF ALL RESPONSES TO PHQ QUESTIONS 1-9: 0
2. FEELING DOWN, DEPRESSED OR HOPELESS: 0
SUM OF ALL RESPONSES TO PHQ QUESTIONS 1-9: 0
1. LITTLE INTEREST OR PLEASURE IN DOING THINGS: 0
SUM OF ALL RESPONSES TO PHQ9 QUESTIONS 1 & 2: 0

## 2023-11-01 NOTE — PROGRESS NOTES
Subjective:     Dot Mitchell is a 82 y.o. male who presents today with the following:  Chief Complaint   Patient presents with    Diabetes    Cholesterol Problem    Other     Patient would like lab work today along with updating vaccines   States he has not taken any of his prescribed medications in about a year        Patient Active Problem List   Diagnosis    Age-related cataract of both eyes    IYER (dyspnea on exertion)    Bilateral carotid artery stenosis    Acute metabolic encephalopathy    S/P ablation of atrial fibrillation    Atrial fibrillation (HCC)    SSS (sick sinus syndrome) ([address])    Coronary artery disease involving coronary bypass graft of native heart without angina pectoris    Hyperlipidemia    Anemia    S/P cardiac cath    Rapid atrial fibrillation (HCC)    Abnormal nuclear cardiac imaging test    Mixed hyperlipidemia    Postsurgical aortocoronary bypass status    Atherosclerosis of native coronary artery of native heart with angina pectoris ([address])    History of GI bleed    Vasculogenic erectile dysfunction    Type 2 diabetes mellitus with stage 3b chronic kidney disease ([address])    Right inguinal hernia    Umbilical hernia    Coagulation defect ([address])    Cardiac pacemaker in situ    Degeneration, intervertebral disc, lumbar    Essential hypertension    Mobitz type II atrioventricular block    Sciatica, right side    Tachycardia         COMPLIANT WITH MEDICATION:   HTN; Denies chest pain, dyspnea, palpitations, headache and blurred vision. Blood pressure normotensive. Mr. Gonzales Check endorses he takes no medications. Diabetes.   Sugars controlled moderately  Hypoglycemia: none  Tolerating current treatment well  Current medications include none and diet controlled    No results found for: \"HBA1C\", \"GLU\", \"GESTF\", \"GLUCPOC\", \"MICROALBUMIN\", \"MCA2\", \"MCAU\", \"LDL\", \"LDLC\", \"TONY\", \"CREAPOC\", \"CREA\"  No results found for: \"MCA2\", \"MCAU\", \"MCAU2\"    Last eye exam performed  greather than 1 year 10/ ago and
Help Needed   Shopping for personal items (toiletries/medicines) No Help Needed   Shopping for groceries No Help Needed   Driving No Help Needed   Climbing a flight of stairs No Help Needed   Getting to places beyond walking distances No Help Needed           11/1/2023    11:00 AM   AMB Abuse Screening   Do you ever feel afraid of your partner? N   Are you in a relationship with someone who physically or mentally threatens you? N   Is it safe for you to go home?  Y       Advance Care Planning     The patient has appointed the following active healthcare agents:    Primary Decision Maker: Tabby Ibarra - Spouse - [phone]

## 2023-11-02 LAB
ALBUMIN SERPL-MCNC: 4 G/DL (ref 3.5–5)
ALBUMIN/GLOB SERPL: 1.2 (ref 1.1–2.2)
ALP SERPL-CCNC: 74 U/L (ref 45–117)
ALT SERPL-CCNC: 20 U/L (ref 12–78)
ANION GAP SERPL CALC-SCNC: 2 MMOL/L (ref 5–15)
AST SERPL-CCNC: 15 U/L (ref 15–37)
BASOPHILS # BLD: 0 K/UL (ref 0–0.1)
BASOPHILS NFR BLD: 1 % (ref 0–1)
BILIRUB SERPL-MCNC: 0.6 MG/DL (ref 0.2–1)
BUN SERPL-MCNC: 30 MG/DL (ref 6–20)
BUN/CREAT SERPL: 18 (ref 12–20)
CALCIUM SERPL-MCNC: 9 MG/DL (ref 8.5–10.1)
CHLORIDE SERPL-SCNC: 110 MMOL/L (ref 97–108)
CHOLEST SERPL-MCNC: 148 MG/DL
CO2 SERPL-SCNC: 28 MMOL/L (ref 21–32)
COMMENT:: NORMAL
CREAT SERPL-MCNC: 1.71 MG/DL (ref 0.7–1.3)
DIFFERENTIAL METHOD BLD: ABNORMAL
EOSINOPHIL # BLD: 0.4 K/UL (ref 0–0.4)
EOSINOPHIL NFR BLD: 5 % (ref 0–7)
ERYTHROCYTE [DISTWIDTH] IN BLOOD BY AUTOMATED COUNT: 13.5 % (ref 11.5–14.5)
EST. AVERAGE GLUCOSE BLD GHB EST-MCNC: 111 MG/DL
GLOBULIN SER CALC-MCNC: 3.4 G/DL (ref 2–4)
GLUCOSE SERPL-MCNC: 91 MG/DL (ref 65–100)
HBA1C MFR BLD: 5.5 % (ref 4–5.6)
HCT VFR BLD AUTO: 37.8 % (ref 36.6–50.3)
HDLC SERPL-MCNC: 58 MG/DL
HDLC SERPL: 2.6 (ref 0–5)
HGB BLD-MCNC: 11.9 G/DL (ref 12.1–17)
IMM GRANULOCYTES # BLD AUTO: 0 K/UL (ref 0–0.04)
IMM GRANULOCYTES NFR BLD AUTO: 0 % (ref 0–0.5)
LDLC SERPL CALC-MCNC: 72 MG/DL (ref 0–100)
LYMPHOCYTES # BLD: 1.3 K/UL (ref 0.8–3.5)
LYMPHOCYTES NFR BLD: 20 % (ref 12–49)
MCH RBC QN AUTO: 31.2 PG (ref 26–34)
MCHC RBC AUTO-ENTMCNC: 31.5 G/DL (ref 30–36.5)
MCV RBC AUTO: 99 FL (ref 80–99)
MONOCYTES # BLD: 0.6 K/UL (ref 0–1)
MONOCYTES NFR BLD: 9 % (ref 5–13)
NEUTS SEG # BLD: 4.4 K/UL (ref 1.8–8)
NEUTS SEG NFR BLD: 65 % (ref 32–75)
NRBC # BLD: 0 K/UL (ref 0–0.01)
NRBC BLD-RTO: 0 PER 100 WBC
PLATELET # BLD AUTO: 176 K/UL (ref 150–400)
PMV BLD AUTO: 11.1 FL (ref 8.9–12.9)
POTASSIUM SERPL-SCNC: 4.3 MMOL/L (ref 3.5–5.1)
PROT SERPL-MCNC: 7.4 G/DL (ref 6.4–8.2)
RBC # BLD AUTO: 3.82 M/UL (ref 4.1–5.7)
SODIUM SERPL-SCNC: 140 MMOL/L (ref 136–145)
SPECIMEN HOLD: NORMAL
TRIGL SERPL-MCNC: 90 MG/DL
VLDLC SERPL CALC-MCNC: 18 MG/DL
WBC # BLD AUTO: 6.7 K/UL (ref 4.1–11.1)

## 2023-11-07 ENCOUNTER — TELEPHONE (OUTPATIENT)
Age: 83
End: 2023-11-07

## 2023-11-07 NOTE — TELEPHONE ENCOUNTER
Patient called in regards to testicles is swollen has surgery scheduled, requesting to speak to nurse    [phone]

## 2023-11-08 ENCOUNTER — TELEPHONE (OUTPATIENT)
Age: 83
End: 2023-11-08

## 2023-11-08 NOTE — TELEPHONE ENCOUNTER
Pt returned call, informed the swelling could be coming from hernia. Pt to continue with surgery. Pt to await time.

## 2023-11-09 ENCOUNTER — TELEPHONE (OUTPATIENT)
Age: 83
End: 2023-11-09

## 2023-11-09 NOTE — TELEPHONE ENCOUNTER
Lizzie Rooney from [facility] called in regards to antibiotics not on order sheet need clarification on antibiotics    Please fax  [phone]

## 2023-11-13 ENCOUNTER — TELEPHONE (OUTPATIENT)
Age: 83
End: 2023-11-13

## 2023-11-13 NOTE — TELEPHONE ENCOUNTER
Rose calling from [facility] calling in regard to the patient. Patient wanted to reschedule his surgery, patient wasn't aware of the driving restrictions. Please reach out to the patient to reschedule.

## 2023-11-15 ENCOUNTER — PREP FOR PROCEDURE (OUTPATIENT)
Age: 83
End: 2023-11-15

## 2023-11-15 DIAGNOSIS — K42.9 UMBILICAL HERNIA WITHOUT OBSTRUCTION AND WITHOUT GANGRENE: ICD-10-CM

## 2023-12-13 ENCOUNTER — TELEPHONE (OUTPATIENT)
Age: 83
End: 2023-12-13

## 2023-12-13 NOTE — TELEPHONE ENCOUNTER
Called to schedule fu consult for umbilical hernia, mailbox full unable to leave message, needs to have fu prior to scheduling surgery per md

## 2023-12-14 RX ORDER — LISINOPRIL 2.5 MG/1
2.5 TABLET ORAL DAILY
Qty: 30 TABLET | Refills: 0 | Status: SHIPPED | OUTPATIENT
Start: 2023-12-14

## 2023-12-14 NOTE — TELEPHONE ENCOUNTER
Called to schedule fu consult for umbilical hernia again, mailbox still full unable to leave message, needs to have fu prior to scheduling surgery per md

## 2023-12-14 NOTE — TELEPHONE ENCOUNTER
Medication Refill Request    Manjinder Rice is requesting a refill of the following medication(s):     lisinopril (PRINIVIL;ZESTRIL) 2.5 MG tablet     metoprolol tartrate (LOPRESSOR) 25 MG tablet     Please send refill to:     [facility]/pharmacy [address] [place] [address] [phone] - F [phone]  [address] [address]  Phone: [phone] Fax: [phone]

## 2024-01-02 ENCOUNTER — TELEPHONE (OUTPATIENT)
Age: 84
End: 2024-01-02

## 2024-01-02 NOTE — TELEPHONE ENCOUNTER
Hema tested COVID + and had headache, coughing, and stopped up. Wants to know what he can take OTC. Please advise.

## 2024-01-03 ENCOUNTER — TELEPHONE (OUTPATIENT)
Age: 84
End: 2024-01-03

## 2024-01-03 NOTE — TELEPHONE ENCOUNTER
Pt son, Zaki called to schedule an appointment with Suzanne, referred by  Teena Montes, APRN - NP       Please call: [phone]

## 2024-01-04 NOTE — TELEPHONE ENCOUNTER
JOIE on his sons phone to give us a call back about possibly setting up a virtual visit with Hema WALL.

## 2024-01-05 ENCOUNTER — HOSPITAL ENCOUNTER (EMERGENCY)
Facility: HOSPITAL | Age: 84
Discharge: HOME | End: 2024-01-05
Attending: STUDENT IN AN ORGANIZED HEALTH CARE EDUCATION/TRAINING PROGRAM
Payer: MEDICARE

## 2024-01-05 ENCOUNTER — APPOINTMENT (OUTPATIENT)
Facility: HOSPITAL | Age: 84
End: 2024-01-05
Payer: MEDICARE

## 2024-01-05 VITALS
SYSTOLIC BLOOD PRESSURE: 111 MMHG | DIASTOLIC BLOOD PRESSURE: 62 MMHG | OXYGEN SATURATION: 98 % | HEART RATE: 60 BPM | TEMPERATURE: 97.5 F

## 2024-01-05 DIAGNOSIS — R10.9 ABDOMINAL PAIN, UNSPECIFIED ABDOMINAL LOCATION: ICD-10-CM

## 2024-01-05 DIAGNOSIS — K40.90 INGUINAL HERNIA, RIGHT: Primary | ICD-10-CM

## 2024-01-05 LAB
ALBUMIN SERPL-MCNC: 3.2 G/DL (ref 3.5–5)
ALBUMIN/GLOB SERPL: 0.8 (ref 1.1–2.2)
ALP SERPL-CCNC: 70 U/L (ref 45–117)
ALT SERPL-CCNC: 21 U/L (ref 12–78)
ANION GAP SERPL CALC-SCNC: 1 MMOL/L (ref 5–15)
AST SERPL-CCNC: 22 U/L (ref 15–37)
BASOPHILS # BLD: 0 K/UL (ref 0–0.1)
BASOPHILS NFR BLD: 1 % (ref 0–1)
BILIRUB SERPL-MCNC: 0.6 MG/DL (ref 0.2–1)
BUN SERPL-MCNC: 40 MG/DL (ref 6–20)
BUN/CREAT SERPL: 22 (ref 12–20)
CALCIUM SERPL-MCNC: 9.3 MG/DL (ref 8.5–10.1)
CHLORIDE SERPL-SCNC: 109 MMOL/L (ref 97–108)
CO2 SERPL-SCNC: 27 MMOL/L (ref 21–32)
CREAT SERPL-MCNC: 1.85 MG/DL (ref 0.7–1.3)
DIFFERENTIAL METHOD BLD: ABNORMAL
EOSINOPHIL # BLD: 0.2 K/UL (ref 0–0.4)
EOSINOPHIL NFR BLD: 3 % (ref 0–7)
ERYTHROCYTE [DISTWIDTH] IN BLOOD BY AUTOMATED COUNT: 14 % (ref 11.5–14.5)
GLOBULIN SER CALC-MCNC: 3.9 G/DL (ref 2–4)
GLUCOSE SERPL-MCNC: 122 MG/DL (ref 65–100)
HCT VFR BLD AUTO: 32.6 % (ref 36.6–50.3)
HGB BLD-MCNC: 10.8 G/DL (ref 12.1–17)
IMM GRANULOCYTES # BLD AUTO: 0.1 K/UL (ref 0–0.04)
IMM GRANULOCYTES NFR BLD AUTO: 1 % (ref 0–0.5)
LACTATE BLD-SCNC: <0.4 MMOL/L (ref 0.4–2)
LIPASE SERPL-CCNC: 52 U/L (ref 13–75)
LYMPHOCYTES # BLD: 1.2 K/UL (ref 0.8–3.5)
LYMPHOCYTES NFR BLD: 21 % (ref 12–49)
MCH RBC QN AUTO: 31.3 PG (ref 26–34)
MCHC RBC AUTO-ENTMCNC: 33.1 G/DL (ref 30–36.5)
MCV RBC AUTO: 94.5 FL (ref 80–99)
MONOCYTES # BLD: 0.4 K/UL (ref 0–1)
MONOCYTES NFR BLD: 7 % (ref 5–13)
NEUTS SEG # BLD: 3.8 K/UL (ref 1.8–8)
NEUTS SEG NFR BLD: 67 % (ref 32–75)
NRBC # BLD: 0 K/UL (ref 0–0.01)
NRBC BLD-RTO: 0 PER 100 WBC
PLATELET # BLD AUTO: 275 K/UL (ref 150–400)
PMV BLD AUTO: 10.3 FL (ref 8.9–12.9)
POTASSIUM SERPL-SCNC: 5.2 MMOL/L (ref 3.5–5.1)
PROT SERPL-MCNC: 7.1 G/DL (ref 6.4–8.2)
RBC # BLD AUTO: 3.45 M/UL (ref 4.1–5.7)
SODIUM SERPL-SCNC: 137 MMOL/L (ref 136–145)
WBC # BLD AUTO: 5.6 K/UL (ref 4.1–11.1)

## 2024-01-05 PROCEDURE — 2580000003 HC RX 258: Performed by: STUDENT IN AN ORGANIZED HEALTH CARE EDUCATION/TRAINING PROGRAM

## 2024-01-05 PROCEDURE — 99285 EMERGENCY DEPT VISIT HI MDM: CPT

## 2024-01-05 PROCEDURE — 6360000004 HC RX CONTRAST MEDICATION: Performed by: STUDENT IN AN ORGANIZED HEALTH CARE EDUCATION/TRAINING PROGRAM

## 2024-01-05 PROCEDURE — 83605 ASSAY OF LACTIC ACID: CPT

## 2024-01-05 PROCEDURE — 96374 THER/PROPH/DIAG INJ IV PUSH: CPT

## 2024-01-05 PROCEDURE — 85025 COMPLETE CBC W/AUTO DIFF WBC: CPT

## 2024-01-05 PROCEDURE — 36415 COLL VENOUS BLD VENIPUNCTURE: CPT

## 2024-01-05 PROCEDURE — 6360000002 HC RX W HCPCS: Performed by: STUDENT IN AN ORGANIZED HEALTH CARE EDUCATION/TRAINING PROGRAM

## 2024-01-05 PROCEDURE — 83690 ASSAY OF LIPASE: CPT

## 2024-01-05 PROCEDURE — 80053 COMPREHEN METABOLIC PANEL: CPT

## 2024-01-05 PROCEDURE — 74177 CT ABD & PELVIS W/CONTRAST: CPT

## 2024-01-05 RX ORDER — 0.9 % SODIUM CHLORIDE 0.9 %
1000 INTRAVENOUS SOLUTION INTRAVENOUS ONCE
Status: COMPLETED | OUTPATIENT
Start: 2024-01-05 | End: 2024-01-05

## 2024-01-05 RX ORDER — FENTANYL CITRATE 50 UG/ML
50 INJECTION, SOLUTION INTRAMUSCULAR; INTRAVENOUS
Status: COMPLETED | OUTPATIENT
Start: 2024-01-05 | End: 2024-01-05

## 2024-01-05 RX ADMIN — SODIUM CHLORIDE 1000 ML: 9 INJECTION, SOLUTION INTRAVENOUS at 13:09

## 2024-01-05 RX ADMIN — FENTANYL CITRATE 50 MCG: 50 INJECTION INTRAMUSCULAR; INTRAVENOUS at 13:23

## 2024-01-05 RX ADMIN — IOPAMIDOL 100 ML: 755 INJECTION, SOLUTION INTRAVENOUS at 12:52

## 2024-01-05 NOTE — ED NOTES
DC info reviewed with patient, all questions answered. Patient well-appearing at time of discharge and vital signs stable. Ambulatory out of ED at this time.

## 2024-01-05 NOTE — ED PROVIDER NOTES
[facility] EMERGENCY DEPT  EMERGENCY DEPARTMENT ENCOUNTER       Pt Name: Hema Pritchett  MRN: [id number]  Birthdate 1940  Date of evaluation: 1/5/2024  Provider: Amadeo Chowdary MD   PCP: Teena Montes APRN - FERMÍN  Note Started: 12:37 PM EST 1/5/24     CHIEF COMPLAINT       Chief Complaint   Patient presents with    Abdominal Pain     Driving when he had a sudden sharp pain in the upper mid abd, pain subsided and he drove himself here, history of abd hernia        HISTORY OF PRESENT ILLNESS: 1 or more elements      History From: Patient  HPI Limitations: None     Hema Pritchett is a 83 y.o. male who presents abdominal pain that started earlier today.  He reports he has an umbilical hernia and 2 inguinal hernias.  He reports the pain was severe this morning.  He states the pain is somewhat improved but not resolved.  He states his right inguinal hernia is more uncomfortable and full than normal.  He denies nausea, vomiting, diarrhea.  Denies fever, chills.  Does have a history of A-fib, sick sinus syndrome, hyperlipidemia, CAD status post stents, type 2 diabetes, CKD.         Nursing Notes were all reviewed and agreed with or any disagreements were addressed in the HPI.     REVIEW OF SYSTEMS      Review of Systems     Positives and Pertinent negatives as per HPI.    PAST HISTORY     Past Medical History:  Past Medical History:   Diagnosis Date    Abnormal nuclear cardiac imaging test 5/8/2019    Anemia 6/5/2017    Atrial fibrillation (HCC)     CKD (chronic kidney disease) 5/8/2019    Coagulation defect (HCC) 08/10/2023    Coronary atherosclerosis of native coronary artery     COVID 2020    Diabetes mellitus, type II (HCC)     IYER (dyspnea on exertion) 5/8/2019    Essential hypertension 06/21/2022    Glaucoma     HTN (hypertension)     Mixed hyperlipidemia     Mobitz type II atrioventricular block     Other dyspnea and respiratory abnormality     Pacemaker     S/P ablation of atrial fibrillation 8/4/2016

## 2024-01-10 ENCOUNTER — HOSPITAL ENCOUNTER (EMERGENCY)
Facility: HOSPITAL | Age: 84
Discharge: LEFT BEFORE DISCHARGE | End: 2024-01-10
Attending: EMERGENCY MEDICINE
Payer: MEDICARE

## 2024-01-10 ENCOUNTER — APPOINTMENT (OUTPATIENT)
Facility: HOSPITAL | Age: 84
End: 2024-01-10
Payer: MEDICARE

## 2024-01-10 VITALS
HEART RATE: 65 BPM | BODY MASS INDEX: 21.67 KG/M2 | HEIGHT: 72 IN | RESPIRATION RATE: 18 BRPM | TEMPERATURE: 97.7 F | SYSTOLIC BLOOD PRESSURE: 140 MMHG | DIASTOLIC BLOOD PRESSURE: 69 MMHG | WEIGHT: 160 LBS | OXYGEN SATURATION: 98 %

## 2024-01-10 DIAGNOSIS — S09.90XA INJURY OF HEAD, INITIAL ENCOUNTER: Primary | ICD-10-CM
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
## 2024-01-10 PROCEDURE — 99282 EMERGENCY DEPT VISIT SF MDM: CPT

## 2024-01-10 ASSESSMENT — LIFESTYLE VARIABLES
HOW OFTEN DO YOU HAVE A DRINK CONTAINING ALCOHOL: NEVER
HOW MANY STANDARD DRINKS CONTAINING ALCOHOL DO YOU HAVE ON A TYPICAL DAY: PATIENT DOES NOT DRINK

## 2024-01-10 ASSESSMENT — PAIN - FUNCTIONAL ASSESSMENT: PAIN_FUNCTIONAL_ASSESSMENT: 0-10

## 2024-01-10 NOTE — ED NOTES
MD spoke with patient about risks associated with leaving AMA. Patient verbalized understanding but still wished to sign out AMA.

## 2024-01-10 NOTE — ED TRIAGE NOTES
Pt reports a mechanical fall yesterday did not hot head. Was trying to sit on a stool and misjudged. Reports that he injured his back.  Today he slipped off of a stool and reports that he hit head, no LOC, not on thinners. Ambulatory a/ox4.

## 2024-01-11 NOTE — ED PROVIDER NOTES
disregard these errors.  Additionally, please excuse any errors that have escaped final proofreading.          Jey Archer MD  01/11/24 [id number]

## 2024-01-23 ENCOUNTER — TELEPHONE (OUTPATIENT)
Age: 84
End: 2024-01-23

## 2024-01-23 NOTE — TELEPHONE ENCOUNTER
Malissa wanted to touch base with Teena to see if she had any concerns about Hema. Malissa states they have a  working with him. He is driving and seems to be doing ok. (There is a signed consent for communication from DSS scanned into his chart.)

## 2024-01-24 ENCOUNTER — TELEPHONE (OUTPATIENT)
Age: 84
End: 2024-01-24

## 2024-01-24 NOTE — TELEPHONE ENCOUNTER
Malissa from [facility] is calling back to see if Teena could give her a call back to discuss if there was anything they should be concerned about from his last OV. Call back [phone]

## 2024-03-01 ENCOUNTER — TELEPHONE (OUTPATIENT)
Age: 84
End: 2024-03-01

## 2024-03-01 NOTE — TELEPHONE ENCOUNTER
Called patient to inform his upcoming appt with Dr Muniz has been cancelled. LM requesting a call back to reschedule.

## 2024-03-04 NOTE — PROGRESS NOTES
Procedure consent completed with provider.    Transition of Care Plan:     RUR:   12% \"low risk\"  Disposition: SNF-[facility]  Follow up appointments: Has no PCP  DME needed: None  Transportation at [address] or means to access home:   No     IM Medicare Letter: Needed at discharge  Is patient a BCPI-A Bundle: No                 If yes, was Bundle Letter given?:  N/A  Is patient a  and connected with the [address] [place] [address] Road  If yes, was [facility] transfer form completed and [facility] notified? N/A  [address] [place] [address], [phone]  Discharge Caregiver contacted prior to discharge? Patient needs to be restraint & sitter free for 24hr's prior to SNF admission. CM will continue to follow.     Asia Koo  Ext [phone]

## 2024-03-25 ENCOUNTER — HOSPITAL ENCOUNTER (INPATIENT)
Facility: HOSPITAL | Age: 84
LOS: 9 days | Discharge: NURSING, REHAB OR LONG-TERM CARE | DRG: 948 | End: 2024-04-05
Attending: EMERGENCY MEDICINE | Admitting: INTERNAL MEDICINE
Payer: MEDICARE

## 2024-03-25 ENCOUNTER — APPOINTMENT (OUTPATIENT)
Facility: HOSPITAL | Age: 84
DRG: 948 | End: 2024-03-25
Payer: MEDICARE

## 2024-03-25 DIAGNOSIS — S02.2XXA CLOSED FRACTURE OF NASAL BONE, INITIAL ENCOUNTER: Primary | ICD-10-CM

## 2024-03-25 DIAGNOSIS — I48.0 PAROXYSMAL ATRIAL FIBRILLATION (HCC): ICD-10-CM

## 2024-03-25 DIAGNOSIS — R06.09 DOE (DYSPNEA ON EXERTION): ICD-10-CM

## 2024-03-25 LAB
ALBUMIN SERPL-MCNC: 3.3 G/DL (ref 3.5–5)
ALBUMIN/GLOB SERPL: 0.9 (ref 1.1–2.2)
ALP SERPL-CCNC: 122 U/L (ref 45–117)
ALT SERPL-CCNC: 37 U/L (ref 12–78)
ANION GAP SERPL CALC-SCNC: 11 MMOL/L (ref 5–15)
AST SERPL-CCNC: 45 U/L (ref 15–37)
BASOPHILS # BLD: 0.1 K/UL (ref 0–0.1)
BASOPHILS NFR BLD: 1 % (ref 0–1)
BILIRUB SERPL-MCNC: 1.5 MG/DL (ref 0.2–1)
BUN SERPL-MCNC: 43 MG/DL (ref 6–20)
BUN/CREAT SERPL: 28 (ref 12–20)
CALCIUM SERPL-MCNC: 8.9 MG/DL (ref 8.5–10.1)
CHLORIDE SERPL-SCNC: 106 MMOL/L (ref 97–108)
CO2 SERPL-SCNC: 24 MMOL/L (ref 21–32)
CREAT SERPL-MCNC: 1.56 MG/DL (ref 0.7–1.3)
DIFFERENTIAL METHOD BLD: ABNORMAL
EKG ATRIAL RATE: 416 BPM
EKG DIAGNOSIS: NORMAL
EKG Q-T INTERVAL: 438 MS
EKG QRS DURATION: 94 MS
EKG QTC CALCULATION (BAZETT): 465 MS
EKG R AXIS: 57 DEGREES
EKG T AXIS: 53 DEGREES
EKG VENTRICULAR RATE: 68 BPM
EOSINOPHIL # BLD: 0 K/UL (ref 0–0.4)
EOSINOPHIL NFR BLD: 0 % (ref 0–7)
ERYTHROCYTE [DISTWIDTH] IN BLOOD BY AUTOMATED COUNT: 13.5 % (ref 11.5–14.5)
ETHANOL SERPL-MCNC: <10 MG/DL (ref 0–0.08)
GLOBULIN SER CALC-MCNC: 3.6 G/DL (ref 2–4)
GLUCOSE SERPL-MCNC: 109 MG/DL (ref 65–100)
HCT VFR BLD AUTO: 33.6 % (ref 36.6–50.3)
HGB BLD-MCNC: 10.7 G/DL (ref 12.1–17)
IMM GRANULOCYTES # BLD AUTO: 0.1 K/UL (ref 0–0.04)
IMM GRANULOCYTES NFR BLD AUTO: 1 % (ref 0–0.5)
LYMPHOCYTES # BLD: 0.5 K/UL (ref 0.8–3.5)
LYMPHOCYTES NFR BLD: 6 % (ref 12–49)
MAGNESIUM SERPL-MCNC: 2.4 MG/DL (ref 1.6–2.4)
MCH RBC QN AUTO: 30.7 PG (ref 26–34)
MCHC RBC AUTO-ENTMCNC: 31.8 G/DL (ref 30–36.5)
MCV RBC AUTO: 96.6 FL (ref 80–99)
MONOCYTES # BLD: 0.7 K/UL (ref 0–1)
MONOCYTES NFR BLD: 9 % (ref 5–13)
NEUTS SEG # BLD: 6.2 K/UL (ref 1.8–8)
NEUTS SEG NFR BLD: 83 % (ref 32–75)
NRBC # BLD: 0 K/UL (ref 0–0.01)
NRBC BLD-RTO: 0 PER 100 WBC
PLATELET # BLD AUTO: 168 K/UL (ref 150–400)
PMV BLD AUTO: 9.9 FL (ref 8.9–12.9)
POTASSIUM SERPL-SCNC: 4.6 MMOL/L (ref 3.5–5.1)
PROT SERPL-MCNC: 6.9 G/DL (ref 6.4–8.2)
RBC # BLD AUTO: 3.48 M/UL (ref 4.1–5.7)
RBC MORPH BLD: ABNORMAL
SODIUM SERPL-SCNC: 141 MMOL/L (ref 136–145)
WBC # BLD AUTO: 7.6 K/UL (ref 4.1–11.1)

## 2024-03-25 PROCEDURE — 80053 COMPREHEN METABOLIC PANEL: CPT

## 2024-03-25 PROCEDURE — G0378 HOSPITAL OBSERVATION PER HR: HCPCS

## 2024-03-25 PROCEDURE — 85025 COMPLETE CBC W/AUTO DIFF WBC: CPT

## 2024-03-25 PROCEDURE — 82077 ASSAY SPEC XCP UR&BREATH IA: CPT

## 2024-03-25 PROCEDURE — 70450 CT HEAD/BRAIN W/O DYE: CPT

## 2024-03-25 PROCEDURE — 6360000002 HC RX W HCPCS: Performed by: EMERGENCY MEDICINE

## 2024-03-25 PROCEDURE — 90714 TD VACC NO PRESV 7 YRS+ IM: CPT | Performed by: EMERGENCY MEDICINE

## 2024-03-25 PROCEDURE — 93005 ELECTROCARDIOGRAM TRACING: CPT | Performed by: EMERGENCY MEDICINE

## 2024-03-25 PROCEDURE — 72125 CT NECK SPINE W/O DYE: CPT

## 2024-03-25 PROCEDURE — 36415 COLL VENOUS BLD VENIPUNCTURE: CPT

## 2024-03-25 PROCEDURE — 83735 ASSAY OF MAGNESIUM: CPT

## 2024-03-25 PROCEDURE — 90471 IMMUNIZATION ADMIN: CPT | Performed by: EMERGENCY MEDICINE

## 2024-03-25 PROCEDURE — 99285 EMERGENCY DEPT VISIT HI MDM: CPT

## 2024-03-25 PROCEDURE — 70486 CT MAXILLOFACIAL W/O DYE: CPT

## 2024-03-25 RX ORDER — TETANUS AND DIPHTHERIA TOXOIDS ADSORBED 2; 2 [LF]/.5ML; [LF]/.5ML
0.5 INJECTION INTRAMUSCULAR ONCE
Status: COMPLETED | OUTPATIENT
Start: 2024-03-25 | End: 2024-03-25

## 2024-03-25 RX ORDER — CEPHALEXIN 500 MG/1
500 CAPSULE ORAL 4 TIMES DAILY
Qty: 28 CAPSULE | Refills: 0 | Status: SHIPPED | OUTPATIENT
Start: 2024-03-25 | End: 2024-04-01

## 2024-03-25 RX ADMIN — TETANUS AND DIPHTHERIA TOXOIDS ADSORBED 0.5 ML: 2; 2 INJECTION INTRAMUSCULAR at 14:14

## 2024-03-25 ASSESSMENT — LIFESTYLE VARIABLES
HOW MANY STANDARD DRINKS CONTAINING ALCOHOL DO YOU HAVE ON A TYPICAL DAY: PATIENT DECLINED
HOW OFTEN DO YOU HAVE A DRINK CONTAINING ALCOHOL: PATIENT DECLINED

## 2024-03-25 ASSESSMENT — PAIN - FUNCTIONAL ASSESSMENT: PAIN_FUNCTIONAL_ASSESSMENT: NONE - DENIES PAIN

## 2024-03-25 NOTE — ED NOTES
Patient educated on medications to be administered. Verified  Allergies. Pain medication administered as ordered.

## 2024-03-25 NOTE — ED NOTES
[facility] to [facility] this patient has been having an increase in calls to the  office for aggression possibly due to dementia. Patient came out of room asking for someone to check on his mother that she was at home by herself and needed care. [facility] lives at home by self, both his mother and wife are . Patient was also unaware of where he is currently, and when trying to explain the antibiotic he was going to be taking he was confused and replied with \" do we know where she is coming from?\".

## 2024-03-25 NOTE — ED PROVIDER NOTES
[facility] EMERGENCY DEP  EMERGENCY DEPARTMENT ENCOUNTER      Patient Name: Hema Pritchett  MRN: [id number]  Birthdate 1940  Date of Evaluation: 3/25/2024  Physician: Benito Sutton MD    CHIEF COMPLAINT       Chief Complaint   Patient presents with    Fall    Facial Injury     Hematoma, abrasion per EMS,        HISTORY OF PRESENT ILLNESS   (Location/Symptom, Timing/Onset, Context/Setting, Quality, Duration, Modifying Factors, Severity)   Hema Pritchett, 84 y.o., male     84-year-old male with a history of dementia presents with a chief complaint of a fall.  Patient reportedly fell off of a porch this morning.  It is not clear whether he lost consciousness.          Nursing Notes were reviewed.    REVIEW OF SYSTEMS    (Not required)   Review of Systems    Except as noted above the remainder of the review of systems was reviewed and negative.     PAST MEDICAL HISTORY     Past Medical History:   Diagnosis Date    Abnormal nuclear cardiac imaging test 5/8/2019    Anemia 6/5/2017    Atrial fibrillation (HCC)     CKD (chronic kidney disease) 5/8/2019    Coagulation defect (HCC) 08/10/2023    Coronary atherosclerosis of native coronary artery     COVID 2020    Diabetes mellitus, type II (HCC)     IYER (dyspnea on exertion) 5/8/2019    Essential hypertension 06/21/2022    Glaucoma     HTN (hypertension)     Mixed hyperlipidemia     Mobitz type II atrioventricular block     Other dyspnea and respiratory abnormality     Pacemaker     S/P ablation of atrial fibrillation 8/4/2016    Tachycardia        SURGICAL HISTORY       Past Surgical History:   Procedure Laterality Date    ABLATION OF DYSRHYTHMIC FOCUS      CARDIAC CATHETERIZATION  11/21/2019    Left     CATARACT REMOVAL Bilateral 2018    CORONARY ANGIOPLASTY WITH STENT PLACEMENT      CORONARY ARTERY BYPASS GRAFT  2001    INS NEW/RPLCMT PRM PM W/TRANSV ELTRD ATRIAL&VENT N/A 3/29/2019    INSERT PPM DUAL performed by Carter Ray MD at [facility] CARDIAC CATH LAB    PTCA

## 2024-03-25 NOTE — ED NOTES
Attempting to find transport home. Patient does not know any phone numbers to call of friends for transport.

## 2024-03-26 ENCOUNTER — APPOINTMENT (OUTPATIENT)
Facility: HOSPITAL | Age: 84
DRG: 948 | End: 2024-03-26
Payer: MEDICARE

## 2024-03-26 PROBLEM — F03.90 DEMENTIA (HCC): Status: ACTIVE | Noted: 2024-03-26

## 2024-03-26 LAB
ANION GAP SERPL CALC-SCNC: 13 MMOL/L (ref 5–15)
BASOPHILS # BLD: 0 K/UL (ref 0–0.1)
BASOPHILS NFR BLD: 0 % (ref 0–1)
BUN SERPL-MCNC: 48 MG/DL (ref 6–20)
BUN/CREAT SERPL: 28 (ref 12–20)
CALCIUM SERPL-MCNC: 8.5 MG/DL (ref 8.5–10.1)
CHLORIDE SERPL-SCNC: 106 MMOL/L (ref 97–108)
CO2 SERPL-SCNC: 22 MMOL/L (ref 21–32)
CREAT SERPL-MCNC: 1.73 MG/DL (ref 0.7–1.3)
DIFFERENTIAL METHOD BLD: ABNORMAL
EOSINOPHIL # BLD: 0.1 K/UL (ref 0–0.4)
EOSINOPHIL NFR BLD: 1 % (ref 0–7)
ERYTHROCYTE [DISTWIDTH] IN BLOOD BY AUTOMATED COUNT: 13.5 % (ref 11.5–14.5)
GLUCOSE SERPL-MCNC: 102 MG/DL (ref 65–100)
HCT VFR BLD AUTO: 30.3 % (ref 36.6–50.3)
HGB BLD-MCNC: 9.8 G/DL (ref 12.1–17)
IMM GRANULOCYTES # BLD AUTO: 0.1 K/UL (ref 0–0.04)
IMM GRANULOCYTES NFR BLD AUTO: 1 % (ref 0–0.5)
LYMPHOCYTES # BLD: 0.6 K/UL (ref 0.8–3.5)
LYMPHOCYTES NFR BLD: 10 % (ref 12–49)
MCH RBC QN AUTO: 31.5 PG (ref 26–34)
MCHC RBC AUTO-ENTMCNC: 32.3 G/DL (ref 30–36.5)
MCV RBC AUTO: 97.4 FL (ref 80–99)
MONOCYTES # BLD: 0.7 K/UL (ref 0–1)
MONOCYTES NFR BLD: 11 % (ref 5–13)
NEUTS SEG # BLD: 4.9 K/UL (ref 1.8–8)
NEUTS SEG NFR BLD: 77 % (ref 32–75)
NRBC # BLD: 0 K/UL (ref 0–0.01)
NRBC BLD-RTO: 0 PER 100 WBC
PLATELET # BLD AUTO: 186 K/UL (ref 150–400)
PMV BLD AUTO: 10.1 FL (ref 8.9–12.9)
POTASSIUM SERPL-SCNC: 4.3 MMOL/L (ref 3.5–5.1)
RBC # BLD AUTO: 3.11 M/UL (ref 4.1–5.7)
SODIUM SERPL-SCNC: 141 MMOL/L (ref 136–145)
WBC # BLD AUTO: 6.4 K/UL (ref 4.1–11.1)

## 2024-03-26 PROCEDURE — 6370000000 HC RX 637 (ALT 250 FOR IP): Performed by: STUDENT IN AN ORGANIZED HEALTH CARE EDUCATION/TRAINING PROGRAM

## 2024-03-26 PROCEDURE — 36415 COLL VENOUS BLD VENIPUNCTURE: CPT

## 2024-03-26 PROCEDURE — 6360000002 HC RX W HCPCS: Performed by: STUDENT IN AN ORGANIZED HEALTH CARE EDUCATION/TRAINING PROGRAM

## 2024-03-26 PROCEDURE — 80048 BASIC METABOLIC PNL TOTAL CA: CPT

## 2024-03-26 PROCEDURE — 85025 COMPLETE CBC W/AUTO DIFF WBC: CPT

## 2024-03-26 PROCEDURE — 2580000003 HC RX 258: Performed by: STUDENT IN AN ORGANIZED HEALTH CARE EDUCATION/TRAINING PROGRAM

## 2024-03-26 PROCEDURE — G0378 HOSPITAL OBSERVATION PER HR: HCPCS

## 2024-03-26 PROCEDURE — 94760 N-INVAS EAR/PLS OXIMETRY 1: CPT

## 2024-03-26 PROCEDURE — 96372 THER/PROPH/DIAG INJ SC/IM: CPT

## 2024-03-26 RX ORDER — POTASSIUM CHLORIDE 750 MG/1
40 TABLET, FILM COATED, EXTENDED RELEASE ORAL PRN
Status: DISCONTINUED | OUTPATIENT
Start: 2024-03-26 | End: 2024-04-01

## 2024-03-26 RX ORDER — ACETAMINOPHEN 650 MG/1
650 SUPPOSITORY RECTAL EVERY 6 HOURS PRN
Status: DISCONTINUED | OUTPATIENT
Start: 2024-03-26 | End: 2024-04-05 | Stop reason: HOSPADM

## 2024-03-26 RX ORDER — POTASSIUM CHLORIDE 7.45 MG/ML
10 INJECTION INTRAVENOUS PRN
Status: DISCONTINUED | OUTPATIENT
Start: 2024-03-26 | End: 2024-04-01

## 2024-03-26 RX ORDER — ACETAMINOPHEN 500 MG
1000 TABLET ORAL EVERY 6 HOURS PRN
Status: DISCONTINUED | OUTPATIENT
Start: 2024-03-26 | End: 2024-04-05 | Stop reason: HOSPADM

## 2024-03-26 RX ORDER — SODIUM CHLORIDE 9 MG/ML
INJECTION, SOLUTION INTRAVENOUS PRN
Status: DISCONTINUED | OUTPATIENT
Start: 2024-03-26 | End: 2024-04-05 | Stop reason: HOSPADM

## 2024-03-26 RX ORDER — MAGNESIUM SULFATE IN WATER 40 MG/ML
2000 INJECTION, SOLUTION INTRAVENOUS PRN
Status: DISCONTINUED | OUTPATIENT
Start: 2024-03-26 | End: 2024-04-01

## 2024-03-26 RX ORDER — SODIUM CHLORIDE 0.9 % (FLUSH) 0.9 %
5-40 SYRINGE (ML) INJECTION EVERY 12 HOURS SCHEDULED
Status: DISCONTINUED | OUTPATIENT
Start: 2024-03-26 | End: 2024-04-05 | Stop reason: HOSPADM

## 2024-03-26 RX ORDER — ENOXAPARIN SODIUM 100 MG/ML
40 INJECTION SUBCUTANEOUS DAILY
Status: DISCONTINUED | OUTPATIENT
Start: 2024-03-26 | End: 2024-04-05 | Stop reason: HOSPADM

## 2024-03-26 RX ORDER — SODIUM CHLORIDE 0.9 % (FLUSH) 0.9 %
5-40 SYRINGE (ML) INJECTION PRN
Status: DISCONTINUED | OUTPATIENT
Start: 2024-03-26 | End: 2024-04-05 | Stop reason: HOSPADM

## 2024-03-26 RX ORDER — LISINOPRIL 5 MG/1
2.5 TABLET ORAL DAILY
Status: DISCONTINUED | OUTPATIENT
Start: 2024-03-26 | End: 2024-03-29

## 2024-03-26 RX ORDER — LATANOPROST 50 UG/ML
1 SOLUTION/ DROPS OPHTHALMIC NIGHTLY
Status: DISCONTINUED | OUTPATIENT
Start: 2024-03-26 | End: 2024-04-05 | Stop reason: HOSPADM

## 2024-03-26 RX ORDER — OXYCODONE HYDROCHLORIDE 10 MG/1
10 TABLET ORAL EVERY 4 HOURS PRN
Status: DISCONTINUED | OUTPATIENT
Start: 2024-03-26 | End: 2024-03-27

## 2024-03-26 RX ORDER — PROCHLORPERAZINE EDISYLATE 5 MG/ML
10 INJECTION INTRAMUSCULAR; INTRAVENOUS EVERY 6 HOURS PRN
Status: DISCONTINUED | OUTPATIENT
Start: 2024-03-26 | End: 2024-04-01

## 2024-03-26 RX ORDER — RANOLAZINE 500 MG/1
500 TABLET, EXTENDED RELEASE ORAL 2 TIMES DAILY
Status: DISCONTINUED | OUTPATIENT
Start: 2024-03-26 | End: 2024-04-05 | Stop reason: HOSPADM

## 2024-03-26 RX ORDER — POLYETHYLENE GLYCOL 3350 17 G/17G
17 POWDER, FOR SOLUTION ORAL DAILY PRN
Status: DISCONTINUED | OUTPATIENT
Start: 2024-03-26 | End: 2024-04-05 | Stop reason: HOSPADM

## 2024-03-26 RX ORDER — OXYCODONE HYDROCHLORIDE 5 MG/1
5 TABLET ORAL EVERY 4 HOURS PRN
Status: DISCONTINUED | OUTPATIENT
Start: 2024-03-26 | End: 2024-03-27

## 2024-03-26 RX ADMIN — WATER 5 MG: 1 INJECTION INTRAMUSCULAR; INTRAVENOUS; SUBCUTANEOUS at 15:00

## 2024-03-26 RX ADMIN — ENOXAPARIN SODIUM 40 MG: 100 INJECTION SUBCUTANEOUS at 08:31

## 2024-03-26 RX ADMIN — RANOLAZINE 500 MG: 500 TABLET, FILM COATED, EXTENDED RELEASE ORAL at 21:15

## 2024-03-26 RX ADMIN — RANOLAZINE 500 MG: 500 TABLET, FILM COATED, EXTENDED RELEASE ORAL at 08:31

## 2024-03-26 RX ADMIN — LISINOPRIL 2.5 MG: 5 TABLET ORAL at 08:31

## 2024-03-26 RX ADMIN — OXYCODONE HYDROCHLORIDE 10 MG: 10 TABLET ORAL at 21:14

## 2024-03-26 RX ADMIN — METOPROLOL TARTRATE 25 MG: 25 TABLET, FILM COATED ORAL at 08:31

## 2024-03-26 ASSESSMENT — PAIN SCALES - WONG BAKER: WONGBAKER_NUMERICALRESPONSE: NO HURT

## 2024-03-26 ASSESSMENT — PAIN DESCRIPTION - ORIENTATION
ORIENTATION: POSTERIOR
ORIENTATION: POSTERIOR;LOWER
ORIENTATION: ANTERIOR;POSTERIOR

## 2024-03-26 ASSESSMENT — PAIN DESCRIPTION - DESCRIPTORS
DESCRIPTORS: ACHING;SHARP
DESCRIPTORS: ACHING;SHARP

## 2024-03-26 ASSESSMENT — PAIN SCALES - PAIN ASSESSMENT IN ADVANCED DEMENTIA (PAINAD)
FACIALEXPRESSION: FACIAL GRIMACING
BODYLANGUAGE: RELAXED
TOTALSCORE: 5
CONSOLABILITY: UNABLE TO CONSOLE, DISTRACT OR REASSURE
NEGVOCALIZATION: OCCASIONAL MOAN/GROAN, LOW SPEECH, NEGATIVE/DISAPPROVING QUALITY
BREATHING: NORMAL

## 2024-03-26 ASSESSMENT — PAIN DESCRIPTION - LOCATION
LOCATION: BACK
LOCATION: BACK
LOCATION: GENERALIZED

## 2024-03-26 ASSESSMENT — PAIN DESCRIPTION - PAIN TYPE: TYPE: CHRONIC PAIN

## 2024-03-26 ASSESSMENT — PAIN SCALES - GENERAL
PAINLEVEL_OUTOF10: 0
PAINLEVEL_OUTOF10: 4
PAINLEVEL_OUTOF10: 5
PAINLEVEL_OUTOF10: 9
PAINLEVEL_OUTOF10: 7

## 2024-03-26 ASSESSMENT — PAIN DESCRIPTION - ONSET: ONSET: AWAKENED FROM SLEEP

## 2024-03-26 ASSESSMENT — PAIN DESCRIPTION - FREQUENCY: FREQUENCY: INTERMITTENT

## 2024-03-26 NOTE — ED NOTES
Patient is sitting in a chair leaned forward and refusing to move to the bed or to the recliner. Multiple attempts were made to get patient to a position to keep patient from falling. Patient was given a bag of chips and a drink to try to distract him.

## 2024-03-26 NOTE — H&P
V2.0  History and Physical      Name:  Hema Pritchett /Age/Sex: 1940  (84 y.o. male)   MRN & CSN:  [id number] & [id number] Encounter Date/Time: 3/26/24/ 6:24 AM EDT   Location:  / PCP: Teena Montes APRN - NP       Hospital Day: 2    Assessment and Plan:   Hema Pritchett is a 84 y.o. male with a pmh as below who presents with Fracture of nasal bones, initial encounter for closed fracture    Hospital Problems             Last Modified POA    * (Principal) Fracture of nasal bones, initial encounter for closed fracture 3/26/2024 Yes    Dementia (HCC) 3/26/2024 Yes    S/P ablation of atrial fibrillation 3/26/2024 Yes    Hyperlipidemia 3/26/2024 Yes    Anemia 3/26/2024 Yes    Essential hypertension 3/26/2024 Yes       Plan:  Fracture of nasal bones, initial encounter for closed fracture  Unwitnessed fall at home.   CT head wo contrast: no acute intracranial abnormality  CT Maxillofacial: acute bilateral nasal bone fractures.   Without nasal airway obstruction  PRN pain control  Follow up with ENT    Dementia (HCC)  Severely demented and lives alone  Sundowning in the ED to where he is now unsafe for discharge  Case management consult    S/P ablation of atrial fibrillation  Continue metoprolol     Essential hypertension  Continue home medications    Hyperlipidemia  Continue home medication    Anemia  Stable at baseline  No evidence of hemorrhage      Disposition:   Current Living situation: Home  Expected Disposition: NH  Estimated D/C: 1 D    Diet ADULT DIET; Regular; Low Fat/Low Chol/High Fiber/DANIELLE   DVT Prophylaxis [x] Lovenox, []  Heparin, [] SCDs, [] Ambulation,  [] Eliquis, [] Xarelto, [] Coumadin   Code Status Full Code     Personally reviewed Lab Studies and Imaging     Discussed management of the case with ED provider    Drugs that require monitoring for toxicity include lovenox and the method of monitoring was labs and physical exam      History from:     Ed provider    History of Present     CHOL 148 11/01/2023 11:59 AM    HDL 58 11/01/2023 11:59 AM    TRIG 90 11/01/2023 11:59 AM     Hemoglobin A1C:   Lab Results   Component Value Date/Time    LABA1C 5.5 11/01/2023 11:59 AM     TSH:   Lab Results   Component Value Date/Time    TSH 0.64 11/26/2021 06:18 PM     Troponin: No results found for: \"TROPONINT\"  Lactic Acid: No results for input(s): \"LACTA\" in the last 72 hours.  BNP: No results for input(s): \"PROBNP\" in the last 72 hours.  UA:No results found for: \"NITRU\", \"COLORU\", \"PHUR\", \"LABCAST\", \"WBCUA\", \"RBCUA\", \"MUCUS\", \"TRICHOMONAS\", \"YEAST\", \"BACTERIA\", \"CLARITYU\", \"SPECGRAV\", \"LEUKOCYTESUR\", \"UROBILINOGEN\", \"BILIRUBINUR\", \"BLOODU\", \"GLUCOSEU\", \"KETUA\", \"AMORPHOUS\"  Urine Cultures: No results found for: \"LABURIN\"  Blood Cultures: No results found for: \"BC\"  No results found for: \"BLOODCULT2\"  Organism: No results found for: \"ORG\"    Imaging/Diagnostics Last 24 Hours     CT HEAD WO CONTRAST   Final Result   No acute intracranial abnormality.         CT CERVICAL SPINE WO CONTRAST   Final Result   No acute fracture nor subluxation.      CT MAXILLOFACIAL WO CONTRAST   Final Result   Acute bilateral nasal bone fractures. Left frontal scalp hematoma..          CT CERVICAL SPINE WO CONTRAST    Result Date: 3/25/2024  EXAM:  CT CERVICAL SPINE WITHOUT CONTRAST INDICATION: fall. Reason for exam:->fall COMPARISON: None. CONTRAST:  None. TECHNIQUE: Multislice helical CT of the cervical spine was performed without intravenous contrast administration.  Sagittal and coronal reformats were generated.  CT dose reduction was achieved through use of a standardized protocol tailored for this examination and automatic exposure control for dose modulation. FINDINGS: The alignment is within normal limits. There is no fracture or compression deformity. The odontoid process is intact. The craniocervical junction is within normal limits. Degenerative changes and disc disease. Atherosclerosis.     No acute fracture nor

## 2024-03-26 NOTE — CARE COORDINATION
Transition of Care Plan:    RUR: n/a obs   Prior Level of Functioning:   Disposition:   If SNF or IPR: Date FOC offered:   Date FOC received:   Accepting facility:   Date authorization started with reference number:   Date authorization received and expires:   Follow up appointments:   DME needed:   Transportation at discharge:   IM/IMM Medicare/ letter given:   Is patient a  and connected with VA?    If yes, was [place] transfer form completed and VA notified?   Caregiver Contact:   Discharge Caregiver contacted prior to discharge?   Care Conference needed?   Barriers to discharge:       842: Spoke with primary nurse. States patient here d/t not being able to find a ride home. Provided me with a list of transportation.  has resources of transportation but the question is if the patient can pay for it. Driving Ms. Morales provides free transportation but accepts donations which staff usually provide. There is a CM consult for \"nursing home placement\". Per Dr. Lynn's note patient/family do NOT want placement at this time. Mr. Pritchett is well known to CM. Patient owns two homes and is a hoarder. Non compliant. When his wife was here he refused home o2 for her. Wife had wounds and maggots in her leg. APS was following patient/spouse in the past. CM has NOT see patient yet. Will have to talk to MD, patient and or family to discuss discharge planning.     0906: Attempted to talk to patient but he is sleeping soundly. Will try again letter. Likely after IDR so CM can have more clear understanding of discharge plan.       1414: Spoke with staff in IDR rounds regarding Mr. Pritchett. Apparently he has been increasingly confused. He is currently sitting at the nurses station for close monitoring. Per PCP's last note, patient's wife has passed away and he has bee more confused since her passing. Called patient's son Chavez Pritchett. No answer. Left a message. This is the ONLY contact on his emergency list. Called  Malissa [facility] [phone].  She asked if he was competent to make decisions. Told her at this point no. Told her per staff he is confused. At the nurses station right now. She only has number for Chavez Pritchett. Has NO other number. Unable to move forward with anything until we hear from family. Malissa said can't open APS until dcd from hospital. Malissa asked me to keep her posted. Will do that as I hear more.       1645: Spoke with son. He lives in [place]. Going to be here tomorrow 3/27 around noon. Told him his father is NOT safe to discharge home right now. Will talk more with him tomorrow.

## 2024-03-26 NOTE — ED NOTES
Patient was wandering in the garcia, trying to \"fix\" the EKG machines. Patient walked back to him room, attempted to reorient patient and assisted into .

## 2024-03-26 NOTE — PROGRESS NOTES
Code AURORA called at 1500 d/t pt being combative w/ nursing staff. Pt was sedated/medicated to calm him down and is not appropriate for a skilled OT evaluation at this time. Will continue to follow tomorrow and proceed as able and appropriate w/ OT eval and tx.     David Sweet, OTR/L

## 2024-03-26 NOTE — ED NOTES
Nursing supervisor called because patient is wandering, confused and \"looking for tools to fix his bike\". Patient had a pocket knife open on the floor, knife given to security while patient is in ER. Patient walked back to his room and TV on for patient.

## 2024-03-26 NOTE — PROGRESS NOTES
Hospitalist Progress    Called to evaluate patient for social admission for Dementia  Pt is followed by Dr. Tran in [place]    He fell at home with abrasion to nose and forehead    He is scheduled for see Neuro for evaluation of Dementia about July 1  His son worked with PCP to set this appt back in December  (first available)    His son Zaki Pritchett lives in [place]  Pt stayed with him for several weeks in Dec / [place]  He returned home to [place] to get something but would not return    He is on no medications  He functions fairly well in his [place] community living in a camper  SS has followed him during the past year and did not recommend placement     Son knows the stairs into [facility] are a problem and is planning to replace with a ramp  Son does not feel his dad needs placement as he feels he is doing well in his limited environment  He will be coming to check on him this week and will again attempt to arrange for a paid care giver to check in on home daily or several days per week    He has not wandered or been lost.  He has not had any vehicle accidents. He has not been a arm to others.  Hospitalization will not correct the situation at this time.   Admission Inpatient Acute Care is not recommended

## 2024-03-26 NOTE — PROGRESS NOTES
Patient was admitted by the Nocturnist this morning for an unwitnessed fall and subsequently fracture of his nasal bones.  Upon arrival patient has had agitation, became aggressive with staff with psychosis.  Per family he lives at home alone, baseline mentation unknown.  CODE AURORA was called and he received 5mg of PRN Zyprexa.  Will place a Psych consult for additional recommendations.

## 2024-03-26 NOTE — PROGRESS NOTES
Patient transferred to room at this time for a duplex study on BLE. This nurse and another nurse in room to assist pt to bed; pt denies and verbally threats both nursing staff. Nursing supervisor in room to assist. Therapeutic communication provided to pt; unsuccessful. Pt attempted to strike both assisting nurse and nursing supervisor with minimal contact. MD in room to assist. Code AURORA called. Respiratory personnel in room to help while this nurse pulled PRN Zyprexa. PRN Zyprexa administered. After 5 minutes of monitoring, patient in recliner resting quietly. Code AURORA deescalated.

## 2024-03-26 NOTE — PROGRESS NOTES
Speech pathology note  Reviewed chart and note patient admitted with fall, and CT showed No acute intracranial abnormality. Note patient passed the nursing dysphagia screen and a regular diet was ordered. Discussed case with RN who reported good tolerance of diet. No SLP needs at this time, therefore SLP will sign off. Please re-consult if further needs arise. Thank you.    LUCIANO Davis Ed., CCC-SLP

## 2024-03-26 NOTE — ED NOTES
Admission SBAR Note  Situation/Background:     Pt arrived via EMS with c/oa fall with a facial injury. Pt with dementia.       Patient is being transferred to Med  Surg ([facility]), Room# 200    Patient's Chief Complaint was Fall with facial injuries and is admitted for Closed  fracture of nasal  bone.    CODE STATUS: Full  CSSRS: 0 - No Risk    ISOLATION/PRECAUTIONS: Yescontact  ISOLATION TYPE: Contact    Is this a behavioral health patient? No      All STAT orders are complete: Yes    The following personal items will be sent with the patient during transfer to the floor:     All valuables: listed in ER with patient, with patient at bedside       ASSESSMENT:    NEURO:     ORIENTATION LEVEL: ORIENTATION LEVEL: Other, dementia, confused  Cognition: impaired decision making and memory loss  Speech: shows no evidence of impairment    Is patient impulsive? Yes  Is patient oriented? No  Do they follow commands? No  Is the patient ambulatory? Yes    FALL RISK? Yes  Interventions: Implemented/recommended use of non-skid footwear    RESPIRATORY:   Is patient on oxygen? No  Oxygen therapy: room air      CARDIAC:   Is cardiac monitoring ordered? No      /GI:   Continent Bowel/Bladder? Yes?    INTEGUMENTARY:  IS THE PATIENT UNDRESSED? Yes  ARE THERE WOUNDS PRESENT? Yes, facial abrasions, scattered bruising to ue's, nasal fracture, skin tear left elbow  ARE THE WOUNDS DOCUMENTED? Yes    RESTRAINTS IN USE: Yes      Vital Signs:   / Level of Consciousness: Alert (0)    Vitals:    03/25/24 1547 03/25/24 1556 03/25/24 1601 03/25/24 1756   BP: (!) 170/146 137/63 127/70 (!) 123/45   Pulse: 65 72 74    Resp: 13 10 19 18   SpO2:    98%   Weight:       Height:              Pain 1: 0  Pain Scale 1: 0-10    REVIEW:    IP UNIT CALLED NOTE IS READY: Yes  IF THERE ARE QUESTIONS, CALL [facility] AT PHONE # [phone]

## 2024-03-26 NOTE — ASSESSMENT & PLAN NOTE
Unwitnessed fall at home.   CT head wo contrast: no acute intracranial abnormality  CT Maxillofacial: acute bilateral nasal bone fractures.   Without nasal airway obstruction  PRN pain control  Follow up with ENT

## 2024-03-26 NOTE — PROGRESS NOTES
Physical Therapy:    Orders received and chart reviewed. Code AURORA called at 1500 due to pt being combative w/ nursing staff. He has now been medicated to help calm him down and is not appropriate for a skilled PT assessment. Will continue to follow tomorrow and proceed as able and appropriate w/ full PT eval and tx. Thank you.     KULWINDER RYDERT

## 2024-03-26 NOTE — PROGRESS NOTES
1050 Bed alarm sounding,  alarm sounding. This nurse responded; found patient standing in room attempting to urinate on recliner. Attempted to reorient; unsuccessful. Assisted to bathroom to void. Voided on floor. Gown changed, BLE cleaned and socks applied. Patient brought to nurses station with  for closer monitoring.   1107 Resting quietly at nurses station.

## 2024-03-26 NOTE — PROGRESS NOTES
Patient presented to the [facility] unit via wheelchair. Patient was verbally abusive and refused to get out of wheelchair onto the bed. Patient allowed us to take a set of vitals but verbalized that they did not want to be touched anymore so patient was left alone. Patient began to fall asleep in wheelchair and several attempts were made to try to get patient to allow us to help them get into bed so that they would be more comfortable but patient kept refusing. After trying to persuade patient to get in bed for over 30 minutes patient did finally agree to get in bed but only would sit on the side of the bed after stating that they would lie down in bed. Patient began to nod off again and was concerned about patient falling into floor so we tried to persuade patient to lie down in bed but kept refusing. Security was called to help assist patient to bed without any success. However, patient did agree to sit in recliner and has the virtual  in place. Will continue to monitor.

## 2024-03-26 NOTE — ED PROVIDER NOTES
Multiple attempts made to contact patient's son over the past 3 hours have been unsuccessful.  His phone goes straight to voicemail.  Patient has become increasingly more confused and disoriented,?  Sundowning.  He has no idea where he is and has been wandering the halls trying to \"fix\" our EKG machine because he thinks it is his motorcycle.  At this point, I do not think it is safe for him to [place] himself.  I think he is a danger to himself.  Case discussed with Dr.Manuele acosta [facility] who will admit patient.     Tanika Hargrove MD  03/25/24 5311

## 2024-03-26 NOTE — ASSESSMENT & PLAN NOTE
Severely demented and lives alone  Sundowning in the ED to where he is now unsafe for discharge  Case management consult

## 2024-03-26 NOTE — PROGRESS NOTES
Spoke with Kemi TALBERT via telephone regarding Code AURORA. Discussed safety plan to include medicating patient per MD orders.

## 2024-03-26 NOTE — CARE COORDINATION
Care Management Initial Assessment       RUR:n/a obs   Readmission? No  1st IM letter given? 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 Unable to contact son)   Would you like for me to discuss the discharge plan with any other family members/significant others, and if so, who? Yes  (Son)   Financial Resources Medicare   Social/Functional History   Lives With Alone   Type of Home Trailer   Home Equipment None   Active  No   Patient's  Info Son? was driving few months ago per chart review   Discharge Planning   Type of Residence Long-Term Care;Skilled Nursing Facility;Trailer/Mobile Home   Living Arrangements Alone   Current Services Prior To Admission None   Patient expects to be discharged to: Other (comment)  (Unable to assess as patient confused)       Mr. Pritchtet was due to be discharged yesterday 3/25 but apparently was unable to find a ride home. Then, as time progressed he became more and more confused. That confusion is still present. He is currently at the nurses station. Not safe to go home at this point. Staff here attempted to call son (only emergency contact on file) as well as myself and NO one has been able to get in touch with him.

## 2024-03-27 ENCOUNTER — APPOINTMENT (OUTPATIENT)
Facility: HOSPITAL | Age: 84
DRG: 948 | End: 2024-03-27
Payer: MEDICARE

## 2024-03-27 PROBLEM — R29.6 FREQUENT FALLS: Status: ACTIVE | Noted: 2024-03-27

## 2024-03-27 LAB — ECHO BSA: 2.15 M2

## 2024-03-27 PROCEDURE — 93970 EXTREMITY STUDY: CPT

## 2024-03-27 PROCEDURE — 99222 1ST HOSP IP/OBS MODERATE 55: CPT | Performed by: PSYCHIATRY & NEUROLOGY

## 2024-03-27 PROCEDURE — 6360000002 HC RX W HCPCS: Performed by: PSYCHIATRY & NEUROLOGY

## 2024-03-27 PROCEDURE — 94760 N-INVAS EAR/PLS OXIMETRY 1: CPT

## 2024-03-27 PROCEDURE — 6360000002 HC RX W HCPCS: Performed by: STUDENT IN AN ORGANIZED HEALTH CARE EDUCATION/TRAINING PROGRAM

## 2024-03-27 PROCEDURE — 6370000000 HC RX 637 (ALT 250 FOR IP): Performed by: STUDENT IN AN ORGANIZED HEALTH CARE EDUCATION/TRAINING PROGRAM

## 2024-03-27 PROCEDURE — 1100000000 HC RM PRIVATE

## 2024-03-27 PROCEDURE — 2580000003 HC RX 258: Performed by: PSYCHIATRY & NEUROLOGY

## 2024-03-27 RX ORDER — OLANZAPINE 5 MG/1
5 TABLET, ORALLY DISINTEGRATING ORAL 2 TIMES DAILY PRN
Status: DISCONTINUED | OUTPATIENT
Start: 2024-03-27 | End: 2024-04-02

## 2024-03-27 RX ADMIN — LISINOPRIL 2.5 MG: 5 TABLET ORAL at 11:09

## 2024-03-27 RX ADMIN — RANOLAZINE 500 MG: 500 TABLET, FILM COATED, EXTENDED RELEASE ORAL at 23:53

## 2024-03-27 RX ADMIN — METOPROLOL TARTRATE 25 MG: 25 TABLET, FILM COATED ORAL at 11:09

## 2024-03-27 RX ADMIN — RANOLAZINE 500 MG: 500 TABLET, FILM COATED, EXTENDED RELEASE ORAL at 11:11

## 2024-03-27 RX ADMIN — ACETAMINOPHEN 1000 MG: 500 TABLET ORAL at 23:54

## 2024-03-27 RX ADMIN — WATER 5 MG: 1 INJECTION INTRAMUSCULAR; INTRAVENOUS; SUBCUTANEOUS at 15:53

## 2024-03-27 RX ADMIN — ENOXAPARIN SODIUM 40 MG: 100 INJECTION SUBCUTANEOUS at 11:24

## 2024-03-27 ASSESSMENT — PAIN DESCRIPTION - LOCATION: LOCATION: BACK

## 2024-03-27 ASSESSMENT — PAIN SCALES - WONG BAKER: WONGBAKER_NUMERICALRESPONSE: NO HURT

## 2024-03-27 ASSESSMENT — PAIN SCALES - GENERAL
PAINLEVEL_OUTOF10: 5
PAINLEVEL_OUTOF10: 0

## 2024-03-27 NOTE — PROGRESS NOTES
Physical Therapy:    Attempted evaluation again today but per RN pt is still medically sedated and not currently appropriate for skilled therapy assessment and/or intervention. Will try again tomorrow and proceed with full eval and tx as able and appropriate. Thank you.    KULWINDER RYDERT

## 2024-03-27 NOTE — PROGRESS NOTES
Hospitalist Progress Note    NAME:   Hema Pritchett   : 1940   MRN: [id number]     Date/Time: 3/27/2024 10:51 AM  Patient PCP: Teena Montes APRN - NP      Assessment / Plan:    Altered mental status with psychosis  History of Dementia (?)  -patient has had psychosis with confusion since admission, not on any medications outpatient   -per son this has been his \"baseline\" for several months  -CT scan of the head obtained was negative for acute findings  -unable to obtain an MRI due to agitation  -will continue prn Zyprexa and place a Psychiatry consult for additional recommendations    3.   Acute bilateral nasal bone fractures   -after an unwitnessed fall at home  -outpatient follow up with ENT    4.   Chronic atrial fibrillation  -rate controlled, continue metoprolol    5.   Essential hypertension  -continue outpatient regimen    6.   Hyperlipidemia  -continue outpatient regimen    7.   Anemia  -stable    Code Status: FULL  DVT Prophylaxis:  Lovenox      Subjective:     Chief Complaint / Reason for Physician Visit  This am patient seen resting comfortably in bed.       Objective:     VITALS:   Last 24hrs VS reviewed since prior progress note. Most recent are:  Patient Vitals for the past 24 hrs:   BP Temp Temp src Pulse Resp SpO2   24 0743 118/62 97.5 °F (36.4 °C) Oral 61 20 96 %   24 0030 -- 98.7 °F (37.1 °C) Tympanic 58 18 --   240 -- 98.6 °F (37 °C) Tympanic 64 20 --   244 -- 98.6 °F (37 °C) Tympanic 64 20 --   24 1945 -- 98.7 °F (37.1 °C) Tympanic 62 20 --   24 1733 117/63 98.2 °F (36.8 °C) Oral 60 20 100 %         Intake/Output Summary (Last 24 hours) at 3/27/2024 1051  Last data filed at 3/27/2024 0415  Gross per 24 hour   Intake 230 ml   Output 650 ml   Net -420 ml        I had a face to face encounter and independently examined this patient on 3/27/2024, as outlined below:  PHYSICAL EXAM:  General: Alert and in no acute cardiopulmonary distress

## 2024-03-27 NOTE — PLAN OF CARE
Problem: Safety - Adult  Goal: Free from fall injury  Outcome: Progressing     Problem: ABCDS Injury Assessment  Goal: Absence of physical injury  Outcome: Progressing     Problem: Chronic Conditions and Co-morbidities  Goal: Patient's chronic conditions and co-morbidity symptoms are monitored and maintained or improved  Outcome: Progressing     Problem: Skin/Tissue Integrity  Goal: Absence of new skin breakdown  Description: 1.  Monitor for areas of redness and/or skin breakdown  2.  Assess vascular access sites hourly  3.  Every 4-6 hours minimum:  Change oxygen saturation probe site  4.  Every 4-6 hours:  If on nasal continuous positive airway pressure, respiratory therapy assess nares and determine need for appliance change or resting period.  Outcome: Progressing

## 2024-03-27 NOTE — PLAN OF CARE
Problem: Safety - Adult  Goal: Free from fall injury  3/27/2024 1142 by Jennifer Feliciano, RN  Outcome: Progressing  3/27/2024 0503 by Ashlie Zimmerman, RN  Outcome: Progressing

## 2024-03-27 NOTE — CARE COORDINATION
Transition of Care Plan:    RUR: 19% MODERATE   Prior Level of Functioning: Able to manage ADL's as far as son was aware   Disposition: TBD. SNF/LTC vs AL?   If SNF or IPR: Date FOC offered:   Date FOC received:   Accepting facility:   Date authorization started with reference number:   Date authorization received and expires:   Follow up appointments:   DME needed:   Transportation at discharge:   IM/IMM Medicare/ letter given: 1st IMM obtained 3/27  Is patient a  and connected with VA?    If yes, was  transfer form completed and VA notified?   Caregiver Contact:   Discharge Caregiver contacted prior to discharge?   Care Conference needed?   Barriers to discharge: Mentation,       1300: Spoke with son Chavez Pritchett regarding patient's discharge plans. Told him with his current state of mentation he is NOT able to go home by himself. At this point requires 24/7 care. Told him options include long term care which costs around $9,000 a month or assisted living memory care which is around $7,000. Son isn't  close to his father (socially and location). He lives in [place].  Son states it was recommended he go to a facility in the past but at the time he was alert and oriented and refused to go. He is worried he is going to refuse again. Told him at this point, he does not have the mentation to make such decisions. Told him per MD today, psych is due to see him. Hopefully his mentation will improve. He was in observation status. Medicare Outpatient Observation Notice provided to Chavez Pritchett Oral explanation was provided and all questions answered. Signed document placed on the bedside chart to be scanned under the media tab. Copy provided to Chavez Pritchett Important Message from Medicare Letter provided to Chavez Pritchett. Oral explanation was provided and all questions answered. Signed document placed on the bedside chart to be scanned under the media tab. Copy provided to Chavez Pritchett. Now

## 2024-03-27 NOTE — PROGRESS NOTES
Patients vitals done, he is sleeping soundly at this time, held am medications for now, will continue to monitor.

## 2024-03-27 NOTE — PROGRESS NOTES
Code AURORA called at 1550 d/t pt being combative w/ nursing staff. Pt was sedated/medicated to calm him down and is not appropriate for a skilled OT evaluation at this time. Will continue to follow tomorrow and proceed as able and appropriate w/ OT eval and tx.

## 2024-03-27 NOTE — PROGRESS NOTES
Pt watch removed and placed in patient valuable envelope and sealed by FLETCHER Pérez RN. Placed in nursing supervisors safe with patient label. Bag # [id number]

## 2024-03-28 LAB
AMMONIA PLAS-SCNC: 17 UMOL/L
CREAT SERPL-MCNC: 1.67 MG/DL (ref 0.7–1.3)
FOLATE SERPL-MCNC: 14.1 NG/ML (ref 5–21)
TSH SERPL DL<=0.05 MIU/L-ACNC: 1.17 UIU/ML (ref 0.36–3.74)
VIT B12 SERPL-MCNC: 282 PG/ML (ref 193–986)

## 2024-03-28 PROCEDURE — 1100000000 HC RM PRIVATE

## 2024-03-28 PROCEDURE — 97165 OT EVAL LOW COMPLEX 30 MIN: CPT

## 2024-03-28 PROCEDURE — 97110 THERAPEUTIC EXERCISES: CPT

## 2024-03-28 PROCEDURE — 82746 ASSAY OF FOLIC ACID SERUM: CPT

## 2024-03-28 PROCEDURE — 94760 N-INVAS EAR/PLS OXIMETRY 1: CPT

## 2024-03-28 PROCEDURE — 6360000002 HC RX W HCPCS: Performed by: STUDENT IN AN ORGANIZED HEALTH CARE EDUCATION/TRAINING PROGRAM

## 2024-03-28 PROCEDURE — 6370000000 HC RX 637 (ALT 250 FOR IP): Performed by: PSYCHIATRY & NEUROLOGY

## 2024-03-28 PROCEDURE — 6370000000 HC RX 637 (ALT 250 FOR IP): Performed by: INTERNAL MEDICINE

## 2024-03-28 PROCEDURE — 82607 VITAMIN B-12: CPT

## 2024-03-28 PROCEDURE — 97161 PT EVAL LOW COMPLEX 20 MIN: CPT

## 2024-03-28 PROCEDURE — 36415 COLL VENOUS BLD VENIPUNCTURE: CPT

## 2024-03-28 PROCEDURE — 82565 ASSAY OF CREATININE: CPT

## 2024-03-28 PROCEDURE — 6370000000 HC RX 637 (ALT 250 FOR IP): Performed by: STUDENT IN AN ORGANIZED HEALTH CARE EDUCATION/TRAINING PROGRAM

## 2024-03-28 PROCEDURE — 84443 ASSAY THYROID STIM HORMONE: CPT

## 2024-03-28 PROCEDURE — 82140 ASSAY OF AMMONIA: CPT

## 2024-03-28 RX ORDER — FUROSEMIDE 20 MG/1
20 TABLET ORAL DAILY
Status: DISCONTINUED | OUTPATIENT
Start: 2024-03-28 | End: 2024-03-29

## 2024-03-28 RX ORDER — OLANZAPINE 2.5 MG/1
2.5 TABLET, FILM COATED ORAL 2 TIMES DAILY
Status: DISCONTINUED | OUTPATIENT
Start: 2024-03-28 | End: 2024-03-29

## 2024-03-28 RX ADMIN — OLANZAPINE 5 MG: 5 TABLET, ORALLY DISINTEGRATING ORAL at 00:36

## 2024-03-28 RX ADMIN — OLANZAPINE 5 MG: 5 TABLET, ORALLY DISINTEGRATING ORAL at 15:48

## 2024-03-28 RX ADMIN — ACETAMINOPHEN 1000 MG: 500 TABLET ORAL at 05:02

## 2024-03-28 RX ADMIN — FUROSEMIDE 20 MG: 20 TABLET ORAL at 18:01

## 2024-03-28 RX ADMIN — OLANZAPINE 2.5 MG: 2.5 TABLET, FILM COATED ORAL at 21:49

## 2024-03-28 RX ADMIN — METOPROLOL TARTRATE 25 MG: 25 TABLET, FILM COATED ORAL at 09:43

## 2024-03-28 RX ADMIN — OLANZAPINE 5 MG: 5 TABLET, ORALLY DISINTEGRATING ORAL at 11:15

## 2024-03-28 RX ADMIN — RANOLAZINE 500 MG: 500 TABLET, FILM COATED, EXTENDED RELEASE ORAL at 09:43

## 2024-03-28 RX ADMIN — RANOLAZINE 500 MG: 500 TABLET, FILM COATED, EXTENDED RELEASE ORAL at 21:49

## 2024-03-28 RX ADMIN — LISINOPRIL 2.5 MG: 5 TABLET ORAL at 09:42

## 2024-03-28 ASSESSMENT — PAIN DESCRIPTION - LOCATION: LOCATION: BACK

## 2024-03-28 ASSESSMENT — PAIN DESCRIPTION - ORIENTATION: ORIENTATION: LOWER

## 2024-03-28 ASSESSMENT — PAIN SCALES - GENERAL: PAINLEVEL_OUTOF10: 6

## 2024-03-28 NOTE — PROGRESS NOTES
Patient speaking loudly and hallucinating.  Jumping out of bed, agitated.  Medicated with Zyprexa p.o.  Patient's attention diverted to writing down friends number and information that he wants relayed to him.  Returned to bed, alarms on.

## 2024-03-28 NOTE — PLAN OF CARE
Problem: Safety - Adult  Goal: Free from fall injury  Outcome: Progressing     Problem: ABCDS Injury Assessment  Goal: Absence of physical injury  Outcome: Progressing     Problem: Chronic Conditions and Co-morbidities  Goal: Patient's chronic conditions and co-morbidity symptoms are monitored and maintained or improved  Outcome: Progressing  Flowsheets (Taken 3/28/2024 0800)  Care Plan - Patient's Chronic Conditions and Co-Morbidity Symptoms are Monitored and Maintained or Improved: Monitor and assess patient's chronic conditions and comorbid symptoms for stability, deterioration, or improvement     Problem: Skin/Tissue Integrity  Goal: Absence of new skin breakdown  Description: 1.  Monitor for areas of redness and/or skin breakdown  2.  Assess vascular access sites hourly  3.  Every 4-6 hours minimum:  Change oxygen saturation probe site  4.  Every 4-6 hours:  If on nasal continuous positive airway pressure, respiratory therapy assess nares and determine need for appliance change or resting period.  Outcome: Progressing     Problem: Occupational Therapy - Adult  Goal: By Discharge: Performs self-care activities at highest level of function for planned discharge setting.  See evaluation for individualized goals.  3/28/2024 1227 by David Sweet, OT  Outcome: Adequate for Discharge     Problem: Occupational Therapy - Adult  Goal: By Discharge: Performs self-care activities at highest level of function for planned discharge setting.  See evaluation for individualized goals.  3/28/2024 1227 by David Sweet, OT  Outcome: Adequate for Discharge

## 2024-03-28 NOTE — PROGRESS NOTES
Hospitalist Progress Note    NAME:   Hema Pritchett   : 1940   MRN: [id number]     Date/Time: 3/28/2024 1:08 PM  Patient PCP: Teena Montes APRN - NP      Assessment / Plan:    Altered mental status with psychosis  History of Dementia (?)  -patient has had psychosis with confusion since admission, not on any medications outpatient   -per son this has been his \"baseline\" for several months  -CT scan of the head obtained was negative for acute findings  -unable to obtain an MRI due to agitation    -yesterday evening patient became very aggressive with nursing staff with attempts of hitting, CODE AURORA and CODE ATLAS were called in which prn Zyprexa was given    -Psychiatry consult placed with recommendations noted.  Will scheduled Zyprexa 2.5mg PO BID in addition to PRN medications    3.   Acute bilateral nasal bone fractures   -after an unwitnessed fall at home  -outpatient follow up with ENT    4.   Chronic atrial fibrillation  -rate controlled, continue metoprolol    5.   Essential hypertension  -continue outpatient regimen    6.   Hyperlipidemia  -continue outpatient regimen    7.   Anemia  -stable    Code Status: FULL  DVT Prophylaxis:  Lovenox      Subjective:     Chief Complaint / Reason for Physician Visit  Yesterday patient was confused, aggressive with attempts of hitting staff.        Objective:     VITALS:   Last 24hrs VS reviewed since prior progress note. Most recent are:  Patient Vitals for the past 24 hrs:   BP Temp Temp src Pulse Resp SpO2   24 1056 (!) 146/91 -- -- (!) 113 -- 99 %   24 0801 -- -- -- 62 18 99 %   24 0726 (!) 157/80 97.4 °F (36.3 °C) Oral 58 18 100 %   24 2000 (!) 122/52 -- -- 67 16 --           Intake/Output Summary (Last 24 hours) at 3/28/2024 1308  Last data filed at 3/28/2024 1241  Gross per 24 hour   Intake 720 ml   Output --   Net 720 ml          I had a face to face encounter and independently examined this patient on 3/28/2024, as outlined  below:  PHYSICAL EXAM:  General: Alert and in no acute cardiopulmonary distress   EENT:  +ecchymosis, tenderness to palpation  Resp:  Decreased inspiratory effort  CV:  Regular  rhythm,  No edema  GI:  Soft, Non distended, Non tender.  +Bowel sounds  Neurologic:  Unable to fully assess due to mentation  Psych:   Unable to fully assess due to mentation      Reviewed most current lab test results and cultures  YES  Reviewed most current radiology test results   YES  Review and summation of old records today    NO  Reviewed patient's current orders and MAR    YES  PMH/SH reviewed - no change compared to H&P  ________________________________________________________________________  Care Plan discussed with:    Comments   Patient x    Family      RN x    Care Manager     Consultant                        Multidiciplinary team rounds were held today with , nursing, pharmacist and clinical coordinator.  Patient's plan of care was discussed; medications were reviewed and discharge planning was addressed.     ________________________________________________________________________  Total NON critical care TIME:  35  Minutes    Total CRITICAL CARE TIME Spent:   Minutes non procedure based      Comments   >50% of visit spent in counseling and coordination of care     ________________________________________________________________________  Daniela Bryan MD     Procedures: see electronic medical records for all procedures/Xrays and details which were not copied into this note but were reviewed prior to creation of Plan.      LABS:  I reviewed today's most current labs and imaging studies.  Pertinent labs include:  Recent Labs     03/25/24  1424 03/26/24  0554   WBC 7.6 6.4   HGB 10.7* 9.8*   HCT 33.6* 30.3*    186       Recent Labs     03/25/24  1424 03/26/24  0554    141   K 4.6 4.3    106   CO2 24 22   BUN 43* 48*   MG 2.4  --    ALT 37  --          Signed: Daniela Bryan MD

## 2024-03-28 NOTE — PLAN OF CARE
Problem: Occupational Therapy - Adult  Goal: By Discharge: Performs self-care activities at highest level of function for planned discharge setting.  See evaluation for individualized goals.  Outcome: Adequate for Discharge   OCCUPATIONAL THERAPY EVALUATION/DISCHARGE  Patient: Hema Pritchett (84 y.o. male)  Date: 3/28/2024  Primary Diagnosis: Closed fracture of nasal bone, initial encounter [S02.2XXA]  Fracture of nasal bones, initial encounter for closed fracture [S02.2XXA]  Frequent falls [R29.6]         Precautions:                    ASSESSMENT :  Based on the objective data below, the patient pt will not require skilled OT services in this setting. Pt was initially admitted earlier this week however has been inappropriate to evaluate d/t emotional/mental instability requiring sedation, code BERTs, and a code ATLAS. Pt approached while seated at EOB and was agreeable and pleasant. Pt was able to complete functional mobility to bathroom w/ no AD and supervision. No lobs noted. Pt completed standing toileting task w/ set up. Pt completed toilet transfer w/ set up. Pt completed shower transfer w/ supervision. Pt returned to sink and completed hand hygiene and oral hygiene task w/ set up and cue to initiate and remember task. Pt returned to EOB and completed LB dressing donning/doffing socks w/ set up. Pt was able to answer simple questions inconsistently and follow 1 step directions but not 2 or 3 step directions consistently. Pt left at EOB w/ all needs met, call light/alarm in place, and nursing in room.    Functional Outcome Measure:  The patient scored 23/24 on the [facility] outcome measure.    Further skilled acute occupational therapy is not indicated at this time.     PLAN :    Recommendation for discharge: (in order for the patient to meet his/her long term goals): No skilled occupational therapy or Therapy up to 5 days/week in Skilled nursing facility     Other factors to consider for discharge: lives  DEFICITS:    Cognitive/Behavioral Status:              Hearing:        Vision/Perceptual:                  Range of Motion:   AROM: Within functional limits         Strength:  Strength: Within functional limits      Coordination:               Tone & Sensation:             Functional Mobility and Transfers for ADLs:  Bed Mobility:          Transfers:     Transfer Training  Transfer Training: Yes  Toilet Transfer: Independent  Tub/Shower Transfer: Independent                                   Balance:             ADL Assessment:                  Grooming: Setup       UE Bathing: Setup            LE Bathing: Setup       UE Dressing: Setup       LE Dressing: Setup       Toileting: Setup                            ADL Intervention and task modifications:      [facility] \"6 Clicks\"                                                       Daily Activity Inpatient Short Form  How much help from another person does the patient currently need... Total; A Lot A Little None   1.  Putting on and taking off regular lower body clothing? []  1 []  2 []  3 [x]  4   2.  Bathing (including washing, rinsing, drying)? []  1 []  2 [x]  3 []  4   3.  Toileting, which includes using toilet, bedpan or urinal? [] 1 []  2 []  3 [x]  4   4.  Putting on and taking off regular upper body clothing? []  1 []  2 []  3 [x]  4   5.  Taking care of personal grooming such as brushing teeth? []  1 []  2 []  3 [x]  4   6.  Eating meals? []  1 []  2 []  3 [x]  4   © 2007, Trustees of [facility], under license to [employer]. All rights reserved     Score: 23/24     Interpretation of Tool:  Represents clinically-significant functional categories (i.e. Activities of daily living).    Cutoff score 39.4 (19) correlates to a good likelihood of discharging home versus a facility  Wendy Almazan, Tamiko Velez, Wayne Villagran, Liz Greene, Rex Coello, Robbin Almazan, -PAC “6-Clicks” Functional Assessment Scores Predict

## 2024-03-28 NOTE — PROGRESS NOTES
Patient remains confused and restless at times, but not as agitated. Ambulated to bathroom, slow and appeared uncomfortable.  When questioned states lower back hurts.  Medicated with Tylenol.

## 2024-03-28 NOTE — PROGRESS NOTES
Patient intermittently agitated and getting out of bed.  Ambulated to bathroom and urinated.  Flight of thoughts and confused.  Reassured and reoriented calmly and assisted back to bed.  Denies discomfort.  Bed alarms on and Shani Shore monitor continues.

## 2024-03-28 NOTE — PROGRESS NOTES
Pt sleeping at present with video monitoring in place. Pt has been very cooperative but confused to time and situation today. Pt refused SQ lovenox but did take all po meds.

## 2024-03-28 NOTE — PLAN OF CARE
Problem: Physical Therapy - Adult  Goal: By Discharge: Performs mobility at highest level of function for planned discharge setting.  See evaluation for individualized goals.  Outcome: Adequate for Discharge   PHYSICAL THERAPY EVALUATION/DISCHARGE    Patient: Hema Pritchett (84 y.o. male)  Date: 3/28/2024  Primary Diagnosis: Closed fracture of nasal bone, initial encounter [S02.2XXA]  Fracture of nasal bones, initial encounter for closed fracture [S02.2XXA]  Frequent falls [R29.6]       Precautions: Fall Risk, General Precautions, Bed Alarm                      ASSESSMENT AND RECOMMENDATIONS:  Based on the objective data below, the patient presented very confused but functionally at or near his baseline status. Vitals taken as noted per flowsheet. Pt was then able to transfer OOB to standing and walked all the way down to the rehab gym for assessment on stairs w/o AD; no LOB, no SOB; no issue except CGA/SBA needed to help hold up his hospital pants. Once in the rehab gym he declined a seated rest break before ascending/descending 10 stairs in a step-over-step pattern and 1 HR; SBA only; no issue. He walked back to his room telling stories of questionable accuracy the whole way and was eventually left comfortably sitting on EOB drinking a cup of coffee; bed alarm on, call bell and tray table in reach; all needs met as able. Pt was confused and a poor historian with obvious cognitive impairment as he noted his  spouse does the grocery shopping but functionally he was independent. Subsequently, additional skilled PT services are not warranted at this time and he will be d/c'd back to the care of the medical team on the unit. D/C PT.     Functional Outcome Measure:  The patient scored 13 seconds on the TUG test outcome measure which is indicative of reduced fall risk.      Further skilled acute physical therapy is not indicated at this time.       PLAN :  Recommendation for discharge: (in order for the  Supervision;Stand-by assistance  Stand to Sit: Supervision;Stand-by assistance  Toilet Transfer: Supervision  Tub/Shower Transfer: Independent  Balance:               Balance  Sitting: Without support  Standing: Without support  Ambulation/Gait Training:                       Gait  Gait Training: Yes  Overall Level of Assistance: Stand-by assistance;Contact-guard assistance  Distance (ft): 500 Feet  Assistive Device: Gait belt  Interventions: Safety awareness training;Verbal cues  Speed/Tarsha: Pace decreased (< 100 feet/min)  Rail Use: Right  Stairs - Level of Assistance: Stand-by assistance  Number of Stairs Trained: 10                                                                                                                                                                                                                                                                                                                                                                                                                                                                 Timed Up and Go:    Timed Up and Go: 13       < than 10 seconds=Normal  Greater then 13.5 seconds (in elderly)=Increased fall risk   Rama BUNCH, Landon S, Yesica SABA. Predicting the probability for falls in community dwelling older adults using the Timed Up and Go Test. Phys Ther. 2000;80:896-903.                                                                                                                                                                                                                                                                                                                                                                                                Pain Ratin-5/10 low back soreness s/p fall; pt noted it was better after walking  Pain Intervention(s):   nursing notified, rest, and repositioning    Activity Tolerance:   Fair

## 2024-03-28 NOTE — CONSULTS
PSYCHIATRIC CONSULTATION NOTE         Patient Name  Hema Pritchett   Date of Birth 1940   [facility] [id number]   Medical Record Number  [id number]      Age  84 y.o.   PCP Teena Montes APRN - NP   Admit date:  3/25/2024    Room Number  200/01  @ [facility]   Date of Service  3/27/2024             E & M PROGRESS NOTE:         HISTORY       CC/HISTORY OF PRESENT ILLNESS/INTERVAL HISTORY:  (reviewed/updated 3/27/2024). \"Psychosis\"    This is an 84 year old man with no significant prior psychiatric history who presented to the hospital following a fall from his porch, and upon ED assessment was noted to be confused and with limited understanding of the events leading to hospitalization. Per documentation, disposition was difficult as he did not appear to have readily accessible supports in the community, and could not care for himself. He began trying to adjust electronics, believing himself to be at home working on his motorcycle. Due to worsening AMS a and tenuous disposition, he was admitted to the medical service with plan to contact collateral and family for disposition support. Overnight, he had an episode of agitation and hallucinations, requiring Zyprexa administration with fair effect. He also received Oxycodone overnight for worsening pain.     Psychiatry asked to assess patient for AMS and psychosis, as he appeared to be sundowning. Patient seen via he telemedicine robot. He is lying in his hospital bed and obtunded, moaning to stimulus and unable to provide any information to RN at beside or MD on screen.     The patient is not oriented and cannot participate in psychiatric interview due to AMS. Per primary team, the patient's family was contacted and report that he lives alone and was driving as recently as two months ago. Per chart review he was seen by his PCP in December, at which time he reported some difficulty with memory but no confusion was noted. Of note, the patient's wife  suppository 650 mg  650 mg Rectal Q6H PRN    prochlorperazine (COMPAZINE) injection 10 mg  10 mg IntraVENous Q6H PRN               ASSESSMENT & PLAN     The patient, Hema Pritchett, is a 84 y.o.  male who presents at this time for treatment of the following diagnoses: (reviewed/updated 3/27/2024)  Psychosis    Assessment:    Suspect acute delirium, unclear source (infectious/trauma). No prior psychiatric history. Rapid decline in memory and ADLs most concerning for vascular dementia given HTN history; self care and medication administration may have worsened following the death of the patient's wife. Patient cannot care for himself at this time and is a high risk for harm to self or others given his AMS.     Plan:  - Care by primary team  - Avoid benzodiazepine / opiate / anticholingeric medications  - SCHEDULE MRI if AMS persists / pre-treat with Zyprexa ODT 5 mg for restlessness (history of claustrophobia)  - START Zyprexa 5 mg PO *OR* IM for BID PRN agitation  - Can start Zyprexa 2.5 mg PO BID if patient's agitation persists  - Check TSH, B12, Folate, NH4, Cr, UA, UDS    Thank you for this consultation, I will continue to follow up with patient as deemed appropriate.       I will continue to order blood tests/labs and diagnostic tests as deemed appropriate and review results as they become available (see orders for details and above listed lab/test results).    I will gather additional collateral information from friends, family and o/p treatment team to further elucidate the nature of patient's psychopathology and baselline level of psychiatric functioning.    Complete current electronic health record for patient has been reviewed today including consultant notes, ancillary staff notes, nurses and psychiatric tech notes        Signed By:   Mike Rodriguez MD  3/27/2024

## 2024-03-29 PROBLEM — F23 BRIEF PSYCHOTIC DISORDER (HCC): Status: ACTIVE | Noted: 2024-03-29

## 2024-03-29 LAB
ANION GAP SERPL CALC-SCNC: 9 MMOL/L (ref 5–15)
BASOPHILS # BLD: 0 K/UL (ref 0–0.1)
BASOPHILS NFR BLD: 0 % (ref 0–1)
BUN SERPL-MCNC: 27 MG/DL (ref 6–20)
BUN/CREAT SERPL: 18 (ref 12–20)
CALCIUM SERPL-MCNC: 8.4 MG/DL (ref 8.5–10.1)
CHLORIDE SERPL-SCNC: 107 MMOL/L (ref 97–108)
CO2 SERPL-SCNC: 27 MMOL/L (ref 21–32)
CREAT SERPL-MCNC: 1.5 MG/DL (ref 0.7–1.3)
DIFFERENTIAL METHOD BLD: ABNORMAL
EOSINOPHIL # BLD: 0.2 K/UL (ref 0–0.4)
EOSINOPHIL NFR BLD: 2 % (ref 0–7)
ERYTHROCYTE [DISTWIDTH] IN BLOOD BY AUTOMATED COUNT: 13.2 % (ref 11.5–14.5)
GLUCOSE SERPL-MCNC: 123 MG/DL (ref 65–100)
HCT VFR BLD AUTO: 32.3 % (ref 36.6–50.3)
HGB BLD-MCNC: 10.5 G/DL (ref 12.1–17)
IMM GRANULOCYTES # BLD AUTO: 0 K/UL (ref 0–0.04)
IMM GRANULOCYTES NFR BLD AUTO: 1 % (ref 0–0.5)
INR PPP: 1.2 (ref 0.9–1.1)
LYMPHOCYTES # BLD: 0.8 K/UL (ref 0.8–3.5)
LYMPHOCYTES NFR BLD: 13 % (ref 12–49)
MCH RBC QN AUTO: 31 PG (ref 26–34)
MCHC RBC AUTO-ENTMCNC: 32.5 G/DL (ref 30–36.5)
MCV RBC AUTO: 95.3 FL (ref 80–99)
MONOCYTES # BLD: 0.7 K/UL (ref 0–1)
MONOCYTES NFR BLD: 11 % (ref 5–13)
NEUTS SEG # BLD: 4.6 K/UL (ref 1.8–8)
NEUTS SEG NFR BLD: 73 % (ref 32–75)
NRBC # BLD: 0 K/UL (ref 0–0.01)
NRBC BLD-RTO: 0 PER 100 WBC
NT PRO BNP: 4680 PG/ML (ref 0–450)
PLATELET # BLD AUTO: 201 K/UL (ref 150–400)
PMV BLD AUTO: 9.9 FL (ref 8.9–12.9)
POTASSIUM SERPL-SCNC: 3.6 MMOL/L (ref 3.5–5.1)
PROTHROMBIN TIME: 11.6 SEC (ref 9–11.1)
RBC # BLD AUTO: 3.39 M/UL (ref 4.1–5.7)
SODIUM SERPL-SCNC: 143 MMOL/L (ref 136–145)
WBC # BLD AUTO: 6.2 K/UL (ref 4.1–11.1)

## 2024-03-29 PROCEDURE — 83880 ASSAY OF NATRIURETIC PEPTIDE: CPT

## 2024-03-29 PROCEDURE — 99233 SBSQ HOSP IP/OBS HIGH 50: CPT | Performed by: PSYCHIATRY & NEUROLOGY

## 2024-03-29 PROCEDURE — 36415 COLL VENOUS BLD VENIPUNCTURE: CPT

## 2024-03-29 PROCEDURE — 1100000000 HC RM PRIVATE

## 2024-03-29 PROCEDURE — 85025 COMPLETE CBC W/AUTO DIFF WBC: CPT

## 2024-03-29 PROCEDURE — 94760 N-INVAS EAR/PLS OXIMETRY 1: CPT

## 2024-03-29 PROCEDURE — 6370000000 HC RX 637 (ALT 250 FOR IP): Performed by: STUDENT IN AN ORGANIZED HEALTH CARE EDUCATION/TRAINING PROGRAM

## 2024-03-29 PROCEDURE — 80048 BASIC METABOLIC PNL TOTAL CA: CPT

## 2024-03-29 PROCEDURE — 6370000000 HC RX 637 (ALT 250 FOR IP): Performed by: INTERNAL MEDICINE

## 2024-03-29 PROCEDURE — 85610 PROTHROMBIN TIME: CPT

## 2024-03-29 PROCEDURE — 6370000000 HC RX 637 (ALT 250 FOR IP): Performed by: PSYCHIATRY & NEUROLOGY

## 2024-03-29 RX ORDER — OLANZAPINE 2.5 MG/1
5 TABLET, FILM COATED ORAL 2 TIMES DAILY
Status: DISCONTINUED | OUTPATIENT
Start: 2024-03-29 | End: 2024-04-02

## 2024-03-29 RX ORDER — LISINOPRIL 5 MG/1
5 TABLET ORAL DAILY
Status: DISCONTINUED | OUTPATIENT
Start: 2024-03-30 | End: 2024-03-29

## 2024-03-29 RX ORDER — FUROSEMIDE 20 MG/1
20 TABLET ORAL 2 TIMES DAILY
Status: DISCONTINUED | OUTPATIENT
Start: 2024-03-29 | End: 2024-04-05 | Stop reason: HOSPADM

## 2024-03-29 RX ORDER — FUROSEMIDE 20 MG/1
20 TABLET ORAL 2 TIMES DAILY
Status: DISCONTINUED | OUTPATIENT
Start: 2024-03-29 | End: 2024-03-29

## 2024-03-29 RX ORDER — LISINOPRIL 5 MG/1
2.5 TABLET ORAL DAILY
Status: DISCONTINUED | OUTPATIENT
Start: 2024-03-30 | End: 2024-04-05 | Stop reason: HOSPADM

## 2024-03-29 RX ADMIN — OLANZAPINE 5 MG: 2.5 TABLET, FILM COATED ORAL at 20:36

## 2024-03-29 RX ADMIN — FUROSEMIDE 20 MG: 20 TABLET ORAL at 17:40

## 2024-03-29 RX ADMIN — METOPROLOL TARTRATE 25 MG: 25 TABLET, FILM COATED ORAL at 11:14

## 2024-03-29 RX ADMIN — FUROSEMIDE 20 MG: 20 TABLET ORAL at 11:16

## 2024-03-29 RX ADMIN — LISINOPRIL 2.5 MG: 5 TABLET ORAL at 11:14

## 2024-03-29 RX ADMIN — OLANZAPINE 2.5 MG: 2.5 TABLET, FILM COATED ORAL at 11:13

## 2024-03-29 RX ADMIN — RANOLAZINE 500 MG: 500 TABLET, FILM COATED, EXTENDED RELEASE ORAL at 20:36

## 2024-03-29 RX ADMIN — RANOLAZINE 500 MG: 500 TABLET, FILM COATED, EXTENDED RELEASE ORAL at 11:13

## 2024-03-29 NOTE — CARE COORDINATION
Transition of Care Plan:     RUR: 19% MODERATE   Prior Level of Functioning: Able to manage ADL's as far as son was aware   Disposition: TBD. SNF/LTC vs AL?   If SNF or IPR: Date FOC offered:   Date FOC received:   Accepting facility:   Date authorization started with reference number:   Date authorization received and expires:   Follow up appointments:   DME needed:   Transportation at discharge:   IM/IMM Medicare/ letter given: 1st IMM obtained 3/27  Is patient a  and connected with [facility]?               If yes, was  transfer form completed and [facility] notified?   Caregiver Contact:   Discharge Caregiver contacted prior to discharge?   Care Conference needed?   Barriers to discharge: Mentation,    1515: Called patient's son Chavez Pritchett to speak about discharge plans. Per staff patient still confused. He didn't answer. Left him a message stating there is NO safe way for him to go home alone. In message stated that maybe assisted living would be a good idea. Waiting to hear back.

## 2024-03-29 NOTE — PLAN OF CARE
Patient is monitored by [facility] which has been effective. Patient has been more cooperative with being redirected until staff arrived to the room.  He is ambulating with can and hands-on standby assist. Patient has been free from fall injury.    Patient is able to move/reposition himself. No new skin breakdown is noted.

## 2024-03-29 NOTE — TELEPHONE ENCOUNTER
Needs to set up a hosp f/u with dr. Georges Aiken for pt.  Please call [phone]
Printed off for Dr. Drew Clifford to review upon his return.  Altered mental status 12/2/2021
The patient is a 42y Male complaining of

## 2024-03-29 NOTE — PROGRESS NOTES
Hospitalist Progress Note    NAME:   Hema Pritchett   : 1940   MRN: [id number]     Date/Time: 3/29/2024 5:23 PM  Patient PCP: Teena Montes APRN - NP      Assessment / Plan:    Altered mental status with psychosis  History of Dementia (?)  -patient has had psychosis with confusion since admission, not on any medications outpatient   -per son this has been his \"baseline\" for several months  -CT scan of the head obtained was negative for acute findings  -unable to obtain an MRI due to agitation    - patient became very aggressive with nursing staff with attempts of hitting, CODE AURORA and CODE ATLAS were called in which prn Zyprexa was given    -Psychiatry consult placed with recommendations noted.  Continue scheduled Zyprexa 2.5mg PO BID in addition to PRN medications    -patient will likely need placement , case management assisting    3.   Acute bilateral nasal bone fractures   -after an unwitnessed fall at home  -outpatient follow up with ENT    4.   Chronic atrial fibrillation  -rate controlled, continue metoprolol    5.   Essential hypertension  -continue outpatient regimen    6.   Hyperlipidemia  -continue outpatient regimen    7.   Anemia  -stable    Code Status: FULL  DVT Prophylaxis:  Lovenox      Subjective:     Chief Complaint / Reason for Physician Visit  Yesterday patient was confused, aggressive with attempts of hitting staff.        Objective:     VITALS:   Last 24hrs VS reviewed since prior progress note. Most recent are:  Patient Vitals for the past 24 hrs:   BP Temp Temp src Pulse Resp SpO2   24 0936 (!) 155/69 98.6 °F (37 °C) Oral (!) 102 16 --   24 0028 138/68 97.9 °F (36.6 °C) Oral 99 20 100 %           Intake/Output Summary (Last 24 hours) at 3/29/2024 1723  Last data filed at 3/29/2024 1200  Gross per 24 hour   Intake 600 ml   Output 525 ml   Net 75 ml          I had a face to face encounter and independently examined this patient on 3/29/2024, as outlined

## 2024-03-29 NOTE — PROGRESS NOTES
Pt has been using the bathroom today. Cooperative but remains impulsive and  has poor safety awareness. Appetite GREAT. Eats 100% of meals. Video monitoring remains in place.

## 2024-03-29 NOTE — PLAN OF CARE
Problem: Safety - Adult  Goal: Free from fall injury  Outcome: Progressing     Problem: ABCDS Injury Assessment  Goal: Absence of physical injury  Outcome: Progressing     Problem: Chronic Conditions and Co-morbidities  Goal: Patient's chronic conditions and co-morbidity symptoms are monitored and maintained or improved  Outcome: Progressing  Flowsheets (Taken 3/29/2024 0800)  Care Plan - Patient's Chronic Conditions and Co-Morbidity Symptoms are Monitored and Maintained or Improved: Monitor and assess patient's chronic conditions and comorbid symptoms for stability, deterioration, or improvement     Problem: Skin/Tissue Integrity  Goal: Absence of new skin breakdown  Description: 1.  Monitor for areas of redness and/or skin breakdown  2.  Assess vascular access sites hourly  3.  Every 4-6 hours minimum:  Change oxygen saturation probe site  4.  Every 4-6 hours:  If on nasal continuous positive airway pressure, respiratory therapy assess nares and determine need for appliance change or resting period.  Outcome: Progressing

## 2024-03-30 PROCEDURE — 6370000000 HC RX 637 (ALT 250 FOR IP): Performed by: STUDENT IN AN ORGANIZED HEALTH CARE EDUCATION/TRAINING PROGRAM

## 2024-03-30 PROCEDURE — 6370000000 HC RX 637 (ALT 250 FOR IP): Performed by: INTERNAL MEDICINE

## 2024-03-30 PROCEDURE — 6360000002 HC RX W HCPCS: Performed by: PSYCHIATRY & NEUROLOGY

## 2024-03-30 PROCEDURE — 6370000000 HC RX 637 (ALT 250 FOR IP): Performed by: PSYCHIATRY & NEUROLOGY

## 2024-03-30 PROCEDURE — 1100000000 HC RM PRIVATE

## 2024-03-30 PROCEDURE — 2580000003 HC RX 258: Performed by: INTERNAL MEDICINE

## 2024-03-30 PROCEDURE — 94760 N-INVAS EAR/PLS OXIMETRY 1: CPT

## 2024-03-30 PROCEDURE — 6360000002 HC RX W HCPCS: Performed by: INTERNAL MEDICINE

## 2024-03-30 PROCEDURE — 2580000003 HC RX 258: Performed by: PSYCHIATRY & NEUROLOGY

## 2024-03-30 RX ORDER — OXYCODONE HYDROCHLORIDE AND ACETAMINOPHEN 5; 325 MG/1; MG/1
1 TABLET ORAL EVERY 8 HOURS PRN
Status: DISCONTINUED | OUTPATIENT
Start: 2024-03-30 | End: 2024-04-01

## 2024-03-30 RX ADMIN — WATER 5 MG: 1 INJECTION INTRAMUSCULAR; INTRAVENOUS; SUBCUTANEOUS at 13:40

## 2024-03-30 RX ADMIN — ACETAMINOPHEN 1000 MG: 500 TABLET ORAL at 18:15

## 2024-03-30 RX ADMIN — WATER 2.5 MG: 1 INJECTION INTRAMUSCULAR; INTRAVENOUS; SUBCUTANEOUS at 15:58

## 2024-03-30 RX ADMIN — WATER 2.5 MG: 1 INJECTION INTRAMUSCULAR; INTRAVENOUS; SUBCUTANEOUS at 17:14

## 2024-03-30 RX ADMIN — OLANZAPINE 5 MG: 5 TABLET, ORALLY DISINTEGRATING ORAL at 01:31

## 2024-03-30 ASSESSMENT — PAIN SCALES - GENERAL: PAINLEVEL_OUTOF10: 0

## 2024-03-30 ASSESSMENT — PAIN DESCRIPTION - LOCATION: LOCATION: BACK

## 2024-03-30 NOTE — PROGRESS NOTES
Patient has had many hallucinations visual and audible.  Talks to people calling out name, looks under bed to talk to Curt a [place] Gray he had or has.  Reaches for invisible food and actually appears to be eating it.  Patient has increased restlessness Gave the scheduled Zyprexa and later a PRN dose.  The patient slept for 30 minutes on and off but no significant rest.  Staff walked with patient and then rolled patient in wheel chair around nursing station.  Patient refused to listen to nurse when any attempt made to redirect to bathroom and patient stubbornly voided in floor in front of heater beside cabinet.  Patient threw cheerios on floor that were in a open container.  Patient verbally stated, \"I am loading my shot gun and I will whoop your ass.\"  Security called to assist and redirect patient.

## 2024-03-30 NOTE — PROGRESS NOTES
Nurse entered room to administer medications, and set pt up for breakfast. Pt sitting in chair resting. Nurse lightly touched patient shin/arm to arouse, pt proceeded to swat arm toward this nurse, while mumbling seeming irritated. Nurse left room to let patient rest, will try again later.

## 2024-03-30 NOTE — PLAN OF CARE
Problem: Chronic Conditions and Co-morbidities  Goal: Patient's chronic conditions and co-morbidity symptoms are monitored and maintained or improved  3/30/2024 0635 by Ashlie Zimmerman RN  Outcome: Progressing  Flowsheets (Taken 3/29/2024 2004)  Care Plan - Patient's Chronic Conditions and Co-Morbidity Symptoms are Monitored and Maintained or Improved: Monitor and assess patient's chronic conditions and comorbid symptoms for stability, deterioration, or improvement  3/29/2024 1818 by Marion Kendall RN  Outcome: Progressing  Flowsheets (Taken 3/29/2024 0800)  Care Plan - Patient's Chronic Conditions and Co-Morbidity Symptoms are Monitored and Maintained or Improved: Monitor and assess patient's chronic conditions and comorbid symptoms for stability, deterioration, or improvement     Problem: Skin/Tissue Integrity  Goal: Absence of new skin breakdown  Description: 1.  Monitor for areas of redness and/or skin breakdown  2.  Assess vascular access sites hourly  3.  Every 4-6 hours minimum:  Change oxygen saturation probe site  4.  Every 4-6 hours:  If on nasal continuous positive airway pressure, respiratory therapy assess nares and determine need for appliance change or resting period.  3/29/2024 1818 by Marion Kendall RN  Outcome: Progressing     Problem: Safety - Adult  Goal: Free from fall injury  3/30/2024 0635 by Ashlie Zimmerman RN  Outcome: Not Progressing  3/29/2024 1818 by Marion Kendall RN  Outcome: Progressing     Problem: ABCDS Injury Assessment  Goal: Absence of physical injury  3/30/2024 0635 by Ashlie Zimmerman RN  Outcome: Not Progressing  3/29/2024 1818 by Marion Kendall RN  Outcome: Progressing

## 2024-03-30 NOTE — PROGRESS NOTES
Reason for Physician Visit  Yesterday patient was confused, aggressive with attempts of hitting staff.        Objective:     VITALS:   Last 24hrs VS reviewed since prior progress note. Most recent are:  Patient Vitals for the past 24 hrs:   BP Temp Temp src Pulse Resp SpO2   03/30/24 1025 120/70 -- -- 82 -- --   03/30/24 0834 -- -- -- 85 18 97 %   03/30/24 0742 120/70 97.9 °F (36.6 °C) Oral 82 18 97 %   03/30/24 0020 122/69 98.3 °F (36.8 °C) Temporal (!) 107 20 97 %   03/29/24 2004 -- -- -- 88 -- --   03/29/24 1632 (!) 145/72 97.5 °F (36.4 °C) -- 90 18 --           Intake/Output Summary (Last 24 hours) at 3/30/2024 1033  Last data filed at 3/29/2024 2137  Gross per 24 hour   Intake 240 ml   Output 250 ml   Net -10 ml          I had a face to face encounter and independently examined this patient on 3/30/2024, as outlined below:  PHYSICAL EXAM:  General: Alert and in no acute cardiopulmonary distress   EENT:  +ecchymosis, tenderness to palpation  Resp:  Decreased inspiratory effort  CV:  Regular  rhythm,  No edema  GI:  Soft, Non distended, Non tender.  +Bowel sounds  Neurologic:  Unable to fully assess due to mentation  Psych:   Unable to fully assess due to mentation      Reviewed most current lab test results and cultures  YES  Reviewed most current radiology test results   YES  Review and summation of old records today    NO  Reviewed patient's current orders and MAR    YES  PMH/ reviewed - no change compared to H&P  ________________________________________________________________________  Care Plan discussed with:    Comments   Patient x    Family      RN x    Care Manager     Consultant                        Multidiciplinary team rounds were held today with , nursing, pharmacist and clinical coordinator.  Patient's plan of care was discussed; medications were reviewed and discharge planning was addressed.     ________________________________________________________________________  Total NON

## 2024-03-30 NOTE — PROGRESS NOTES
Staff alerted via alarm by [employer] (virtual ) that patient was up.  Upon entering the room, the patient was up and trying to exit room with tray.   Nurse and other care team members tried to redirect pt. Pt tried to enter other occupied pt rooms. Redirection continued. Managed to get patient back in chair. Pt refused to enter room. Started to throw multiple items, code atlas called. [place] team arrived.  IM injection Zyprexa administered with assistance while MD at bedside.  Pt now resting and reclined in chair, breathing regular, eyes closed.    This is an operative report (ID [id number])  The name of the patient is Mariaa Castrejon. 30 year old Female. : May 20th, 1988  Her MRN number is [id number]  The date of the operation is 2019  The name of the attending doctor is Mitch Leigh DDS  First assistant - Moe Montiel DMD  The Pre-op diagnosis was multiple carious teeth and gingivitis related to dental plaque  The name of the operation was comprehensive dental care under general anesthesia  The post-op diagnosis was carious teeth and gingivitis    The procedure was as follows:  The patient arrived in the medical operating room npo since midnight. The patient was induced with general anesthesia via right nasotracheal intubation.   Following induction, the patient was prepped and draped in the usual manner. A complete oral examination including intra-oral radiographs was performed. A moist 2" pharyngeal gauze throat pack was placed. A complete periodontal scaling and root planning was performed with cavitron.    Attention was then directed to the upper right quadrant:  Tooth #2 was found to have occlusal decay. Tooth #2 underwent caries excavation and was prepared for amalgam restoration. The occlusal amalgam restoration was placed and finished.    Attention was then directed to the upper left quadrant:  Tooth #9 was found to have facial decay  Tooth #10 was found to have facial decay  Tooth #12 was found to have disto-occluso-buccal decay. Tooth #12 underwent caries excavation and was prepared for  amalgam restoration. The amalgam was placed and finished.  Tooth #15 was found to have disto-occluso-buccal decay. Tooth #15 underwent caries excavation and was prepared for  glass ionomer restoration. The glass ionomer restoration was placed and finished.  It was determined that anterior restorations #s 9 and 10 would be placed at the patient's next visit for comprehensive dental treatment in the OR.    Attention was then directed to the lower left quadrant:  Tooth #18 was found to have gross occlusal decay. Site #18 anesthetized with 1.7cc Lidocaine 2% HCl with epi 1:100,000 administered via inferior alveolar and long buccal blocks. Tooth #18 was then elevated and extracted with forceps without complication. The extraction site was irrigated, curretted, Surgicel was placed, and one 3-0 chromic gut suture was placed. Hemostasis was observed.  Tooth #19 was found to have occlusal decay. Tooth #19 underwent caries excavation and was prepared for occlusal amalgam restoration. The amalgam restoration was placed and finished.  Tooth #20 was found to have occlusal decay. Tooth #20 underwent caries excavation and was prepared for occlusal amalgam restoration. The amalgam restoration was placed and finished.  Tooth #21 was found to have disto-occluso-buccal decay. Tooth #21 underwent caries excavation and was prepared for  amalgam restoration. The amalgam restoration was placed and finished.   Tooth #23 was found to have jgypo-joynt-cuqata decay.  Tooth #24 was found to have meso-facial decay.  It was determined that anterior restorations#s 23, and 24 would be placed at the patient's next visit for comprehensive dental treatment in the OR.    Attention was then directed to the lower right quadrant:  Tooth #25 was found to have disto-lingual decay.  Tooth #26 was found to have disto-lingual decay.  Tooth #28 was found to have occluso-lingual decay. Tooth #28 underwent caries excavation and was prepared for OL amalgam restoration. The amalgam restoration was placed and finished.  Tooth #30 was found to have occluso-buccal decay. Tooth #30 underwent caries excavation and was prepared for OB glass ionomer restoration. The OB glass ionomer restoration was placed and finished.  Tooth #31 was found to have gross occlusal decay. Site #31 anesthetized with 1.7cc Lidocaine 2% HCl with epi 1:100,000 via inferior alveolar and long buccal blocks. Tooth #31 was then elevated and extracted with forceps without complication. The extraction site was irrigated, curetted, Surgicel was placed, and one 3-0 chromic gut suture was placed. Hemostasis was observed.  It was determined that anterior restorations #s 25, and 26 would be placed at the patient's next visit for comprehensive dental treatment in the OR.    The pharyngeal gauze throat pack was removed, and the patient was transported to the recovery room in stable condition.      This is a discharge report (ID:[id number])  The patient was discharged the same day, 2019  Post-operative instructions were given to the patient's mother and sister, and they were told to follow up at [facility] in one week.  This is an operative report (ID [id number])  The name of the patient is Mariaa Castrejon. 30 year old Female. : May 20th, 1988  Her MRN number is [id number]  The date of the operation is 2019  The name of the attending doctor is Mitch Leigh DDS  First assistant - Moe Montiel DMD  The Pre-op diagnosis was multiple carious teeth and gingivitis related to dental plaque  The name of the operation was comprehensive dental care under general anesthesia  The post-op diagnosis was carious teeth and gingivitis    The procedure was as follows:  The patient arrived in the medical operating room npo since midnight. The patient was induced with general anesthesia via right nasotracheal intubation.   Following induction, the patient was prepped and draped in the usual manner. A complete oral examination including intra-oral radiographs was performed. A moist 2" pharyngeal gauze throat pack was placed. A complete periodontal scaling and root planning was performed with cavitron.    Attention was then directed to the upper right quadrant:  Tooth #2 was found to have occlusal decay. Tooth #2 underwent caries excavation and was prepared for amalgam restoration. The occlusal amalgam restoration was placed and finished.    Attention was then directed to the upper left quadrant:  Tooth #9 was found to have facial decay  Tooth #10 was found to have facial decay  Tooth #12 was found to have disto-occluso-buccal decay. Tooth #12 underwent caries excavation and was prepared for  amalgam restoration. The amalgam was placed and finished.  Tooth #15 was found to have disto-occluso-buccal decay. Tooth #15 underwent caries excavation and was prepared for  glass ionomer restoration. The glass ionomer restoration was placed and finished.  It was determined that anterior restorations #s 9 and 10 would be placed at the patient's next visit for comprehensive dental treatment in the OR.    Attention was then directed to the lower left quadrant:  Tooth #18 was found to have gross occlusal decay. Site #18 anesthetized with 1.7cc Lidocaine 2% HCl with epi 1:100,000 administered via inferior alveolar and long buccal blocks. Tooth #18 was then elevated and extracted with forceps without complication. The extraction site was irrigated, curretted, Surgicel was placed, and one 3-0 chromic gut suture was placed. Hemostasis was observed.  Tooth #19 was found to have occlusal decay. Tooth #19 underwent caries excavation and was prepared for occlusal amalgam restoration. The amalgam restoration was placed and finished.  Tooth #20 was found to have occlusal decay. Tooth #20 underwent caries excavation and was prepared for occlusal amalgam restoration. The amalgam restoration was placed and finished.  Tooth #21 was found to have disto-occluso-buccal decay. Tooth #21 underwent caries excavation and was prepared for  amalgam restoration. The amalgam restoration was placed and finished.   Tooth #23 was found to have zvhqv-aetxz-xrqujj decay.  Tooth #24 was found to have meso-facial decay.  It was determined that anterior restorations#s 23, and 24 would be placed at the patient's next visit for comprehensive dental treatment in the OR.    Attention was then directed to the lower right quadrant:  Tooth #25 was found to have disto-lingual decay.  Tooth #26 was found to have disto-lingual decay.  Tooth #28 was found to have occluso-lingual decay. Tooth #28 underwent caries excavation and was prepared for OL amalgam restoration. The amalgam restoration was placed and finished.  Tooth #30 was found to have occluso-buccal decay. Tooth #30 underwent caries excavation and was prepared for OB glass ionomer restoration. The OB glass ionomer restoration was placed and finished.  Tooth #31 was found to have gross occlusal decay. Site #31 anesthetized with 1.7cc Lidocaine 2% HCl with epi 1:100,000 via inferior alveolar and long buccal blocks. Tooth #31 was then elevated and extracted with forceps without complication. The extraction site was irrigated, curetted, Surgicel was placed, and one 3-0 chromic gut suture was placed. Hemostasis was observed.  It was determined that anterior restorations #s 25, and 26 would be placed at the patient's next visit for comprehensive dental treatment in the OR.    The pharyngeal gauze throat pack was removed, and the patient was transported to the recovery room in stable condition.      This is a discharge report (ID:[id number])  The patient was discharged the same day, 2019  Post-operative instructions were given to the patient's mother and sister, and they were told to follow up at [facility] in one week  This is an operative report (ID [id number])  The name of the patient is Mariaa Castrejon. 30 year old Female. : May 20th, 1988  Her MRN number is [id number]  The date of the operation is 2019  The name of the attending doctor is Mitch Leigh DDS  First assistant - Moe Montiel DMD  The Pre-op diagnosis was multiple carious teeth and gingivitis related to dental plaque  The name of the operation was comprehensive dental care under general anesthesia  The post-op diagnosis was carious teeth and gingivitis    The procedure was as follows:  The patient arrived in the medical operating room npo since midnight. The patient was induced with general anesthesia via right nasotracheal intubation.   Following induction, the patient was prepped and draped in the usual manner. A complete oral examination including intra-oral radiographs was performed. A moist 2" pharyngeal gauze throat pack was placed. A complete periodontal scaling and root planning was performed with cavitron.    Attention was then directed to the upper right quadrant:  Tooth #2 was found to have occlusal decay. Tooth #2 underwent caries excavation and was prepared for amalgam restoration. The occlusal amalgam restoration was placed and finished.    Attention was then directed to the upper left quadrant:  Tooth #9 was found to have facial decay  Tooth #10 was found to have facial decay  Tooth #12 was found to have disto-occluso-buccal decay. Tooth #12 underwent caries excavation and was prepared for  amalgam restoration. The amalgam was placed and finished.  Tooth #15 was found to have disto-occluso-buccal decay. Tooth #15 underwent caries excavation and was prepared for  glass ionomer restoration. The glass ionomer restoration was placed and finished.  It was determined that anterior restorations #s 9 and 10 would be placed at the patient's next visit for comprehensive dental treatment in the OR.    Attention was then directed to the lower left quadrant:  Tooth #18 was found to have gross occlusal decay. Site #18 anesthetized with 1.7cc Lidocaine 2% HCl with epi 1:100,000 administered via inferior alveolar and long buccal blocks. Tooth #18 was then elevated and extracted with forceps without complication. The extraction site was irrigated, curretted, Surgicel was placed, and one 3-0 chromic gut suture was placed. Hemostasis was observed.  Tooth #19 was found to have occlusal decay. Tooth #19 underwent caries excavation and was prepared for occlusal amalgam restoration. The amalgam restoration was placed and finished.  Tooth #20 was found to have occlusal decay. Tooth #20 underwent caries excavation and was prepared for occlusal amalgam restoration. The amalgam restoration was placed and finished.  Tooth #21 was found to have disto-occluso-buccal decay. Tooth #21 underwent caries excavation and was prepared for  amalgam restoration. The amalgam restoration was placed and finished.   Tooth #23 was found to have lyhqp-jhsvo-qveyuv decay.  Tooth #24 was found to have meso-facial decay.  It was determined that anterior restorations #s 23, and 24 would be placed at the patient's next visit for comprehensive dental treatment in the OR.    Attention was then directed to the lower right quadrant:  Tooth #25 was found to have disto-lingual decay.  Tooth #26 was found to have disto-lingual decay.  Tooth #28 was found to have occluso-lingual decay. Tooth #28 underwent caries excavation and was prepared for OL amalgam restoration. The amalgam restoration was placed and finished.  Tooth #30 was found to have occluso-buccal decay. Tooth #30 underwent caries excavation and was prepared for OB glass ionomer restoration. The OB glass ionomer restoration was placed and finished.  Tooth #31 was found to have gross occlusal decay. Site #31 anesthetized with 1.7cc Lidocaine 2% HCl with epi 1:100,000 via inferior alveolar and long buccal blocks. Tooth #31 was then elevated and extracted with forceps without complication. The extraction site was irrigated, curetted, Surgicel was placed, and one 3-0 chromic gut suture was placed. Hemostasis was observed.  It was determined that anterior restorations #s 25, and 26 would be placed at the patient's next visit for comprehensive dental treatment in the OR.    The pharyngeal gauze throat pack was removed, and the patient was transported to the recovery room in stable condition.      This is a discharge report (ID:[id number])  The patient was discharged the same day, 2019  Post-operative instructions were given to the patient's mother and sister, and they were told to follow up at [facility] in one week.  This is the end of dictation.        Moe Montiel, DMD | Pager #[phone]

## 2024-03-30 NOTE — CONSULTS
PSYCHIATRIC CONSULTATION PROGRESS NOTE         Patient Name  eHma Pritchett   Date of Birth 1940   [facility] [id number]   Medical Record Number  [id number]      Age  84 y.o.   PCP Teena Montes, BLANK Armendariz NP   Admit date:  3/25/2024    Room Number  200/01  @ [facility]   Date of Service  3/29/2024             E & M PROGRESS NOTE:         HISTORY       CC/HISTORY OF PRESENT ILLNESS/INTERVAL HISTORY:  (reviewed/updated 3/29/2024). \"Psychosis\"    Yesterday, the patient received IM Zyprexa due to an episode of agitation in the evening. He was started on standing Zyprexa PO and received an additional PRN PO dose. On interview, he is cooperative and calm. He is more coherent today and provides some information about his stay in the hospital though he appears to state he is in his niece's home. He is oriented x1 but pleasant. He denies SI/HI/AVH/PI. Labwork reviewed, Cr improving 1.73 -> 1.5, BNP elevated > 4k, Vit B12/folate/TSH WNL, UA/UDS not performed.      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 Taking?   latanoprost (XALATAN) 0.005 % ophthalmic solution   No No   Sig: Place 1 drop into both eyes nightly   lisinopril (PRINIVIL;ZESTRIL) 2.5 MG tablet   No No   Sig: Take 1 tablet by mouth daily   metoprolol tartrate (LOPRESSOR) 25 MG tablet   No No   Sig: Take 1 tablet by mouth daily   ranolazine (RANEXA) 500 MG extended release tablet   No No   Sig: Take 1 tablet by mouth 2 times daily      Facility-Administered Medications: None       PAST MEDICAL HISTORY: Past medical history from the initial psychiatric evaluation has been reviewed  delirium, unclear source (infectious/trauma). No prior psychiatric history. Rapid decline in memory and ADLs most concerning for vascular dementia given HTN history; self care and medication administration may have worsened following the death of the patient's wife. Patient cannot care for himself at this time and is a high risk for harm to self or others given his AMS.     Plan:  - Care by primary team  - Avoid benzodiazepine / opiate / anticholingeric medications  - CONTINUE Zyprexa 5 mg PO *OR* IM for BID PRN agitation  - INCREASE Zyprexa to 5 mg PO BID, can taper prior to discharge if AMS continues to improve  - TSH, B12, Folate, NH4 WNL  - Cr elevated trending down  - Check UA, UDS    Thank you for this consultation, I will continue to follow up with patient as deemed appropriate.       I will continue to order blood tests/labs and diagnostic tests as deemed appropriate and review results as they become available (see orders for details and above listed lab/test results).    I will gather additional collateral information from friends, family and o/p treatment team to further elucidate the nature of patient's psychopathology and baselline level of psychiatric functioning.    Complete current electronic health record for patient has been reviewed today including consultant notes, ancillary staff notes, nurses and psychiatric tech notes        Signed By:   Mike Rodriguez MD  3/29/2024

## 2024-03-30 NOTE — PROGRESS NOTES
Pt is still  agitated and get up and walk around unsteady. Nurse and staff tried to redirect, with no success.  Previous medication administrations appears to have no effect, again.  MD contacted. New order placed.   2.5mg Zyprexa administered with no issues.

## 2024-03-30 NOTE — PLAN OF CARE
Problem: Safety - Adult  Goal: Free from fall injury  3/30/2024 1230 by Laya Adrian RN  Outcome: Progressing  3/30/2024 0635 by Ashlie Zimmerman RN  Outcome: Not Progressing     Problem: ABCDS Injury Assessment  Goal: Absence of physical injury  3/30/2024 1230 by Laya Adrian RN  Outcome: Progressing  3/30/2024 0635 by Ashlie Zimmerman RN  Outcome: Not Progressing     Problem: Chronic Conditions and Co-morbidities  Goal: Patient's chronic conditions and co-morbidity symptoms are monitored and maintained or improved  3/30/2024 1230 by Laya Adrian RN  Outcome: Progressing  3/30/2024 0635 by Ashlie Zimmerman RN  Outcome: Progressing  Flowsheets (Taken 3/29/2024 2004)  Care Plan - Patient's Chronic Conditions and Co-Morbidity Symptoms are Monitored and Maintained or Improved: Monitor and assess patient's chronic conditions and comorbid symptoms for stability, deterioration, or improvement     Problem: Skin/Tissue Integrity  Goal: Absence of new skin breakdown  Description: 1.  Monitor for areas of redness and/or skin breakdown  2.  Assess vascular access sites hourly  Outcome: Progressing

## 2024-03-30 NOTE — PROGRESS NOTES
Pt continued to appear agitated and get up and walk around unsteady. Nurse and staff tried to redirect, with no success.  Previous medication administration appears to have no effect.  MD contacted. Order placed.  2.5mg Zyprexa administered.

## 2024-03-31 PROCEDURE — 1100000000 HC RM PRIVATE

## 2024-03-31 PROCEDURE — 6370000000 HC RX 637 (ALT 250 FOR IP): Performed by: STUDENT IN AN ORGANIZED HEALTH CARE EDUCATION/TRAINING PROGRAM

## 2024-03-31 PROCEDURE — 6370000000 HC RX 637 (ALT 250 FOR IP): Performed by: PSYCHIATRY & NEUROLOGY

## 2024-03-31 PROCEDURE — 94760 N-INVAS EAR/PLS OXIMETRY 1: CPT

## 2024-03-31 RX ADMIN — METOPROLOL TARTRATE 25 MG: 25 TABLET, FILM COATED ORAL at 17:42

## 2024-03-31 RX ADMIN — OLANZAPINE 5 MG: 2.5 TABLET, FILM COATED ORAL at 09:45

## 2024-03-31 RX ADMIN — OLANZAPINE 5 MG: 5 TABLET, ORALLY DISINTEGRATING ORAL at 13:30

## 2024-03-31 RX ADMIN — OLANZAPINE 5 MG: 2.5 TABLET, FILM COATED ORAL at 22:25

## 2024-03-31 RX ADMIN — RANOLAZINE 500 MG: 500 TABLET, FILM COATED, EXTENDED RELEASE ORAL at 22:25

## 2024-03-31 ASSESSMENT — PAIN SCALES - GENERAL
PAINLEVEL_OUTOF10: 8
PAINLEVEL_OUTOF10: 8

## 2024-03-31 ASSESSMENT — PAIN DESCRIPTION - ORIENTATION
ORIENTATION: POSTERIOR;LOWER
ORIENTATION: POSTERIOR;LOWER

## 2024-03-31 ASSESSMENT — PAIN DESCRIPTION - LOCATION
LOCATION: BACK
LOCATION: BACK

## 2024-03-31 NOTE — PROGRESS NOTES
Hospitalist Progress Note    NAME:   Hema Pritchett   : 1940   MRN: [id number]     Date/Time: 3/31/2024 3:40 PM  Patient PCP: Teena Montes APRN - NP      Assessment / Plan:    Mr. Pritchett is a 84 year old male admitted after he was found down on the floor with facial trauma and confusion.  Upon arrival he was found to have bilateral nasal fractures and since admission has had psychosis with delirium.    Altered mental status with psychosis and delirium  History of Dementia (?)  -patient has had psychosis + delirium with confusion since admission, not on any medications outpatient   -per son this has been his \"baseline\" for several months  -CT scan of the head obtained was negative for acute findings  -unable to obtain an MRI due to agitation    - patient became very aggressive with nursing staff with attempts of hitting, CODE AUR[facility]A and CODE [facility]LAS were called in which prn Zyprexa was given    -Psychiatry consult placed with recommendations noted.  Continue scheduled Zyprexa 5mg PO BID in addition to PRN medications    - he again had episode of agitation, frequently asking \"when is my flight out of here\".  Per Psychiatry rapid decline in memory and ADLs most concerning for vascular dementia given HTN history; self care and medication administration may have worsened following the death of the patient's wife. Patient cannot care for himself at this time and is a high risk for harm to self or others given his AMS.      Plan:  - Avoid benzodiazepine / opiate / anticholingeric medications  - CONTINUE Zyprexa 5 mg PO *OR* IM for BID PRN agitation  - INCREASE Zyprexa to 5 mg PO BID, can taper prior to discharge if AMS continues to improve  - TSH, B12, Folate, NH4 WNL  - Cr elevated trending down  - Check UA, UDS     3.   Acute bilateral nasal bone fractures   -after an unwitnessed fall at home  -outpatient follow up with ENT    4.   Chronic atrial fibrillation  -rate controlled, continue

## 2024-03-31 NOTE — PROGRESS NOTES
Pt was sleeping upon entering the room. Patient did respond to voice and patient declined assessment and did not want to be touched. Patient also refused medications. Will continue to monitor.          0243- Pt has awakened and complained of 8/10 lower back pain. Patient was asked if they would like pain medication and stated \"yes\". Once returned with patient's medication patient became irritated and stated \" I don't want the medication & just leave me alone!\" Patient is up sitting on side of the bed with head lying on the pillow but refuses to get back into bed due to back pain. Patient has been asked several times if they would like medication for pain but still refuses.     0301- Patient is still sitting on side of the bed with head lying on pillow but is now asleep. Will continue to monitor.    0315- Patient was given some warm blankets to lower back to help try to alleviate some of the pain. Was able to finally get patient back into bed. Patient is sleeping. Will continue to monitor.

## 2024-03-31 NOTE — PLAN OF CARE
Problem: Safety - Adult  Goal: Free from fall injury  3/31/2024 1612 by Marion Kendall RN  Outcome: Not Progressing  Note: Pt remains a high falls risk, video monitoring in progress  3/31/2024 0231 by Vira Lind LPN  Outcome: Progressing     Problem: ABCDS Injury Assessment  Goal: Absence of physical injury  3/31/2024 1612 by Marion Kendall RN  Outcome: Progressing  3/31/2024 0231 by Vira Lind LPN  Outcome: Progressing     Problem: Safety - Adult  Goal: Free from fall injury  3/31/2024 1612 by Marion Kendall RN  Outcome: Not Progressing  Note: Pt remains a high falls risk, video monitoring in progress  3/31/2024 0231 by Vira Lind LPN  Outcome: Progressing

## 2024-03-31 NOTE — PLAN OF CARE
Problem: Safety - Adult  Goal: Free from fall injury  Outcome: Progressing     Problem: ABCDS Injury Assessment  Goal: Absence of physical injury  Outcome: Progressing     Problem: Chronic Conditions and Co-morbidities  Goal: Patient's chronic conditions and co-morbidity symptoms are monitored and maintained or improved  Outcome: Progressing  Flowsheets (Taken 3/30/2024 2100)  Care Plan - Patient's Chronic Conditions and Co-Morbidity Symptoms are Monitored and Maintained or Improved: Monitor and assess patient's chronic conditions and comorbid symptoms for stability, deterioration, or improvement     Problem: Skin/Tissue Integrity  Goal: Absence of new skin breakdown  Description: 1.  Monitor for areas of redness and/or skin breakdown  2.  Assess vascular access sites hourly  3.  Every 4-6 hours minimum:  Change oxygen saturation probe site  4.  Every 4-6 hours:  If on nasal continuous positive airway pressure, respiratory therapy assess nares and determine need for appliance change or resting period.  Outcome: Progressing

## 2024-04-01 PROBLEM — R41.0 DELIRIUM: Status: ACTIVE | Noted: 2024-04-01

## 2024-04-01 LAB
ANION GAP SERPL CALC-SCNC: 8 MMOL/L (ref 5–15)
BASOPHILS # BLD: 0 K/UL (ref 0–0.1)
BASOPHILS NFR BLD: 1 % (ref 0–1)
BUN SERPL-MCNC: 35 MG/DL (ref 6–20)
BUN/CREAT SERPL: 23 (ref 12–20)
CALCIUM SERPL-MCNC: 8.4 MG/DL (ref 8.5–10.1)
CHLORIDE SERPL-SCNC: 106 MMOL/L (ref 97–108)
CO2 SERPL-SCNC: 27 MMOL/L (ref 21–32)
CREAT SERPL-MCNC: 1.51 MG/DL (ref 0.7–1.3)
DIFFERENTIAL METHOD BLD: ABNORMAL
EOSINOPHIL # BLD: 0.2 K/UL (ref 0–0.4)
EOSINOPHIL NFR BLD: 3 % (ref 0–7)
ERYTHROCYTE [DISTWIDTH] IN BLOOD BY AUTOMATED COUNT: 13.4 % (ref 11.5–14.5)
GLUCOSE SERPL-MCNC: 124 MG/DL (ref 65–100)
HCT VFR BLD AUTO: 30.6 % (ref 36.6–50.3)
HGB BLD-MCNC: 9.7 G/DL (ref 12.1–17)
IMM GRANULOCYTES # BLD AUTO: 0.1 K/UL (ref 0–0.04)
IMM GRANULOCYTES NFR BLD AUTO: 1 % (ref 0–0.5)
LYMPHOCYTES # BLD: 1.1 K/UL (ref 0.8–3.5)
LYMPHOCYTES NFR BLD: 17 % (ref 12–49)
MCH RBC QN AUTO: 31.4 PG (ref 26–34)
MCHC RBC AUTO-ENTMCNC: 31.7 G/DL (ref 30–36.5)
MCV RBC AUTO: 99 FL (ref 80–99)
MONOCYTES # BLD: 0.6 K/UL (ref 0–1)
MONOCYTES NFR BLD: 9 % (ref 5–13)
NEUTS SEG # BLD: 4.4 K/UL (ref 1.8–8)
NEUTS SEG NFR BLD: 70 % (ref 32–75)
NRBC # BLD: 0 K/UL (ref 0–0.01)
NRBC BLD-RTO: 0 PER 100 WBC
PLATELET # BLD AUTO: 223 K/UL (ref 150–400)
PMV BLD AUTO: 9.7 FL (ref 8.9–12.9)
POTASSIUM SERPL-SCNC: 4 MMOL/L (ref 3.5–5.1)
RBC # BLD AUTO: 3.09 M/UL (ref 4.1–5.7)
SODIUM SERPL-SCNC: 141 MMOL/L (ref 136–145)
WBC # BLD AUTO: 6.3 K/UL (ref 4.1–11.1)

## 2024-04-01 PROCEDURE — 2580000003 HC RX 258: Performed by: PSYCHIATRY & NEUROLOGY

## 2024-04-01 PROCEDURE — 80048 BASIC METABOLIC PNL TOTAL CA: CPT

## 2024-04-01 PROCEDURE — 6370000000 HC RX 637 (ALT 250 FOR IP): Performed by: INTERNAL MEDICINE

## 2024-04-01 PROCEDURE — 36415 COLL VENOUS BLD VENIPUNCTURE: CPT

## 2024-04-01 PROCEDURE — 6370000000 HC RX 637 (ALT 250 FOR IP): Performed by: PSYCHIATRY & NEUROLOGY

## 2024-04-01 PROCEDURE — 1100000000 HC RM PRIVATE

## 2024-04-01 PROCEDURE — 6360000002 HC RX W HCPCS: Performed by: STUDENT IN AN ORGANIZED HEALTH CARE EDUCATION/TRAINING PROGRAM

## 2024-04-01 PROCEDURE — 6360000002 HC RX W HCPCS: Performed by: PSYCHIATRY & NEUROLOGY

## 2024-04-01 PROCEDURE — 6370000000 HC RX 637 (ALT 250 FOR IP): Performed by: STUDENT IN AN ORGANIZED HEALTH CARE EDUCATION/TRAINING PROGRAM

## 2024-04-01 PROCEDURE — 85025 COMPLETE CBC W/AUTO DIFF WBC: CPT

## 2024-04-01 PROCEDURE — 94760 N-INVAS EAR/PLS OXIMETRY 1: CPT

## 2024-04-01 RX ORDER — HALOPERIDOL 5 MG/ML
5 INJECTION INTRAMUSCULAR ONCE
Status: COMPLETED | OUTPATIENT
Start: 2024-04-01 | End: 2024-04-01

## 2024-04-01 RX ORDER — HYDROXYZINE PAMOATE 25 MG/1
50 CAPSULE ORAL 3 TIMES DAILY PRN
Status: DISCONTINUED | OUTPATIENT
Start: 2024-04-01 | End: 2024-04-02

## 2024-04-01 RX ADMIN — RANOLAZINE 500 MG: 500 TABLET, FILM COATED, EXTENDED RELEASE ORAL at 08:42

## 2024-04-01 RX ADMIN — RANOLAZINE 500 MG: 500 TABLET, FILM COATED, EXTENDED RELEASE ORAL at 20:11

## 2024-04-01 RX ADMIN — LATANOPROST 1 DROP: 50 SOLUTION OPHTHALMIC at 22:11

## 2024-04-01 RX ADMIN — HYDROXYZINE PAMOATE 50 MG: 25 CAPSULE ORAL at 18:42

## 2024-04-01 RX ADMIN — METOPROLOL TARTRATE 25 MG: 25 TABLET, FILM COATED ORAL at 08:42

## 2024-04-01 RX ADMIN — HALOPERIDOL LACTATE 5 MG: 5 INJECTION, SOLUTION INTRAMUSCULAR at 22:09

## 2024-04-01 RX ADMIN — LISINOPRIL 2.5 MG: 5 TABLET ORAL at 08:43

## 2024-04-01 RX ADMIN — ACETAMINOPHEN 1000 MG: 500 TABLET ORAL at 23:31

## 2024-04-01 RX ADMIN — OLANZAPINE 5 MG: 2.5 TABLET, FILM COATED ORAL at 08:42

## 2024-04-01 RX ADMIN — OLANZAPINE 5 MG: 5 TABLET, ORALLY DISINTEGRATING ORAL at 10:03

## 2024-04-01 RX ADMIN — FUROSEMIDE 20 MG: 20 TABLET ORAL at 08:42

## 2024-04-01 RX ADMIN — HYDROXYZINE PAMOATE 50 MG: 25 CAPSULE ORAL at 23:30

## 2024-04-01 RX ADMIN — WATER 5 MG: 1 INJECTION INTRAMUSCULAR; INTRAVENOUS; SUBCUTANEOUS at 13:36

## 2024-04-01 RX ADMIN — OLANZAPINE 5 MG: 2.5 TABLET, FILM COATED ORAL at 20:11

## 2024-04-01 ASSESSMENT — PAIN SCALES - GENERAL: PAINLEVEL_OUTOF10: 7

## 2024-04-01 NOTE — PLAN OF CARE
Problem: Safety - Adult  Goal: Free from fall injury  4/1/2024 0218 by Vira Lind LPN  Outcome: Progressing  3/31/2024 1612 by Marion Kendall RN  Outcome: Not Progressing  Note: Pt remains a high falls risk, video monitoring in progress     Problem: ABCDS Injury Assessment  Goal: Absence of physical injury  4/1/2024 0218 by Vira Lind LPN  Outcome: Progressing  3/31/2024 1612 by Marion Kendall RN  Outcome: Progressing     Problem: Chronic Conditions and Co-morbidities  Goal: Patient's chronic conditions and co-morbidity symptoms are monitored and maintained or improved  4/1/2024 0218 by Vira Lind LPN  Outcome: Progressing  3/31/2024 1612 by Marion Kendall RN  Outcome: Progressing  Flowsheets (Taken 3/31/2024 0800)  Care Plan - Patient's Chronic Conditions and Co-Morbidity Symptoms are Monitored and Maintained or Improved:   Monitor and assess patient's chronic conditions and comorbid symptoms for stability, deterioration, or improvement   Collaborate with multidisciplinary team to address chronic and comorbid conditions and prevent exacerbation or deterioration   Update acute care plan with appropriate goals if chronic or comorbid symptoms are exacerbated and prevent overall improvement and discharge     Problem: Skin/Tissue Integrity  Goal: Absence of new skin breakdown  Description: 1.  Monitor for areas of redness and/or skin breakdown  2.  Assess vascular access sites hourly  3.  Every 4-6 hours minimum:  Change oxygen saturation probe site  4.  Every 4-6 hours:  If on nasal continuous positive airway pressure, respiratory therapy assess nares and determine need for appliance change or resting period.  4/1/2024 0218 by Vira Lind LPN  Outcome: Progressing  3/31/2024 1612 by Marion Kendall, RN  Outcome: Progressing     Problem: Safety - Adult  Goal: Free from fall injury  4/1/2024 0218 by Vira Lind LPN  Outcome: Progressing  3/31/2024 1612 by Marion Kendall,

## 2024-04-01 NOTE — PROGRESS NOTES
Pt had an overall good night. Pt was very cooperative without agitation or aggressiveness. Pt took their medications without any hesitation. Pt is still experiencing some hallucinations but has been easy to redirect. Will continue to monitor.

## 2024-04-01 NOTE — PROGRESS NOTES
Pt has been cooperative with most care today. He refused most meds but did take his Zyprexa as scheduled and one dose prn. Appetite great. No c/o pain. Face healing and bruising to eyes turning more yellow and fading. L elbow lac healed. Feet remain edematous , he did refuse lasix today.

## 2024-04-01 NOTE — PROGRESS NOTES
[facility]  Hospitalist Progress Note    NAME: Hema Pritchett   :  1940   MRN:  [id number]     Total duration of encounter: 7 days      Interim Hospital Summary: 84 y.o. male who presented on 3/25/2024 with Fracture of nasal bones, initial encounter for closed fracture. He has a past medical history of Abnormal nuclear cardiac imaging test, Anemia, Atrial fibrillation (HCC), CKD (chronic kidney disease), Coagulation defect (HCC), Coronary atherosclerosis of native coronary artery, COVID, Diabetes mellitus, type II (HCC), IYER (dyspnea on exertion), Essential hypertension, Glaucoma, HTN (hypertension), Mixed hyperlipidemia, Mobitz type II atrioventricular block, Other dyspnea and respiratory abnormality, Pacemaker, S/P ablation of atrial fibrillation, and Tachycardia..       Pt presenting to ED post GLF, unwitnessed  Delirium / confusion  No caregiver  Son talked to me that evening, but has not been reached since  No placement plan since then  Seen by Psychiatry 3/29     Subjective:     Chief Complaint / Reason for Physician Visit  \"No c/o\".  Discussed with RN   Aggitated,combative - tx Zyprex 5mg x 2 dayshift    Review of Systems:  UTO  Symptom Y/N Comments  Symptom Y/N Comments   Fever/Chills    Chest Pain     Poor Appetite    Edema     Cough    Abdominal Pain     Sputum    Joint Pain     SOB/IYER    Pruritis/Rash     Nausea/vomit    Tolerating PT/OT     Diarrhea    Tolerating Diet     Constipation    Other         Current Facility-Administered Medications:     oxyCODONE-acetaminophen (PERCOCET) 5-325 MG per tablet 1 tablet, 1 tablet, Oral, Q8H PRN, Daniela Bryan MD    furosemide (LASIX) tablet 20 mg, 20 mg, Oral, BID, Daniela Bryan MD, 20 mg at 24 0842    lisinopril (PRINIVIL;ZESTRIL) tablet 2.5 mg, 2.5 mg, Oral, Daily, Mike Rodriguez MD, 2.5 mg at 24 0843    OLANZapine (ZYPREXA) tablet 5 mg, 5 mg, Oral, BID, Mike Rodriguez MD, 5 mg at 24  data filed at 4/1/2024 1400  Gross per 24 hour   Intake 480 ml   Output --   Net 480 ml        PHYSICAL EXAM:  General: WD, WN. Sedate by tx   EENT:  Anicteric sclerae. MMM  Resp:  CTA bilaterally, no wheezing or rales.  No accessory muscle use  CV:  Irreg rhythm,  No edema  GI:  Soft, Non distended, Non tender.  +Bowel sounds  Neurologic:  Nonfocal  Psych:   Anxious  / agitated  Skin:  No rashes.     LABS:  I reviewed today's most current labs and imaging studies.  Pertinent labs include:  Recent Labs     04/01/24  0636   WBC 6.3   HGB 9.7*   HCT 30.6*        Recent Labs     04/01/24  0636      K 4.0      CO2 27   BUN 35*       RADIOLOGY:  CT HEAD 3/25:  No acute infarct is seen. There is no apparent mass on unenhanced imaging. There  is no bleed, shift, obstructive hydrocephalus or significant extra-axial fluid  collection. Bone windows are unremarkable.   IMPRESSION: No acute intracranial abnormality.    CT C-SPINE 3/25:  The alignment is within normal limits. There is no fracture or compression  deformity. The odontoid process is intact. The craniocervical junction is within  normal limits. Degenerative changes and disc disease.   Atherosclerosis.    IMPRESSION: No acute fracture nor subluxation.    CT FACIAL 3/25  Bones: Acute bilateral nasal bone fractures.   Paranasal sinuses: Clear   Orbits: Bilateral lens replacements.   Base of brain: Limited evaluation. No evidence of pathology.   Soft tissues: Left frontal scalp hematoma.   IMPRESSION: Acute bilateral nasal bone fractures. Left frontal scalp hematoma..    LE DUPLEX 3/27:    No evidence of deep vein and acute deep vein thrombosis in the right lower extremity.    No evidence of deep vein and acute deep vein thrombosis in the left lower extremity.    Cardiac phasicity is seen in the veins in the lower extremity, which can be seen with right heart failure or tricuspid regurgiation.    EKG 3/25:  Poor quality, Afib 68 bpm, ABN, HR decreased

## 2024-04-01 NOTE — PROGRESS NOTES
Non compliant and agitated throughout day. Zyprexa given twice for agitation. 1500 vitals refused by patient.

## 2024-04-01 NOTE — CARE COORDINATION
Transition of Care Plan:     RUR: 19% MODERATE   Prior Level of Functioning: Able to manage ADL's as far as son was aware   Disposition: TBD. SNF/LTC vs AL?   If SNF or IPR: Date FOC offered:   Date FOC received:   Accepting facility:   Date authorization started with reference number:   Date authorization received and expires:   Follow up appointments:   DME needed:   Transportation at discharge:   IM/IMM Medicare/ letter given: 1st IMM obtained 3/27  Is patient a  and connected with VA?               If yes, was  transfer form completed and VA notified?   Caregiver Contact:   Discharge Caregiver contacted prior to discharge?   Care Conference needed?   Barriers to discharge: Mentation    1428: Spoke with CM Manager Ade about patient's situation and lack of family engagement. May have to do legal next of kin search (done by reaching out to legal team). Will contact APS first to see if they have any family members. Also sent referral to [facility] (they declined), [facility] and [facility].

## 2024-04-02 LAB
EKG ATRIAL RATE: 267 BPM
EKG DIAGNOSIS: NORMAL
EKG Q-T INTERVAL: 436 MS
EKG QRS DURATION: 94 MS
EKG QTC CALCULATION (BAZETT): 473 MS
EKG R AXIS: 55 DEGREES
EKG T AXIS: 40 DEGREES
EKG VENTRICULAR RATE: 71 BPM

## 2024-04-02 PROCEDURE — 6370000000 HC RX 637 (ALT 250 FOR IP): Performed by: STUDENT IN AN ORGANIZED HEALTH CARE EDUCATION/TRAINING PROGRAM

## 2024-04-02 PROCEDURE — 6370000000 HC RX 637 (ALT 250 FOR IP): Performed by: PSYCHIATRY & NEUROLOGY

## 2024-04-02 PROCEDURE — 2580000003 HC RX 258: Performed by: PSYCHIATRY & NEUROLOGY

## 2024-04-02 PROCEDURE — 94760 N-INVAS EAR/PLS OXIMETRY 1: CPT

## 2024-04-02 PROCEDURE — 6370000000 HC RX 637 (ALT 250 FOR IP): Performed by: INTERNAL MEDICINE

## 2024-04-02 PROCEDURE — 99232 SBSQ HOSP IP/OBS MODERATE 35: CPT | Performed by: PSYCHIATRY & NEUROLOGY

## 2024-04-02 PROCEDURE — 2580000003 HC RX 258

## 2024-04-02 PROCEDURE — 2580000003 HC RX 258: Performed by: STUDENT IN AN ORGANIZED HEALTH CARE EDUCATION/TRAINING PROGRAM

## 2024-04-02 PROCEDURE — 6360000002 HC RX W HCPCS: Performed by: STUDENT IN AN ORGANIZED HEALTH CARE EDUCATION/TRAINING PROGRAM

## 2024-04-02 PROCEDURE — 6360000002 HC RX W HCPCS: Performed by: PSYCHIATRY & NEUROLOGY

## 2024-04-02 PROCEDURE — 1100000000 HC RM PRIVATE

## 2024-04-02 RX ORDER — ZIPRASIDONE MESYLATE 20 MG/ML
20 INJECTION, POWDER, LYOPHILIZED, FOR SOLUTION INTRAMUSCULAR ONCE
Status: COMPLETED | OUTPATIENT
Start: 2024-04-02 | End: 2024-04-02

## 2024-04-02 RX ORDER — HALOPERIDOL 5 MG/1
5 TABLET ORAL EVERY 6 HOURS PRN
Status: DISCONTINUED | OUTPATIENT
Start: 2024-04-02 | End: 2024-04-05 | Stop reason: HOSPADM

## 2024-04-02 RX ORDER — DIPHENHYDRAMINE HCL 25 MG
50 CAPSULE ORAL ONCE
Status: DISCONTINUED | OUTPATIENT
Start: 2024-04-02 | End: 2024-04-02

## 2024-04-02 RX ORDER — HALOPERIDOL 5 MG/1
5 TABLET ORAL 2 TIMES DAILY
Status: DISCONTINUED | OUTPATIENT
Start: 2024-04-02 | End: 2024-04-05 | Stop reason: HOSPADM

## 2024-04-02 RX ORDER — HALOPERIDOL 5 MG/ML
5 INJECTION INTRAMUSCULAR EVERY 6 HOURS PRN
Status: DISCONTINUED | OUTPATIENT
Start: 2024-04-02 | End: 2024-04-05 | Stop reason: HOSPADM

## 2024-04-02 RX ORDER — LORAZEPAM 1 MG/1
2 TABLET ORAL ONCE
Status: DISCONTINUED | OUTPATIENT
Start: 2024-04-02 | End: 2024-04-02

## 2024-04-02 RX ORDER — DIVALPROEX SODIUM 250 MG/1
250 TABLET, DELAYED RELEASE ORAL EVERY 8 HOURS SCHEDULED
Status: DISCONTINUED | OUTPATIENT
Start: 2024-04-02 | End: 2024-04-05 | Stop reason: HOSPADM

## 2024-04-02 RX ORDER — WATER 10 ML/10ML
INJECTION INTRAMUSCULAR; INTRAVENOUS; SUBCUTANEOUS
Status: COMPLETED
Start: 2024-04-02 | End: 2024-04-02

## 2024-04-02 RX ORDER — DIPHENHYDRAMINE HYDROCHLORIDE 50 MG/ML
25 INJECTION INTRAMUSCULAR; INTRAVENOUS ONCE
Status: DISCONTINUED | OUTPATIENT
Start: 2024-04-02 | End: 2024-04-02

## 2024-04-02 RX ADMIN — DIVALPROEX SODIUM 250 MG: 250 TABLET, DELAYED RELEASE ORAL at 21:40

## 2024-04-02 RX ADMIN — ACETAMINOPHEN 1000 MG: 500 TABLET ORAL at 21:38

## 2024-04-02 RX ADMIN — WATER 10 ML: 1 INJECTION INTRAMUSCULAR; INTRAVENOUS; SUBCUTANEOUS at 04:24

## 2024-04-02 RX ADMIN — WATER 5 MG: 1 INJECTION INTRAMUSCULAR; INTRAVENOUS; SUBCUTANEOUS at 02:34

## 2024-04-02 RX ADMIN — LATANOPROST 1 DROP: 50 SOLUTION OPHTHALMIC at 21:40

## 2024-04-02 RX ADMIN — RANOLAZINE 500 MG: 500 TABLET, FILM COATED, EXTENDED RELEASE ORAL at 21:40

## 2024-04-02 RX ADMIN — ZIPRASIDONE MESYLATE 20 MG: 20 INJECTION, POWDER, LYOPHILIZED, FOR SOLUTION INTRAMUSCULAR at 04:24

## 2024-04-02 RX ADMIN — WATER 5 MG: 1 INJECTION INTRAMUSCULAR; INTRAVENOUS; SUBCUTANEOUS at 10:18

## 2024-04-02 RX ADMIN — FUROSEMIDE 20 MG: 20 TABLET ORAL at 16:02

## 2024-04-02 RX ADMIN — RANOLAZINE 500 MG: 500 TABLET, FILM COATED, EXTENDED RELEASE ORAL at 16:02

## 2024-04-02 RX ADMIN — DIVALPROEX SODIUM 250 MG: 250 TABLET, DELAYED RELEASE ORAL at 16:02

## 2024-04-02 RX ADMIN — HALOPERIDOL 5 MG: 5 TABLET ORAL at 21:39

## 2024-04-02 ASSESSMENT — PAIN SCALES - GENERAL
PAINLEVEL_OUTOF10: 0
PAINLEVEL_OUTOF10: 8
PAINLEVEL_OUTOF10: 4

## 2024-04-02 ASSESSMENT — PAIN SCALES - WONG BAKER
WONGBAKER_NUMERICALRESPONSE: NO HURT
WONGBAKER_NUMERICALRESPONSE: NO HURT

## 2024-04-02 ASSESSMENT — PAIN DESCRIPTION - LOCATION
LOCATION: BACK
LOCATION: BACK

## 2024-04-02 ASSESSMENT — PAIN DESCRIPTION - ORIENTATION
ORIENTATION: POSTERIOR
ORIENTATION: POSTERIOR

## 2024-04-02 ASSESSMENT — PAIN DESCRIPTION - DESCRIPTORS: DESCRIPTORS: ACHING

## 2024-04-02 NOTE — CARE COORDINATION
Transition of Care Plan:     RUR: 19% MODERATE   Prior Level of Functioning: Able to manage ADL's as far as son was aware   Disposition: TBD. SNF/LTC vs AL?   If SNF or IPR: Date FOC offered:   Date FOC received:   Accepting facility:   Date authorization started with reference number:   Date authorization received and expires:   Follow up appointments:   DME needed:   Transportation at discharge:   IM/IMM Medicare/ letter given: 1st IMM obtained 3/27  Is patient a  and connected with VA?               If yes, was  transfer form completed and VA notified?   Caregiver Contact:   Discharge Caregiver contacted prior to discharge?   Care Conference needed?   Barriers to discharge: Mentation    1100--1123     Spoke with Capri from [facility].  [facility] is one of the facilities that she deals with. She said that likely wouldn't go to [facility] but perhaps [facility] would work for Mr. Pritchett. Said they have a psychologist and psychiatrist there. She asked if he was on scheduled medications which he is. Just informed her that MD now placing patient on scheduled depakote. Will have to see how he does with that. She is okay with him being on scheduled medications. She states however that Mr. Pritchett needs to be sitter free AND video monitor free for 24 hours before they can accept. I also let her know the troubles I have been having getting in touch with her son and the likely ivey that he will have to pay out of pocket for LTC. She stated understanding and said she would stay in contact with me regarding Mr. Pritchett. Capri's number is [phone]. I then called patient's son Chavez Pritchett. No answer. Left a message. Told him via voicemail  about Capri and that they may be interesting in taking him. Asked him to please call me. Then called [facility] and asked to speak with Josh (who was handling Mr. Pritchett's case). She had NO other contacts. Also spoke with  her regarding the finances if Mr. Pritchett can go to a LTC facility. She said son Chavez could use Mr. Pritchett's checks to pay for the LTC stating \"paying on the behalf of\". She also confirmed that Mr. Pritchett does NOT qualify for medicaid due to being over-resourced. Will call son later    1145: Received call from patient's son. Updated him on the above. He states the problem is that he does NOT have legal POA states he needs signatures from doctors stating that he is incompetent to make decisions himself. Told him I don't think that will be a problem. Told him when I saw Mr. Pritchett yesterday he was having a conversation with someone who was not there. Also staff reports confusion. He stated understanding. He would like me to reach out to doctors to see if possible. Will do that. I also told him that he could find check book and write check for behalf of patient. He is worried he couldn't find that. Provided him with Capri's contact info. He stated he would call her this afternoon. Chavez also stated that he has a brother in [place] and it would be good if his dad could go to a facility in [place]. I asked him for his brothers contact info and he refused to give it to me because apparently the brother is even more estranged then he is.     Patient's LOS >96 hours d/t placement

## 2024-04-02 NOTE — PROGRESS NOTES
Comprehensive Nutrition Assessment    Type and Reason for Visit:  Initial    Nutrition Recommendations/Plan:   Regular low fat/low chol/high fiber/DANIELLE  Ensure with meals   Encourage po intake      Malnutrition Assessment:  Malnutrition Status:  Mild malnutrition (04/02/24 1215)    Context:  Acute Illness     Findings of the 6 clinical characteristics of malnutrition:  Energy Intake:  No significant decrease in energy intake  Weight Loss:  No significant weight loss     Body Fat Loss:  No significant body fat loss     Muscle Mass Loss:  No significant muscle mass loss    Fluid Accumulation:  Moderate to Severe Extremities   Strength:  Not Performed    Nutrition Assessment:  Present on Admission:   Fracture of nasal bones, initial encounter for closed fracture   Essential hypertension   S/P ablation of atrial fibrillation   Hyperlipidemia   Anemia   Dementia (HCC)   Frequent falls   Delirium    Pt admitted with above. He usually has good po intake and likes the ensure. He does stop eating with certain meds and will need encouragement. He will take supplements during that time too. He has no wt change but possibly an error on wt from 1/10 that seemed inconsistent. Pt is on a diuretic and has edema bilateral UE +1 and bilateral LE +3.      Nutrition Related Findings:      Wound Type: Skin Tears   , meds-reviewed, labs-BUN/CR-35/1.51, Ca-8.4     Current Nutrition Intake & Therapies:    Average Meal Intake: %  Average Supplements Intake: %  ADULT DIET; Regular; Low Fat/Low Chol/High Fiber/DANIELLE  ADULT ORAL NUTRITION SUPPLEMENT; Breakfast, Lunch, Dinner; Standard High Calorie/High Protein Oral Supplement  Patient Vitals for the past 96 hrs:   PO Meals Eaten (%)   04/02/24 1200 0%   04/02/24 0900 0%   04/01/24 1400 76 - 100%   04/01/24 0900 76 - 100%       Anthropometric Measures:  Height: 182.9 cm (6' 0.01\")  Ideal Body Weight (IBW): 178 lbs (81 kg)    Current Body Weight: 90.7 kg (199 lb 15.3 oz), 112.3 %

## 2024-04-02 NOTE — PROGRESS NOTES
[facility]  Hospitalist Progress Note    NAME: Hema Pritchett   :  1940   MRN:  [id number]     Total duration of encounter: 8 days      Interim Hospital Summary: 84 y.o. male who presented on 3/25/2024 with Fracture of nasal bones, initial encounter for closed fracture. He has a past medical history of Abnormal nuclear cardiac imaging test, Anemia, Atrial fibrillation (HCC), CKD (chronic kidney disease), Coagulation defect (HCC), Coronary atherosclerosis of native coronary artery, COVID, Diabetes mellitus, type II (HCC), IYER (dyspnea on exertion), Essential hypertension, Glaucoma, HTN (hypertension), Mixed hyperlipidemia, Mobitz type II atrioventricular block, Other dyspnea and respiratory abnormality, Pacemaker, S/P ablation of atrial fibrillation, and Tachycardia..       Pt presenting to ED post GLF, unwitnessed  Delirium / confusion  No caregiver  Son talked to me that evening, but has not been reached since  No placement plan - attempt stablize the agitation issue  Seen by Psychiatry 3/29, requested f/u by Dr. Vital today     Subjective:     Chief Complaint / Reason for Physician Visit  \"No c/o\".  Discussed with RN   Aggitated,combative - required IM tx last evening with Zyprexa, Ativan, Geodon  Sitting bedside eating dinner w/o c/o, enjoying.  Conversant    Review of Systems:  UTO  Symptom Y/N Comments  Symptom Y/N Comments   Fever/Chills    Chest Pain     Poor Appetite    Edema     Cough    Abdominal Pain     Sputum    Joint Pain     SOB/IYER    Pruritis/Rash     Nausea/vomit    Tolerating PT/OT     Diarrhea    Tolerating Diet     Constipation    Other         Current Facility-Administered Medications:     divalproex (DEPAKOTE) DR tablet 250 mg, 250 mg, Oral, 3 times per day, Zaki Lynn MD, 250 mg at 24 1602    haloperidol (HALDOL) tablet 5 mg, 5 mg, Oral, BID, Rigo Vital MD    haloperidol (HALDOL) tablet 5 mg, 5 mg, Oral, Q6H PRN, Rigo Vital MD     rashes. Scattered ecchymosis.  Abrasions clearing on forehead    LABS:  I reviewed today's most current labs and imaging studies.  Pertinent labs include:  Recent Labs     04/01/24  0636   WBC 6.3   HGB 9.7*   HCT 30.6*          Recent Labs     04/01/24  0636      K 4.0      CO2 27   BUN 35*         RADIOLOGY:  CT HEAD 3/25:  No acute infarct is seen. There is no apparent mass on unenhanced imaging. There  is no bleed, shift, obstructive hydrocephalus or significant extra-axial fluid  collection. Bone windows are unremarkable.   IMPRESSION: No acute intracranial abnormality.    CT C-SPINE 3/25:  The alignment is within normal limits. There is no fracture or compression  deformity. The odontoid process is intact. The craniocervical junction is within  normal limits. Degenerative changes and disc disease.   Atherosclerosis.    IMPRESSION: No acute fracture nor subluxation.    CT FACIAL 3/25  Bones: Acute bilateral nasal bone fractures.   Paranasal sinuses: Clear   Orbits: Bilateral lens replacements.   Base of brain: Limited evaluation. No evidence of pathology.   Soft tissues: Left frontal scalp hematoma.   IMPRESSION: Acute bilateral nasal bone fractures. Left frontal scalp hematoma..    LE DUPLEX 3/27:    No evidence of deep vein and acute deep vein thrombosis in the right lower extremity.    No evidence of deep vein and acute deep vein thrombosis in the left lower extremity.    Cardiac phasicity is seen in the veins in the lower extremity, which can be seen with right heart failure or tricuspid regurgiation.    EKG 3/25:  Poor quality, Afib 68 bpm, ABN, HR decreased by 62 bpm since prev [id number]    CONSULT - PSYCHIATRY  4/2:  Records and clinical information were reviewed. Has remained very delirious since admission--waxing and waning levels of confusion and alertness, visual hallucinations and stereo-typic movements, and episodic agitation. Has steadily been requiring increased dosages of

## 2024-04-02 NOTE — PROGRESS NOTES
Patient ha been on compliant, agitated and physically aggressive towards staff throughout this shift.  Requiring assistance from security multiple times for patient, staff and unit safety.  On call MD notified multiple times throughout shift for additional medications. Patient received the following additional PRN medications this shift: 2209 Haldol 5 MG IM; 2330 Tylenol 1000 MG PO and Vistaril 50 MG PO; Zyprexa 5 MG IM at 0234; Geodon 20 MG IM at 0424. Patient currently resting in bed quietly, easily aroused, no distress noted.

## 2024-04-02 NOTE — CONSULTS
INTERVAL Hx:  Records and clinical information were reviewed. Has remained very delirious since admission--waxing and waning levels of confusion and alertness, visual hallucinations and stereo-typic movements, and episodic agitation. Has steadily been requiring increased dosages of PRNs, without much relief. Olanzapine hasn't helped at all and could be contributing to the delirium. He received 30 mg total in the past 24 hours, along with 5 mg of Haldol and 20 mg of IM Geodon. I agree with starting the Depakote and I'll change the Olanzapine to Haldol, including for PRN use. Will D/C the Vistaril due to some anticholinergic properties as well. Can't assess his level of reported dementia until the delirium clears.     MEDS:  Current Facility-Administered Medications   Medication Dose Route Frequency    divalproex (DEPAKOTE) DR tablet 250 mg  250 mg Oral 3 times per day    haloperidol (HALDOL) tablet 5 mg  5 mg Oral BID    haloperidol (HALDOL) tablet 5 mg  5 mg Oral Q6H PRN    haloperidol lactate (HALDOL) injection 5 mg  5 mg IntraMUSCular Q6H PRN    furosemide (LASIX) tablet 20 mg  20 mg Oral BID    lisinopril (PRINIVIL;ZESTRIL) tablet 2.5 mg  2.5 mg Oral Daily    metoprolol tartrate (LOPRESSOR) tablet 25 mg  25 mg Oral Daily    latanoprost (XALATAN) 0.005 % ophthalmic solution 1 drop  1 drop Both Eyes Nightly    ranolazine (RANEXA) extended release tablet 500 mg  500 mg Oral BID    sodium chloride flush 0.9 % injection 5-40 mL  5-40 mL IntraVENous 2 times per day    sodium chloride flush 0.9 % injection 5-40 mL  5-40 mL IntraVENous PRN    0.9 % sodium chloride infusion   IntraVENous PRN    enoxaparin (LOVENOX) injection 40 mg  40 mg SubCUTAneous Daily    polyethylene glycol (GLYCOLAX) packet 17 g  17 g Oral Daily PRN    acetaminophen (TYLENOL) tablet 1,000 mg  1,000 mg Oral Q6H PRN    Or    acetaminophen (TYLENOL) suppository 650 mg  650 mg Rectal Q6H PRN       VITALS: Patient Vitals for the past 12 hrs:   Pulse Resp

## 2024-04-02 NOTE — PROGRESS NOTES
Pt awake and agitated around 0945 AM. Out of bed wandering in room. Pt tried to redirect, pt proceeded to urinate into the floor. Security called. Pt proceeded to walk out of room with increased agitation, Code Giovanni called. Pt attempted to enter 206 pt room, redirected back to pt room. Pt then tried to enter 204 pt room. With help of security and care team, pt placed in wheelchair and wheeled back into patient room. 5mg Zyprexa administered IM @ 1015. Pt resting in bed, easily aroused, no distress noted.

## 2024-04-03 LAB
ANION GAP SERPL CALC-SCNC: 8 MMOL/L (ref 5–15)
BUN SERPL-MCNC: 36 MG/DL (ref 6–20)
BUN/CREAT SERPL: 22 (ref 12–20)
CALCIUM SERPL-MCNC: 9 MG/DL (ref 8.5–10.1)
CHLORIDE SERPL-SCNC: 105 MMOL/L (ref 97–108)
CO2 SERPL-SCNC: 27 MMOL/L (ref 21–32)
CREAT SERPL-MCNC: 1.65 MG/DL (ref 0.7–1.3)
GLUCOSE SERPL-MCNC: 153 MG/DL (ref 65–100)
MAGNESIUM SERPL-MCNC: 2.2 MG/DL (ref 1.6–2.4)
POTASSIUM SERPL-SCNC: 4.6 MMOL/L (ref 3.5–5.1)
SODIUM SERPL-SCNC: 140 MMOL/L (ref 136–145)

## 2024-04-03 PROCEDURE — 83735 ASSAY OF MAGNESIUM: CPT

## 2024-04-03 PROCEDURE — 80048 BASIC METABOLIC PNL TOTAL CA: CPT

## 2024-04-03 PROCEDURE — 6370000000 HC RX 637 (ALT 250 FOR IP): Performed by: PSYCHIATRY & NEUROLOGY

## 2024-04-03 PROCEDURE — 99232 SBSQ HOSP IP/OBS MODERATE 35: CPT | Performed by: PSYCHIATRY & NEUROLOGY

## 2024-04-03 PROCEDURE — 6370000000 HC RX 637 (ALT 250 FOR IP): Performed by: STUDENT IN AN ORGANIZED HEALTH CARE EDUCATION/TRAINING PROGRAM

## 2024-04-03 PROCEDURE — 6360000002 HC RX W HCPCS: Performed by: STUDENT IN AN ORGANIZED HEALTH CARE EDUCATION/TRAINING PROGRAM

## 2024-04-03 PROCEDURE — 6370000000 HC RX 637 (ALT 250 FOR IP): Performed by: INTERNAL MEDICINE

## 2024-04-03 PROCEDURE — 1100000000 HC RM PRIVATE

## 2024-04-03 PROCEDURE — 83921 ORGANIC ACID SINGLE QUANT: CPT

## 2024-04-03 PROCEDURE — 36415 COLL VENOUS BLD VENIPUNCTURE: CPT

## 2024-04-03 RX ADMIN — HALOPERIDOL 5 MG: 5 TABLET ORAL at 02:03

## 2024-04-03 RX ADMIN — HALOPERIDOL 5 MG: 5 TABLET ORAL at 10:34

## 2024-04-03 RX ADMIN — FUROSEMIDE 20 MG: 20 TABLET ORAL at 10:45

## 2024-04-03 RX ADMIN — ENOXAPARIN SODIUM 40 MG: 100 INJECTION SUBCUTANEOUS at 10:34

## 2024-04-03 RX ADMIN — METOPROLOL TARTRATE 25 MG: 25 TABLET, FILM COATED ORAL at 10:34

## 2024-04-03 RX ADMIN — FUROSEMIDE 20 MG: 20 TABLET ORAL at 17:26

## 2024-04-03 RX ADMIN — HALOPERIDOL 5 MG: 5 TABLET ORAL at 20:41

## 2024-04-03 RX ADMIN — DIVALPROEX SODIUM 250 MG: 250 TABLET, DELAYED RELEASE ORAL at 13:50

## 2024-04-03 RX ADMIN — LISINOPRIL 2.5 MG: 5 TABLET ORAL at 10:34

## 2024-04-03 RX ADMIN — LATANOPROST 1 DROP: 50 SOLUTION OPHTHALMIC at 21:30

## 2024-04-03 RX ADMIN — DIVALPROEX SODIUM 250 MG: 250 TABLET, DELAYED RELEASE ORAL at 06:32

## 2024-04-03 RX ADMIN — DIVALPROEX SODIUM 250 MG: 250 TABLET, DELAYED RELEASE ORAL at 20:41

## 2024-04-03 RX ADMIN — RANOLAZINE 500 MG: 500 TABLET, FILM COATED, EXTENDED RELEASE ORAL at 10:34

## 2024-04-03 RX ADMIN — RANOLAZINE 500 MG: 500 TABLET, FILM COATED, EXTENDED RELEASE ORAL at 20:41

## 2024-04-03 ASSESSMENT — PAIN SCALES - WONG BAKER
WONGBAKER_NUMERICALRESPONSE: NO HURT
WONGBAKER_NUMERICALRESPONSE: NO HURT

## 2024-04-03 ASSESSMENT — PAIN SCALES - GENERAL
PAINLEVEL_OUTOF10: 3
PAINLEVEL_OUTOF10: 0
PAINLEVEL_OUTOF10: 0

## 2024-04-03 NOTE — PROGRESS NOTES
Patient agitated thru out shift and the virtual  alerted staff patient getting out of bed.  Patient continues to want to walk in garcia in nothing but a brief.  Takes two persons to redirect patient back to room.  Patient continues to see and talk to people or animals that are not in room. Haldol is effective patient is resting in bed with eyes closed.

## 2024-04-03 NOTE — CONSULTS
INTERVAL Hx:  Records and clinical information were reviewed. Seems to be calmer this AM--sitting up on the side of the bed without any agitation. Still delirious--mostly nonsensical comments that are completely off topic, and he's still having likely VH's at times. Seems to be tolerating the Haldol and the Depakote so far--no sedation, motor impairment, or EPSE's noted. Cause of the delirium is still unclear, but prior meds could have contributed to it, somewhat. Can't assess his level of reported dementia until the delirium clears further.     MEDS:  Current Facility-Administered Medications   Medication Dose Route Frequency    divalproex (DEPAKOTE) DR tablet 250 mg  250 mg Oral 3 times per day    haloperidol (HALDOL) tablet 5 mg  5 mg Oral BID    haloperidol (HALDOL) tablet 5 mg  5 mg Oral Q6H PRN    haloperidol lactate (HALDOL) injection 5 mg  5 mg IntraMUSCular Q6H PRN    furosemide (LASIX) tablet 20 mg  20 mg Oral BID    lisinopril (PRINIVIL;ZESTRIL) tablet 2.5 mg  2.5 mg Oral Daily    metoprolol tartrate (LOPRESSOR) tablet 25 mg  25 mg Oral Daily    latanoprost (XALATAN) 0.005 % ophthalmic solution 1 drop  1 drop Both Eyes Nightly    ranolazine (RANEXA) extended release tablet 500 mg  500 mg Oral BID    sodium chloride flush 0.9 % injection 5-40 mL  5-40 mL IntraVENous 2 times per day    sodium chloride flush 0.9 % injection 5-40 mL  5-40 mL IntraVENous PRN    0.9 % sodium chloride infusion   IntraVENous PRN    enoxaparin (LOVENOX) injection 40 mg  40 mg SubCUTAneous Daily    polyethylene glycol (GLYCOLAX) packet 17 g  17 g Oral Daily PRN    acetaminophen (TYLENOL) tablet 1,000 mg  1,000 mg Oral Q6H PRN    Or    acetaminophen (TYLENOL) suppository 650 mg  650 mg Rectal Q6H PRN       VITALS: Patient Vitals for the past 12 hrs:   Temp Pulse Resp BP SpO2   04/03/24 0742 97.9 °F (36.6 °C) 99 18 (!) 149/87 99 %   04/03/24 0239 -- (!) 101 -- (!) 147/76 --   04/03/24 0223 97.8 °F (36.6 °C) 88 18 -- 96 %          LABS: .  Recent Results (from the past 24 hour(s))   Basic Metabolic Panel    Collection Time: 04/03/24  6:53 AM   Result Value Ref Range    Sodium 140 136 - 145 mmol/L    Potassium 4.6 3.5 - 5.1 mmol/L    Chloride 105 97 - 108 mmol/L    CO2 27 21 - 32 mmol/L    Anion Gap 8 5 - 15 mmol/L    Glucose 153 (H) 65 - 100 mg/dL    BUN 36 (H) 6 - 20 MG/DL    Creatinine 1.65 (H) 0.70 - 1.30 MG/DL    Bun/Cre Ratio 22 (H) 12 - 20      Est, Glom Filt Rate 41 (L) >60 ml/min/1.73m2    Calcium 9.0 8.5 - 10.1 MG/DL   Magnesium    Collection Time: 04/03/24  6:53 AM   Result Value Ref Range    Magnesium 2.2 1.6 - 2.4 mg/dL       MSE:   Appearance: wearing gown; fair grooming  Behavior: calmer and more interactive  Motor: mildly slowed  Mood/Affect: no agitation currently  Thought Process: very disorganized  Thought Content: often nonsensical  Perceptions: +VH's and likely AH's  Insight/Judgment: nil    ASSESSMENT:   1.  Delirium--unclear etiology  2.  Possible Dementia, unclear type but vascular element is likely    PLAN:  Continue the Haldol at 5 mg BID.  Use Haldol 5 mg PRN for agitation.  Continue the Depakote at 250 mg TID  Will follow and adjust meds as needed.

## 2024-04-03 NOTE — CARE COORDINATION
Transition of Care Plan:     RUR: 19% MODERATE   Prior Level of Functioning: Able to manage ADL's as far as son was aware   Disposition: TBD. SNF/LTC vs AL?   If SNF or IPR: Date FOC offered:   Date FOC received:   Accepting facility:   Date authorization started with reference number:   Date authorization received and expires:   Follow up appointments:   DME needed:   Transportation at discharge:   IM/IMM Medicare/ letter given: 1st IMM obtained 3/27  Is patient a  and connected with VA?               If yes, was [place] transfer form completed and VA notified?   Caregiver Contact:   Discharge Caregiver contacted prior to discharge?   Care Conference needed?   Barriers to discharge: Mentation        1430: Called Capri from [facility]. Said she has NOT yet spoken to son. She tried calling but it hung up and wouldn't let her leave a voicemail. She said she has been busy all day today and hasn't looked at her missed calls yet. I told her I provided son with Capri's info as well. Also updated Capri that according to staff Mr. Pritchett has been behaving better since the medication adjustment but still has the video monitoring system. Facility can NOT take patient unless off monitor for 24 hours.

## 2024-04-03 NOTE — PROGRESS NOTES
Hospitalist Progress Note    NAME:   Hema Pritchett   : 1940   MRN: [id number]     Date/Time: 4/3/2024 1:13 PM  Patient PCP: Teena Montes APRN - NP      Assessment / Plan:    Mr. Pritchett is a 84 year old male admitted after he was found down on the floor with facial trauma and confusion.  Upon arrival he was found to have bilateral nasal fractures and since admission has had psychosis with delirium.    Altered mental status with psychosis and delirium  History of Dementia (?)  -patient has had delirium with confusion since admission, not on any medications outpatient   -per son this has been his \"baseline\" for several months  -CT scan of the head obtained was negative for acute findings  -unable to obtain an MRI due to agitation    - patient became very aggressive with nursing staff with attempts of hitting, CODE AURORA and CODE ATLAS were called in which prn Zyprexa was given    -Psychiatry consult placed with recommendations noted.  Continue scheduled Zyprexa 5mg PO BID in addition to PRN medications    - he again had episode of agitation, frequently asking \"when is my flight out of here\".  Per Psychiatry rapid decline in memory and ADLs most concerning for vascular dementia given HTN history; self care and medication administration may have worsened following the death of the patient's wife. Patient cannot care for himself at this time and is a high risk for harm to self or others given his AMS. Recommended increasing Zyprexa 5mg PO BID + 5mg BID PRN agitation    - patient remained agitated despite adjustments in medications.  Dr. Vital evaluated patient today who recommended starting Depakote 250mg TID, change Olanzapine to Haldol 5mg BID and DC vistaril.    - this am patient seen lying in bed calm, per nursing staff was not agitated overnight and remained calm.       3.   Acute bilateral nasal bone fractures   -after an unwitnessed fall at home  -outpatient follow up with  ENT    4.   Chronic atrial fibrillation  -rate controlled, continue metoprolol    5.   Essential hypertension  -continue outpatient regimen    6.   Hyperlipidemia  -continue outpatient regimen    7.   Anemia  -stable    8.   Peripheral edema  -elevated proBNP  -empirically placed on oral Lasix daily  -obtain 2D echocardiogram once patient is stable and less aggressive    Code Status: FULL  DVT Prophylaxis:  Lovenox      Subjective:     Chief Complaint / Reason for Physician Visit  This am patient seen resting comfortably.      Objective:     VITALS:   Last 24hrs VS reviewed since prior progress note. Most recent are:  Patient Vitals for the past 24 hrs:   BP Temp Temp src Pulse Resp SpO2 Height   04/03/24 0742 (!) 149/87 97.9 °F (36.6 °C) Oral 99 18 99 % --   04/03/24 0239 (!) 147/76 -- -- (!) 101 -- -- --   04/03/24 0223 -- 97.8 °F (36.6 °C) Tympanic 88 18 96 % --   04/02/24 1511 -- -- -- -- -- -- 1.829 m (6' 0.01\")           Intake/Output Summary (Last 24 hours) at 4/3/2024 1313  Last data filed at 4/3/2024 0900  Gross per 24 hour   Intake 240 ml   Output --   Net 240 ml          I had a face to face encounter and independently examined this patient on 4/3/2024, as outlined below:  PHYSICAL EXAM:  General: Alert and in no acute cardiopulmonary distress   EENT:  +ecchymosis, tenderness to palpation  Resp:  Decreased inspiratory effort  CV:  Regular  rhythm,  No edema  GI:  Soft, Non distended, Non tender.  +Bowel sounds  Neurologic:  Unable to fully assess due to mentation  Psych:   Unable to fully assess due to mentation      Reviewed most current lab test results and cultures  YES  Reviewed most current radiology test results   YES  Review and summation of old records today    NO  Reviewed patient's current orders and MAR    YES  PMH/SH reviewed - no change compared to H&P  ________________________________________________________________________  Care Plan discussed with:    Comments   Patient x    Family      RN

## 2024-04-03 NOTE — PLAN OF CARE
Problem: Safety - Adult  Goal: Free from fall injury  Outcome: Progressing     Problem: ABCDS Injury Assessment  Goal: Absence of physical injury  Outcome: Progressing     Problem: Chronic Conditions and Co-morbidities  Goal: Patient's chronic conditions and co-morbidity symptoms are monitored and maintained or improved  Outcome: Progressing  Flowsheets (Taken 4/2/2024 2115)  Care Plan - Patient's Chronic Conditions and Co-Morbidity Symptoms are Monitored and Maintained or Improved: Monitor and assess patient's chronic conditions and comorbid symptoms for stability, deterioration, or improvement     Problem: Skin/Tissue Integrity  Goal: Absence of new skin breakdown  Description: 1.  Monitor for areas of redness and/or skin breakdown  2.  Assess vascular access sites hourly  3.  Every 4-6 hours minimum:  Change oxygen saturation probe site  4.  Every 4-6 hours:  If on nasal continuous positive airway pressure, respiratory therapy assess nares and determine need for appliance change or resting period.  Outcome: Progressing

## 2024-04-04 PROCEDURE — 6370000000 HC RX 637 (ALT 250 FOR IP): Performed by: STUDENT IN AN ORGANIZED HEALTH CARE EDUCATION/TRAINING PROGRAM

## 2024-04-04 PROCEDURE — 6370000000 HC RX 637 (ALT 250 FOR IP): Performed by: PSYCHIATRY & NEUROLOGY

## 2024-04-04 PROCEDURE — 99232 SBSQ HOSP IP/OBS MODERATE 35: CPT | Performed by: PSYCHIATRY & NEUROLOGY

## 2024-04-04 PROCEDURE — 6370000000 HC RX 637 (ALT 250 FOR IP): Performed by: INTERNAL MEDICINE

## 2024-04-04 PROCEDURE — 1100000000 HC RM PRIVATE

## 2024-04-04 RX ADMIN — DIVALPROEX SODIUM 250 MG: 250 TABLET, DELAYED RELEASE ORAL at 06:28

## 2024-04-04 RX ADMIN — METOPROLOL TARTRATE 25 MG: 25 TABLET, FILM COATED ORAL at 11:20

## 2024-04-04 RX ADMIN — LATANOPROST 1 DROP: 50 SOLUTION OPHTHALMIC at 23:23

## 2024-04-04 RX ADMIN — HALOPERIDOL 5 MG: 5 TABLET ORAL at 22:15

## 2024-04-04 RX ADMIN — FUROSEMIDE 20 MG: 20 TABLET ORAL at 11:20

## 2024-04-04 RX ADMIN — DIVALPROEX SODIUM 250 MG: 250 TABLET, DELAYED RELEASE ORAL at 14:36

## 2024-04-04 RX ADMIN — DIVALPROEX SODIUM 250 MG: 250 TABLET, DELAYED RELEASE ORAL at 22:15

## 2024-04-04 RX ADMIN — RANOLAZINE 500 MG: 500 TABLET, FILM COATED, EXTENDED RELEASE ORAL at 22:15

## 2024-04-04 RX ADMIN — RANOLAZINE 500 MG: 500 TABLET, FILM COATED, EXTENDED RELEASE ORAL at 11:20

## 2024-04-04 RX ADMIN — LISINOPRIL 2.5 MG: 5 TABLET ORAL at 11:20

## 2024-04-04 RX ADMIN — FUROSEMIDE 20 MG: 20 TABLET ORAL at 18:09

## 2024-04-04 RX ADMIN — HALOPERIDOL 5 MG: 5 TABLET ORAL at 11:20

## 2024-04-04 ASSESSMENT — PAIN SCALES - WONG BAKER: WONGBAKER_NUMERICALRESPONSE: NO HURT

## 2024-04-04 ASSESSMENT — PAIN SCALES - GENERAL: PAINLEVEL_OUTOF10: 0

## 2024-04-04 NOTE — PROGRESS NOTES
Hospitalist Progress Note    NAME:   Hema Pritchett   : 1940   MRN: [id number]     Date/Time: 2024 4:01 PM  Patient PCP: Teena Montes APRN - NP      Assessment / Plan:    Mr. Pritchett is a 84 year old male admitted after he was found down on the floor with facial trauma and confusion.  Upon arrival he was found to have bilateral nasal fractures and since admission has had psychosis with delirium.    Altered mental status with delirium  History of Dementia (?)  -patient has had delirium with confusion since admission, not on any medications outpatient   -per son this has been his \"baseline\" for several months  -CT scan of the head obtained was negative for acute findings  -unable to obtain an MRI due to agitation    - patient became very aggressive with nursing staff with attempts of hitting, CODE AURORA and CODE ATLAS were called in which prn Zyprexa was given    -Psychiatry consult placed with recommendations noted.  Continue scheduled Zyprexa 5mg PO BID in addition to PRN medications    - he again had episode of agitation, frequently asking \"when is my flight out of here\".  Per Psychiatry rapid decline in memory and ADLs most concerning for vascular dementia given HTN history; self care and medication administration may have worsened following the death of the patient's wife. Patient cannot care for himself at this time and is a high risk for harm to self or others given his AMS. Recommended increasing Zyprexa 5mg PO BID + 5mg BID PRN agitation    - patient remained agitated despite adjustments in medications.  Dr. Vital evaluated patient today who recommended starting Depakote 250mg TID, change Olanzapine to Haldol 5mg BID and DC vistaril.    - this am patient seen lying in bed calm, per nursing staff was not agitated overnight and remained calm.    - patient lethargic this am however arousable seen sitting up in chair eating.  Will discontinue video monitoring for 24h  H&P  ________________________________________________________________________  Care Plan discussed with:    Comments   Patient x    Family      RN x    Care Manager     Consultant                        Multidiciplinary team rounds were held today with , nursing, pharmacist and clinical coordinator.  Patient's plan of care was discussed; medications were reviewed and discharge planning was addressed.     ________________________________________________________________________  Total NON critical care TIME:  35  Minutes    Total CRITICAL CARE TIME Spent:   Minutes non procedure based      Comments   >50% of visit spent in counseling and coordination of care     ________________________________________________________________________  Daniela Bryan MD     Procedures: see electronic medical records for all procedures/Xrays and details which were not copied into this note but were reviewed prior to creation of Plan.      LABS:  I reviewed today's most current labs and imaging studies.  Pertinent labs include:  No results for input(s): \"WBC\", \"HGB\", \"HCT\", \"PLT\" in the last 72 hours.    Recent Labs     04/03/24  0653      K 4.6      CO2 27   BUN 36*   MG 2.2         Signed: Daniela Bryan MD

## 2024-04-04 NOTE — PLAN OF CARE
Problem: Safety - Adult  Goal: Free from fall injury  Outcome: Progressing     Problem: ABCDS Injury Assessment  Goal: Absence of physical injury  Outcome: Progressing     Problem: Chronic Conditions and Co-morbidities  Goal: Patient's chronic conditions and co-morbidity symptoms are monitored and maintained or improved  Outcome: Progressing     Problem: Skin/Tissue Integrity  Goal: Absence of new skin breakdown  Description: 1.  Monitor for areas of redness and/or skin breakdown  2.  Assess vascular access sites hourly  Outcome: Progressing

## 2024-04-04 NOTE — CARE COORDINATION
Transition of Care Plan:     RUR: 19% MODERATE   Prior Level of Functioning: Able to manage ADL's as far as son was aware   Disposition: TBD. SNF/LTC vs AL?   If SNF or IPR: Date FOC offered:   Date FOC received:   Accepting facility:   Date authorization started with reference number:   Date authorization received and expires:   Follow up appointments:   DME needed:   Transportation at discharge:   IM/IMM Medicare/ letter given: 1st IMM obtained 3/27  Is patient a [place] and connected with [facility]?               If yes, was  transfer form completed and VA notified?   Caregiver Contact:   Discharge Caregiver contacted prior to discharge?   Care Conference needed?   Barriers to discharge: Mentation    1000: During IDR rounds, CM stated that son seeking documentation from physicians stating that his father Is incompetent to make decisions so that he can bring this to a  and deem him incompetent. After that, son would like to be able to access patient's resources to pay for LTC. Right now, he is NOT able to pay. No facility will take him without payment. Hospitalist has deferred to psychiatrist     1323: Sent message to Capri to see if she has heard from patient's son. Also let her know that patient was taken off the video monitoring system since around 10am this morning. Also let her know what patient's behavior improving. No reports of being aggressive.       1500: Referrals sent to [facility] and [place].  Patient has been declined from [facility] and [facility].

## 2024-04-04 NOTE — CONSULTS
INTERVAL Hx:  Records and clinical information were reviewed. Very sleepy this AM and was slow to arouse until ~11 AM. He appears to be much calmer than before, and there's been no reported behavioral issues including no wandering or aggression. However, he's still grossly confused and still delirious--mostly nonsensical and unable to communicate effectively at times. However, he seems to be more redirectable than before. Mostly tolerating the Haldol and the Depakote so far but we'll need to watch out for any excessive sedation or motor impairment. Cause of the delirium is still unclear, but prior meds could have contributed to it, somewhat. Can't assess his level of reported dementia until the delirium clears further.     MEDS:  Current Facility-Administered Medications   Medication Dose Route Frequency    divalproex (DEPAKOTE) DR tablet 250 mg  250 mg Oral 3 times per day    haloperidol (HALDOL) tablet 5 mg  5 mg Oral BID    haloperidol (HALDOL) tablet 5 mg  5 mg Oral Q6H PRN    haloperidol lactate (HALDOL) injection 5 mg  5 mg IntraMUSCular Q6H PRN    furosemide (LASIX) tablet 20 mg  20 mg Oral BID    lisinopril (PRINIVIL;ZESTRIL) tablet 2.5 mg  2.5 mg Oral Daily    metoprolol tartrate (LOPRESSOR) tablet 25 mg  25 mg Oral Daily    latanoprost (XALATAN) 0.005 % ophthalmic solution 1 drop  1 drop Both Eyes Nightly    ranolazine (RANEXA) extended release tablet 500 mg  500 mg Oral BID    sodium chloride flush 0.9 % injection 5-40 mL  5-40 mL IntraVENous 2 times per day    sodium chloride flush 0.9 % injection 5-40 mL  5-40 mL IntraVENous PRN    0.9 % sodium chloride infusion   IntraVENous PRN    enoxaparin (LOVENOX) injection 40 mg  40 mg SubCUTAneous Daily    polyethylene glycol (GLYCOLAX) packet 17 g  17 g Oral Daily PRN    acetaminophen (TYLENOL) tablet 1,000 mg  1,000 mg Oral Q6H PRN    Or    acetaminophen (TYLENOL) suppository 650 mg  650 mg Rectal Q6H PRN       VITALS: Patient Vitals for the past 12 hrs:    Temp Pulse Resp BP   04/04/24 0035 -- (!) 110 -- 90/60   04/03/24 2330 98.1 °F (36.7 °C) (!) 117 19 (!) 83/63         LABS: .No results found for this or any previous visit (from the past 24 hour(s)).      MSE:   Appearance: lying in bed; fair grooming  Behavior: calm   Motor: mildly slowed  Mood/Affect: no agitation currently  Thought Process: still very disorganized  Thought Content: often nonsensical  Perceptions: +VH's and possibly some AH's  Insight/Judgment: nil    ASSESSMENT:   1.  Delirium--unclear etiology  2.  Possible Dementia, unclear type but vascular element is likely    PLAN:  Continue the Haldol at 5 mg BID for now--may need to reduce dose.  Use Haldol 5 mg PRN for agitation.  Continue the Depakote at 250 mg TID for now  Will follow and adjust meds as needed. If too lethargic, consider decreasing the Haldol dose to 2-4 mg BID.

## 2024-04-05 ENCOUNTER — APPOINTMENT (OUTPATIENT)
Facility: HOSPITAL | Age: 84
DRG: 948 | End: 2024-04-05
Payer: MEDICARE

## 2024-04-05 VITALS
DIASTOLIC BLOOD PRESSURE: 93 MMHG | HEIGHT: 72 IN | WEIGHT: 200 LBS | HEART RATE: 100 BPM | SYSTOLIC BLOOD PRESSURE: 126 MMHG | BODY MASS INDEX: 27.09 KG/M2 | TEMPERATURE: 97.3 F | RESPIRATION RATE: 20 BRPM | OXYGEN SATURATION: 99 %

## 2024-04-05 LAB
ANION GAP SERPL CALC-SCNC: 7 MMOL/L (ref 5–15)
BUN SERPL-MCNC: 47 MG/DL (ref 6–20)
BUN/CREAT SERPL: 27 (ref 12–20)
CALCIUM SERPL-MCNC: 8.7 MG/DL (ref 8.5–10.1)
CHLORIDE SERPL-SCNC: 104 MMOL/L (ref 97–108)
CO2 SERPL-SCNC: 30 MMOL/L (ref 21–32)
CREAT SERPL-MCNC: 1.77 MG/DL (ref 0.7–1.3)
ECHO AO ROOT DIAM: 3.8 CM
ECHO AO ROOT INDEX: 1.78 CM/M2
ECHO BSA: 2.15 M2
ECHO LA DIAMETER INDEX: 2.39 CM/M2
ECHO LA DIAMETER: 5.1 CM
ECHO LA TO AORTIC ROOT RATIO: 1.34
ECHO LA VOL A-L A2C: 101 ML (ref 18–58)
ECHO LA VOL A-L A4C: 93 ML (ref 18–58)
ECHO LA VOL MOD A2C: 97 ML (ref 18–58)
ECHO LA VOL MOD A4C: 89 ML (ref 18–58)
ECHO LA VOLUME AREA LENGTH: 110 ML
ECHO LA VOLUME INDEX A-L A2C: 47 ML/M2 (ref 16–34)
ECHO LA VOLUME INDEX A-L A4C: 44 ML/M2 (ref 16–34)
ECHO LA VOLUME INDEX AREA LENGTH: 52 ML/M2 (ref 16–34)
ECHO LA VOLUME INDEX MOD A2C: 46 ML/M2 (ref 16–34)
ECHO LA VOLUME INDEX MOD A4C: 42 ML/M2 (ref 16–34)
ECHO LV EDV A2C: 129 ML
ECHO LV EDV A4C: 115 ML
ECHO LV EDV BP: 123 ML (ref 67–155)
ECHO LV EDV INDEX A4C: 54 ML/M2
ECHO LV EDV INDEX BP: 58 ML/M2
ECHO LV EDV NDEX A2C: 61 ML/M2
ECHO LV EJECTION FRACTION A2C: 60 %
ECHO LV EJECTION FRACTION A4C: 38 %
ECHO LV EJECTION FRACTION BIPLANE: 49 % (ref 55–100)
ECHO LV ESV A2C: 52 ML
ECHO LV ESV A4C: 72 ML
ECHO LV ESV BP: 62 ML (ref 22–58)
ECHO LV ESV INDEX A2C: 24 ML/M2
ECHO LV ESV INDEX A4C: 34 ML/M2
ECHO LV ESV INDEX BP: 29 ML/M2
ECHO LV FRACTIONAL SHORTENING: 14 % (ref 28–44)
ECHO LV INTERNAL DIMENSION DIASTOLE INDEX: 2.72 CM/M2
ECHO LV INTERNAL DIMENSION DIASTOLIC: 5.8 CM (ref 4.2–5.9)
ECHO LV INTERNAL DIMENSION SYSTOLIC INDEX: 2.35 CM/M2
ECHO LV INTERNAL DIMENSION SYSTOLIC: 5 CM
ECHO LV IVSD: 0.9 CM (ref 0.6–1)
ECHO LV MASS 2D: 203.5 G (ref 88–224)
ECHO LV MASS INDEX 2D: 95.5 G/M2 (ref 49–115)
ECHO LV POSTERIOR WALL DIASTOLIC: 0.9 CM (ref 0.6–1)
ECHO LV RELATIVE WALL THICKNESS RATIO: 0.31
ECHO LVOT AREA: 3.1 CM2
ECHO LVOT DIAM: 2 CM
GLUCOSE SERPL-MCNC: 117 MG/DL (ref 65–100)
POTASSIUM SERPL-SCNC: 4.6 MMOL/L (ref 3.5–5.1)
SODIUM SERPL-SCNC: 141 MMOL/L (ref 136–145)
VALPROATE SERPL-MCNC: 29 UG/ML (ref 50–100)

## 2024-04-05 PROCEDURE — 6370000000 HC RX 637 (ALT 250 FOR IP): Performed by: PSYCHIATRY & NEUROLOGY

## 2024-04-05 PROCEDURE — 94760 N-INVAS EAR/PLS OXIMETRY 1: CPT

## 2024-04-05 PROCEDURE — 6370000000 HC RX 637 (ALT 250 FOR IP): Performed by: INTERNAL MEDICINE

## 2024-04-05 PROCEDURE — 99232 SBSQ HOSP IP/OBS MODERATE 35: CPT | Performed by: PSYCHIATRY & NEUROLOGY

## 2024-04-05 PROCEDURE — 6370000000 HC RX 637 (ALT 250 FOR IP): Performed by: STUDENT IN AN ORGANIZED HEALTH CARE EDUCATION/TRAINING PROGRAM

## 2024-04-05 PROCEDURE — 93308 TTE F-UP OR LMTD: CPT

## 2024-04-05 PROCEDURE — 80048 BASIC METABOLIC PNL TOTAL CA: CPT

## 2024-04-05 PROCEDURE — 80164 ASSAY DIPROPYLACETIC ACD TOT: CPT

## 2024-04-05 PROCEDURE — 93308 TTE F-UP OR LMTD: CPT | Performed by: SPECIALIST

## 2024-04-05 PROCEDURE — 93325 DOPPLER ECHO COLOR FLOW MAPG: CPT | Performed by: SPECIALIST

## 2024-04-05 PROCEDURE — 36415 COLL VENOUS BLD VENIPUNCTURE: CPT

## 2024-04-05 RX ORDER — HALOPERIDOL 5 MG/1
5 TABLET ORAL 2 TIMES DAILY
Qty: 120 TABLET | Refills: 3 | Status: SHIPPED | OUTPATIENT
Start: 2024-04-05

## 2024-04-05 RX ORDER — DIVALPROEX SODIUM 250 MG/1
250 TABLET, DELAYED RELEASE ORAL EVERY 8 HOURS SCHEDULED
Qty: 90 TABLET | Refills: 3 | Status: SHIPPED | OUTPATIENT
Start: 2024-04-05

## 2024-04-05 RX ORDER — FUROSEMIDE 20 MG/1
20 TABLET ORAL 2 TIMES DAILY
Qty: 60 TABLET | Refills: 3 | Status: SHIPPED | OUTPATIENT
Start: 2024-04-05

## 2024-04-05 RX ADMIN — RANOLAZINE 500 MG: 500 TABLET, FILM COATED, EXTENDED RELEASE ORAL at 08:41

## 2024-04-05 RX ADMIN — FUROSEMIDE 20 MG: 20 TABLET ORAL at 08:42

## 2024-04-05 RX ADMIN — LISINOPRIL 2.5 MG: 5 TABLET ORAL at 08:41

## 2024-04-05 RX ADMIN — HALOPERIDOL 5 MG: 5 TABLET ORAL at 08:45

## 2024-04-05 RX ADMIN — DIVALPROEX SODIUM 250 MG: 250 TABLET, DELAYED RELEASE ORAL at 14:18

## 2024-04-05 RX ADMIN — METOPROLOL TARTRATE 25 MG: 25 TABLET, FILM COATED ORAL at 08:41

## 2024-04-05 RX ADMIN — HALOPERIDOL 5 MG: 5 TABLET ORAL at 08:41

## 2024-04-05 RX ADMIN — DIVALPROEX SODIUM 250 MG: 250 TABLET, DELAYED RELEASE ORAL at 06:39

## 2024-04-05 NOTE — DISCHARGE SUMMARY
Discharge Summary    Name: Hema Pritchett  [id number]  YOB: 1940 (Age: 84 y.o.)   Date of Admission: 3/25/2024  Date of Discharge: 4/5/2024  Attending Physician: Daniela Bryan MD    Discharge Diagnosis:     Consultations:  IP CONSULT TO CASE MANAGEMENT  IP CONSULT TO HOSPITALIST  IP CONSULT TO PSYCHIATRY  IP CONSULT TO PSYCHIATRY      Brief Admission History/Reason for Admission Per Daniela Bryan MD:     84 year old male admitted after he was found down on the floor with facial trauma and confusion. Upon arrival he was found to have bilateral nasal fractures and since admission has had psychosis with delirium.     =============================    Brief Hospital Course by Main Problems:     Altered mental status with delirium  History of Dementia   -patient has had delirium with confusion since admission, not on any medications outpatient   -per son this has been his \"baseline\" for several months  -CT scan of the head obtained was negative for acute findings  -unable to obtain an MRI due to agitation     -03/27 patient became very aggressive with nursing staff with attempts of hitting, CODE AURORA and CODE ATLAS were called in which prn Zyprexa was given     -Psychiatry consult placed with recommendations noted.  Continue scheduled Zyprexa 5mg PO BID in addition to PRN medications     -03/29 he again had episode of agitation, frequently asking \"when is my flight out of here\".  Per Psychiatry rapid decline in memory and ADLs most concerning for vascular dementia given HTN history; self care and medication administration may have worsened following the death of the patient's wife. Patient cannot care for himself at this time and is a high risk for harm to self or others given his AMS. Recommended increasing Zyprexa 5mg PO BID + 5mg BID PRN agitation     -04/02 patient remained agitated despite adjustments in medications.  Dr. Vital evaluated patient today who  [facility]  [address] [facility] [id number] [phone]              Total time in minutes spent coordinating this discharge (includes going over instructions, follow-up, prescriptions, and preparing report for sign off to her PCP) :  35 minutes

## 2024-04-05 NOTE — CARE COORDINATION
04/05/24 1352   Services At/After Discharge   Transition of Care Consult (CM Consult) SNF   Partner SNF No   Reason Why Partner SNF Not Chosen Bed availability   Services At/After Discharge Skilled Nursing Facility (SNF)   [place] Resource Information Provided? No   Mode of Transport at Discharge BLS   Confirm Follow Up Transport Other (see comment)  (Facility)   Condition of Participation: Discharge Planning   The Plan for Transition of Care is related to the following treatment goals: Gain strength, mobility and endurance--SNF. Also likely transition to LTC   The Patient and/or Patient Representative was provided with a Choice of Provider? Patient Representative   Name of the Patient Representative who was provided with the Choice of Provider and agrees with the Discharge Plan?  Chavez Pritchett   The Patient and/Or Patient Representative agree with the Discharge Plan? Yes   Freedom of Choice list was provided with basic dialogue that supports the patient's individualized plan of care/goals, treatment preferences, and shares the quality data associated with the providers?  Yes       Mr. Pritchett is being discharged today to [facility]. Called son Chavez to make him aware but he did NOT answer. Provided him the number of the facility on his voicemail. Lack of communication from son. Last I spoke with him he was okay with his dad going to any facility. Patient/family aware and agree with the discharge plan.       1635: Spoke with son. In agreement with discharge plan. Told him to contact Capri from [place] for further questions/concerns   Transition of Care Plan:     RUR: 16% MODERATE   Prior Level of Functioning: Able to manage ADL's as far as son was aware   Disposition: TBD. SNF/LTC vs AL?   If SNF or IPR: Date FOC offered:   Date FOC received:   Accepting facility:   Date authorization started with reference number:   Date authorization received and expires:   Follow up appointments: Def to  SNF   DME needed:   Transportation at discharge:   IM/IMM Medicare/ letter given: 1st IMM obtained 3/27, 2nd IMM obtained 4/5   Is patient a  and connected with [facility]?               If yes, was  transfer form completed and [facility] notified?   Caregiver Contact:   Discharge Caregiver contacted prior to discharge?   Care Conference needed?   Barriers to discharge: None             Transition of Care Plan to SNF/Rehab    Communication to Patient/Family:  Met with patient and family and they are agreeable to the transition plan. The Plan for Transition of Care is related to the following treatment goals: Gain strength, mobility and endurance     The Patient and/or patient representative was provided with a choice of provider and agrees  with the discharge plan.      Yes [x] No []    A Freedom of choice list was provided with basic dialogue that supports the patient's individualized plan of care/goals and shares the quality data associated with the providers.       Yes [x] No []    SNF/Rehab Transition:  Patient has been accepted to [facility] and meets criteria for admission.   Patient will transported by squad and expected to leave at 1500    Communication to SNF/Rehab:  Bedside , Eusebio, has been notified to update the transition plan to the facility and call report (phone number).    Nursing Please include all hard scripts for controlled substances, med rec and dc summary, and AVS in packet.     Reviewed and confirmed with facility, [facility], can manage the patient care needs for the following:     [place] with (X) only those applicable:  Medication:  [x]Medications are available at the facility  []IV Antibiotics    []Controlled Substance - hard copies available sent.  []Weekly Labs    Equipment:  []CPAP/BiPAP  []Wound Vacuum  []Lopez or Urinary Device  []PICC/Central Line  []Nebulizer  []Ventilator    Treatment:  []Isolation (for MRSA, VRE, etc.)  []Surgical Drain

## 2024-04-05 NOTE — PLAN OF CARE
Problem: Safety - Adult  Goal: Free from fall injury  4/5/2024 1100 by Nicolasa Lim LPN  Outcome: Progressing  4/5/2024 0418 by Vira Lind LPN  Outcome: Progressing     Problem: ABCDS Injury Assessment  Goal: Absence of physical injury  4/5/2024 1100 by Nicolasa Lim LPN  Outcome: Progressing  4/5/2024 0418 by Vira Lind LPN  Outcome: Progressing     Problem: Chronic Conditions and Co-morbidities  Goal: Patient's chronic conditions and co-morbidity symptoms are monitored and maintained or improved  4/5/2024 1100 by Nicolasa Lim LPN  Outcome: Progressing  4/5/2024 0418 by Vira Lind LPN  Outcome: Progressing  Flowsheets (Taken 4/4/2024 2130)  Care Plan - Patient's Chronic Conditions and Co-Morbidity Symptoms are Monitored and Maintained or Improved: Monitor and assess patient's chronic conditions and comorbid symptoms for stability, deterioration, or improvement     Problem: Skin/Tissue Integrity  Goal: Absence of new skin breakdown  Description: 1.  Monitor for areas of redness and/or skin breakdown  2.  Assess vascular access sites hourly  3.  Every 4-6 hours minimum:  Change oxygen saturation probe site  4.  Every 4-6 hours:  If on nasal continuous positive airway pressure, respiratory therapy assess nares and determine need for appliance change or resting period.  4/5/2024 1100 by Nicolasa Lim LPN  Outcome: Progressing  4/5/2024 0418 by Vira Lind LPN  Outcome: Progressing

## 2024-04-05 NOTE — PLAN OF CARE
Problem: Safety - Adult  Goal: Free from fall injury  4/5/2024 0418 by Vira Lind LPN  Outcome: Progressing  4/4/2024 1726 by Laya Adrian RN  Outcome: Progressing     Problem: ABCDS Injury Assessment  Goal: Absence of physical injury  4/5/2024 0418 by Vira Lind LPN  Outcome: Progressing  4/4/2024 1726 by Laya Adrian RN  Outcome: Progressing     Problem: Chronic Conditions and Co-morbidities  Goal: Patient's chronic conditions and co-morbidity symptoms are monitored and maintained or improved  4/5/2024 0418 by Vira Lind LPN  Outcome: Progressing  Flowsheets (Taken 4/4/2024 2130)  Care Plan - Patient's Chronic Conditions and Co-Morbidity Symptoms are Monitored and Maintained or Improved: Monitor and assess patient's chronic conditions and comorbid symptoms for stability, deterioration, or improvement  4/4/2024 1726 by Laya Adrian RN  Outcome: Progressing     Problem: Skin/Tissue Integrity  Goal: Absence of new skin breakdown  Description: 1.  Monitor for areas of redness and/or skin breakdown  2.  Assess vascular access sites hourly  3.  Every 4-6 hours minimum:  Change oxygen saturation probe site  4.  Every 4-6 hours:  If on nasal continuous positive airway pressure, respiratory therapy assess nares and determine need for appliance change or resting period.  4/5/2024 0418 by Vira Lind LPN  Outcome: Progressing  4/4/2024 1726 by Laya Adrian RN  Outcome: Progressing

## 2024-04-05 NOTE — CONSULTS
INTERVAL Hx:  Records and clinical information were reviewed. Not overly sleepy or lethargic this AM--seems to be tolerating the Haldol and VPA adequately. He's very pleasant and cooperative, and hasn't received any PRN's since 2 AM on 4/3. Still processing slowly and seems confused by his surroundings at times, but he also knew his age and a few accurate facts. Likely has some underlying dementia, but I suspect it's mild to moderate. The delirium seems to be clearing nicely now--no reports of any VH's or waxing and waning levels of clarity and alertness. Will continue the current med dosages for now, but watching closely for any sedation in which case the Haldol could be reduced (to 5 mg QHS to start). I will order a [VPA] this AM as well.       MEDS:  Current Facility-Administered Medications   Medication Dose Route Frequency    divalproex (DEPAKOTE) DR tablet 250 mg  250 mg Oral 3 times per day    haloperidol (HALDOL) tablet 5 mg  5 mg Oral BID    haloperidol (HALDOL) tablet 5 mg  5 mg Oral Q6H PRN    haloperidol lactate (HALDOL) injection 5 mg  5 mg IntraMUSCular Q6H PRN    furosemide (LASIX) tablet 20 mg  20 mg Oral BID    lisinopril (PRINIVIL;ZESTRIL) tablet 2.5 mg  2.5 mg Oral Daily    metoprolol tartrate (LOPRESSOR) tablet 25 mg  25 mg Oral Daily    latanoprost (XALATAN) 0.005 % ophthalmic solution 1 drop  1 drop Both Eyes Nightly    ranolazine (RANEXA) extended release tablet 500 mg  500 mg Oral BID    sodium chloride flush 0.9 % injection 5-40 mL  5-40 mL IntraVENous 2 times per day    sodium chloride flush 0.9 % injection 5-40 mL  5-40 mL IntraVENous PRN    0.9 % sodium chloride infusion   IntraVENous PRN    enoxaparin (LOVENOX) injection 40 mg  40 mg SubCUTAneous Daily    polyethylene glycol (GLYCOLAX) packet 17 g  17 g Oral Daily PRN    acetaminophen (TYLENOL) tablet 1,000 mg  1,000 mg Oral Q6H PRN    Or    acetaminophen (TYLENOL) suppository 650 mg  650 mg Rectal Q6H PRN       VITALS: Patient Vitals

## 2024-04-05 NOTE — PROGRESS NOTES
1547: Attempted to give report to [facility] on pt at 1547, phone answered and told to call two other numbers to give report. Attempted to give report to both numbers at 1550, but there was no answer.     1700: Attempted to give report on patient again using both numbers. There was no answer again. Voice message left at 1708. Waiting on call back.     1900:Attempted again to call facility and give report. There was no answer. Voicemail left to facility.

## 2024-04-05 NOTE — PROGRESS NOTES
Contacted [facility] and spoke with Curt to arrange BLS transport for this patient to [facility]. Requested information provided ( Dx, wt, demographics, no monitors,  no O2, no isolation and insurance information given ) Liss MAKI on floor made aware.

## 2024-04-05 NOTE — PROGRESS NOTES
Pt discharged to [facility] by [facility]. Transport via stretcher. Attempted to call and give report with no response. Pt left facility at 1650 with clothes, shoes, and watch from the locker. Pt did have watch placed on wrist by this nurse, watch is secured.

## 2024-04-05 NOTE — PROGRESS NOTES
Contacted [facility] per Robbin [facility] and spoke with Curt to arrange BLS transport for this [place] to [place]. Requested information provided ( Dx, wt, demographics,  no O2, no isolation and insurance information given ) ETA 161Liss Beard made aware.      Cancelled arrangements made earlier with [facility].

## 2024-04-05 NOTE — CARE COORDINATION
Transition of Care Plan:     RUR: 19% MODERATE   Prior Level of Functioning: Able to manage ADL's as far as son was aware   Disposition: TBD. SNF/LTC vs AL?   If SNF or IPR: Date FOC offered:   Date FOC received:   Accepting facility:   Date authorization started with reference number:   Date authorization received and expires:   Follow up appointments:   DME needed:   Transportation at discharge:   IM/IMM Medicare/ letter given: 1st IMM obtained 3/27  Is patient a [place] and connected with [facility]?               If yes, was [place] transfer form completed and [facility] notified?   Caregiver Contact:   Discharge Caregiver contacted prior to discharge?   Care Conference needed?   Barriers to discharge: Mentation          1913 Spoke with Capri from [facility]. They have accepted him. She has NOT heard from son still. She also requested her medical director to get in touch with son. THEY CAN TAKE PATIENT TODAY! Capri said to go ahead and set up transport.  Dr. Torres made aware. Would like echo to be done first. Called [facility] supervisor and made her aware. Called echo tech Viri and made her aware.     1251: Spoke with Capri again. Asked when latest they could accept. She said they can accept whenever. They are 24/7 facility. Just requests that he have his evening medications and dinner before he leaves (if he is leaving that late). He is going to bed 202B which is located on Wing [address]. Number to give report is [phone]

## 2024-04-08 LAB — METHYLMALONATE SERPL-SCNC: 1301 NMOL/L (ref 0–378)

## 2024-04-09 ENCOUNTER — TELEPHONE (OUTPATIENT)
Age: 84
End: 2024-04-09

## 2024-04-09 NOTE — TELEPHONE ENCOUNTER
----- Message from BLANK Pena NP sent at 4/8/2024  1:15 PM EDT -----  Regarding: severe B12 defiiency  Please call Mr. Pritchett.Lab from hospitalization .  He has a severe vit B12 deficiency.  Is he being treated for this?   Thanks! DL   If he has not been treated he will need an appointment   ----- Message -----  From: Adrienne Ramirez Incoming Lab For [facility] Pdfs  Sent: 4/8/2024  12:36 PM EDT  To: BLANK Pena NP

## 2024-04-10 NOTE — PROGRESS NOTES
Late entry   Medication pulled around 0420 Oxycodone 5/325 mg tablet.  This RN stayed at patient bedside until 0512 to encourage patient to take medication, patient refused medication and assistance from this RN to help patient lay in bed was sitting on edge with eyes closed.  This RN remained at bedside to ensure patient safety.  VSC remains in place.  Wasted refused medication with another RN in [facility] and medication disposed as per policy in [facility].

## 2024-04-13 PROBLEM — E53.8 B12 DEFICIENCY: Status: ACTIVE | Noted: 2024-04-13

## (undated) DEVICE — DRAPE PRB US TRNSDCR 6X96IN --

## (undated) DEVICE — CONTAINER SPEC 20 ML LID NEUT BUFF FORMALIN 10 % POLYPR STS

## (undated) DEVICE — COVER LT HNDL BLU PLAS

## (undated) DEVICE — SET ADMIN 16ML TBNG L100IN 2 Y INJ SITE IV PIGGY BK DISP

## (undated) DEVICE — PACK PROCEDURE SURG HRT CATH

## (undated) DEVICE — AIRLIFE™  ADULT CUSHION NASAL CANNULA WITH 7 FOOT (2.1 M) CRUSH-RESISTANT OXYGEN TUBING, AND U/CONNECT-IT ADAPTER: Brand: AIRLIFE™

## (undated) DEVICE — CUSTOM KT PTCA INFL DEV K05 00053H

## (undated) DEVICE — KIT ACCS INTRO 4FR L10CM NDL 21GA L7CM GWIRE L40CM

## (undated) DEVICE — SYR 10ML LUER LOK 1/5ML GRAD --

## (undated) DEVICE — KENDALL RADIOLUCENT FOAM MONITORING ELECTRODE RECTANGULAR SHAPE: Brand: KENDALL

## (undated) DEVICE — DIGIT TRAP FINGER GRASPING DEVICE: Brand: DIGIT TRAP

## (undated) DEVICE — TR BAND RADIAL ARTERY COMPRESSION DEVICE: Brand: TR BAND

## (undated) DEVICE — BLOCK BITE ENDOSCP AD 21 MM W/ DIL BLU LF DISP

## (undated) DEVICE — FORCEPS BX L160CM DIA8MM GRSP DISECT CUP TIP NONLOCKING ROT

## (undated) DEVICE — MICROSURGICAL DILATATION DEVICE: Brand: WOLVERINE CORONARY CUTTING BALLOON

## (undated) DEVICE — CATHETER ANGIO JR4 AD 5 FRX100 CM 25 CM PERFORMA

## (undated) DEVICE — INTRO SHTH 9FR 13X20CM -- USE ITEM# 341577

## (undated) DEVICE — ZIP 8I SURGICAL SKIN CLOSURE DEVICE: Brand: ZIP 8I SURGICAL SKIN CLOSURE DEVICE

## (undated) DEVICE — RECORDER CARD MON REVEAL LINQ MYCARELINK IMPL LINQSYS
Type: IMPLANTABLE DEVICE | Status: NON-FUNCTIONAL
Removed: 2019-03-29

## (undated) DEVICE — TUBING PRSS MON L6IN PVC M FEM CONN

## (undated) DEVICE — SPLINT WR POS F/ARTERIAL ACC -- BX/10

## (undated) DEVICE — GLIDESHEATH SLENDER ACCESS KIT: Brand: GLIDESHEATH SLENDER

## (undated) DEVICE — SUTURE VCRL SZ 2-0 L36IN ABSRB UD L40MM CT 1/2 CIR J957H

## (undated) DEVICE — 3M™ TEGADERM™ TRANSPARENT FILM DRESSING FRAME STYLE, 1626W, 4 IN X 4-3/4 IN (10 CM X 12 CM), 50/CT 4CT/CASE: Brand: 3M™ TEGADERM™

## (undated) DEVICE — Device: Brand: ASAHI SION BLUE

## (undated) DEVICE — NC TREK CORONARY DILATATION CATHETER 2.5 MM X 15 MM / RAPID-EXCHANGE: Brand: NC TREK

## (undated) DEVICE — CATH IV AUTOGRD BC PNK 20GA 25 -- INSYTE

## (undated) DEVICE — HI-TORQUE VERSACORE FLOPPY GUIDE WIRE SYSTEM 145 CM: Brand: HI-TORQUE VERSACORE

## (undated) DEVICE — LIMB HOLDER, WRIST/ANKLE: Brand: DEROYAL

## (undated) DEVICE — BAG SPEC BIOHZRD 10 X 10 IN --

## (undated) DEVICE — DECANTER FLD L85IN IV FLX TBNG CLMP DLP

## (undated) DEVICE — COPILOT BLEEDBACK CONTROL VALVE: Brand: COPILOT

## (undated) DEVICE — RADIFOCUS OPTITORQUE ANGIOGRAPHIC CATHETER: Brand: OPTITORQUE

## (undated) DEVICE — Device

## (undated) DEVICE — MINI TREK CORONARY DILATATION CATHETER 2.0 MM X 15 MM / RAPID-EXCHANGE: Brand: MINI TREK

## (undated) DEVICE — GUIDEWIRE VASC L260CM 0.035IN J TIP L3MM PTFE FIX COR NAMIC

## (undated) DEVICE — CATH GUID COR JR4.0 6FR 100CM -- LAUNCHER

## (undated) DEVICE — SYR POWER 150ML 8IN FILL TUBE --

## (undated) DEVICE — TREK CORONARY DILATATION CATHETER 2.50 MM X 15 MM / RAPID-EXCHANGE: Brand: TREK

## (undated) DEVICE — CATHETER ETER ANGIO L110CM OD5FR ID046IN L75CM 038IN 145DEG CARD

## (undated) DEVICE — SYR 3ML LL TIP 1/10ML GRAD --

## (undated) DEVICE — TOWEL 4 PLY TISS 19X30 SUE WHT

## (undated) DEVICE — SUT SLK 0 30IN SH BLK --

## (undated) DEVICE — ANGIOGRAPHY KIT CUST [K0910930B] [MERIT MEDICAL SYSTEMS INC]

## (undated) DEVICE — INTRO SHTH 7FR 13X20CM -- TEARAWAY

## (undated) DEVICE — GUIDE 5FR  JR4.0 100CM

## (undated) DEVICE — GUIDE CATH CONVEY 5FR JR4 -- 39228-686

## (undated) DEVICE — SUTURE VCRL 2-0 L27IN ABSRB UD PS-2 L19MM 1/2 CIR J428H

## (undated) DEVICE — SYR ART 700 CLEAR MARK 7 -- ARTERION

## (undated) DEVICE — SYRINGE ANGIO 10 CC BRL STD PRNT POLYCARB LT BLU MEDALLION

## (undated) DEVICE — REM POLYHESIVE ADULT PATIENT RETURN ELECTRODE: Brand: VALLEYLAB

## (undated) DEVICE — CATHETER ANGIO IM AD 5 FRX100 CM PERFORMA

## (undated) DEVICE — NEEDLE HYPO 18GA L1.5IN PNK S STL HUB POLYPR SHLD REG BVL

## (undated) DEVICE — Z DISCONTINUED PER MEDLINE LINE GAS SAMPLING O2/CO2 LNG AD 13 FT NSL W/ TBNG FILTERLINE

## (undated) DEVICE — 3M™ IOBAN™ 2 ANTIMICROBIAL INCISE DRAPE 6640EZ: Brand: IOBAN™ 2

## (undated) DEVICE — BASIN EMSIS 16OZ GRAPHITE PLAS KID SHP MOLD GRAD FOR ORAL

## (undated) DEVICE — TREK CORONARY DILATATION CATHETER 2.25 MM X 12 MM / RAPID-EXCHANGE: Brand: TREK

## (undated) DEVICE — NEONATAL-ADULT SPO2 SENSOR: Brand: NELLCOR

## (undated) DEVICE — SET ADMIN IV PMP 20GTT 117IN -- 2 NDLS INJ SITE

## (undated) DEVICE — CATHETER ANGIO JL4 AD 5 FRX100 CM PERFORMA

## (undated) DEVICE — CATHETER ANGIO JL3.5 AD 5 FRX100 CM PERFORMA

## (undated) DEVICE — MEDI-TRACE CADENCE ADULT, DEFIBRILLATION ELECTRODE -RTS  (10 PR/PK) - PHYSIO-CONTROL: Brand: MEDI-TRACE CADENCE

## (undated) DEVICE — PACEMAKER PACK: Brand: MEDLINE INDUSTRIES, INC.

## (undated) DEVICE — MEDI-VAC YANK SUCT HNDL W/TPRD BULBOUS TIP: Brand: CARDINAL HEALTH

## (undated) DEVICE — SOLIDIFIER MEDC 1200ML -- CONVERT TO 356117

## (undated) DEVICE — 1200 GUARD II KIT W/5MM TUBE W/O VAC TUBE: Brand: GUARDIAN